# Patient Record
Sex: FEMALE | Race: WHITE | Employment: FULL TIME | URBAN - METROPOLITAN AREA
[De-identification: names, ages, dates, MRNs, and addresses within clinical notes are randomized per-mention and may not be internally consistent; named-entity substitution may affect disease eponyms.]

---

## 2021-09-20 RX ORDER — AMANTADINE HYDROCHLORIDE 100 MG/1
200 CAPSULE, GELATIN COATED ORAL 2 TIMES DAILY
COMMUNITY
Start: 2021-07-27 | End: 2021-11-24

## 2021-09-20 RX ORDER — ONDANSETRON 8 MG/1
8 TABLET, ORALLY DISINTEGRATING ORAL
COMMUNITY
Start: 2021-08-23

## 2021-09-20 RX ORDER — LEVETIRACETAM 250 MG/1
250 TABLET ORAL 2 TIMES DAILY
COMMUNITY
Start: 2021-08-26 | End: 2021-11-24

## 2021-09-20 RX ORDER — TRAZODONE HYDROCHLORIDE 50 MG/1
50 TABLET ORAL AT BEDTIME
COMMUNITY
Start: 2021-08-17 | End: 2021-09-30

## 2021-09-20 NOTE — PERIOP NOTES
PRE-SURGICAL INSTRUCTIONS        Patient's Name:  Cici Kamara      YXDIANA Date:  9/20/2021            Covid Testing Date and Time:    Surgery Date:  9/22/2021                1. Do NOT eat or drink anything, including candy, gum, or ice chips after midnight on 9/20/2021, unless you have specific instructions from your surgeon or anesthesia provider to do so.  2. You may brush your teeth before coming to the hospital.  3. No smoking 24 hours prior to the day of surgery. 4. No alcohol 24 hours prior to the day of surgery. 5. No recreational drugs for one week prior to the day of surgery. 6. Leave all valuables, including money/purse, at home. 7. Remove all jewelry, nail polish, acrylic nails, and makeup (including mascara); no lotions powders, deodorant, or perfume/cologne/after shave on the skin. 8. Follow instruction for Hibiclens washes and CHG wipes from surgeon's office. 9. Glasses/contact lenses and dentures may be worn to the hospital.  They will be removed prior to surgery. 10. Call your doctor if symptoms of a cold or illness develop within 24-48 hours prior to your surgery. 11.  If you are having an outpatient procedure, please make arrangements for a responsible ADULT TO [address] [place] [address] and stay with you for 24 hours after your surgery. 12. ONE VISITOR in the hospital at this time for outpatient procedures. Exceptions may be made for surgical admissions, per nursing unit guidelines      Special Instructions:      Bring list of CURRENT medications. Bring any pertinent legal medical records. Take these medications the morning of surgery with a sip of water:  As directed by MD  Follow physician instructions about stopping anticoagulants. On the day of surgery, come in the main entrance of [facility]. Let the  at the desk know you are there for surgery. A staff member will come escort you to the surgical area on the second floor.     If you have any questions or concerns, please do not hesitate to call:     (Prior to the day of surgery) [facility] department:  [phone]   (Day of surgery) Pre-Op department:  [phone]    These surgical instructions were reviewed with Zachariah Lim during the [facility] phone call.

## 2021-09-21 ENCOUNTER — ANESTHESIA EVENT (OUTPATIENT)
Dept: ENDOSCOPY | Age: 56
End: 2021-09-21
Payer: COMMERCIAL

## 2021-09-22 ENCOUNTER — HOSPITAL ENCOUNTER (OUTPATIENT)
Age: 56
Setting detail: OUTPATIENT SURGERY
Discharge: HOME | End: 2021-09-22
Attending: INTERNAL MEDICINE | Admitting: INTERNAL MEDICINE
Payer: COMMERCIAL

## 2021-09-22 ENCOUNTER — ANESTHESIA (OUTPATIENT)
Dept: ENDOSCOPY | Age: 56
End: 2021-09-22
Payer: COMMERCIAL

## 2021-09-22 VITALS
OXYGEN SATURATION: 99 % | TEMPERATURE: 97.4 F | HEART RATE: 80 BPM | WEIGHT: 148 LBS | HEIGHT: 62 IN | RESPIRATION RATE: 20 BRPM | SYSTOLIC BLOOD PRESSURE: 99 MMHG | DIASTOLIC BLOOD PRESSURE: 67 MMHG | BODY MASS INDEX: 27.23 KG/M2

## 2021-09-22 LAB
COVID-19 RAPID TEST, COVR: NOT DETECTED
HCG UR QL: NEGATIVE
POTASSIUM SERPL-SCNC: 2.6 MMOL/L (ref 3.5–5.5)
POTASSIUM SERPL-SCNC: 3 MMOL/L (ref 3.5–5.5)
SOURCE, COVRS: NORMAL

## 2021-09-22 PROCEDURE — 74011250637 HC RX REV CODE- 250/637: Performed by: ANESTHESIOLOGY

## 2021-09-22 PROCEDURE — 74011250636 HC RX REV CODE- 250/636: Performed by: NURSE ANESTHETIST, CERTIFIED REGISTERED

## 2021-09-22 PROCEDURE — 74011000250 HC RX REV CODE- 250: Performed by: ANESTHESIOLOGY

## 2021-09-22 PROCEDURE — 2709999900 HC NON-CHARGEABLE SUPPLY: Performed by: INTERNAL MEDICINE

## 2021-09-22 PROCEDURE — 74011250636 HC RX REV CODE- 250/636: Performed by: ANESTHESIOLOGY

## 2021-09-22 PROCEDURE — 77030019988 HC FCPS ENDOSC DISP BSC -B: Performed by: INTERNAL MEDICINE

## 2021-09-22 PROCEDURE — 87635 SARS-COV-2 COVID-19 AMP PRB: CPT

## 2021-09-22 PROCEDURE — 77030008565 HC TBNG SUC IRR ERBE -B: Performed by: INTERNAL MEDICINE

## 2021-09-22 PROCEDURE — 84132 ASSAY OF SERUM POTASSIUM: CPT

## 2021-09-22 PROCEDURE — 76060000031 HC ANESTHESIA FIRST 0.5 HR: Performed by: INTERNAL MEDICINE

## 2021-09-22 PROCEDURE — 00731 ANES UPR GI NDSC PX NOS: CPT | Performed by: NURSE ANESTHETIST, CERTIFIED REGISTERED

## 2021-09-22 PROCEDURE — 74011000250 HC RX REV CODE- 250: Performed by: NURSE ANESTHETIST, CERTIFIED REGISTERED

## 2021-09-22 PROCEDURE — 76040000019: Performed by: INTERNAL MEDICINE

## 2021-09-22 PROCEDURE — 00731 ANES UPR GI NDSC PX NOS: CPT | Performed by: ANESTHESIOLOGY

## 2021-09-22 PROCEDURE — 36415 COLL VENOUS BLD VENIPUNCTURE: CPT

## 2021-09-22 PROCEDURE — 88305 TISSUE EXAM BY PATHOLOGIST: CPT

## 2021-09-22 PROCEDURE — 81025 URINE PREGNANCY TEST: CPT

## 2021-09-22 RX ORDER — ONDANSETRON 2 MG/ML
4 INJECTION INTRAMUSCULAR; INTRAVENOUS ONCE
Status: COMPLETED | OUTPATIENT
Start: 2021-09-22 | End: 2021-09-22

## 2021-09-22 RX ORDER — ONDANSETRON 4 MG/1
4 TABLET, FILM COATED ORAL ONCE
Status: DISCONTINUED | OUTPATIENT
Start: 2021-09-22 | End: 2021-09-22

## 2021-09-22 RX ORDER — LIDOCAINE HYDROCHLORIDE 10 MG/ML
0.1 INJECTION, SOLUTION EPIDURAL; INFILTRATION; INTRACAUDAL; PERINEURAL AS NEEDED
Status: DISCONTINUED | OUTPATIENT
Start: 2021-09-22 | End: 2021-09-22 | Stop reason: HOSPADM

## 2021-09-22 RX ORDER — POTASSIUM CHLORIDE 7.45 MG/ML
10 INJECTION INTRAVENOUS ONCE
Status: COMPLETED | OUTPATIENT
Start: 2021-09-22 | End: 2021-09-22

## 2021-09-22 RX ORDER — POTASSIUM CHLORIDE 20 MEQ/1
20 TABLET, EXTENDED RELEASE ORAL
Status: DISCONTINUED | OUTPATIENT
Start: 2021-09-22 | End: 2021-09-22

## 2021-09-22 RX ORDER — PROPOFOL 10 MG/ML
INJECTION, EMULSION INTRAVENOUS AS NEEDED
Status: DISCONTINUED | OUTPATIENT
Start: 2021-09-22 | End: 2021-09-22 | Stop reason: HOSPADM

## 2021-09-22 RX ORDER — ONDANSETRON 2 MG/ML
4 INJECTION INTRAMUSCULAR; INTRAVENOUS ONCE
Status: CANCELLED | OUTPATIENT
Start: 2021-09-22 | End: 2021-09-22

## 2021-09-22 RX ORDER — POTASSIUM CHLORIDE 750 MG/1
10 TABLET, EXTENDED RELEASE ORAL
Status: DISCONTINUED | OUTPATIENT
Start: 2021-09-22 | End: 2021-09-22

## 2021-09-22 RX ORDER — SODIUM CHLORIDE 0.9 % (FLUSH) 0.9 %
5-40 SYRINGE (ML) INJECTION EVERY 8 HOURS
Status: CANCELLED | OUTPATIENT
Start: 2021-09-22

## 2021-09-22 RX ORDER — LIDOCAINE HYDROCHLORIDE 20 MG/ML
INJECTION, SOLUTION EPIDURAL; INFILTRATION; INTRACAUDAL; PERINEURAL AS NEEDED
Status: DISCONTINUED | OUTPATIENT
Start: 2021-09-22 | End: 2021-09-22 | Stop reason: HOSPADM

## 2021-09-22 RX ORDER — INSULIN LISPRO 100 [IU]/ML
INJECTION, SOLUTION INTRAVENOUS; SUBCUTANEOUS ONCE
Status: DISCONTINUED | OUTPATIENT
Start: 2021-09-22 | End: 2021-09-22 | Stop reason: HOSPADM

## 2021-09-22 RX ORDER — NALOXONE HYDROCHLORIDE 0.4 MG/ML
0.4 INJECTION, SOLUTION INTRAMUSCULAR; INTRAVENOUS; SUBCUTANEOUS AS NEEDED
Status: CANCELLED | OUTPATIENT
Start: 2021-09-22

## 2021-09-22 RX ORDER — SODIUM CHLORIDE, SODIUM LACTATE, POTASSIUM CHLORIDE, CALCIUM CHLORIDE 600; 310; 30; 20 MG/100ML; MG/100ML; MG/100ML; MG/100ML
25 INJECTION, SOLUTION INTRAVENOUS CONTINUOUS
Status: DISCONTINUED | OUTPATIENT
Start: 2021-09-22 | End: 2021-09-22 | Stop reason: HOSPADM

## 2021-09-22 RX ORDER — SODIUM CHLORIDE 0.9 % (FLUSH) 0.9 %
5-40 SYRINGE (ML) INJECTION AS NEEDED
Status: CANCELLED | OUTPATIENT
Start: 2021-09-22

## 2021-09-22 RX ORDER — POTASSIUM CHLORIDE 14.9 MG/ML
10 INJECTION INTRAVENOUS ONCE
Status: DISCONTINUED | OUTPATIENT
Start: 2021-09-22 | End: 2021-09-22

## 2021-09-22 RX ORDER — ONDANSETRON 2 MG/ML
INJECTION INTRAMUSCULAR; INTRAVENOUS AS NEEDED
Status: DISCONTINUED | OUTPATIENT
Start: 2021-09-22 | End: 2021-09-22 | Stop reason: HOSPADM

## 2021-09-22 RX ORDER — FENTANYL CITRATE 50 UG/ML
25 INJECTION, SOLUTION INTRAMUSCULAR; INTRAVENOUS AS NEEDED
Status: CANCELLED | OUTPATIENT
Start: 2021-09-22

## 2021-09-22 RX ADMIN — FAMOTIDINE: 10 INJECTION INTRAVENOUS at 09:09

## 2021-09-22 RX ADMIN — LIDOCAINE HYDROCHLORIDE 100 MG: 20 INJECTION, SOLUTION EPIDURAL; INFILTRATION; INTRACAUDAL; PERINEURAL at 12:46

## 2021-09-22 RX ADMIN — SODIUM CHLORIDE, SODIUM LACTATE, POTASSIUM CHLORIDE, AND CALCIUM CHLORIDE 25 ML/HR: 600; 310; 30; 20 INJECTION, SOLUTION INTRAVENOUS at 08:22

## 2021-09-22 RX ADMIN — ONDANSETRON 4 MG: 2 INJECTION INTRAMUSCULAR; INTRAVENOUS at 12:46

## 2021-09-22 RX ADMIN — POTASSIUM BICARBONATE 25 MEQ: 978 TABLET, EFFERVESCENT ORAL at 10:53

## 2021-09-22 RX ADMIN — POTASSIUM CHLORIDE 10 MEQ: 7.46 INJECTION, SOLUTION INTRAVENOUS at 10:53

## 2021-09-22 RX ADMIN — PROPOFOL 100 MG: 10 INJECTION, EMULSION INTRAVENOUS at 12:51

## 2021-09-22 RX ADMIN — PROPOFOL 100 MG: 10 INJECTION, EMULSION INTRAVENOUS at 12:46

## 2021-09-22 RX ADMIN — ONDANSETRON 4 MG: 2 INJECTION INTRAMUSCULAR; INTRAVENOUS at 10:55

## 2021-09-22 NOTE — DISCHARGE INSTRUCTIONS
Upper GI Endoscopy: What to Expect at [address] [place] [address]  After you have an endoscopy, you will stay at the hospital or clinic for 1 to 2 hours. This will allow the medicine to wear off. You will be able to go home after your doctor or nurse checks to make sure you are not having any problems. You may have to stay overnight if you had treatment during the test. You may have a sore throat for a day or two after the test.  This care sheet gives you a general idea about what to expect after the test.  How can you care for yourself at home? Activity  · Rest as much as you need to after you go home. · You should be able to go back to your usual activities the day after the test.  Diet  · Follow your doctor's directions for eating after the test.  · Drink plenty of fluids (unless your doctor has told you not to). Medications  · If you have a sore throat the day after the test, use an over-the-counter spray to numb your throat. Follow-up care is a key part of your treatment and safety. Be sure to make and go to all appointments, and call your doctor if you are having problems. It's also a good idea to know your test results and keep a list of the medicines you take. When should you call for help? Call 911 anytime you think you may need emergency care. For example, call if:  · You passed out (lost consciousness). · You cough up blood. · You vomit blood or what looks like coffee grounds. · You pass maroon or very bloody stools. Call your doctor now or seek immediate medical care if:  · You have trouble swallowing. · You have belly pain. · Your stools are black and tarlike or have streaks of blood. · You are sick to your stomach or cannot keep fluids down. Watch closely for changes in your health, and be sure to contact your doctor if:  · Your throat still hurts after a day or two. · You do not get better as expected. Where can you learn more?    Go to DealExplorer.be  Enter J454 in the search box to learn more about \"Upper GI Endoscopy: What to Expect at Home. \"   © 5830-5191 Healthwise, Incorporated. Care instructions adapted under license by [employer] (which disclaims liability or warranty for this information). This care instruction is for use with your licensed healthcare professional. If you have questions about a medical condition or this instruction, always ask your healthcare professional. [address] any warranty or liability for your use of this information. Content Version: 65.3.271923; Current as of: November 14, 2014    Patient Education        Esophageal Dilation: What to Expect at [address] had an esophageal dilation. This procedure can open up narrow areas of the esophagus. After the procedure, you will stay at the hospital or surgery center for 1 to 2 hours. This will allow the medicine to wear off. You will be able to go home after your doctor or nurse checks to make sure that you're not having any problems. This care sheet gives you a general idea about how long it will take for you to recover. But each person recovers at a different pace. Follow the steps below to get better as quickly as possible. How can you care for yourself at home? Activity    · Rest as much as you need to after you go home.     · You should be able to go back to your usual activities the day after the procedure. Diet    · Follow your doctor's directions for eating after the procedure.     · Drink plenty of fluids (unless your doctor has told you not to). Medicines    · Your doctor will tell you if and when you can restart your medicines. He or she will also give you instructions about taking any new medicines.     · If you take aspirin or some other blood thinner, ask your doctor if and when to start taking it again.  Make sure that you understand exactly what your doctor wants you to do.     · If you have a sore throat the day after the procedure, use an over-the-counter spray to numb your throat. Sucking on throat lozenges and gargling with warm salt water may also help relieve your symptoms. Follow-up care is a key part of your treatment and safety. Be sure to make and go to all appointments, and call your doctor if you are having problems. It's also a good idea to know your test results and keep a list of the medicines you take. When should you call for help? Call 911 anytime you think you may need emergency care. For example, call if:    · You passed out (lost consciousness).     · You have trouble breathing.     · Your stools are maroon or very bloody   Call your doctor now or seek immediate medical care if:    · You have new or worse belly pain.     · You have a fever.     · You have new or more blood in your stools.     · You are sick to your stomach and cannot drink fluids.     · You cannot pass stools or gas.     · You have pain that does not get better after you take pain medicine. Watch closely for changes in your health, and be sure to contact your doctor if:    · Your throat still hurts after a day or two.     · You do not get better as expected. Where can you learn more? Go to http://www.gray.com/  Enter J014 in the search box to learn more about \"Esophageal Dilation: What to Expect at Home. \"  Current as of: February 10, 2021               Content Version: 13.0  © 7913-9818 Healthwise, Incorporated. Care instructions adapted under license by [employer] (which disclaims liability or warranty for this information). If you have questions about a medical condition or this instruction, always ask your healthcare professional. [place] [address] any warranty or liability for your use of this information. Patient Education        Learning About Swallowing Problems  What are swallowing problems? Certain health problems that affect the nervous system can cause trouble swallowing. These conditions include stroke, ALS (also known as Margarita Gehrig's disease), Parkinson's disease, and multiple sclerosis. The muscles and nerves that help move food through the throat and esophagus may not work right. Growths, such as cancer, and other problems with your esophagus can also make it hard to swallow. The esophagus is the tube that leads from your throat to your stomach. How are swallowing problems diagnosed? A doctor or speech therapist will examine you to check for swallowing problems. You may get swallowing tests to check how well your throat muscles work. For these tests, you swallow a special liquid that helps the doctor see your throat and esophagus on an X-ray or video screen. Other tests use a thin, flexible tube called a scope to check for problems with your esophagus. The doctor puts the scope in your mouth and down your throat to look at your esophagus. What are the symptoms? Symptoms of swallowing problems may include:  · Trouble getting food or liquids to go down on the first try. · Gagging, choking, or coughing when you swallow. · Having food or liquids come back up through your throat, mouth, or nose after you swallow. · Feeling like foods or liquids are stuck in some part of your throat or chest.  · Pain when you swallow. How are swallowing problems treated? How swallowing problems are treated depends on the cause. The main goals of treatment will be to help you eat and swallow safely and get good nutrition. This is important for your health and quality of life. You may learn exercises to train your throat muscles to work together so you're able to swallow better. Learning certain ways to put food in your mouth or to position your head while eating may also help. Your doctor or a speech therapist may recommend changes to your diet to help make it easier to swallow. You may need to avoid certain foods or liquids.  You also may need to change the thickness of foods or liquids in your diet. To eat and swallow safely, follow any instructions you get from your doctor or therapist. These ideas may help:  · Sit upright when eating, drinking, and taking pills. · Take small bites of food. Chew completely and swallow before taking another bite. · Take small sips of liquids. · If eating makes you tired, eat smaller but more frequent meals. · Tip your chin down when there is food in your mouth. Where can you learn more? Go to http://www.gray.com/  Enter D018 in the search box to learn more about \"Learning About Swallowing Problems. \"  Current as of: July 1, 2021               Content Version: 13.0  © 3823-7402 [employer]. Care instructions adapted under license by [employer] (which disclaims liability or warranty for this information). If you have questions about a medical condition or this instruction, always ask your healthcare professional. [place] [address] any warranty or liability for your use of this information. DISCHARGE SUMMARY from Nurse     POST-PROCEDURE INSTRUCTIONS:    Call your Physician if you:  ? Observe any excess bleeding. ? Develop a temperature over 100.5o F.  ? Experience abdominal, shoulder or chest pain. ? Notice any signs of decreased circulation or nerve impairment to an extremity such as a change in color, persistent numbness, tingling, coldness or increase in pain. ? Vomit blood or you have nausea and vomiting lasting longer than 4 hours. ? Are unable to take medications. ? Are unable to urinate within 8 hours after discharge following general anesthesia or intravenous sedation. For the next 24 hours after receiving general anesthesia or intravenous sedation, or while taking prescription Narcotics, limit your activities:  ? Do NOT drive a motor vehicle, operate hazard machinery or power tools, or perform tasks that require coordination.   The medication you received during your procedure may have some effect on your mental awareness. ? Do NOT make important personal or business decisions. The medication you received during your procedure may have some effect on your mental awareness. ? Do NOT drink alcoholic beverages. These drinks do not mix well with the medications that have been given to you. ? Upon discharge from the hospital, you must be accompanied by a responsible adult. ? Resume your diet as directed by your physician. ? Resume medications as your physician has prescribed. ? Please give a list of your current medications to your Primary Care Provider. ? Please update this list whenever your medications are discontinued, doses are changed, or new medications (including over-the-counter products) are added. ? Please carry medication information at all times in case of emergency situations. These are general instructions for a healthy lifestyle:    No smoking/ No tobacco products/ Avoid exposure to second hand smoke.  Surgeon General's Warning:  Quitting smoking now greatly reduces serious risk to your health. Obesity, smoking, and a sedentary lifestyle greatly increase your risk for illness.  A healthy diet, regular physical exercise & weight monitoring are important for maintaining a healthy lifestyle   You may be retaining fluid if you have a history of heart failure or if you experience any of the following symptoms:  Weight gain of 3 pounds or more overnight or 5 pounds in a week, increased swelling in our hands or feet or shortness of breath while lying flat in bed. Please call your doctor as soon as you notice any of these symptoms; do not wait until your next office visit. Colorectal Screening   Colorectal cancer almost always develops from precancerous polyps (abnormal growths) in the colon or rectum. Screening tests can find precancerous polyps, so that they can be removed before they turn into cancer.  Screening tests can also find colorectal cancer early, when treatment works best.  [facility] Speak with your physician about when you should begin screening and how often you should be tested  [facility]   Additional Information    Educational references and/or instructions provided during this visit included:    See attached. APPOINTMENTS:    Per MD Instruction. Discharge information has been reviewed with the patient. The patient verbalized understanding.

## 2021-09-22 NOTE — H&P
[facility]    [email or web address]      Impression:   1.dyspahgia       Plan:     1. egd dil mac all risks benfits and alt discussed       Chief Complaint: dyspahgai       HPI:  Tolu Curry is a 54 y.o. female who is being seen on consult for dysphagia . PMH:   Past Medical History:   Diagnosis Date    Arthritis     Diarrhea     no diarrhea since 9/14/2021    Encephalitis 06/14/2021    intubated    GERD (gastroesophageal reflux disease)     H. pylori infection 2019    IBS (irritable bowel syndrome)     Memory difficulty     since encephalitis    Migraine     Ovarian cyst 2019    Seizures ([address] 06/16/2021    x 1    Vomiting        PSH:   Past Surgical History:   Procedure Laterality Date    HX BREAST AUGMENTATION  2005    HX CHOLECYSTECTOMY      HX COLONOSCOPY  2016    HX ENDOSCOPY  2019    HX TYMPANOPLASTY      left x 5 times       Social HX:   Social History     Socioeconomic History    Marital status:      Spouse name: Not on file    Number of children: Not on file    Years of education: Not on file    Highest education level: Not on file   Occupational History    Not on file   Tobacco Use    Smoking status: Never Smoker    Smokeless tobacco: Never Used   Substance and Sexual Activity    Alcohol use: Yes     Alcohol/week: 1.0 standard drinks     Types: 1 Glasses of wine per week     Comment: occasional    Drug use: Never    Sexual activity: Not on file   Other Topics Concern    Not on file   Social History Narrative    Not on file     Social Determinants of Health     Financial Resource Strain:     Difficulty of Paying Living Expenses:    Food Insecurity:     Worried About Running Out of Food in the Last Year:     [address] in the Last Year:    Transportation Needs:     Lack of Transportation (Medical):      Lack of Transportation (Non-Medical):    Physical Activity:     Days of Exercise per Week:     Minutes of Exercise per Session:    Stress:     Feeling of Stress :    Social Connections:     Frequency of Communication with Friends and Family:     Frequency of Social Gatherings with Friends and Family:     Attends Yaz[employer] Services:     Active Member of Clubs or Organizations:     Attends Club or Organization Meetings:     Marital Status:    Intimate Partner Violence:     Fear of Current or Ex-Partner:     Emotionally Abused:     Physically Abused:     Sexually Abused:        FHX:   History reviewed. No pertinent family history. Allergy:   Allergies   Allergen Reactions    Minocycline Other (comments)     dizzy       Home Medications:     Medications Prior to Admission   Medication Sig    levETIRAcetam (KEPPRA) 250 mg tablet Take 250 mg by mouth two (2) times a day.  amantadine HCL (SYMMETREL) 100 mg capsule Take 200 mg by mouth two (2) times a day.  traZODone (DESYREL) 50 mg tablet Take 50 mg by mouth At bedtime.  ondansetron (ZOFRAN ODT) 8 mg disintegrating tablet Take 8 mg by mouth every eight (8) hours as needed. Review of Systems:     Constitutional: No fevers, chills, weight loss, fatigue. Skin: No rashes, pruritis, jaundice, ulcerations, erythema. HENT: No headaches, nosebleeds, sinus pressure, rhinorrhea, sore throat. Eyes: No visual changes, blurred vision, eye pain, photophobia, jaundice. Cardiovascular: No chest pain, heart palpitations. Respiratory: No cough, SOB, wheezing, chest discomfort, orthopnea. Gastrointestinal: dyspahgia    Genitourinary: No dysuria, bleeding, discharge, pyuria. Musculoskeletal: No weakness, arthralgias, wasting. Endo: No sweats. Heme: No bruising, easy bleeding. Allergies: As noted. Neurological: Cranial nerves intact. Alert and oriented. Gait not assessed. Psychiatric:  No anxiety, depression, hallucinations.                  Visit Vitals  Ht 5' 2\" (1.575 m)   Wt 68 kg (150 lb)   LMP 04/01/2019 Comment: unknown   BMI 27.44 kg/m²       Physical Assessment:     constitutional: appearance: well developed, well nourished, normal habitus, no deformities, in no acute distress. skin: inspection: no rashes, ulcers, icterus or other lesions; no clubbing or telangiectasias. palpation: no induration or subcutaneos nodules. eyes: inspection: normal conjunctivae and lids; no jaundice pupils: symmetrical, normoreactive to light, normal accommodation and size. ENMT: mouth: normal oral mucosa,lips and gums; good dentition. oropharynx: normal tongue, hard and soft palate; posterior pharynx without erythema, exudate or lesions. neck: no masses organomegaly or tenderness. respiratory: effort: normal chest excursion; no intercostal retraction or accessory muscle use. cardiovascular: abdominal aorta: normal size and position; no bruits. palpation: PMI of normal size and position; normal rhythm; no thrill or murmurs. abdominal: abdomen: normal consistency; no tenderness or masses. hernias: no hernias appreciated. liver: normal size and consistency. spleen: not palpable. rectal: hemoccult/guaiac: not performed. musculoskeletal: no deformities or muscle wasting   lymphatic: axilae: not palpable. groin: not palpable. neck: within normal limits. other: not palpable. neurologic: cranial nerves: II-XII normal.   psychiatric: judgement/insight: within normal limits. memory: within normal limits for recent and remote events. mood and affect: no evidence of depression, anxiety or agitation. orientation: oriented to time, space and person. Basic Metabolic Profile   No results for input(s): NA, K, CL, CO2, BUN, GLU, CA, MG, PHOS in the last 72 hours. No lab exists for component: CREAT      CBC w/Diff    No results for input(s): WBC, RBC, HGB, HCT, MCV, MCH, MCHC, RDW, PLT, HGBEXT, HCTEXT, PLTEXT in the last 72 hours.     No lab exists for component: MPV No results for input(s): GRANS, LYMPH, EOS, PRO, MYELO, METAS, BLAST in the last 72 hours. No lab exists for component: MONO, BASO     Hepatic Function   No results for input(s): ALB, TP, TBILI, AP, AML, LPSE in the last 72 hours. No lab exists for component: DBILI, GPT, SGOT       Gamaliel Malhotra MD, M.D. [facility], [address] [place] [address]  [email or web address]

## 2021-09-22 NOTE — ANESTHESIA PREPROCEDURE EVALUATION
Anesthetic History     PONV          Review of Systems / Medical History  Patient summary reviewed, nursing notes reviewed and pertinent labs reviewed    Pulmonary  Within defined limits                 Neuro/Psych     seizures        Comments: 06/2021 Encephalitis, residual left weakness Cardiovascular  Within defined limits                     GI/Hepatic/Renal     GERD           Endo/Other  Within defined limits           Other Findings            Physical Exam    Airway  Mallampati: I  TM Distance: 4 - 6 cm  Neck ROM: normal range of motion   Mouth opening: Normal     Cardiovascular    Rhythm: regular           Dental    Dentition: Upper dentition intact and Lower dentition intact     Pulmonary  Breath sounds clear to auscultation               Abdominal  GI exam deferred       Other Findings            Anesthetic Plan    ASA: 3  Anesthesia type: MAC            Anesthetic plan and risks discussed with: Patient

## 2021-09-22 NOTE — PROGRESS NOTES
Apply ice 20 minutes at a time for comfort. May take Tylenol or Ibuprofen as needed for pain. Weight bearing as tolerated. Follow up with PCP in 7-10 days if foot is not improving. For rash, start giving Children's Claritin daily to help with itching. May apply anti-itch cream as needed. Monitor for signs of infection. Follow up with PCP if rash does not improve in 7 days, sooner if rash worsens.     Foot Sprain    A sprain is a stretching or tearing of the ligaments that hold a joint together. There are no broken bones. Sprains generally take from 3-6 weeks to heal. A sprain may be treated with a splint, walking cast, or special boot. Mild sprains may not need any additional support.  Home care  The following guidelines will help you care for your injury at home:  · Keep your leg elevated when sitting or lying down. This is very important during the first 48 hours to reduce swelling. Stay off the injured foot as much as possible until you can walk on it without pain. If needed, you may use crutches during the first week for this purpose. Crutches can be rented at many pharmacies or surgical/orthopedic supply stores.  · You may be given a cast shoe to wear to prevent movement in your foot. If not, you can use a sandal or any shoe that does not put pressure on the injured area until the swelling and pain go away. If using a sandal, be careful not to hit your foot against anything, since another injury could make the sprain worse.  · Apply an ice pack over the injured area for 15 to 20 minutes every 3 to 6 hours. You should do this for the first 24 to 48 hours. You can make an ice pack by filling a plastic bag that seals at the top with ice cubes and then wrapping it with a thin towel. Continue to use ice packs for relief of pain and swelling as needed. As the ice melts, avoid getting any wrap, splint, or cast wet. After 48 hours, apply heat from a warm shower or bath for 20 minutes several times  Potassium very low , anesthesia ordered oral and iv re[placement and we are cleared to proceed. daily. Alternating ice and heat may also be helpful.  · You may use over-the-counter pain medicine to control pain, unless another medicine was prescribed. If you have chronic liver or kidney disease or ever had a stomach ulcer or GI bleeding, talk with your healthcare provider before using these medicines.  · If you were given a splint or cast, keep it dry. Bathe with your splint or cast well out of the water, protected with 2 large plastic bags, rubber-banded at the top end. If a fiberglass splint or cast gets wet, you can dry it with a hair dryer.  · You may return to sports after healing, when you can run without pain.  Follow-up care  Follow up with your healthcare provider as directed. Sometimes fractures don’t show up on the first X-ray. Bruises and sprains can sometimes hurt as much as a fracture. These injuries can take time to heal completely. If your symptoms don’t improve or they get worse, talk with your healthcare provider. You may need a repeat X-ray.  When to seek medical advice  Call your healthcare provider right away if any of these occur:  · The plaster cast or splint gets wet or soft  · The fiberglass cast or splint gets wet and does not dry for 24 hours  · Pain or swelling increases, or redness appears  · A bad odor comes from within the cast  · Fever of 100.4°F (38°C) or above lasting for 24 to 48 hours  · Toes on the injured foot become cold, blue, numb, or tingly  Date Last Reviewed: 11/20/2015  © 5442-4571 [employer]. [address] [place] [address]. All rights reserved. This information is not intended as a substitute for professional medical care. Always follow your healthcare professional's instructions.             Poison Ivy Dermatitis (Child)  Poison ivy dermatitis is a skin rash. It is caused by the oil found in the poison ivy plant. The oil is called urushiol. Symptoms can start within a few hours to a few days after contact with the plant. They include an  itchy rash, redness, and swelling of the skin. Small blisters can form, which can then break and leak fluid. This fluid is not contagious to others. Only the plant oil can cause rash. The rash usually starts to go away after 1 to 2 weeks, but it may take 4 to 6 weeks to fully clear.  Home care  Your child’s healthcare provider may prescribe medicine to help relieve itching and swelling. These may include steroid cream, antihistamines, or calamine lotion. For more severe cases, oral medicine or medicine injected into a muscle  may be given. Follow all instructions for giving medicines to your child.  The diagnosis is usually made by the clinical appearance and the history.  General care  · Follow the healthcare provider’s instructions on how to care for your child’s rash.  · Wash your hands with soap and warm water before and after caring for your child.  · The rash can spread if traces of the plant oil remain on your child’s skin. Gently wash the affected areas of the skin with soap and warm water. If needed, over-the-counter products can be used to help remove the plant oil from the skin shortly after exposure.  · Bathe your child in cool water. Adding oatmeal powder or aluminum acetate powder to the water may help soothe itchy skin. These are available over the counter.  · Expose the affected skin to the air so that it dries completely. Do not use a hair dryer on the skin.  · Dress your child in loose cotton clothing.  · Make sure your child does not scratch the affected area. This can delay healing. It can also cause a bacterial infection. You may need to use soft “scratch mittens” that cover your child’s hands. Keep your child’s nails trimmed short. You may need to put gloves on small children to prevent scratching. Hydrocortisone cream (1%) is available over the counter and can reduce itching. Benadryl may be given by mouth if the itching is bothersome.  · Put cold compresses on your child’s sores to help  soothe symptoms. Do this for 30 minutes 3 to 4 times a day. You can make a cold compress by soaking a cloth in cold water. Squeeze out excess water. You can add colloidal oatmeal to the water to help reduce itching.  · You can also help relieve large areas of itching by giving your child a lukewarm bath with colloidal oatmeal added to the water.  · Make sure to wash the clothes your child was wearing when in contact with the plant. This washes away the plant oil that gave your child the rash and prevent more or worse symptoms.  · Check your child’s skin every day for the signs of a worsening rash or infection listed below.  Prevention  Follow these steps to help prevent poison ivy dermatitis:  · If your child is exposed to poison ivy, use a poison ivy wash to remove the plant oil from the skin. Poison ivy wash can be bought in a drugstore with no prescription. The sooner you remove the oil, the more it will help prevent rash. The oil can irritate the skin quickly, but a wash it can still help hours later.  · Wash anything that may have touched the plant oil. This includes clothing, shoes, and toys. Oil can stay on these items for weeks if not washed.  · The oil can also get on a pet’s fur. If your pet has been in an area where there is poison ivy, clean your pet’s fur. Otherwise the animal can rub the oil on you and your children.  · If your child has very sensitive skin, he or she should wear long shirts, pants, or gloves even when it is hot outside. Learn what the plant looks like to avoid touching it.  · If your child is very sensitive, consider using an ivy block skin product before they are potentially exposed. There is no guarantee that this will work, however.  Follow-up care  Follow up with your child’s healthcare provider, or as advised. Contact your child’s healthcare provider if the rash is not better in 2 weeks.  Special note to parents  Wash your hands well with soap and warm water before and after  caring for your child. This helps prevent infection.  When to seek medical advice  Call your child’s healthcare provider right away if any of these occur:  · Redness or swelling that gets worse  · Symptoms that don’t get better after 1 week of treatment  · Rash spreads to the face or groin, causing swelling  · Pain, redness, or swelling that gets worse  · Foul-smelling fluid leaking from the skin  · Fussiness or crying that cannot be soothed  · Fever as directed by your healthcare professional or:  ? Your child is younger than 12 weeks and has a fever of 100.4°F (38°C) or higher because your baby may need to be seen by his or her healthcare provider  ? Your child has repeated fevers above 104°F (40°C) at any age  ? Your child is younger than 2 years old and his or her fever continues for more than 24 hours or your child is 2 years old or older and his or her fever continues for more than 3 days  Date Last Reviewed: 12/24/2015  © 3123-3036 [employer], [employer]. [address] [place] [address], [place] [address]. All rights reserved. This information is not intended as a substitute for professional medical care. Always follow your healthcare professional's instructions.

## 2021-09-22 NOTE — ANESTHESIA POSTPROCEDURE EVALUATION
Procedure(s):  UPPER ENDOSCOPY / dilation w bx's. MAC    Anesthesia Post Evaluation      Multimodal analgesia: multimodal analgesia used between 6 hours prior to anesthesia start to PACU discharge  Patient location during evaluation: bedside  Patient participation: complete - patient participated  Level of consciousness: awake  Pain management: adequate  Airway patency: patent  Anesthetic complications: no  Cardiovascular status: stable  Respiratory status: acceptable  Hydration status: acceptable  Post anesthesia nausea and vomiting:  controlled  Final Post Anesthesia Temperature Assessment:  Normothermia (36.0-37.5 degrees C)      INITIAL Post-op Vital signs:   Vitals Value Taken Time   BP 93/49 09/22/21 1306   Temp     Pulse 78 09/22/21 1316   Resp 21 09/22/21 1316   SpO2 99 % 09/22/21 1316   Vitals shown include unvalidated device data.

## 2021-09-28 ENCOUNTER — HOME HEALTH ADMISSION (OUTPATIENT)
Dept: HOME HEALTH SERVICES | Facility: HOME HEALTH | Age: 56
End: 2021-09-28
Payer: COMMERCIAL

## 2021-09-30 ENCOUNTER — HOME CARE VISIT (OUTPATIENT)
Dept: SCHEDULING | Facility: HOME HEALTH | Age: 56
End: 2021-09-30
Payer: COMMERCIAL

## 2021-09-30 PROCEDURE — 400013 HH SOC

## 2021-09-30 PROCEDURE — 400018 HH-NO PAY CLAIM PROCEDURE

## 2021-09-30 PROCEDURE — G0153 HHCP-SVS OF S/L PATH,EA 15MN: HCPCS

## 2021-10-01 ENCOUNTER — HOME CARE VISIT (OUTPATIENT)
Dept: SCHEDULING | Facility: HOME HEALTH | Age: 56
End: 2021-10-01
Payer: COMMERCIAL

## 2021-10-01 ENCOUNTER — HOME CARE VISIT (OUTPATIENT)
Dept: HOME HEALTH SERVICES | Facility: HOME HEALTH | Age: 56
End: 2021-10-01
Payer: COMMERCIAL

## 2021-10-01 VITALS
HEART RATE: 85 BPM | RESPIRATION RATE: 18 BRPM | OXYGEN SATURATION: 95 % | DIASTOLIC BLOOD PRESSURE: 75 MMHG | TEMPERATURE: 98.1 F | SYSTOLIC BLOOD PRESSURE: 104 MMHG | RESPIRATION RATE: 18 BRPM | HEART RATE: 81 BPM | TEMPERATURE: 98.1 F | OXYGEN SATURATION: 97 %

## 2021-10-01 PROCEDURE — G0151 HHCP-SERV OF PT,EA 15 MIN: HCPCS

## 2021-10-01 PROCEDURE — G0153 HHCP-SVS OF S/L PATH,EA 15MN: HCPCS

## 2021-10-01 NOTE — HOME HEALTH
[address]  RECENT HX OF CURRENT ILLNESS/REASON FOR REFERRAL:  patient was referred to [facility] - INPATIENT following Face to Face encounter on 8/26/21 by Riky Urena NP under the supervision of Adeola May MD.  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 PLOF/DIET/COMMUNICATION:  Independent  . PAST MEDICAL HISTORY:    Diarrhea        no diarrhea since 9/14/2021    Encephalitis 06/14/2021      intubated    GERD (gastroesophageal reflux disease)      H. pylori infection 2019    IBS (irritable bowel syndrome)      Memory difficulty        since encephalitis    Migraine      Ovarian cyst 2019    Seizures ([facility] [address] 06/16/2021      x 1    Vomiting   . CURRENT RESIDENCE/LIVING SITUATION: Patient lives in 2 story home with  and 1 child. .  EDUCATIONAL LEVEL:        ___POST SECONDARY EDUCATION  . SUBJECTIVE (PT/FAMILY COMMENTS, THERAPIST OBSERVATIONS):  is present, patient is feeling nauseated, relating difficulties she is having completing ADLs due to  difficulties with thought processing and memory   . MEDICATIONS REVIEWED: PROBLEMS, NO PROBLEMS   . CAREGIVER INVOLVEMENT:caregivers provide assistance with medications, meals and ADLs  . ASSESSMENT OF INTERVENTION / PATIENT PROGRESS / Cris Bennett / PLAN:  Patient received the Swallow Ability and Function Examination scoring WFL for thin liquids. Solids were not assessed due to patient's gag response upon presentation of solid trials. Further assessment of swallow function is needed to complete examination. Cognitive linguistic assessment if remarkable for Mild Neurocognitive Disorder scoring  a 22 of 30 on the [address] Status Examination. Specifically, patient demonstrates deficits in short term recall working memory, money management, recall of paragraph [employer] and visual spatial tasks.   Portions of the BURNS Inventory of Language and Cognition were also remarkable for deficits in executive function skills and sequencing. Patient is currently unable to manage her finances, appointments and complete tasks of ADLs due to above deficits. Speech therapy is medically necessary to increase cognitive linguistic function and continue assessment of swallow function s/p encephalitis. SLP collaborated with PT on 10/2/2021 regardign functional status, mobility, gait and ADLs  . MD NOTIFIED OF POC AND FREQUENCY    . PATIENT GOALS:  get my memory back  . ASSESSMENT/RESPONSE TO TEACHING:patient is motivated to achieve her goals and return to work  . HOME EXERCISE PROGRAM: notebook to keep daily schedule  . EDUCATION PROVIDED:STPOC  .   DISCHARGE PLANNING - (ANTICIPATED DC DATE, DC PLAN):  7 more visit or when goals met/max benefits achieved

## 2021-10-02 VITALS
OXYGEN SATURATION: 98 % | HEART RATE: 70 BPM | SYSTOLIC BLOOD PRESSURE: 120 MMHG | TEMPERATURE: 97.5 F | DIASTOLIC BLOOD PRESSURE: 82 MMHG

## 2021-10-02 NOTE — HOME HEALTH
Teodoro Merrill PATIENT EDUCATION PROVIDED THIS VISIT:  Home safety to include stair safety and fall prevention, HEP, walking, postureal correction using stretch to anterior chest and strengthening to upper back:  scapular retractions, posterior shoulder rolls, pecs stretches. HEP consisting of:  1. Walking every hour during the day as tolerated to prevent being sedentary. 2. Seated sit to stand 1-5 times a day from dining room chair, no hands. 3. Deep breathing   Written HEP issued, patient/caregiver verbalized understanding; however, will need reinforcement and continued education for Progression of HEP to include balance and general strengthening exercises. .  ASSESSMENT AND CONTINUED NEED FOR THE FOLLOWING SKILLS: HHPT is needed to restore functional independence through skilled interventions in strength, HEP, transfer and gait training, balance training. Patient reports that she experiences vertigo symptoms when she moves her head as during some transfers or when looking up or around. This needs to be addressed through physical therapy in order to prevent a fall and to restore independent function. Patient is at risk for falls due to vertigo, and due to her scores on Tinetti, TUG and FTSST, and HHPT is medically necessary to improve safety in the home. POC:  Patient/caregiver instructed on POC and are agreeable to POC at this time. PLAN: PT 1w1, 2w4, 1w1    DISCHARGE PLANNING DISCUSSED: Discharge to self and family under MD supervision once all goals have been met or patient has reached max potential. Patient/caregiver verbalized understanding. PMHx:    List of Comorbitites and PMH:   Reduced hearing L ear, reconstructive surgery L ear.    Arthritis      Diarrhea  -no diarrhea since 9/14/2021    Encephalitis 06/14/2021- intubated    GERD (gastroesophageal reflux disease)      H. pylori infection 2019    IBS (irritable bowel syndrome)      Memory difficulty since encephalitis    Migraine      Ovarian cyst 2019    Seizures 06/16/2021 x 1    Vomiting   . SUBJECTIVE:  Patient is a 64 y.o. female s/p a prolonged hospitalization for Encephalitis due to HSV. The patient was hospitalized, then sent to rehab prior to d/c home. During the initial part of her hospital stay she was intubated. She had 1 seizure in June 2021. Today, she presents with nausea and vomiting and poor ability to eat, hold food and water down. She reports that once she can eat she believes she will begin to feel better. At this time the cause of the nv is unknown. The patient also reports vertigp symptoms when she turns her head in specific ways, such as looking up. She reports the vertigo can cause her to fall back or swoon, and usually occurs when she is sitting and looking up. LIVING SITUATION:  Patient lives in a 2 story home with her  and son. Her  is present and is available to assist, as he works from home at this time. There are about 6 steps to enter home, handrails. REQUIRES CAREGIVER ASSISTANCE FOR:  Meals, meds, supervision with ADLs and walking tdue to impaired balance. PLOF:  Indep, driving, working outside the home. [facility] MEDICATIONS:  No changes in meds since admission yesterday. NEXT MD APPT:  TBD. Patient/caregiver encouraged/instructed to keep appointment as lack of follow through with physician appointment could result in discontinuation of home care services for non-compliance. .  ROM:  BLEs WFL  STRENGTH:   B Hips and knees:  3+/5, see PT ROM for details. 3+/5 indicates weakness per MMT. FTSST:  20.1 seconds, indicates a fall risk if 12 seconds or higher, and a risk for repeated falls if 15 seconds or higher, indiicating functional weakness. WOUNDS:  n/a. BED MOBILITY:   Uses step stool to get in forward to knees then sidelying. Coming out uses bedrail assist bar. Indep/ supervision for safety due to weakness and impaired balance.   TRANSFERS: Uses hands for touch support in and out of chairs, toilet, couch. Able to stand from dining room chair without hands and completed FTSST:  20.1 seconds. GAIT: No AD, Supervision due to impaired balance. Gait characterized by veers off to the side, flexed posture, uneven step lengths. Cues/instruction provided for posture. STAIRS:  Step over step up and down, hands on rail, mild short of breath after each, supervision level for safety. BALANCE:  Pt scored 19/28 on Tinetti Balance Assessment placing patient at medium risk for falls.

## 2021-10-02 NOTE — CASE COMMUNICATION
Therapy Functional Score Assessment- OT  Question   Score   Grooming  1       Upper Dressing 1      Lower Dressing 1      Bathing  3      Toilet Transfer  1    Transfer  1      OE580l Mobility  Lying to sitting on side of bed  SOC 5  Goal    6          Ambulation  3   Dyspnea                     1       Pain Interfering with activity  0   Est number therapy visits      10    [id number]: Self Care   SOC/MIGUEL  DC Goal  A. Eating   5      6  B. Oral Hygeine   5      6  C. Toileting Hygeine  4      6  E. Shower/Bathe Self:  4      6  F. UB Dressing   4                            6   G. LB Dressing   4      6  H.  Red/doff footwear  4      6

## 2021-10-05 ENCOUNTER — HOME CARE VISIT (OUTPATIENT)
Dept: SCHEDULING | Facility: HOME HEALTH | Age: 56
End: 2021-10-05
Payer: COMMERCIAL

## 2021-10-05 VITALS
OXYGEN SATURATION: 93 % | RESPIRATION RATE: 18 BRPM | HEART RATE: 81 BPM | DIASTOLIC BLOOD PRESSURE: 80 MMHG | SYSTOLIC BLOOD PRESSURE: 103 MMHG | TEMPERATURE: 98.1 F

## 2021-10-05 VITALS
OXYGEN SATURATION: 98 % | RESPIRATION RATE: 16 BRPM | TEMPERATURE: 97 F | SYSTOLIC BLOOD PRESSURE: 110 MMHG | DIASTOLIC BLOOD PRESSURE: 80 MMHG | HEART RATE: 82 BPM

## 2021-10-05 PROCEDURE — G0153 HHCP-SVS OF S/L PATH,EA 15MN: HCPCS

## 2021-10-05 PROCEDURE — G0157 HHC PT ASSISTANT EA 15: HCPCS

## 2021-10-05 NOTE — HOME HEALTH
Subjective: Pt report continued diarrhea and vomiting and not eating well. She reports having a bad start to her day. Her primary c/o are not being able to keep her food down. .  Caregiver involvement/assistance needed for: meals, meds, transportation. .  Home health supplies by type and quantity ordered/delivered this visit include:  none  . Objective:  See interventions. .  Assessment and progress towards goals:   Pt demonstrated Ind with all bed mobility and transfer on/off the bed. Bed mobility goal achieved. Pt denies any pain in the past 24 hours. Pt amb without an AD. She amb at a fair daniel and stability. Issued written HEP. Educated on exercises and expectations. Pt with good return demonstration and perceived exertion at 3-4/10  . Home Health PT is medically necessary to address the following:  decreased strength,  decreased activity tolerance, decreased Ind with functional transfers, impaired gait, impaired stair negotiation, and impaired stability, to decrease sx, improve functional Ind, quality of life and reduce the fall risk. Plan for next visit: Fall risk intervention education, add seated exercises  .   The following discharge planning discussed with the patient/caregiver: Estimated DC  11/1

## 2021-10-05 NOTE — HOME HEALTH
SUBJECTIVE  Patient reports exhaustion and nausea. Resting upon SLP arrival.  .  Caregiver involvement: caregivers provide assistance with medications, meals and ADLs. .  Medications reconciled and all medications are available in the home this visit. The following education was provided regarding medications, medication interactions, and look a like medications: no new medications, take as scheduled. Medications  are effective at this time. .  Home health supplies by type and quantity ordered/delivered this visit include: n/a  . Patient education provided this visit:  discharge plan, insurance coverage  . OBJECTIVE - see interventions  . ASSESSMENT  Patient demonstrates steady progress in therapy this day as indicated by functional completion of all therapy tasks with decreased need for clarification from patient and function time for task completion. Patient continues to complain of decrease short term recall for unrelated information. Patient continues to benefit from skilled speech therapy to increase functional cognitive linguistic skills for safe completion of ADLs and return to work. Adelaida Wyman   PLAN  Home exercise program: read map and answer questions, complete inferences    Continued need for the following skills: Speech Language Pathology    The following discharge planning was discussed with the pt/caregiver: 7 more visits or when goals met/max benefits achieved

## 2021-10-07 ENCOUNTER — HOME CARE VISIT (OUTPATIENT)
Dept: SCHEDULING | Facility: HOME HEALTH | Age: 56
End: 2021-10-07
Payer: COMMERCIAL

## 2021-10-07 PROCEDURE — G0153 HHCP-SVS OF S/L PATH,EA 15MN: HCPCS

## 2021-10-08 ENCOUNTER — HOME CARE VISIT (OUTPATIENT)
Dept: SCHEDULING | Facility: HOME HEALTH | Age: 56
End: 2021-10-08
Payer: COMMERCIAL

## 2021-10-08 VITALS — HEART RATE: 80 BPM | OXYGEN SATURATION: 99 % | TEMPERATURE: 98.1 F | RESPIRATION RATE: 18 BRPM

## 2021-10-08 PROCEDURE — G0157 HHC PT ASSISTANT EA 15: HCPCS

## 2021-10-08 NOTE — HOME HEALTH
SUBJECTIVE  Patient reports assistance required with home work assignment. .  Caregiver involvement: caregivers provide assistance with medications, meals and ADLs. .  Medications reconciled and all medications are available in the home this visit. The following education was provided regarding medications, medication interactions, and look a like medications: no new medications, update on new medications following today's visit. Medications  are somewhat effective at this time. .  Home health supplies by type and quantity ordered/delivered this visit include: n/a  . Patient education provided this visit:  social support for nausea  . OBJECTIVE - SEE INTERVENTIONS  . ASSESSMENT  Patient demonstrates steady progress in therapy as indicated by increased complexity of therapy tasks completed with decreased assistance required. Delayed recall of 3 unrelated words is [facility]. Patient continues to benefit from skilled speech therapy to increase safe completion of higher executive function skills for return to PLOF and community/work s/p encephalitis. Isabel Moon   PLAN  Home exercise program: thought processing    Continued need for the following skills: Speech Language Pathology    The following discharge planning was discussed with the pt/caregiver: 4 mor visit or when goals met max /benefits achieved

## 2021-10-09 VITALS
HEART RATE: 86 BPM | TEMPERATURE: 96.1 F | SYSTOLIC BLOOD PRESSURE: 120 MMHG | OXYGEN SATURATION: 98 % | RESPIRATION RATE: 18 BRPM | DIASTOLIC BLOOD PRESSURE: 90 MMHG

## 2021-10-09 NOTE — HOME HEALTH
Subjective: The patient reports not sleeping well last night. Pt reports that she has done her HEP about 3x since her last PT session only. Her nausea has been really bad. .  Caregiver involvement/assistance needed for: meals, meds transportation  . Home health supplies by type and quantity ordered/delivered this visit include:  none  . Objective:  See interventions. .  Assessment and progress towards goals: The patient was only able to perform minimal amount of exercises due to nausea. She denies any pain except for her nausea. She is continuing to vomit. She demonstrates good stability when amb. Fair return demonstration of her exercises with poor tolerance due to nausea. Home Health PT is medically necessary to address the following:  Decreased activity tolerance, decreased strength,  decreased Ind with functional transfers, impaired gait, impaired stair negotiation and impaired stability to decrease sx, improve functional Ind, quality of life, reduce the fall risk. .  Plan for next visit: community amb if pt is able to tolerate  .   The following discharge planning discussed with the patient/caregiver: Estimated DC  11/1

## 2021-10-12 ENCOUNTER — TRANSCRIBE ORDER (OUTPATIENT)
Dept: SCHEDULING | Age: 56
End: 2021-10-12

## 2021-10-12 ENCOUNTER — HOME CARE VISIT (OUTPATIENT)
Dept: HOME HEALTH SERVICES | Facility: HOME HEALTH | Age: 56
End: 2021-10-12
Payer: COMMERCIAL

## 2021-10-12 DIAGNOSIS — R10.13 ABDOMINAL PAIN, EPIGASTRIC: Primary | ICD-10-CM

## 2021-10-12 DIAGNOSIS — R63.4 LOSS OF WEIGHT: ICD-10-CM

## 2021-10-12 NOTE — Clinical Note
Thank you,  We had to d/c PT because she cannot tolerate it at this time. ----- Message -----  From: MARIA DEL ROSARIO Davis  Sent: 10/13/2021   5:45 PM EDT  To: Gorge Wheatley, WALT      Patient received therapy to increase cognitive linguistic skills necessary to return to PLOF. Patient is managing her own appointments and completes executive function skills. Increased thought processing and strategies are required for short term recall tasks. Patient is discharged due to insurance denial of service coverage.

## 2021-10-12 NOTE — Clinical Note
vernon garza  ----- Message -----  From: Minnie Guido PT  Sent: 10/19/2021   8:24 AM EDT  To: MARIA DEL ROSARIO Lerner  Subject: RE:                                                Thank you,  We had to d/c PT because she cannot tolerate it at this time. ----- Message -----  From: MARIA DEL ROSARIO Fox  Sent: 10/13/2021   5:45 PM EDT  To: Mark Borjas PT      Patient received therapy to increase cognitive linguistic skills necessary to return to [facility]. Patient is managing her own appointments and completes executive function skills. Increased thought processing and strategies are required for short term recall tasks. Patient is discharged due to insurance denial of service coverage.

## 2021-10-13 NOTE — CASE COMMUNICATION
Patient received therapy to increase cognitive linguistic skills necessary to return to [facility]. Patient is managing her own appointments and completes executive function skills. Increased thought processing and strategies are required for short term recall tasks. Patient is discharged due to insurance denial of service coverage.

## 2021-10-14 ENCOUNTER — TRANSCRIBE ORDER (OUTPATIENT)
Dept: SCHEDULING | Age: 56
End: 2021-10-14

## 2021-10-14 DIAGNOSIS — R10.13 ABDOMINAL PAIN, EPIGASTRIC: Primary | ICD-10-CM

## 2021-10-15 ENCOUNTER — HOSPITAL ENCOUNTER (EMERGENCY)
Age: 56
Discharge: HOME | End: 2021-10-15
Attending: EMERGENCY MEDICINE
Payer: COMMERCIAL

## 2021-10-15 ENCOUNTER — HOSPITAL ENCOUNTER (OUTPATIENT)
Dept: CT IMAGING | Age: 56
Discharge: HOME | End: 2021-10-15
Attending: NURSE PRACTITIONER
Payer: COMMERCIAL

## 2021-10-15 ENCOUNTER — HOME CARE VISIT (OUTPATIENT)
Dept: HOME HEALTH SERVICES | Facility: HOME HEALTH | Age: 56
End: 2021-10-15
Payer: COMMERCIAL

## 2021-10-15 VITALS
TEMPERATURE: 98.8 F | BODY MASS INDEX: 29.06 KG/M2 | SYSTOLIC BLOOD PRESSURE: 106 MMHG | HEART RATE: 86 BPM | OXYGEN SATURATION: 100 % | HEIGHT: 60 IN | DIASTOLIC BLOOD PRESSURE: 64 MMHG | WEIGHT: 148 LBS | RESPIRATION RATE: 27 BRPM

## 2021-10-15 DIAGNOSIS — R10.13 ABDOMINAL PAIN, EPIGASTRIC: ICD-10-CM

## 2021-10-15 DIAGNOSIS — R11.2 NON-INTRACTABLE VOMITING WITH NAUSEA, UNSPECIFIED VOMITING TYPE: ICD-10-CM

## 2021-10-15 DIAGNOSIS — E87.6 HYPOKALEMIA: Primary | ICD-10-CM

## 2021-10-15 LAB
ALBUMIN SERPL-MCNC: 2.9 G/DL (ref 3.4–5)
ALBUMIN/GLOB SERPL: 1 {RATIO} (ref 0.8–1.7)
ALP SERPL-CCNC: 91 U/L (ref 45–117)
ALT SERPL-CCNC: 175 U/L (ref 13–56)
ANION GAP SERPL CALC-SCNC: 16 MMOL/L (ref 3–18)
AST SERPL-CCNC: 126 U/L (ref 10–38)
ATRIAL RATE: 88 BPM
BASOPHILS # BLD: 0 K/UL (ref 0–0.1)
BASOPHILS NFR BLD: 0 % (ref 0–2)
BILIRUB SERPL-MCNC: 3.8 MG/DL (ref 0.2–1)
BUN SERPL-MCNC: 17 MG/DL (ref 7–18)
BUN/CREAT SERPL: 31 (ref 12–20)
CALCIUM SERPL-MCNC: 8.1 MG/DL (ref 8.5–10.1)
CALCULATED P AXIS, ECG09: 54 DEGREES
CALCULATED R AXIS, ECG10: 102 DEGREES
CALCULATED T AXIS, ECG11: 38 DEGREES
CHLORIDE SERPL-SCNC: 87 MMOL/L (ref 100–111)
CK MB CFR SERPL CALC: 13.4 % (ref 0–4)
CK MB SERPL-MCNC: 7.9 NG/ML (ref 5–25)
CK SERPL-CCNC: 59 U/L (ref 26–192)
CO2 SERPL-SCNC: 27 MMOL/L (ref 21–32)
CREAT SERPL-MCNC: 0.55 MG/DL (ref 0.6–1.3)
DIAGNOSIS, 93000: NORMAL
DIFFERENTIAL METHOD BLD: ABNORMAL
EOSINOPHIL # BLD: 0 K/UL (ref 0–0.4)
EOSINOPHIL NFR BLD: 0 % (ref 0–5)
ERYTHROCYTE [DISTWIDTH] IN BLOOD BY AUTOMATED COUNT: 13.9 % (ref 11.6–14.5)
GLOBULIN SER CALC-MCNC: 3 G/DL (ref 2–4)
GLUCOSE SERPL-MCNC: 127 MG/DL (ref 74–99)
HCT VFR BLD AUTO: 35.4 % (ref 35–45)
HGB BLD-MCNC: 13 G/DL (ref 12–16)
LIPASE SERPL-CCNC: 164 U/L (ref 73–393)
LYMPHOCYTES # BLD: 1.3 K/UL (ref 0.9–3.6)
LYMPHOCYTES NFR BLD: 13 % (ref 21–52)
MAGNESIUM SERPL-MCNC: 1.7 MG/DL (ref 1.6–2.6)
MCH RBC QN AUTO: 30.8 PG (ref 24–34)
MCHC RBC AUTO-ENTMCNC: 36.7 G/DL (ref 31–37)
MCV RBC AUTO: 83.9 FL (ref 78–100)
MONOCYTES # BLD: 0.9 K/UL (ref 0.05–1.2)
MONOCYTES NFR BLD: 8 % (ref 3–10)
NEUTS SEG # BLD: 8.3 K/UL (ref 1.8–8)
NEUTS SEG NFR BLD: 78 % (ref 40–73)
P-R INTERVAL, ECG05: 162 MS
PLATELET # BLD AUTO: 432 K/UL (ref 135–420)
PMV BLD AUTO: 9.7 FL (ref 9.2–11.8)
POTASSIUM SERPL-SCNC: 2.1 MMOL/L (ref 3.5–5.5)
PROT SERPL-MCNC: 5.9 G/DL (ref 6.4–8.2)
Q-T INTERVAL, ECG07: 370 MS
QRS DURATION, ECG06: 62 MS
QTC CALCULATION (BEZET), ECG08: 447 MS
RBC # BLD AUTO: 4.22 M/UL (ref 4.2–5.3)
SODIUM SERPL-SCNC: 130 MMOL/L (ref 136–145)
TROPONIN I SERPL-MCNC: 0.09 NG/ML (ref 0–0.04)
VENTRICULAR RATE, ECG03: 88 BPM
WBC # BLD AUTO: 10.6 K/UL (ref 4.6–13.2)

## 2021-10-15 PROCEDURE — 96366 THER/PROPH/DIAG IV INF ADDON: CPT

## 2021-10-15 PROCEDURE — 83735 ASSAY OF MAGNESIUM: CPT

## 2021-10-15 PROCEDURE — 96361 HYDRATE IV INFUSION ADD-ON: CPT

## 2021-10-15 PROCEDURE — 83690 ASSAY OF LIPASE: CPT

## 2021-10-15 PROCEDURE — 74011250636 HC RX REV CODE- 250/636: Performed by: EMERGENCY MEDICINE

## 2021-10-15 PROCEDURE — 74177 CT ABD & PELVIS W/CONTRAST: CPT

## 2021-10-15 PROCEDURE — 74011000636 HC RX REV CODE- 636: Performed by: NURSE PRACTITIONER

## 2021-10-15 PROCEDURE — 93005 ELECTROCARDIOGRAM TRACING: CPT

## 2021-10-15 PROCEDURE — 85025 COMPLETE CBC W/AUTO DIFF WBC: CPT

## 2021-10-15 PROCEDURE — 80053 COMPREHEN METABOLIC PANEL: CPT

## 2021-10-15 PROCEDURE — 99284 EMERGENCY DEPT VISIT MOD MDM: CPT

## 2021-10-15 PROCEDURE — 82553 CREATINE MB FRACTION: CPT

## 2021-10-15 PROCEDURE — 96365 THER/PROPH/DIAG IV INF INIT: CPT

## 2021-10-15 PROCEDURE — 96376 TX/PRO/DX INJ SAME DRUG ADON: CPT

## 2021-10-15 RX ORDER — POTASSIUM CHLORIDE 7.45 MG/ML
10 INJECTION INTRAVENOUS
Status: COMPLETED | OUTPATIENT
Start: 2021-10-15 | End: 2021-10-15

## 2021-10-15 RX ORDER — LORAZEPAM 2 MG/ML
1 INJECTION INTRAMUSCULAR
Status: COMPLETED | OUTPATIENT
Start: 2021-10-15 | End: 2021-10-15

## 2021-10-15 RX ORDER — LORAZEPAM 1 MG/1
1 TABLET ORAL
Qty: 10 TABLET | Refills: 0 | Status: SHIPPED | OUTPATIENT
Start: 2021-10-15 | End: 2021-11-24

## 2021-10-15 RX ADMIN — POTASSIUM CHLORIDE 10 MEQ: 7.46 INJECTION, SOLUTION INTRAVENOUS at 10:48

## 2021-10-15 RX ADMIN — POTASSIUM CHLORIDE 10 MEQ: 7.46 INJECTION, SOLUTION INTRAVENOUS at 14:38

## 2021-10-15 RX ADMIN — LORAZEPAM 1 MG: 2 INJECTION INTRAMUSCULAR; INTRAVENOUS at 09:11

## 2021-10-15 RX ADMIN — POTASSIUM CHLORIDE 10 MEQ: 7.46 INJECTION, SOLUTION INTRAVENOUS at 11:51

## 2021-10-15 RX ADMIN — SODIUM CHLORIDE 1000 ML: 900 INJECTION, SOLUTION INTRAVENOUS at 09:11

## 2021-10-15 RX ADMIN — POTASSIUM CHLORIDE 10 MEQ: 7.46 INJECTION, SOLUTION INTRAVENOUS at 13:16

## 2021-10-15 RX ADMIN — POTASSIUM CHLORIDE 10 MEQ: 7.46 INJECTION, SOLUTION INTRAVENOUS at 15:41

## 2021-10-15 RX ADMIN — IOPAMIDOL 100 ML: 612 INJECTION, SOLUTION INTRAVENOUS at 08:28

## 2021-10-15 RX ADMIN — SODIUM CHLORIDE 1000 ML: 900 INJECTION, SOLUTION INTRAVENOUS at 11:10

## 2021-10-15 RX ADMIN — POTASSIUM CHLORIDE 10 MEQ: 7.46 INJECTION, SOLUTION INTRAVENOUS at 09:44

## 2021-10-15 NOTE — ED NOTES
Pt. Greeted/made comfortable in bed @ this time. Pt. Understands to contact RN with any concerns/requests/needs.

## 2021-10-15 NOTE — ED TRIAGE NOTES
Per  patient has been having poor food intake and patient not being able to hold down foods    Patient states she is just week.  states she has had multiple procedures to figure out why she is vomiting.     Per patient she was not able to hold down the CT contrast.

## 2021-10-15 NOTE — ED NOTES
Purewick placed at this time. Pt resting comfortably. Pt's  at the bedside. Pt repositioned at the bedside.  Pt brought a sierre mist & warm blanket, per request.

## 2021-10-15 NOTE — ED NOTES
Pt resting in the bed at this time, in no distress. Pt able to be aroused, then pt resumes sleeping. Pt's  informed pt will be here for an extended amount of time. Pt receiving first dose of potassium now. Pt has call bell within reach, no complaints. All questions answered at this time.

## 2021-10-15 NOTE — ED PROVIDER NOTES
EMERGENCY DEPARTMENT HISTORY AND PHYSICAL EXAM    8:54 AM she has brought from outpatient CT to the emergency department and seen at this time in room 5      Date: 10/15/2021  Patient Name: Maximiano Castleman    History of Presenting Illness     Chief Complaint   Patient presents with    Fatigue    Nausea         History Provided By: patient    Additional History (Context): Maximiano Castleman is a 64 y.o. female presents with complex recent medical history was having CT abdomen and pelvis and felt very weak she has been feeling weak chronically but worse today. Felt like she was going to pass out while supine on the table and worse with sitting up after the procedure. CT tech brought her over to the emergency department. 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 No bowel movements, sometimes feels just empty sometimes constipated. Occasional mild crampy abdominal pain minimal at the time of my exam.  She is vertiginous with sitting up. Bree Gray PCP: Gokul Tabares NP    Chief Complaint:   Duration:    Timing:    Location:   Quality:   Severity:   Modifying Factors:   Associated Symptoms:       Current Facility-Administered Medications   Medication Dose Route Frequency Provider Last Rate Last Admin    sodium chloride 0.9 % bolus infusion 1,000 mL  1,000 mL IntraVENous ONCE Maryann Engle MD        LORazepam (ATIVAN) injection 1 mg  1 mg IntraVENous NOW Chantal Hernandez MD         Current Outpatient Medications   Medication Sig Dispense Refill    meclizine (ANTIVERT) 12.5 mg tablet Take 12.5 mg by mouth three (3) times daily as needed for Dizziness. Dizziness      pantoprazole (PROTONIX) 20 mg tablet Take 20 mg by mouth daily.  Take on empty stomah      levETIRAcetam (KEPPRA) 250 mg tablet Take 250 mg by mouth two (2) times a day.  amantadine HCL (SYMMETREL) 100 mg capsule Take 200 mg by mouth two (2) times a day.  ondansetron (ZOFRAN ODT) 8 mg disintegrating tablet Take 8 mg by mouth every eight (8) hours as needed for Nausea or Vomiting. Past History     Past Medical History:  Past Medical History:   Diagnosis Date    Arthritis     Diarrhea     no diarrhea since 9/14/2021    Encephalitis 06/14/2021    intubated    GERD (gastroesophageal reflux disease)     H. pylori infection 2019    IBS (irritable bowel syndrome)     Memory difficulty     since encephalitis    Migraine     Ovarian cyst 2019    Seizures ([address] 06/16/2021    x 1    Vomiting        Past Surgical History:  Past Surgical History:   Procedure Laterality Date    HX BREAST AUGMENTATION  2005    HX CHOLECYSTECTOMY      HX COLONOSCOPY  2016    HX ENDOSCOPY  2019    HX TYMPANOPLASTY      left x 5 times       Family History:  History reviewed. No pertinent family history. Social History:  Social History     Tobacco Use    Smoking status: Never Smoker    Smokeless tobacco: Never Used   Substance Use Topics    Alcohol use: Yes     Alcohol/week: 1.0 standard drinks     Types: 1 Glasses of wine per week     Comment: occasional    Drug use: Never       Allergies: Allergies   Allergen Reactions    Minocycline Other (comments)     dizzy         Review of Systems     Review of Systems   Constitutional: Negative for diaphoresis and fever. HENT: Negative for congestion and sore throat. Eyes: Negative for pain and itching. Respiratory: Negative for cough and shortness of breath. Cardiovascular: Negative for chest pain and palpitations. Gastrointestinal: Positive for abdominal pain. Negative for diarrhea. Endocrine: Negative for polydipsia and polyuria. Genitourinary: Negative for dysuria and hematuria. Musculoskeletal: Negative for arthralgias and myalgias.    Skin: Negative for rash and wound. Neurological: Negative for seizures and syncope. Hematological: Does not bruise/bleed easily. Psychiatric/Behavioral: Negative for agitation and hallucinations. Physical Exam       No data found. IPVITALS  No data found. Physical Exam  Vitals and nursing note reviewed. Constitutional:       General: She is in acute distress (Appears very uncomfortable). Appearance: She is well-developed. Comments: Appears fatigued   HENT:      Head: Normocephalic and atraumatic. Eyes:      General: No scleral icterus. Conjunctiva/sclera: Conjunctivae normal.   Neck:      Vascular: No JVD. Cardiovascular:      Rate and Rhythm: Normal rate and regular rhythm. Heart sounds: Normal heart sounds. Comments: 4 intact extremity pulses  Pulmonary:      Effort: Pulmonary effort is normal.      Breath sounds: Normal breath sounds. Abdominal:      Palpations: Abdomen is soft. There is no mass. Tenderness: There is no abdominal tenderness. Musculoskeletal:         General: Normal range of motion. Cervical back: Normal range of motion and neck supple. Lymphadenopathy:      Cervical: No cervical adenopathy. Skin:     General: Skin is warm and dry. Neurological:      General: No focal deficit present. Mental Status: She is alert. Cranial Nerves: No cranial nerve deficit. Coordination: Coordination normal.      Comments: Bilateral horizontal nystagmus is present with lateral gaze  Normal test of skew. Head impact test deferred due to discomfort.   Speech articulation word choice comprehension all normal           Diagnostic Study Results   Labs -  Recent Results (from the past 12 hour(s))   EKG, 12 LEAD, INITIAL    Collection Time: 10/15/21  8:30 AM   Result Value Ref Range    Ventricular Rate 88 BPM    Atrial Rate 88 BPM    P-R Interval 162 ms    QRS Duration 62 ms    Q-T Interval 370 ms    QTC Calculation (Bezet) 447 ms    Calculated P Axis 54 degrees    Calculated R Axis 102 degrees    Calculated T Axis 38 degrees    Diagnosis       Normal sinus rhythm  Rightward axis  Cannot rule out Anterior infarct , age undetermined  Abnormal ECG  No previous ECGs available         Radiologic Studies -   No orders to display     CT ABD PELV W CONT    Result Date: 10/15/2021  CT ABDOMEN WITH CONTRAST COMPARISON: None. INDICATIONS: Vomiting and weakness and vertigo. TECHNIQUE:  Following the uneventful administration of oral and 100cc of Isovue 300 intravenous contrast , volumetric data acquisition was performed through the abdomen on a multislice scanner and reconstructed in axial coronal and sagittal planes CT ABDOMEN FINDINGS: Lung Bases: Clear. Liver/Gallbladder/Biliary: Gallbladder out. Otherwise unremarkable. Spleen: Normal. Adrenal Glands: Normal. Kidneys: Normal. Pancreas: Normal. Stomach, Small Bowel,  and Colon: Normal. Lymph Nodes: Normal in size and number. The abdominal aorta is unremarkable. The IVC is unremarkable. Peritoneal Spaces: No free fluid or free air is present. Abdominal wall: No hernia or mass is evident Bladder: Unremarkable. The uterus is unremarkable. Simple appearing right adnexal cyst measures 3 cm. Osseous Structures Of Abdomen And Pelvis: Unremarkable for age. No acute or active appearing pathology is evident All CT scans at this facility are performed using dose optimization technique as appropriate to the performed exam, to include automated exposure control, adjustment of the mA and/or kV according to patient's size (Including appropriate matching for site-specific examinations), or use of iterative reconstruction technique.       Medications ordered:   Medications   sodium chloride 0.9 % bolus infusion 1,000 mL (has no administration in time range)   LORazepam (ATIVAN) injection 1 mg (has no administration in time range)         Medical Decision Making   Initial Medical Decision Making and DDx:  CT scan resulted, normal    Thorough discussion with the patient and her , will check labs for effects of dehydration and vomiting, electrolyte abnormality kidney failure. Treat symptomatically will give Ativan. Consider that primary cause may be positional vertigo at this point not a primary gastrointestinal problem. She has had endoscopy recently which was unrevealing. Recent MRI of the brain showed improvement of encephalitis signs. I do not think she is suffering an acute stroke at this point do not think LP will be useful. ED Course: Progress Notes, Reevaluation, and Consults:  ED Course as of Nov 02 0655   Fri Oct 15, 2021   6381 Calcium 2.1, K runs ordered    [CB]   1053 Twelve-lead EKG sinus rhythm at 88 no acute process    [CB]   1140 Updated patient and family on plan to replete potassium. Nonactionable CT and other labs    [CB]      ED Course User Index  [CB] Jon Maya MD         I am the first provider for this patient. I reviewed the vital signs, available nursing notes, past medical history, past surgical history, family history and social history. No data found. Vital Signs-Reviewed the patient's vital signs. Pulse Oximetry Analysis, Cardiac Monitor, 12 lead ekg:      Interpreted by the EP. Records Reviewed: Nursing notes reviewed (Time of Review: 8:54 AM)    Procedures:   Critical Care Time:   Aspirin: (was aspirin given for stroke?)    Diagnosis     Clinical Impression: No diagnosis found. Disposition:       Follow-up Information    None          Patient's Medications   Start Taking    No medications on file   Continue Taking    AMANTADINE HCL (SYMMETREL) 100 MG CAPSULE    Take 200 mg by mouth two (2) times a day. LEVETIRACETAM (KEPPRA) 250 MG TABLET    Take 250 mg by mouth two (2) times a day. MECLIZINE (ANTIVERT) 12.5 MG TABLET    Take 12.5 mg by mouth three (3) times daily as needed for Dizziness.  Dizziness    ONDANSETRON (ZOFRAN ODT) 8 MG DISINTEGRATING TABLET    Take 8 mg by mouth every eight (8) hours as needed for Nausea or Vomiting. PANTOPRAZOLE (PROTONIX) 20 MG TABLET    Take 20 mg by mouth daily.  Take on empty stomah   These Medications have changed    No medications on file   Stop Taking    No medications on file     _______________________________    Notes:    Rodriguez Smith MD using Dragon dictation      _______________________________

## 2021-10-15 NOTE — ED NOTES
Kadie MORENO aware of pt's BP. Pt receiving second bag of potassium at this time. Pt resting comfortably. Pt's  stated he was going home to check on there son & he would be back shortly.

## 2021-10-18 ENCOUNTER — HOME CARE VISIT (OUTPATIENT)
Dept: SCHEDULING | Facility: HOME HEALTH | Age: 56
End: 2021-10-18
Payer: COMMERCIAL

## 2021-10-18 PROCEDURE — G0151 HHCP-SERV OF PT,EA 15 MIN: HCPCS

## 2021-10-19 VITALS
HEART RATE: 84 BPM | RESPIRATION RATE: 18 BRPM | TEMPERATURE: 97.7 F | DIASTOLIC BLOOD PRESSURE: 90 MMHG | OXYGEN SATURATION: 98 % | SYSTOLIC BLOOD PRESSURE: 120 MMHG

## 2021-10-20 ENCOUNTER — HOSPITAL ENCOUNTER (INPATIENT)
Age: 56
LOS: 35 days | Discharge: NURSING, REHAB OR LONG-TERM CARE | DRG: 056 | End: 2021-11-24
Attending: EMERGENCY MEDICINE | Admitting: INTERNAL MEDICINE
Payer: COMMERCIAL

## 2021-10-20 DIAGNOSIS — E43 SEVERE PROTEIN-CALORIE MALNUTRITION (HCC): ICD-10-CM

## 2021-10-20 DIAGNOSIS — R56.9 SEIZURE (HCC): Primary | ICD-10-CM

## 2021-10-20 DIAGNOSIS — E83.42 HYPOMAGNESEMIA: ICD-10-CM

## 2021-10-20 DIAGNOSIS — R11.2 INTRACTABLE VOMITING WITH NAUSEA, UNSPECIFIED VOMITING TYPE: ICD-10-CM

## 2021-10-20 DIAGNOSIS — E83.39 HYPOPHOSPHATEMIA: ICD-10-CM

## 2021-10-20 DIAGNOSIS — E87.6 HYPOKALEMIA: ICD-10-CM

## 2021-10-20 DIAGNOSIS — G93.40 ENCEPHALOPATHY: ICD-10-CM

## 2021-10-20 PROBLEM — R11.10 VOMITING: Status: ACTIVE | Noted: 2021-10-20

## 2021-10-20 LAB
ALBUMIN SERPL-MCNC: 3.2 G/DL (ref 3.4–5)
ALBUMIN/GLOB SERPL: 0.9 {RATIO} (ref 0.8–1.7)
ALP SERPL-CCNC: 150 U/L (ref 45–117)
ALT SERPL-CCNC: 167 U/L (ref 13–56)
ANION GAP SERPL CALC-SCNC: 8 MMOL/L (ref 3–18)
AST SERPL-CCNC: 109 U/L (ref 10–38)
BASOPHILS # BLD: 0 K/UL (ref 0–0.1)
BASOPHILS NFR BLD: 0 % (ref 0–2)
BILIRUB SERPL-MCNC: 2.9 MG/DL (ref 0.2–1)
BUN SERPL-MCNC: 8 MG/DL (ref 7–18)
BUN/CREAT SERPL: 20 (ref 12–20)
CALCIUM SERPL-MCNC: 9 MG/DL (ref 8.5–10.1)
CHLORIDE SERPL-SCNC: 91 MMOL/L (ref 100–111)
CK MB CFR SERPL CALC: 4 % (ref 0–4)
CK MB SERPL-MCNC: 3.5 NG/ML (ref 5–25)
CK SERPL-CCNC: 88 U/L (ref 26–192)
CO2 SERPL-SCNC: 30 MMOL/L (ref 21–32)
CREAT SERPL-MCNC: 0.41 MG/DL (ref 0.6–1.3)
DIFFERENTIAL METHOD BLD: NORMAL
EOSINOPHIL # BLD: 0 K/UL (ref 0–0.4)
EOSINOPHIL NFR BLD: 0 % (ref 0–5)
ERYTHROCYTE [DISTWIDTH] IN BLOOD BY AUTOMATED COUNT: 14.4 % (ref 11.6–14.5)
GLOBULIN SER CALC-MCNC: 3.6 G/DL (ref 2–4)
GLUCOSE SERPL-MCNC: 98 MG/DL (ref 74–99)
HCT VFR BLD AUTO: 38 % (ref 35–45)
HGB BLD-MCNC: 13.8 G/DL (ref 12–16)
LIPASE SERPL-CCNC: 266 U/L (ref 73–393)
LYMPHOCYTES # BLD: 2.1 K/UL (ref 0.9–3.6)
LYMPHOCYTES NFR BLD: 23 % (ref 21–52)
MAGNESIUM SERPL-MCNC: 1.4 MG/DL (ref 1.6–2.6)
MCH RBC QN AUTO: 30.9 PG (ref 24–34)
MCHC RBC AUTO-ENTMCNC: 36.3 G/DL (ref 31–37)
MCV RBC AUTO: 85.2 FL (ref 78–100)
MONOCYTES # BLD: 0.9 K/UL (ref 0.05–1.2)
MONOCYTES NFR BLD: 10 % (ref 3–10)
NEUTS SEG # BLD: 6 K/UL (ref 1.8–8)
NEUTS SEG NFR BLD: 66 % (ref 40–73)
PLATELET # BLD AUTO: 329 K/UL (ref 135–420)
PMV BLD AUTO: 9.3 FL (ref 9.2–11.8)
POTASSIUM SERPL-SCNC: 3.4 MMOL/L (ref 3.5–5.5)
PROT SERPL-MCNC: 6.8 G/DL (ref 6.4–8.2)
RBC # BLD AUTO: 4.46 M/UL (ref 4.2–5.3)
SODIUM SERPL-SCNC: 129 MMOL/L (ref 136–145)
TROPONIN I SERPL-MCNC: 0.02 NG/ML (ref 0–0.04)
WBC # BLD AUTO: 9 K/UL (ref 4.6–13.2)

## 2021-10-20 PROCEDURE — 99223 1ST HOSP IP/OBS HIGH 75: CPT | Performed by: INTERNAL MEDICINE

## 2021-10-20 PROCEDURE — 96374 THER/PROPH/DIAG INJ IV PUSH: CPT

## 2021-10-20 PROCEDURE — 99282 EMERGENCY DEPT VISIT SF MDM: CPT

## 2021-10-20 PROCEDURE — 74011250636 HC RX REV CODE- 250/636: Performed by: EMERGENCY MEDICINE

## 2021-10-20 PROCEDURE — 96360 HYDRATION IV INFUSION INIT: CPT

## 2021-10-20 PROCEDURE — 85025 COMPLETE CBC W/AUTO DIFF WBC: CPT

## 2021-10-20 PROCEDURE — 65270000029 HC RM PRIVATE

## 2021-10-20 PROCEDURE — 83690 ASSAY OF LIPASE: CPT

## 2021-10-20 PROCEDURE — 82553 CREATINE MB FRACTION: CPT

## 2021-10-20 PROCEDURE — 93005 ELECTROCARDIOGRAM TRACING: CPT

## 2021-10-20 PROCEDURE — 83735 ASSAY OF MAGNESIUM: CPT

## 2021-10-20 PROCEDURE — 80053 COMPREHEN METABOLIC PANEL: CPT

## 2021-10-20 PROCEDURE — 74011250636 HC RX REV CODE- 250/636: Performed by: PHYSICIAN ASSISTANT

## 2021-10-20 RX ORDER — MAGNESIUM SULFATE HEPTAHYDRATE 40 MG/ML
2 INJECTION, SOLUTION INTRAVENOUS ONCE
Status: DISPENSED | OUTPATIENT
Start: 2021-10-20 | End: 2021-10-21

## 2021-10-20 RX ORDER — ONDANSETRON 2 MG/ML
4 INJECTION INTRAMUSCULAR; INTRAVENOUS
Status: COMPLETED | OUTPATIENT
Start: 2021-10-20 | End: 2021-10-20

## 2021-10-20 RX ADMIN — SODIUM CHLORIDE 1000 ML: 900 INJECTION, SOLUTION INTRAVENOUS at 13:01

## 2021-10-20 RX ADMIN — ONDANSETRON 4 MG: 2 INJECTION INTRAMUSCULAR; INTRAVENOUS at 13:11

## 2021-10-20 NOTE — H&P
Date of Admission: 10/20/2021      Assessment:   Failure to thrive with unintentional weight loss of 50 pounds: Albumin 3.2  Hypokalemia  Hypomagnesemia  Hyponatremia  Mild transaminitis  Intractable nausea and vmiting  History of HSV encephalitis-admitted to [facility] from 7/28/2021 through 8/12/2021  GERD  Dysphagia: Followed by Dr. Carmine Farfan; status post EGD on 9/22; biopsies without abnormalities  Vitamin D deficiency  Hyperlipidemia  Plan:   GI consulted-will evaluate in a.m. Replace potassium  Replace magnesium  CBC, BMP, magnesium, phosphorus  Neurology consulted-discussed with Dr. Hany Andrade  PT, OT  Speech eval  Nutrition eval  Repeat MRI brain  Continue IV fluid hydration to replace sodium  Zofran for nausea  Toradol for pain    Rocael Hamilton D.O. Internal Medicine and Infectious Diseases      Subjective:    Patient is a 64 y. o.female who is being evaluated for weight loss and possible J-tube placement by GI. .    Ms. Babar Mayer is a 59-year-old female with a history of GERD and arthritis who is presenting to the emergency department for further evaluation of unintentional weight loss and poorly controlled nausea and vomiting. She has an extensive recent medical history which had started in June when she was found by her  to be lethargic and poorly responsive. She was made admitted to [facility] and was noted to have HSV encephalitis. She has had a long and difficult recovery from her encephalitis and had undergone acute rehab as well as home PT and OT. Per her  she is lost approximately 50 pounds since the end of June. According to her  she had been feeling significantly better until about this 4 to 6 weeks ago. At that time she started to have increased confusion, increased difficulty with ambulation, worsening nausea and vomiting. He states that she has not been able to eat more than 1 or 2 bites of food without vomiting.   She has had a recent MRI approximately 2 weeks ago as well as follow-up with her neurologist which showed some improvement compared to her initial MRI in June. However she complains of new onset of vertigo as well as double vision out of her right eye. Her  states that her right eyelid is closed but he is not sure whether she is doing it purposefully. He states that she has been having increased difficulty with ambulation and that he has to help her as she shuffles from one place to the other. He notes that she has had significant decline in physical condition. Of note he she did have an EGD performed for evaluation of her dysphagia and nausea on September 22. Biopsies were performed and were negative. Past Medical History:   Diagnosis Date    Arthritis     Diarrhea     no diarrhea since 9/14/2021    Encephalitis 06/14/2021    intubated    GERD (gastroesophageal reflux disease)     H. pylori infection 2019    IBS (irritable bowel syndrome)     Memory difficulty     since encephalitis    Migraine     Ovarian cyst 2019    Seizures ([facility] [address] 06/16/2021    x 1    Vomiting      Past Surgical History:   Procedure Laterality Date    HX BREAST AUGMENTATION  2005    HX CHOLECYSTECTOMY      HX COLONOSCOPY  2016    HX ENDOSCOPY  2019    HX TYMPANOPLASTY      left x 5 times     No family history on file. Medications reviewed as below:   Current Facility-Administered Medications   Medication Dose Route Frequency Provider Last Rate Last Admin    sodium chloride 0.9 % bolus infusion 1,000 mL  1,000 mL IntraVENous ONCE Aarti [place], [place]        magnesium sulfate 2 g/50 ml IVPB (premix or compounded)  2 g IntraVENous ONCE Jesse Engle MD         Current Outpatient Medications   Medication Sig Dispense Refill    potassium gluconate 595 mg (99 mg) tablet Take 99 mg by mouth daily. To begin taking when the packets run out      potassium chloride (KLOR-CON) 20 mEq pack Take 20 mEq by mouth daily.  Mix with water      LORazepam (Ativan) 1 mg tablet Take 1 Tablet by mouth every eight (8) hours as needed (Vomiting). Max Daily Amount: 3 mg. 10 Tablet 0    meclizine (ANTIVERT) 12.5 mg tablet Take 12.5 mg by mouth three (3) times daily as needed for Dizziness. Dizziness      pantoprazole (PROTONIX) 20 mg tablet Take 20 mg by mouth daily. Take on empty stomah      levETIRAcetam (KEPPRA) 250 mg tablet Take 250 mg by mouth two (2) times a day.  amantadine HCL (SYMMETREL) 100 mg capsule Take 200 mg by mouth two (2) times a day.  ondansetron (ZOFRAN ODT) 8 mg disintegrating tablet Take 8 mg by mouth every eight (8) hours as needed for Nausea or Vomiting. Allergies   Allergen Reactions    Minocycline Other (comments)     dizzy     Social History     Socioeconomic History    Marital status:      Spouse name: Not on file    Number of children: Not on file    Years of education: Not on file    Highest education level: Not on file   Occupational History    Not on file   Tobacco Use    Smoking status: Never Smoker    Smokeless tobacco: Never Used   Substance and Sexual Activity    Alcohol use: Yes     Alcohol/week: 1.0 standard drinks     Types: 1 Glasses of wine per week     Comment: occasional    Drug use: Never    Sexual activity: Not on file   Other Topics Concern    Not on file   Social History Narrative    Not on file     Social Determinants of Health     Financial Resource Strain:     Difficulty of Paying Living Expenses:    Food Insecurity:     Worried About Running Out of Food in the Last Year:     [address] in the Last Year:    Transportation Needs:     Lack of Transportation (Medical):      Lack of Transportation (Non-Medical):    Physical Activity:     Days of Exercise per Week:     Minutes of Exercise per Session:    Stress:     Feeling of Stress :    Social Connections:     Frequency of Communication with Friends and Family:     Frequency of Social Gatherings with Friends and Family:     Attends Mu-ism Services:     Active Member of Clubs or Organizations:     Attends Club or Organization Meetings:     Marital Status:    Intimate Partner Violence:     Fear of Current or Ex-Partner:     Emotionally Abused:     Physically Abused:     Sexually Abused:         Review of Systems    Negative Unless BOLDED    General: fevers, chills, myalgias, arthralgias, unexplained weight loss, malaise, fatigue. HEENT:  headaches,sinus pain or presure, recent URI, recent dental procedures;  tinnitus, hearing loss , visual changes, catarats, dizziness or blurred vision  PUlMONARY:  cough , shortness of breath, sputum production, hx of asthma or COPD. previous treatement for TB or PPD. Cardiovascular: chest pain, previous CAD/MI, vavlular heart disease,  murmurs  GI:   nausea, vomiting, diarrhea, abdominal pain, prior C.diff  :  urinary frequency, dysuria, hematuria, bladder incontinence. Neurologic:  seizures, syncope or prior CVA/TIA, confusion, memory impairment, neuropathy  Musculoskeletal:  myalgias arthralgias, joint pain/ swelling,  back pain  Skin:  Purities,  recurrent cellulitis,  chronic stasis ulcer, diabetic foot ulcers  Endocrine: polyuria, polydipsia, hair loss, weight gain  Psych: Denies depression or treatment by a psychiatrist/psycologist  Heme-Onc: prior DVT, easy bruising, fatigue, malignancy        Objective:        Visit Vitals  BP (!) 129/90 (BP 1 Location: Left arm, BP Patient Position: Sitting)   Pulse 99   Temp 98.6 °F (37 °C)   Resp 18   LMP 2019 Comment: unknown   SpO2 99%     Temp (24hrs), Av.6 °F (37 °C), Min:98.6 °F (37 °C), Max:98.6 °F (37 °C)        General:   awake alert and oriented   Skin:   no rashes or skin lesions noted on limited exam   HEENT:  Normocephalic, atraumatic, PERRL, EOMI, no scleral icterus or pallor; no conjunctival hemmohage;  nasal and oral mucous are moist and without evidence of lesions. No thrush. Dentition good. Neck supple, no bruits.    Lymph Nodes:   no cervical, axillary or inguinal adenopathy   Lungs:   non-labored, bilaterally clear to aspiration- no crackles wheezes rales or rhonchi   Heart:  RRR, s1 and s2; no murmurs rubs or gallops, no edema, + pedal pulses   Abdomen:  soft, non-distended, active bowel sounds, no hepatomegaly, no splenomegaly. Non-tender   Genitourinary:  deferred   Extremities:   no clubbing, cyanosis; no joint effusions or swelling; Full ROM of all large joints to the upper and lower extremities; muscle mass appropriate for age   Neurologic:  No gross focal sensory abnormalities; generalized weakness without any focal loss of muscle strength to upper and lower extremities. Speech appropirate. Right lid lag, pupils are equally round reactive, tongue midline   Psychiatric:   appropriate and interactive. Labs: Results:   Chemistry Recent Labs     10/20/21  1312   GLU 98   *   K 3.4*   CL 91*   CO2 30   BUN 8   CREA 0.41*   CA 9.0   AGAP 8   BUCR 20   *   TP 6.8   ALB 3.2*   GLOB 3.6   AGRAT 0.9      CBC w/Diff Recent Labs     10/20/21  1312   WBC 9.0   RBC 4.46   HGB 13.8   HCT 38.0      GRANS 66   LYMPH 23   EOS 0            No results found for: SDES No results found for: CULT       Imaging:      All imaging reviewed from Admission to present as per radiology interpretation in [address] [place] [address]

## 2021-10-20 NOTE — ED PROVIDER NOTES
EMERGENCY DEPARTMENT HISTORY AND PHYSICAL EXAM    1:17 PM patient seen at this time in triage room      Date: 10/20/2021  Patient Name: Ashok Hayward    History of Presenting Illness     Chief Complaint   Patient presents with    Nausea    Vomiting         History Provided By: patient    Additional History (Context): Ashok Hayward is a 64 y.o. female presents with as yet undiagnosed intractable vomiting illness losing weight becoming more dehydrated. I saw her several days ago repleted her potassium was able to discharge her. Now she comes in from the GI office, very weak confined to a wheelchair additional weight loss. She is referred here, will need admission work-up possibly a G-tube or J-tube. Possibly additional neurology assessment. No time no pain at the time of my exam.    PCP: Liberty Owusu NP    Chief Complaint:   Duration:    Timing:    Location:   Quality:   Severity:   Modifying Factors:   Associated Symptoms:       Current Facility-Administered Medications   Medication Dose Route Frequency Provider Last Rate Last Admin    sodium chloride 0.9 % bolus infusion 1,000 mL  1,000 mL IntraVENous ONCE Nicole Crawford, Manjinderma        magnesium sulfate 2 g/50 ml IVPB (premix or compounded)  2 g IntraVENous ONCE Christofer Engle MD         Current Outpatient Medications   Medication Sig Dispense Refill    potassium gluconate 595 mg (99 mg) tablet Take 99 mg by mouth daily. To begin taking when the packets run out      potassium chloride (KLOR-CON) 20 mEq pack Take 20 mEq by mouth daily. Mix with water      LORazepam (Ativan) 1 mg tablet Take 1 Tablet by mouth every eight (8) hours as needed (Vomiting). Max Daily Amount: 3 mg. 10 Tablet 0    meclizine (ANTIVERT) 12.5 mg tablet Take 12.5 mg by mouth three (3) times daily as needed for Dizziness. Dizziness      pantoprazole (PROTONIX) 20 mg tablet Take 20 mg by mouth daily.  Take on empty stomah      levETIRAcetam (KEPPRA) 250 mg tablet Take 250 mg by mouth two (2) times a day.  amantadine HCL (SYMMETREL) 100 mg capsule Take 200 mg by mouth two (2) times a day.  ondansetron (ZOFRAN ODT) 8 mg disintegrating tablet Take 8 mg by mouth every eight (8) hours as needed for Nausea or Vomiting. Past History     Past Medical History:  Past Medical History:   Diagnosis Date    Arthritis     Diarrhea     no diarrhea since 9/14/2021    Encephalitis 06/14/2021    intubated    GERD (gastroesophageal reflux disease)     H. pylori infection 2019    IBS (irritable bowel syndrome)     Memory difficulty     since encephalitis    Migraine     Ovarian cyst 2019    Seizures ([facility] [address] 06/16/2021    x 1    Vomiting        Past Surgical History:  Past Surgical History:   Procedure Laterality Date    HX BREAST AUGMENTATION  2005    HX CHOLECYSTECTOMY      HX COLONOSCOPY  2016    HX ENDOSCOPY  2019    HX TYMPANOPLASTY      left x 5 times       Family History:  No family history on file. Social History:  Social History     Tobacco Use    Smoking status: Never Smoker    Smokeless tobacco: Never Used   Substance Use Topics    Alcohol use: Yes     Alcohol/week: 1.0 standard drinks     Types: 1 Glasses of wine per week     Comment: occasional    Drug use: Never       Allergies: Allergies   Allergen Reactions    Minocycline Other (comments)     dizzy         Review of Systems     Review of Systems   Constitutional: Positive for fatigue. Negative for diaphoresis and fever. HENT: Negative for congestion and sore throat. Eyes: Negative for pain and itching. Respiratory: Negative for cough and shortness of breath. Cardiovascular: Negative for chest pain and palpitations. Gastrointestinal: Positive for vomiting. Negative for abdominal pain and diarrhea. Endocrine: Negative for polydipsia and polyuria. Genitourinary: Negative for dysuria and hematuria. Musculoskeletal: Negative for arthralgias and myalgias.    Skin: Negative for rash and wound. Neurological: Negative for seizures and syncope. Hematological: Does not bruise/bleed easily. Psychiatric/Behavioral: Negative for agitation and hallucinations. Physical Exam       Patient Vitals for the past 12 hrs:   Temp Pulse Resp BP SpO2   10/20/21 1302 98.6 °F (37 °C) 99 18 (!) 129/90 99 %       IPVITALS  Patient Vitals for the past 24 hrs:   BP Temp Pulse Resp SpO2   10/20/21 1302 (!) 129/90 98.6 °F (37 °C) 99 18 99 %       Physical Exam  Vitals and nursing note reviewed. Constitutional:       General: She is in acute distress. Appearance: She is well-developed. She is ill-appearing. Comments: Seated in wheelchair appears tired. Patch over her right eye   HENT:      Head: Normocephalic and atraumatic. Eyes:      General: No scleral icterus. Conjunctiva/sclera: Conjunctivae normal.   Neck:      Vascular: No JVD. Cardiovascular:      Rate and Rhythm: Normal rate and regular rhythm. Heart sounds: Normal heart sounds. Comments: 4 intact extremity pulses  Pulmonary:      Effort: Pulmonary effort is normal.      Breath sounds: Normal breath sounds. Abdominal:      Palpations: Abdomen is soft. There is no mass. Tenderness: There is no abdominal tenderness. Musculoskeletal:         General: Normal range of motion. Cervical back: Normal range of motion and neck supple. Lymphadenopathy:      Cervical: No cervical adenopathy. Skin:     General: Skin is warm and dry. Neurological:      Mental Status: She is alert.            Diagnostic Study Results   Labs -  Recent Results (from the past 12 hour(s))   CBC WITH AUTOMATED DIFF    Collection Time: 10/20/21  1:12 PM   Result Value Ref Range    WBC 9.0 4.6 - 13.2 K/uL    RBC 4.46 4.20 - 5.30 M/uL    HGB 13.8 12.0 - 16.0 g/dL    HCT 38.0 35.0 - 45.0 %    MCV 85.2 78.0 - 100.0 FL    MCH 30.9 24.0 - 34.0 PG    MCHC 36.3 31.0 - 37.0 g/dL    RDW 14.4 11.6 - 14.5 %    PLATELET 558 922 - 365 K/uL MPV 9.3 9.2 - 11.8 FL    NEUTROPHILS 66 40 - 73 %    LYMPHOCYTES 23 21 - 52 %    MONOCYTES 10 3 - 10 %    EOSINOPHILS 0 0 - 5 %    BASOPHILS 0 0 - 2 %    ABS. NEUTROPHILS 6.0 1.8 - 8.0 K/UL    ABS. LYMPHOCYTES 2.1 0.9 - 3.6 K/UL    ABS. MONOCYTES 0.9 0.05 - 1.2 K/UL    ABS. EOSINOPHILS 0.0 0.0 - 0.4 K/UL    ABS. BASOPHILS 0.0 0.0 - 0.1 K/UL    DF AUTOMATED     METABOLIC PANEL, COMPREHENSIVE    Collection Time: 10/20/21  1:12 PM   Result Value Ref Range    Sodium 129 (L) 136 - 145 mmol/L    Potassium 3.4 (L) 3.5 - 5.5 mmol/L    Chloride 91 (L) 100 - 111 mmol/L    CO2 30 21 - 32 mmol/L    Anion gap 8 3.0 - 18 mmol/L    Glucose 98 74 - 99 mg/dL    BUN 8 7.0 - 18 MG/DL    Creatinine 0.41 (L) 0.6 - 1.3 MG/DL    BUN/Creatinine ratio 20 12 - 20      GFR est AA >60 >60 ml/min/1.73m2    GFR est non-AA >60 >60 ml/min/1.73m2    Calcium 9.0 8.5 - 10.1 MG/DL    Bilirubin, total 2.9 (H) 0.2 - 1.0 MG/DL    ALT (SGPT) 167 (H) 13 - 56 U/L    AST (SGOT) 109 (H) 10 - 38 U/L    Alk.  phosphatase 150 (H) 45 - 117 U/L    Protein, total 6.8 6.4 - 8.2 g/dL    Albumin 3.2 (L) 3.4 - 5.0 g/dL    Globulin 3.6 2.0 - 4.0 g/dL    A-G Ratio 0.9 0.8 - 1.7     LIPASE    Collection Time: 10/20/21  1:12 PM   Result Value Ref Range    Lipase 266 73 - 393 U/L   CARDIAC PANEL,(CK, CKMB & TROPONIN)    Collection Time: 10/20/21  1:12 PM   Result Value Ref Range    CK - MB 3.5 <3.6 ng/ml    CK-MB Index 4.0 0.0 - 4.0 %    CK 88 26 - 192 U/L    Troponin-I, QT 0.02 0.0 - 0.045 NG/ML   MAGNESIUM    Collection Time: 10/20/21  1:12 PM   Result Value Ref Range    Magnesium 1.4 (L) 1.6 - 2.6 mg/dL   EKG, 12 LEAD, INITIAL    Collection Time: 10/20/21  1:20 PM   Result Value Ref Range    Ventricular Rate 102 BPM    Atrial Rate 102 BPM    P-R Interval 128 ms    QRS Duration 66 ms    Q-T Interval 330 ms    QTC Calculation (Bezet) 430 ms    Calculated P Axis 1 degrees    Calculated R Axis -19 degrees    Calculated T Axis 10 degrees    Diagnosis       Sinus tachycardia  Cannot rule out Anterior infarct (cited on or before 20-OCT-2021)  Abnormal ECG  When compared with ECG of 15-OCT-2021 08:30,  Questionable change in initial forces of Anterior leads  ST no longer elevated in Inferior leads  Inverted T waves have replaced nonspecific T wave abnormality in Inferior   leads         Radiologic Studies -   No orders to display     No results found. Medications ordered:   Medications   sodium chloride 0.9 % bolus infusion 1,000 mL (has no administration in time range)   magnesium sulfate 2 g/50 ml IVPB (premix or compounded) (has no administration in time range)   ondansetron (ZOFRAN) injection 4 mg (4 mg IntraVENous Given 10/20/21 1311)         Medical Decision Making   Initial Medical Decision Making and DDx: We will hydrate, treat with Zofran, evaluate for any reversible electrolyte abnormality, plan on admission, further discussion additional GI studies, J-tube feeding tube TPN etc.    Twelve-lead EKG sinus tachycardia 102 no acute process    ED Course: Progress Notes, Reevaluation, and Consults:     Alarming events while in the emergency department, some signs of dehydration, possibly too much free water intake. Repletion of magnesium ordered. 30 minutes critical care time management of alarming electrolyte abnormality requiring IV repletion. Marcella with Dr. Maria R Caldwell agrees to the admission. I am the first provider for this patient. I reviewed the vital signs, available nursing notes, past medical history, past surgical history, family history and social history. Patient Vitals for the past 12 hrs:   Temp Pulse Resp BP SpO2   10/20/21 1302 98.6 °F (37 °C) 99 18 (!) 129/90 99 %       Vital Signs-Reviewed the patient's vital signs. Pulse Oximetry Analysis, Cardiac Monitor, 12 lead ekg:      Interpreted by the EP.       Records Reviewed: Nursing notes reviewed (Time of Review: 1:17 PM)    Procedures:   Critical Care Time:   Aspirin: (was aspirin given for stroke?)    Diagnosis     Clinical Impression:   1. Intractable vomiting with nausea, unspecified vomiting type    2. Hypomagnesemia        Disposition:       Follow-up Information    None          Patient's Medications   Start Taking    No medications on file   Continue Taking    AMANTADINE HCL (SYMMETREL) 100 MG CAPSULE    Take 200 mg by mouth two (2) times a day. LEVETIRACETAM (KEPPRA) 250 MG TABLET    Take 250 mg by mouth two (2) times a day. LORAZEPAM (ATIVAN) 1 MG TABLET    Take 1 Tablet by mouth every eight (8) hours as needed (Vomiting). Max Daily Amount: 3 mg. MECLIZINE (ANTIVERT) 12.5 MG TABLET    Take 12.5 mg by mouth three (3) times daily as needed for Dizziness. Dizziness    ONDANSETRON (ZOFRAN ODT) 8 MG DISINTEGRATING TABLET    Take 8 mg by mouth every eight (8) hours as needed for Nausea or Vomiting. PANTOPRAZOLE (PROTONIX) 20 MG TABLET    Take 20 mg by mouth daily. Take on empty stomah    POTASSIUM CHLORIDE (KLOR-CON) 20 MEQ PACK    Take 20 mEq by mouth daily. Mix with water    POTASSIUM GLUCONATE 595 MG (99 MG) TABLET    Take 99 mg by mouth daily.  To begin taking when the packets run out   These Medications have changed    No medications on file   Stop Taking    No medications on file     _______________________________    Notes:    Eliot Haque MD using Dragon dictation      _______________________________

## 2021-10-21 ENCOUNTER — APPOINTMENT (OUTPATIENT)
Dept: MRI IMAGING | Age: 56
DRG: 056 | End: 2021-10-21
Attending: INTERNAL MEDICINE
Payer: COMMERCIAL

## 2021-10-21 PROBLEM — E43 SEVERE PROTEIN-CALORIE MALNUTRITION (HCC): Status: ACTIVE | Noted: 2021-10-21

## 2021-10-21 LAB
ANION GAP SERPL CALC-SCNC: 9 MMOL/L (ref 3–18)
APPEARANCE UR: ABNORMAL
ATRIAL RATE: 102 BPM
BACTERIA URNS QL MICRO: ABNORMAL /HPF
BILIRUB UR QL: ABNORMAL
BUN SERPL-MCNC: 8 MG/DL (ref 7–18)
BUN/CREAT SERPL: 33 (ref 12–20)
CALCIUM SERPL-MCNC: 7.7 MG/DL (ref 8.5–10.1)
CALCULATED P AXIS, ECG09: 1 DEGREES
CALCULATED R AXIS, ECG10: -19 DEGREES
CALCULATED T AXIS, ECG11: 10 DEGREES
CHLORIDE SERPL-SCNC: 95 MMOL/L (ref 100–111)
CO2 SERPL-SCNC: 29 MMOL/L (ref 21–32)
COLOR UR: ABNORMAL
CREAT SERPL-MCNC: 0.24 MG/DL (ref 0.6–1.3)
CRP SERPL-MCNC: 1.2 MG/DL (ref 0–0.3)
DIAGNOSIS, 93000: NORMAL
EPITH CASTS URNS QL MICRO: ABNORMAL /LPF (ref 0–5)
ERYTHROCYTE [DISTWIDTH] IN BLOOD BY AUTOMATED COUNT: 14.4 % (ref 11.6–14.5)
ERYTHROCYTE [SEDIMENTATION RATE] IN BLOOD: 15 MM/HR (ref 0–30)
GLUCOSE SERPL-MCNC: 81 MG/DL (ref 74–99)
GLUCOSE UR STRIP.AUTO-MCNC: NEGATIVE MG/DL
HCT VFR BLD AUTO: 32.7 % (ref 35–45)
HGB BLD-MCNC: 11.5 G/DL (ref 12–16)
HGB UR QL STRIP: NEGATIVE
KETONES UR QL STRIP.AUTO: 80 MG/DL
LEUKOCYTE ESTERASE UR QL STRIP.AUTO: ABNORMAL
MAGNESIUM SERPL-MCNC: 1.8 MG/DL (ref 1.6–2.6)
MCH RBC QN AUTO: 30.3 PG (ref 24–34)
MCHC RBC AUTO-ENTMCNC: 35.2 G/DL (ref 31–37)
MCV RBC AUTO: 86.3 FL (ref 78–100)
MUCOUS THREADS URNS QL MICRO: ABNORMAL /LPF
NITRITE UR QL STRIP.AUTO: NEGATIVE
P-R INTERVAL, ECG05: 128 MS
PH UR STRIP: 7 [PH] (ref 5–8)
PHOSPHATE SERPL-MCNC: 3.4 MG/DL (ref 2.5–4.9)
PLATELET # BLD AUTO: 263 K/UL (ref 135–420)
PMV BLD AUTO: 9.3 FL (ref 9.2–11.8)
POTASSIUM SERPL-SCNC: 3 MMOL/L (ref 3.5–5.5)
PROT UR STRIP-MCNC: ABNORMAL MG/DL
Q-T INTERVAL, ECG07: 330 MS
QRS DURATION, ECG06: 66 MS
QTC CALCULATION (BEZET), ECG08: 430 MS
RBC # BLD AUTO: 3.79 M/UL (ref 4.2–5.3)
RBC #/AREA URNS HPF: NEGATIVE /HPF (ref 0–5)
SODIUM SERPL-SCNC: 133 MMOL/L (ref 136–145)
SP GR UR REFRACTOMETRY: 1.01 (ref 1–1.03)
UROBILINOGEN UR QL STRIP.AUTO: 2 EU/DL (ref 0.2–1)
VENTRICULAR RATE, ECG03: 102 BPM
WBC # BLD AUTO: 5.7 K/UL (ref 4.6–13.2)
WBC URNS QL MICRO: ABNORMAL /HPF (ref 0–4)

## 2021-10-21 PROCEDURE — 74011000250 HC RX REV CODE- 250: Performed by: INTERNAL MEDICINE

## 2021-10-21 PROCEDURE — 99223 1ST HOSP IP/OBS HIGH 75: CPT | Performed by: PSYCHIATRY & NEUROLOGY

## 2021-10-21 PROCEDURE — 74011250636 HC RX REV CODE- 250/636: Performed by: FAMILY MEDICINE

## 2021-10-21 PROCEDURE — 36415 COLL VENOUS BLD VENIPUNCTURE: CPT

## 2021-10-21 PROCEDURE — 84425 ASSAY OF VITAMIN B-1: CPT

## 2021-10-21 PROCEDURE — 65270000029 HC RM PRIVATE

## 2021-10-21 PROCEDURE — 74011250636 HC RX REV CODE- 250/636: Performed by: PSYCHIATRY & NEUROLOGY

## 2021-10-21 PROCEDURE — 85027 COMPLETE CBC AUTOMATED: CPT

## 2021-10-21 PROCEDURE — 74011250636 HC RX REV CODE- 250/636: Performed by: INTERNAL MEDICINE

## 2021-10-21 PROCEDURE — 74011250637 HC RX REV CODE- 250/637: Performed by: INTERNAL MEDICINE

## 2021-10-21 PROCEDURE — 86140 C-REACTIVE PROTEIN: CPT

## 2021-10-21 PROCEDURE — 84100 ASSAY OF PHOSPHORUS: CPT

## 2021-10-21 PROCEDURE — 51798 US URINE CAPACITY MEASURE: CPT

## 2021-10-21 PROCEDURE — 97166 OT EVAL MOD COMPLEX 45 MIN: CPT

## 2021-10-21 PROCEDURE — 74011250637 HC RX REV CODE- 250/637: Performed by: PSYCHIATRY & NEUROLOGY

## 2021-10-21 PROCEDURE — 82104 ALPHA-1-ANTITRYPSIN PHENO: CPT

## 2021-10-21 PROCEDURE — 2709999900 HC NON-CHARGEABLE SUPPLY

## 2021-10-21 PROCEDURE — 97161 PT EVAL LOW COMPLEX 20 MIN: CPT

## 2021-10-21 PROCEDURE — 81001 URINALYSIS AUTO W/SCOPE: CPT

## 2021-10-21 PROCEDURE — 70553 MRI BRAIN STEM W/O & W/DYE: CPT

## 2021-10-21 PROCEDURE — 86038 ANTINUCLEAR ANTIBODIES: CPT

## 2021-10-21 PROCEDURE — 85652 RBC SED RATE AUTOMATED: CPT

## 2021-10-21 PROCEDURE — 86141 C-REACTIVE PROTEIN HS: CPT

## 2021-10-21 PROCEDURE — 99223 1ST HOSP IP/OBS HIGH 75: CPT | Performed by: FAMILY MEDICINE

## 2021-10-21 PROCEDURE — 81256 HFE GENE: CPT

## 2021-10-21 PROCEDURE — C9113 INJ PANTOPRAZOLE SODIUM, VIA: HCPCS | Performed by: INTERNAL MEDICINE

## 2021-10-21 PROCEDURE — 80048 BASIC METABOLIC PNL TOTAL CA: CPT

## 2021-10-21 PROCEDURE — 97535 SELF CARE MNGMENT TRAINING: CPT

## 2021-10-21 PROCEDURE — 83516 IMMUNOASSAY NONANTIBODY: CPT

## 2021-10-21 PROCEDURE — A9575 INJ GADOTERATE MEGLUMI 0.1ML: HCPCS | Performed by: FAMILY MEDICINE

## 2021-10-21 PROCEDURE — 83735 ASSAY OF MAGNESIUM: CPT

## 2021-10-21 PROCEDURE — 80074 ACUTE HEPATITIS PANEL: CPT

## 2021-10-21 PROCEDURE — 74011000258 HC RX REV CODE- 258: Performed by: PSYCHIATRY & NEUROLOGY

## 2021-10-21 PROCEDURE — 82390 ASSAY OF CERULOPLASMIN: CPT

## 2021-10-21 PROCEDURE — 97530 THERAPEUTIC ACTIVITIES: CPT

## 2021-10-21 RX ORDER — LANOLIN ALCOHOL/MO/W.PET/CERES
100 CREAM (GRAM) TOPICAL DAILY
Status: DISCONTINUED | OUTPATIENT
Start: 2021-10-29 | End: 2021-11-06

## 2021-10-21 RX ORDER — ONDANSETRON 2 MG/ML
8 INJECTION INTRAMUSCULAR; INTRAVENOUS
Status: DISCONTINUED | OUTPATIENT
Start: 2021-10-21 | End: 2021-11-24 | Stop reason: HOSPADM

## 2021-10-21 RX ORDER — AMANTADINE HYDROCHLORIDE 100 MG/1
200 CAPSULE, GELATIN COATED ORAL 2 TIMES DAILY
Status: DISCONTINUED | OUTPATIENT
Start: 2021-10-21 | End: 2021-10-23

## 2021-10-21 RX ORDER — POLYETHYLENE GLYCOL 3350 17 G/17G
17 POWDER, FOR SOLUTION ORAL DAILY PRN
Status: DISCONTINUED | OUTPATIENT
Start: 2021-10-21 | End: 2021-11-24 | Stop reason: HOSPADM

## 2021-10-21 RX ORDER — SODIUM CHLORIDE 9 MG/ML
100 INJECTION, SOLUTION INTRAVENOUS CONTINUOUS
Status: DISCONTINUED | OUTPATIENT
Start: 2021-10-21 | End: 2021-10-29

## 2021-10-21 RX ORDER — SODIUM CHLORIDE 0.9 % (FLUSH) 0.9 %
5-40 SYRINGE (ML) INJECTION EVERY 8 HOURS
Status: DISCONTINUED | OUTPATIENT
Start: 2021-10-21 | End: 2021-11-24 | Stop reason: HOSPADM

## 2021-10-21 RX ORDER — ACETAMINOPHEN 650 MG/1
650 SUPPOSITORY RECTAL
Status: DISCONTINUED | OUTPATIENT
Start: 2021-10-21 | End: 2021-11-24 | Stop reason: HOSPADM

## 2021-10-21 RX ORDER — ACETAMINOPHEN 325 MG/1
650 TABLET ORAL
Status: DISCONTINUED | OUTPATIENT
Start: 2021-10-21 | End: 2021-11-24 | Stop reason: HOSPADM

## 2021-10-21 RX ORDER — LEVETIRACETAM 500 MG/1
1000 TABLET ORAL 2 TIMES DAILY
Status: DISCONTINUED | OUTPATIENT
Start: 2021-10-21 | End: 2021-10-22

## 2021-10-21 RX ORDER — ENOXAPARIN SODIUM 100 MG/ML
40 INJECTION SUBCUTANEOUS DAILY
Status: DISCONTINUED | OUTPATIENT
Start: 2021-10-21 | End: 2021-11-24 | Stop reason: HOSPADM

## 2021-10-21 RX ORDER — ONDANSETRON 4 MG/1
4 TABLET, ORALLY DISINTEGRATING ORAL
Status: DISCONTINUED | OUTPATIENT
Start: 2021-10-21 | End: 2021-11-24 | Stop reason: HOSPADM

## 2021-10-21 RX ORDER — SODIUM CHLORIDE 0.9 % (FLUSH) 0.9 %
5-40 SYRINGE (ML) INJECTION AS NEEDED
Status: DISCONTINUED | OUTPATIENT
Start: 2021-10-21 | End: 2021-11-24 | Stop reason: HOSPADM

## 2021-10-21 RX ADMIN — LEVETIRACETAM 1000 MG: 500 TABLET, FILM COATED ORAL at 18:29

## 2021-10-21 RX ADMIN — AMANTADINE HYDROCHLORIDE 200 MG: 100 CAPSULE ORAL at 09:16

## 2021-10-21 RX ADMIN — AMANTADINE HYDROCHLORIDE 200 MG: 100 CAPSULE ORAL at 18:29

## 2021-10-21 RX ADMIN — ENOXAPARIN SODIUM 40 MG: 100 INJECTION SUBCUTANEOUS at 09:16

## 2021-10-21 RX ADMIN — SODIUM CHLORIDE 40 MG: 9 INJECTION, SOLUTION INTRAMUSCULAR; INTRAVENOUS; SUBCUTANEOUS at 03:50

## 2021-10-21 RX ADMIN — SODIUM CHLORIDE 100 ML/HR: 900 INJECTION, SOLUTION INTRAVENOUS at 15:46

## 2021-10-21 RX ADMIN — SODIUM CHLORIDE 100 ML/HR: 900 INJECTION, SOLUTION INTRAVENOUS at 03:50

## 2021-10-21 RX ADMIN — Medication 10 ML: at 23:31

## 2021-10-21 RX ADMIN — GADOTERATE MEGLUMINE 14 ML: 376.9 INJECTION INTRAVENOUS at 10:55

## 2021-10-21 RX ADMIN — THIAMINE HYDROCHLORIDE 500 MG: 100 INJECTION, SOLUTION INTRAMUSCULAR; INTRAVENOUS at 23:26

## 2021-10-21 NOTE — ROUTINE PROCESS
Bedside shift change report given to [facility] JONATHAN CHRISTIAN RN (oncoming nurse) by Seymour Dance RN (offgoing nurse). Report included the following information SBAR, Kardex, Procedure Summary, Intake/Output, MAR and Recent Results.

## 2021-10-21 NOTE — PROGRESS NOTES
Reason for Admission:  Vomiting [R11.10]                 RUR Score:    13%            Plan for utilizing home health:    Not at this time                      Likelihood of Readmission:   LOW                         Transition of Care Plan:              Initial assessment completed with patient. Cognitive status of patient: oriented to time, place, person and situation. Face sheet information confirmed:  yes. The patient designates , Lydia Yu to participate in her discharge plan and to receive any needed information. This patient lives in a single family home with son and . Patient is able to navigate steps as needed. Prior to hospitalization, patient was considered to be independent with ADLs/IADLS : yes . Patient has a current ACP document on file: no      Healthcare Decision Maker:     Click here to complete [address] including selection of the Healthcare Decision Maker Relationship (ie \"Primary\")    The  will be available to transport patient home upon discharge. The patient already has Shower chair available in the home. Patient is not currently active with home health. Patient has not stayed in a skilled nursing facility or rehab. This patient is on dialysis :no    Currently, the discharge plan is Home. The patient states that she can obtain her medications from the pharmacy, and take her medications as directed. Patient's current insurance is "[employer] and [employer]. Care Management Interventions  PCP Verified by CM:  Yes  Mode of Transport at Discharge: Self  Transition of Care Consult (CM Consult): Discharge Planning  Support Systems: Spouse/Significant Other  Confirm Follow Up Transport: Family  Discharge Location  Discharge Placement: Home        Roger Caban RN - Outcomes Manager  [phone]

## 2021-10-21 NOTE — PROGRESS NOTES
SLP Note:    New orders received and chart reviewed. Pt seen with  at bedside. Both denying difficulty with swallowing. Reporting pt with no appetite and nausea/vomiting post all intake. Skilled SLP services not indicated at this time. Discussed role of SLP, aspiration s/sx and to alert MD/RN if symptoms arise with understanding verbalized. Will sign off. Please re-consult as indicated.      Benjamin Lawrence M.S., [id number] [facility]  Speech-Language Pathologist

## 2021-10-21 NOTE — PROGRESS NOTES
MRI Screening form needs to be filled out and faxed to [address] MRI can be scheduled. If unable to obtain information from pt, [facility] needs to be contacted.  If pt is claustro or will need pain meds, please have ordered in advance in order to facilitate exam.

## 2021-10-21 NOTE — PROGRESS NOTES
Occupational Therapy Goals  Initiated 10/21/2021 within 7 day(s). 1.  Patient will perform bed mobility in preparation for selfcare with modified independence. 2.  Patient will perform grooming task/functional activity standing for 4-7 minutes with supervision/set-up, F+ balance. 3.  Patient will perform lower body dressing with supervision/set-up seated and standing using AE prn (Pt has reacher at home). 4.  Patient will perform toilet transfers with supervision/set-up. 5.  Patient will perform all aspects of toileting with supervision/set-up. 6.  Patient will participate in upper extremity therapeutic exercise/activities with modified independence for 8 minutes. 7.  Patient will utilize energy conservation techniques during functional activities with verbal cues. Prior Level of Function: Patient was independent with self-care and functional mobility PTA. Patient's spouse reports she would walk unsteady at home d/t vertigo and for the stairs she would bump up/down on her bottom      Problem: Self Care Deficits Care Plan (Adult)  Goal: *Acute Goals and Plan of Care (Insert Text)  Outcome: Progressing Towards Goal     OCCUPATIONAL THERAPY EVALUATION    Patient: Renetta Prince (12 y.o. female)  Date: 10/21/2021  Primary Diagnosis: Vomiting [R11.10]  Precautions: Fall, Skin      ASSESSMENT :  Patient cleared to participate in OT evaluation by RN. Upon entering the room, the patient was seated on bedside commode, alert, and agreeable to participate in OT evaluation with spouse present. Patient educated on the role of OT, evaluation process, and safety during this admission with patient verbalizing understanding and reporting familiarity as she was [facility]hab [facility]. Patient reports positional vertigo this session and feeling nauseated, (-) emesis. Patient with eye patch donned on R eye this session, (+) diplopia when doffed.  Patient minimal assistance for toileting tasks, lower body dressing and sit <> stand. Patient contact guard assist for functional transfer to recliner in preparation for selfcare. Based on the objective data described below, the patient presents with decreased strength, decreased independence, decreased activity tolerance, decreased functional balance, and decreased functional mobility, which impedes pt performance in basic self-care/ADL tasks. Patient would benefit from skilled OT to restore PLOF and maximize function. Patient will benefit from skilled intervention to address the above impairments. Patient's rehabilitation potential is considered to be Fair  Factors which may influence rehabilitation potential include:   []             None noted  [x]             Mental ability/status  [x]             Medical condition  [x]             Home/family situation and support systems  [x]             Safety awareness  []             Pain tolerance/management  []             Other:      PLAN :  Recommendations and Planned Interventions:   [x]               Self Care Training                  [x]      Therapeutic Activities  [x]               Functional Mobility Training   []      Cognitive Retraining  [x]               Therapeutic Exercises           [x]      Endurance Activities  [x]               Balance Training                    [x]      Neuromuscular Re-Education  [x]               Visual/Perceptual Training     [x]      Home Safety Training  [x]               Patient Education                   [x]      Family Training/Education  []               Other (comment):    Frequency/Duration: Patient will be followed by occupational therapy 3 - 6 times a week to address goals.   Discharge Recommendations: Inpatient Rehab vs. Home Health with 24/7 Assistance pending pt's progress  Further Equipment Recommendations for Discharge: rolling walker     SUBJECTIVE:   Patient stated I just have these positional spins going on    OBJECTIVE DATA SUMMARY:     Past Medical History:   Diagnosis Date    Arthritis     Diarrhea     no diarrhea since 9/14/2021    Encephalitis 06/14/2021    intubated    GERD (gastroesophageal reflux disease)     H. pylori infection 2019    IBS (irritable bowel syndrome)     Memory difficulty     since encephalitis    Migraine     Ovarian cyst 2019    Seizures ([facility] [address] 06/16/2021    x 1    Vomiting      Past Surgical History:   Procedure Laterality Date    HX BREAST AUGMENTATION  2005    HX CHOLECYSTECTOMY      HX COLONOSCOPY  2016    HX ENDOSCOPY  2019    HX TYMPANOPLASTY      left x 5 times     Barriers to Learning/Limitations: None  Compensate with: visual, verbal, tactile, kinesthetic cues/model    Home Situation:   Home Situation  Home Environment: Private residence  One/Two Story Residence: Two story, live on 1st floor  Living Alone: No  Support Systems: Spouse/Significant Other  Patient Expects to be Discharged to[de-identified] House  Current DME Used/Available at Home: Shower chair  Tub or Shower Type: Tub/Shower combination  [x]  Right hand dominant   []  Left hand dominant    Cognitive/Behavioral Status:  Neurologic State: Alert  Orientation Level: Oriented to person;Oriented to place; Disoriented to time;Disoriented to situation  Cognition: Follows commands;Decreased attention/concentration;Poor safety awareness  Safety/Judgement: Fall prevention    Skin: Intact  Edema: None noted    Vision/Perceptual:    Tracking:  (pt's L eye observed to deviate laterally)                 Diplopia: Yes (with eye patch doffed from R eye)    Acuity: Within Defined Limits;Able to read normal print without difficulty (with R eye patch donned)    Corrective Lenses: Reading glasses    Coordination: BUE  Coordination: Generally decreased, functional  Fine Motor Skills-Upper: Left Intact; Right Intact    Gross Motor Skills-Upper: Left Intact; Right Intact    Balance:  Sitting: Intact  Standing: Impaired; With support  Standing - Static: Fair  Standing - Dynamic : Fair    Strength: BUE    Strength: Generally decreased, functional                Tone & Sensation: BUE    Tone: Normal  Sensation: Intact                      Range of Motion: BUE    AROM: Within functional limits                         Functional Mobility and Transfers for ADLs:  Bed Mobility:  Rolling:  (unable to assess this date )           Transfers:  Sit to Stand: Minimum assistance  Stand to Sit: Minimum assistance                Toilet Transfer : Minimum assistance (BSC)                ADL Assessment:   Feeding: Setup    Oral Facial Hygiene/Grooming: Setup    Bathing: Minimum assistance    Upper Body Dressing: Stand-by assistance    Lower Body Dressing: Minimum assistance    Toileting: Minimum assistance                ADL Intervention:                           Lower Body Dressing Assistance  Dressing Assistance: Minimum assistance  Underpants: Minimum assistance  Position Performed:  (seated on BSC)    Toileting  Toileting Assistance: Minimum assistance  Bladder Hygiene: Minimum assistance (standing)    Cognitive Retraining  Safety/Judgement: Fall prevention      Pain:  Pain level pre-treatment: 0/10   Pain level post-treatment: 0/10   Pain Intervention(s): Medication (see MAR); Response to intervention: Nurse notified, See doc flow    Activity Tolerance:   Fair    Please refer to the flowsheet for vital signs taken during this treatment. After treatment:   [x] Patient left in no apparent distress sitting up in chair  [] Patient left in no apparent distress in bed  [x] Call bell left within reach  [x] Nursing notified  [x] Caregiver present  [x] alarm activated    COMMUNICATION/EDUCATION:   [x] Role of Occupational Therapy in the acute care setting  [x] Home safety education was provided and the patient/caregiver indicated understanding. [x] Patient/family have participated as able in goal setting and plan of care. [x] Patient/family agree to work toward stated goals and plan of care.   [] Patient understands intent and goals of therapy, but is neutral about his/her participation. [] Patient is unable to participate in goal setting and plan of care. Thank you for this referral.  Regine Sanderson, OTR/L  Time Calculation: 33 mins    Eval Complexity: History: MEDIUM Complexity : Expanded review of history including physical, cognitive and psychosocial  history ; Examination: MEDIUM Complexity : 3-5 performance deficits relating to physical, cognitive , or psychosocial skils that result in activity limitations and / or participation restrictions; Decision Making:MEDIUM Complexity : Patient may present with comorbidities that affect occupational performnce.  Miniml to moderate modification of tasks or assistance (eg, physical or verbal ) with assesment(s) is necessary to enable patient to complete evaluation

## 2021-10-21 NOTE — ED NOTES
TRANSFER - OUT REPORT:    Verbal report given to Aerial (name) on Marisa Abarca  being transferred to [address] [place] [address](unit) for routine progression of care       Report consisted of patients Situation, Background, Assessment and   Recommendations(SBAR). Information from the following report(s) SBAR, Kardex, ED Summary, Procedure Summary, Intake/Output, MAR, Recent Results, Cardiac Rhythm NSR, Alarm Parameters  and Quality Measures was reviewed with the receiving nurse. Lines:   Peripheral IV 10/20/21 Left Antecubital (Active)   Site Assessment Clean, dry, & intact 10/20/21 1311   Phlebitis Assessment 0 10/20/21 1311   Infiltration Assessment 0 10/20/21 1311   Dressing Status Clean, dry, & intact 10/20/21 1311   Hub Color/Line Status Pink 10/20/21 1311       Peripheral IV 10/20/21 Right Antecubital (Active)        Opportunity for questions and clarification was provided.       Patient transported with:   [employer]

## 2021-10-21 NOTE — CONSULTS
[email or web address]  [phone]    GASTROENTEROLOGY CONSULT      Impression:   1. Intractable nausea and vomiting - EGD 9/22/2021 neg, CT 10/14/2021 neg, MRI head as noted below  2. Weight loss due to #1  3. Failure to thrive  4. Electrolyte derangement  5. Mild transaminitis - normal liver on CT 10/14/2021  6. 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 Replete electrolytes as indicated  2. Appreciate input from neurology - ? Encephalopathy vs B1 deficiency  3. Will order liver work up given elevated LFTs  4. Antiemetics as indicated  5. Diet as tolerated, stressed importance of adequate intake to replete nutrients  6. Medical management per primary team      Chief Complaint: Nausea, vomiting      HPI:  Ashok Hayward is a 64 y.o. female who I am being asked to see in consultation for an opinion regarding the above. She presented from our GI office yesterday with continue nausea, vomiting and markedly decreased debility and weakness.  states she has been unable to do any of her daily functions and vomits every time she tries to eat. Symptoms have been occurring for several months with significant worsening. EGD 9/22/2021 was negative and recent CT was negative for anything contributing to her symptoms. During the encounter her  did most of the talking as she is fatigued and somnolent. She has been able to hold down small amounts of liquids and Boost at this point without vomiting.  Neurology was present at the end of the encounter. PMH:   Past Medical History:   Diagnosis Date    Arthritis     Diarrhea     no diarrhea since 9/14/2021    Encephalitis 06/14/2021    intubated    GERD (gastroesophageal reflux disease)     H. pylori infection 2019    IBS (irritable bowel syndrome)     Memory difficulty     since encephalitis    Migraine     Ovarian cyst 2019    Seizures ([address] 06/16/2021    x 1    Vomiting        PSH:   Past Surgical History:   Procedure Laterality Date    HX BREAST AUGMENTATION  2005    HX CHOLECYSTECTOMY      HX COLONOSCOPY  2016    HX ENDOSCOPY  2019    HX TYMPANOPLASTY      left x 5 times       Social HX:   Social History     Socioeconomic History    Marital status:      Spouse name: Not on file    Number of children: Not on file    Years of education: Not on file    Highest education level: Not on file   Occupational History    Not on file   Tobacco Use    Smoking status: Never Smoker    Smokeless tobacco: Never Used   Substance and Sexual Activity    Alcohol use: Yes     Alcohol/week: 1.0 standard drinks     Types: 1 Glasses of wine per week     Comment: occasional    Drug use: Never    Sexual activity: Not on file   Other Topics Concern    Not on file   Social History Narrative    Not on file     Social Determinants of Health     Financial Resource Strain:     Difficulty of Paying Living Expenses:    Food Insecurity:     Worried About Running Out of Food in the Last Year:     [address] in the Last Year:    Transportation Needs:     Lack of Transportation (Medical):      Lack of Transportation (Non-Medical):    Physical Activity:     Days of Exercise per Week:     Minutes of Exercise per Session:    Stress:     Feeling of Stress :    Social Connections:     Frequency of Communication with Friends and Family:     Frequency of Social Gatherings with Friends and Family:     Attends [employer] Services:     Active Member of Clubs or Organizations:     Attends Club or Organization Meetings:     Marital Status:    Intimate Partner Violence:     Fear of Current or Ex-Partner:     Emotionally Abused:     Physically Abused:     Sexually Abused:        FHX:   No family history on file. Allergy:   Allergies   Allergen Reactions    Minocycline Other (comments)     dizzy       Patient Active Problem List   Diagnosis Code    Vomiting R11.10       Home Medications:     Medications Prior to Admission   Medication Sig    potassium gluconate 595 mg (99 mg) tablet Take 99 mg by mouth daily. To begin taking when the packets run out    potassium chloride (KLOR-CON) 20 mEq pack Take 20 mEq by mouth daily. Mix with water    meclizine (ANTIVERT) 12.5 mg tablet Take 12.5 mg by mouth three (3) times daily as needed for Dizziness. Dizziness    ondansetron (ZOFRAN ODT) 8 mg disintegrating tablet Take 8 mg by mouth every eight (8) hours as needed for Nausea or Vomiting.  LORazepam (Ativan) 1 mg tablet Take 1 Tablet by mouth every eight (8) hours as needed (Vomiting). Max Daily Amount: 3 mg. (Patient not taking: Reported on 10/21/2021)    pantoprazole (PROTONIX) 20 mg tablet Take 20 mg by mouth daily. Take on empty stomah (Patient not taking: Reported on 10/21/2021)    levETIRAcetam (KEPPRA) 250 mg tablet Take 250 mg by mouth two (2) times a day. (Patient not taking: Reported on 10/21/2021)    amantadine HCL (SYMMETREL) 100 mg capsule Take 200 mg by mouth two (2) times a day. Review of Systems:     Constitutional: weight loss, fatigue. Skin: No rashes, pruritis, jaundice, ulcerations, erythema. HENT: No headaches, nosebleeds, sinus pressure, rhinorrhea, sore throat. Eyes: Right eye weakness, lid droop   Cardiovascular: No chest pain, heart palpitations. Respiratory: No cough, SOB, wheezing, chest discomfort, orthopnea. Gastrointestinal: Neg unless noted otherwise in H&P   Genitourinary: No dysuria, bleeding, discharge, pyuria.    Musculoskeletal: weakness, wasting. Endo: No sweats. Heme: No bruising, easy bleeding. Allergies: As noted. Neurological: Gait not assessed. Psychiatric:  No anxiety, depression, hallucinations. Visit Vitals  /70 (BP 1 Location: Right arm, BP Patient Position: At rest)   Pulse 84   Temp 98.1 °F (36.7 °C)   Resp 16   Ht 5' (1.524 m)   Wt 67.1 kg (148 lb)   LMP 04/01/2019 Comment: unknown   SpO2 99%   BMI 28.90 kg/m²       Physical Assessment:     constitutional: appearance: thin, ill appearing, in no acute distress. skin: inspection: no rashes, ulcers, icterus or other lesions; no clubbing or telangiectasias. palpation: no induration or subcutaneos nodules. eyes: inspection: R lid droop/weakness; no jaundice pupils: normal  ENMT: mouth: normal oral mucosa,lips and gums; good dentition. oropharynx: normal tongue, hard and soft palate; posterior pharynx without erithema, exudate or lesions. neck: thyroid: normal size, consistency and position; no masses or tenderness. respiratory: effort: normal chest excursion; no intercostal retraction or accessory muscle use. cardiovascular: abdominal aorta: normal size and position; no bruits. palpation: PMI of normal size and position; normal rhythm; no thrill or murmurs. abdominal: abdomen: normal consistency; mild diffuse tenderness no masses. hernias: no hernias appreciated. liver: normal size and consistency. spleen: not palpable. rectal: hemoccult/guaiac: not performed. musculoskeletal: grossly normal, gait not assessed, sitting up in chair. neurologic: cranial nerves: II-XII grossly normal. Pupils intact.    Psychiatric: Somnolent during encounter,  gave most of history        Basic Metabolic Profile   Recent Labs     10/21/21  0345   *   K 3.0*   CL 95*   CO2 29   BUN 8   GLU 81   CA 7.7*   MG 1.8   PHOS 3.4         CBC w/Diff    Recent Labs     10/21/21  0345   WBC 5.7   RBC 3.79*   HGB 11.5*   HCT 32.7*   MCV 86.3   MCH 30.3   MCHC 35.2   RDW 14.4       Recent Labs     10/20/21  1312   GRANS 66   LYMPH 23   EOS 0        Hepatic Function   Recent Labs     10/20/21  1312   ALB 3.2*   TP 6.8   TBILI 2.9*   *   LPSE 266        Coags   No results for input(s): PTP, INR, APTT, INREXT in the last 72 hours. LAUREN Aragon. [facility] [address]  Cell: [phone]  [email or web address]

## 2021-10-21 NOTE — CONSULTS
Comprehensive Nutrition Assessment    Type and Reason for Visit: Initial, Positive nutrition screen, Consult    Nutrition Recommendations/Plan:  - Monitor GI symptoms and readiness for diet advancement as tolerated once nausea & vomiting improved. - Add nutritional supplements: Ensure Clear, BID & Gelatein once daily.  - Agree with high dose IV thiamine supplementation.  - Continue IV fluids.  - May need to consider parenteral nutrition if no improvement in po intake or diet tolerance. Nutrition Assessment:  Clear liquid diet with poor po intake and appetite. Inadequate intake x months with significant wt loss. Nausea and vomiting post all po intake, started on high dose IV thiamine per Neurology. Gi consulted for evaluation and possible J-tube placement. Malnutrition Assessment:  Malnutrition Status:  Severe malnutrition    Context:  Chronic illness     Findings of the 6 clinical characteristics of malnutrition:   Energy Intake:  7 - 75% or less est energy requirements for 1 month or longer  Weight Loss:  7.00 - Greater than 10% over 6 months     Body Fat Loss:  Unable to assess,     Muscle Mass Loss:  Unable to assess,    Fluid Accumulation:  Unable to assess,     Strength:  Not performed     Nutrition History and Allergies: PMHx- GERD, arthritis, IBS & HSV encephalitis. Presented to ED for further evaluation of unintentional wt loss and poorly controlled nausea and vomiting. Pt reported a 50# (25%) wt loss since June 2021 with UBW of 200#, down to 148#. Inadequate intake x months, worsened over the past 4-6 weeks PTA with increased nausea and vomiting. NKFA. Estimated Daily Nutrient Needs:  Energy (kcal): 3632-5659; Weight Used for Energy Requirements: Current (67 kg)  Protein (g): ; Weight Used for Protein Requirements: Current (1-1.5)  Fluid (ml/day): 3393-9046; Method Used for Fluid Requirements: 1 ml/kcal    Nutrition Related Findings:  BM PTA. SLP signed off.  Meds: NS at 100 mL/hr & IV thiamine 500 mg TID. Hypokalemia. Wounds:    None       Current Nutrition Therapies:  ADULT DIET Clear Liquid    Anthropometric Measures:  · Height:  5' (152.4 cm)  · Current Body Wt:  67.1 kg (147 lb 14.9 oz)   · Admission Body Wt:  147 lb 14.9 oz    · Usual Body Wt:  90.7 kg (200 lb) (June 2021 per pt report)     · Ideal Body Wt:  100 lbs:  147.9 %   · BMI Category: Overweight (BMI 25.0-29. 9)       Nutrition Diagnosis:   · Severe malnutrition, In context of chronic illness related to altered GI function (poor appetite with nausea and vomiting) as evidenced by poor intake prior to admission, weight loss greater than or equal to 10% in 6 months    Nutrition Interventions:   Food and/or Nutrient Delivery: Continue current diet, Start oral nutrition supplement, Vitamin supplement, IV fluid delivery  Nutrition Education and Counseling: No recommendations at this time, Education not indicated  Coordination of Nutrition Care: Continue to monitor while inpatient    Goals:  Nutritional needs will be met through adequate oral intake or nutrition support within the next 7 days. Nutrition Monitoring and Evaluation:   Behavioral-Environmental Outcomes: None identified  Food/Nutrient Intake Outcomes: Diet advancement/tolerance, Food and nutrient intake, Supplement intake, Vitamin/mineral intake, IVF intake  Physical Signs/Symptoms Outcomes: Biochemical data, GI status, Nausea/vomiting, Nutrition focused physical findings, Meal time behavior    Discharge Planning:     Too soon to determine     Electronically signed by Lennox Sauce, RD on 10/21/2021 at 2:49 PM    Contact: [phone]

## 2021-10-21 NOTE — CONSULTS
NEUROLOGY CONSULT NOTE    Patient ID:  Jean Claude Couch  [id number]  2 y.o.  1965    Date of Consultation:  October 21, 2021    Referring Physician: Tiffany Pate MD    Reason for Consultation: History of HSV encephalitis        Subjective:       History of Present Illness:     Ms. 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 In June this year she became lethargic, poorly responsive, and was admitted at [facility] where she was found to have HSV encephalitis. She was intubated. She had a long and difficult recovery, had undergone acute rehab as well as PT and OT at home. Per her  the patient was doing better until about 4 to 6 weeks ago and after that she developed again confusion, difficulty ambulating, nausea and vomiting. She also reports diplopia. Today the patient reports feeling generally weak. Extremely dizzy and vertiginous, binocular diplopia, nausea and vomiting. Particularly over the past 2 weeks ideally she had an extremely poor intake. Now is unable to ambulate independently because is unsteady. No hx of seizures, but had abnormal EEG, was on Keppra and was not aware needed to be continued.      Patient Active Problem List    Diagnosis Date Noted    Vomiting 10/20/2021     Past Medical History:   Diagnosis Date    Arthritis     Diarrhea     no diarrhea since 9/14/2021    Encephalitis 06/14/2021    intubated    GERD (gastroesophageal reflux disease)     H. pylori infection 2019    IBS (irritable bowel syndrome)     Memory difficulty     since encephalitis    Migraine     Ovarian cyst 2019    Seizures ([facility] [address] 06/16/2021    x 1    Vomiting       Past Surgical History:   Procedure Laterality Date    HX BREAST AUGMENTATION  2005    HX CHOLECYSTECTOMY      HX COLONOSCOPY  2016    HX ENDOSCOPY  2019    HX TYMPANOPLASTY      left x 5 times Prior to Admission medications    Medication Sig Start Date End Date Taking? Authorizing Provider   potassium gluconate 595 mg (99 mg) tablet Take 99 mg by mouth daily. To begin taking when the packets run out   Yes Provider, Historical   potassium chloride (KLOR-CON) 20 mEq pack Take 20 mEq by mouth daily. Mix with water   Yes Provider, Historical   meclizine (ANTIVERT) 12.5 mg tablet Take 12.5 mg by mouth three (3) times daily as needed for Dizziness. Dizziness   Yes Provider, Historical   ondansetron (ZOFRAN ODT) 8 mg disintegrating tablet Take 8 mg by mouth every eight (8) hours as needed for Nausea or Vomiting. 8/23/21  Yes Provider, Historical   LORazepam (Ativan) 1 mg tablet Take 1 Tablet by mouth every eight (8) hours as needed (Vomiting). Max Daily Amount: 3 mg. Patient not taking: Reported on 10/21/2021 10/15/21   Shakila NEAL MD   pantoprazole (PROTONIX) 20 mg tablet Take 20 mg by mouth daily. Take on empty stomah  Patient not taking: Reported on 10/21/2021    Provider, Historical   levETIRAcetam (KEPPRA) 250 mg tablet Take 250 mg by mouth two (2) times a day. Patient not taking: Reported on 10/21/2021 8/26/21 8/26/22  Provider, Historical   amantadine HCL (SYMMETREL) 100 mg capsule Take 200 mg by mouth two (2) times a day. 7/27/21   Provider, Historical     Allergies   Allergen Reactions    Minocycline Other (comments)     dizzy      Social History     Tobacco Use    Smoking status: Never Smoker    Smokeless tobacco: Never Used   Substance Use Topics    Alcohol use: Yes     Alcohol/week: 1.0 standard drinks     Types: 1 Glasses of wine per week     Comment: occasional      No family history on file. Review of Systems    Pertinent items are noted in HPI. Negative Unless BOLDED     General: fevers, chills, myalgias, arthralgias, unexplained weight loss, malaise, fatigue.   HEENT:  headaches,sinus pain or presure, recent URI, recent dental procedures;  tinnitus, hearing loss , visual changes, catarats, dizziness or blurred vision  PUlMONARY:  cough , shortness of breath, sputum production, hx of asthma or COPD. previous treatement for TB or PPD. Cardiovascular: chest pain, previous CAD/MI, vavlular heart disease,  murmurs  GI:   nausea, vomiting, diarrhea, abdominal pain, prior C.diff  :  urinary frequency, dysuria, hematuria, bladder incontinence. Neurologic:  seizures, syncope or prior CVA/TIA, confusion, memory impairment, neuropathy  Musculoskeletal:  myalgias arthralgias, joint pain/ swelling,  back pain  Skin:  Purities,  recurrent cellulitis,  chronic stasis ulcer, diabetic foot ulcers  Endocrine: polyuria, polydipsia, hair loss, weight gain  Psych: Denies depression or treatment by a psychiatrist/psycologist  Heme-Onc: prior DVT, easy bruising, fatigue, malignancy    Objective:     Patient Vitals for the past 8 hrs:   BP Temp Pulse Resp SpO2   10/21/21 0824 125/83 97.1 °F (36.2 °C) 84 16 98 %   10/21/21 0357 116/74 97.9 °F (36.6 °C) 100 16        General Exam   No acute distress, normal body habitus    HEENT: Normocephalic, atraumatic, Sclera anicteric, normal conjunctiva  Mucous membranes: normal color and hydration     CV: No carotid bruits,   Heart: regular to rate and rhythm. No murmurs     RESP:  CTAB     Neurologic Exam:    MENTAL STATUS:     The patient is awake, alert, and oriented x 4. Fund of knowledge is adequate. Speech is fluent and memory appears to be intact, both long and short term. CRANIAL NERVES:   II  pupils are both reactive to light ,some disconjugate eye movements. III, IV, VI-extraocular movement impaired with a ophthalmoplegia, impaired horizontal gaze and nystagmus left beating, also some rotational nystagmus component. Definitely right eye with impaired abduction. V  Facial sensation is intact to pinprick and light touch. VII  Face is symmetrical.   VIII - Hearing is present. IX, X, [address] rises symmetrically.  Gag is present. Tongue is in the midline. XI - Shoulder shrugging and head turning intact    MOTOR:          Tone is normal.  Muscle bulk is normal.  The patient appears globally weak, about 4+ in all muscle groups. No involuntary movements    SENSATION:  Sensory examination is intact to pinprick, light touch and position sense testing. REFLEXES: 2+ and symmetrical. Plantars are down going. COORDINATION: Cerebellar examination reveals no gross ataxia or dysmetria. GAIT: Not able to stand up on my exam.      Data Review:    Recent Results (from the past 24 hour(s))   CBC WITH AUTOMATED DIFF    Collection Time: 10/20/21  1:12 PM   Result Value Ref Range    WBC 9.0 4.6 - 13.2 K/uL    RBC 4.46 4.20 - 5.30 M/uL    HGB 13.8 12.0 - 16.0 g/dL    HCT 38.0 35.0 - 45.0 %    MCV 85.2 78.0 - 100.0 FL    MCH 30.9 24.0 - 34.0 PG    MCHC 36.3 31.0 - 37.0 g/dL    RDW 14.4 11.6 - 14.5 %    PLATELET 710 095 - 584 K/uL    MPV 9.3 9.2 - 11.8 FL    NEUTROPHILS 66 40 - 73 %    LYMPHOCYTES 23 21 - 52 %    MONOCYTES 10 3 - 10 %    EOSINOPHILS 0 0 - 5 %    BASOPHILS 0 0 - 2 %    ABS. NEUTROPHILS 6.0 1.8 - 8.0 K/UL    ABS. LYMPHOCYTES 2.1 0.9 - 3.6 K/UL    ABS. MONOCYTES 0.9 0.05 - 1.2 K/UL    ABS. EOSINOPHILS 0.0 0.0 - 0.4 K/UL    ABS.  BASOPHILS 0.0 0.0 - 0.1 K/UL    DF AUTOMATED     METABOLIC PANEL, COMPREHENSIVE    Collection Time: 10/20/21  1:12 PM   Result Value Ref Range    Sodium 129 (L) 136 - 145 mmol/L    Potassium 3.4 (L) 3.5 - 5.5 mmol/L    Chloride 91 (L) 100 - 111 mmol/L    CO2 30 21 - 32 mmol/L    Anion gap 8 3.0 - 18 mmol/L    Glucose 98 74 - 99 mg/dL    BUN 8 7.0 - 18 MG/DL    Creatinine 0.41 (L) 0.6 - 1.3 MG/DL    BUN/Creatinine ratio 20 12 - 20      GFR est AA >60 >60 ml/min/1.73m2    GFR est non-AA >60 >60 ml/min/1.73m2    Calcium 9.0 8.5 - 10.1 MG/DL    Bilirubin, total 2.9 (H) 0.2 - 1.0 MG/DL    ALT (SGPT) 167 (H) 13 - 56 U/L    AST (SGOT) 109 (H) 10 - 38 U/L    Alk.  phosphatase 150 (H) 45 - 117 U/L    Protein, total 6.8 6.4 - 8.2 g/dL    Albumin 3.2 (L) 3.4 - 5.0 g/dL    Globulin 3.6 2.0 - 4.0 g/dL    A-G Ratio 0.9 0.8 - 1.7     LIPASE    Collection Time: 10/20/21  1:12 PM   Result Value Ref Range    Lipase 266 73 - 393 U/L   CARDIAC PANEL,(CK, CKMB & TROPONIN)    Collection Time: 10/20/21  1:12 PM   Result Value Ref Range    CK - MB 3.5 <3.6 ng/ml    CK-MB Index 4.0 0.0 - 4.0 %    CK 88 26 - 192 U/L    Troponin-I, QT 0.02 0.0 - 0.045 NG/ML   MAGNESIUM    Collection Time: 10/20/21  1:12 PM   Result Value Ref Range    Magnesium 1.4 (L) 1.6 - 2.6 mg/dL   EKG, 12 LEAD, INITIAL    Collection Time: 10/20/21  1:20 PM   Result Value Ref Range    Ventricular Rate 102 BPM    Atrial Rate 102 BPM    P-R Interval 128 ms    QRS Duration 66 ms    Q-T Interval 330 ms    QTC Calculation (Bezet) 430 ms    Calculated P Axis 1 degrees    Calculated R Axis -19 degrees    Calculated T Axis 10 degrees    Diagnosis       Sinus tachycardia  Cannot rule out Anterior infarct (cited on or before 20-OCT-2021)  Abnormal ECG  When compared with ECG of 15-OCT-2021 08:30,  Questionable change in initial forces of Anterior leads  ST no longer elevated in Inferior leads  Inverted T waves have replaced nonspecific T wave abnormality in Inferior   leads  Confirmed by Lenita Phoenix ([id number]) on 10/21/2021 5:05:01 AM     METABOLIC PANEL, BASIC    Collection Time: 10/21/21  3:45 AM   Result Value Ref Range    Sodium 133 (L) 136 - 145 mmol/L    Potassium 3.0 (L) 3.5 - 5.5 mmol/L    Chloride 95 (L) 100 - 111 mmol/L    CO2 29 21 - 32 mmol/L    Anion gap 9 3.0 - 18 mmol/L    Glucose 81 74 - 99 mg/dL    BUN 8 7.0 - 18 MG/DL    Creatinine 0.24 (L) 0.6 - 1.3 MG/DL    BUN/Creatinine ratio 33 (H) 12 - 20      GFR est AA >60 >60 ml/min/1.73m2    GFR est non-AA >60 >60 ml/min/1.73m2    Calcium 7.7 (L) 8.5 - 10.1 MG/DL   MAGNESIUM    Collection Time: 10/21/21  3:45 AM   Result Value Ref Range    Magnesium 1.8 1.6 - 2.6 mg/dL   CBC W/O DIFF    Collection Time: 10/21/21  3:45 AM   Result Value Ref Range    WBC 5.7 4.6 - 13.2 K/uL    RBC 3.79 (L) 4.20 - 5.30 M/uL    HGB 11.5 (L) 12.0 - 16.0 g/dL    HCT 32.7 (L) 35.0 - 45.0 %    MCV 86.3 78.0 - 100.0 FL    MCH 30.3 24.0 - 34.0 PG    MCHC 35.2 31.0 - 37.0 g/dL    RDW 14.4 11.6 - 14.5 %    PLATELET 400 130 - 560 K/uL    MPV 9.3 9.2 - 11.8 FL   PHOSPHORUS    Collection Time: 10/21/21  3:45 AM   Result Value Ref Range    Phosphorus 3.4 2.5 - 4.9 MG/DL   SED RATE (ESR)    Collection Time: 10/21/21  3:45 AM   Result Value Ref Range    Sed rate, automated 15 0 - 30 mm/hr   C REACTIVE PROTEIN, QT    Collection Time: 10/21/21  3:45 AM   Result Value Ref Range    C-Reactive protein 1.2 (H) 0 - 0.3 mg/dL   URINALYSIS W/ RFLX MICROSCOPIC    Collection Time: 10/21/21  4:48 AM   Result Value Ref Range    Color DARK YELLOW      Appearance CLOUDY      Specific gravity 1.014 1.005 - 1.030      pH (UA) 7.0 5.0 - 8.0      Protein TRACE (A) NEG mg/dL    Glucose Negative NEG mg/dL    Ketone 80 (A) NEG mg/dL    Bilirubin LARGE (A) NEG      Blood Negative NEG      Urobilinogen 2.0 (H) 0.2 - 1.0 EU/dL    Nitrites Negative NEG      Leukocyte Esterase MODERATE (A) NEG     URINE MICROSCOPIC ONLY    Collection Time: 10/21/21  4:48 AM   Result Value Ref Range    WBC TOO NUMEROUS TO COUNT 0 - 4 /hpf    RBC Negative 0 - 5 /hpf    Epithelial cells 1+ 0 - 5 /lpf    Bacteria 1+ (A) NEG /hpf    Mucus 3+ (A) NEG /lpf         Radiology studies:   Brain MRI 10/21/2021, reviewed. No enhancing lesions. FINDINGS:     Parenchyma:  There is a significant injury to the right insula anterior temporal   region as well as the medial right frontal lobe these suggest ischemic injuries now remote to the middle and anterior cerebral distribution on the right side     Today's diffusion images demonstrate no evidence of acute restricted diffusion     FLAIR and T2-weighted images demonstrate expected areas of gliosis surrounding the areas of involvement there is also evidence for small amount of left frontal infarction injury also remote old. This has the appearance of a atherosclerotic  injury as most likely etiology for the changes seen     Focal meningitis may have led to a ischemic injury as demonstrated now     On today's exam with contrast there is no evidence of any enhancement of the meninges or subarachnoid spaces to suggest any ongoing concern for an active meningitis, cerebritis-type change      No acute infarction. No acute hemorrhage. No mass lesion.  No pathologic enhancement.     CSF spaces: Reflect cystic encephalomalacia and volume loss in the right middle fossa     IAC regions: Unremarkable     Parasellar region: Unremarkable     Vasculature: Signal void is identified in the carotid siphons terminal carotid right and left middle and anterior cerebral circulation     Cervicomedullary junction: Patent     Orbits: Unremarkable     Paranasal sinuses: Clear         IMPRESSION     Evidence for what appears to be now remote vascular injury to the right MCA right and left TA distribution    more severe on the right with associated volume loss and localized expansion of the ventricular system     No acute infarction no evidence of acute intracranial enhancement seen     Pattern best fits with a atherosclerotic vascular event however focal area of meningitis or even severe encephalitis consent times stimulate vascular occlusion     110  minutes were spent on the patient of which more than 50% was spent in coordination of care and counseling (time spent with patient/family face to face, physical exam, reviewing laboratory and imaging investigations, speaking with physicians and nursing staff involved in this patient's care)    Assessment:     Ms. 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 On neurological exam the patient has ophthalmoplegia and impaired gait, somewhat slow cognitively. On brain MRI there is no brainstem lesion involving MLF to explain the exm findings. Therefore, thiamine deficiency now is in differential.   No concern for active seizures.      Plan:     - B1 level and start B1 Wernicke protocol  - Keppra 1000 mg bid po  - IR LP in am and sent routine labs, HSV 1,2 PCR, NMDA, TGG index, OB  - PT/OT   - CTA head and neck   - will see her in am           Geovany Bui MD  Adult Neurologist  10/21/2021

## 2021-10-21 NOTE — PROGRESS NOTES
Problem: Mobility Impaired (Adult and Pediatric)  Goal: *Acute Goals and Plan of Care (Insert Text)  Description: Physical Therapy Goals  Initiated 10/21/2021 and to be accomplished within 7 day(s)  1. Patient will move from supine to sit and sit to supine , scoot up and down, and roll side to side in bed with supervision/set-up. 2.  Patient will transfer from bed to chair and chair to bed with minimal assistance/contact guard assist using the least restrictive device. 3.  Patient will perform sit to stand with minimal assistance/contact guard assist.  4.  Patient will ambulate with minimal assistance/contact guard assist for 50 feet with the least restrictive device. 5.  Patient will ascend/descend stairs pending necessity for discharge and if safe to perform. 6.  Patient will perform BLE therex as tolerated     PLOF: Pt reports she lives in a [address] [place] [address] with her , states she was ambulatory with no AD however per chart review, pt has been using a wc recently as she has increased weakness and difficulty walking, pt only oriented to self and time this date. Outcome: Progressing Towards Goal     PHYSICAL THERAPY EVALUATION    Patient: Allen Boland (89 y.o. female)  Date: 10/21/2021  Primary Diagnosis: Vomiting [R11.10]        Precautions:      WBAT  PLOF: see above     ASSESSMENT :  Based on the objective data described below, the patient presents with generalized weakness and decreased functional mobility. Pt received in bed in NAD, agreeable. Pt endorses abdominal discomfort and nausea but declines any medication. Pt is only oriented to self and time at this time. BLE ROM at bed level is [facility] [place] however strength generally weak, sensation is in tact.  Pt asked if she wants to sit up and she says yes however does not initiate any movement to sit up, pt asked if she needs help and she says \"no\", again pt asked to sit up and she continues to just sit there, attempted to motivate pt to sit up approx 10 min but pt continues to not initiate then states she wants to eat her breakfast. Pt is a candidate for skilled PT per chart review with increased difficulty with mobility recently, will continue to follow and assess to recommend for discharge. Pt left in bed with alarm on and all needs met. Patient will benefit from skilled intervention to address the above impairments. Patient's rehabilitation potential is considered to be Good  Factors which may influence rehabilitation potential include:   []         None noted  [x]         Mental ability/status  [x]         Medical condition  [x]         Home/family situation and support systems  []         Safety awareness  []         Pain tolerance/management  []         Other:      PLAN :  Recommendations and Planned Interventions:   [x]           Bed Mobility Training             [x]    Neuromuscular Re-Education  [x]           Transfer Training                   []    Orthotic/Prosthetic Training  [x]           Gait Training                          []    Modalities  [x]           Therapeutic Exercises           []    Edema Management/Control  [x]           Therapeutic Activities            [x]    Family Training/Education  [x]           Patient Education  []           Other (comment):    Frequency/Duration: Patient will be followed by physical therapy 1-2 times per day/4-7 days per week to address goals. Discharge Recommendations: Rehab vs HH with increased support pending progress  Further Equipment Recommendations for Discharge: TBD     SUBJECTIVE:   Patient stated my stomach needs to settle.     OBJECTIVE DATA SUMMARY:     Past Medical History:   Diagnosis Date    Arthritis     Diarrhea     no diarrhea since 9/14/2021    Encephalitis 06/14/2021    intubated    GERD (gastroesophageal reflux disease)     H. pylori infection 2019    IBS (irritable bowel syndrome)     Memory difficulty     since encephalitis    Migraine     Ovarian cyst 2019    [facility] 06/16/2021 x 1    Vomiting      Past Surgical History:   Procedure Laterality Date    HX BREAST AUGMENTATION  2005    HX CHOLECYSTECTOMY      HX COLONOSCOPY  2016    HX ENDOSCOPY  2019    HX TYMPANOPLASTY      left x 5 times     Barriers to Learning/Limitations: yes;  physical and altered mental status (i.e.Sedation, Confusion)  Compensate with: Visual Cues, Verbal Cues, and Tactile Cues  Home Situation:  Home Situation  Home Environment: Private residence  One/Two Story Residence: Two story, live on 1st floor  Living Alone: No  Support Systems: Spouse/Significant Other  Patient Expects to be Discharged to[de-identified] House  Current DME Used/Available at Home: None  Critical Behavior:  Neurologic State: Confused  Orientation Level: Oriented to person;Disoriented to place; Disoriented to situation;Oriented to time  Cognition: Decreased attention/concentration;Decreased command following                       Strength:    Strength: Generally decreased, functional                    Tone & Sensation:   Tone: Normal              Sensation: Intact               Range Of Motion:  AROM: Within functional limits       Functional Mobility:  Bed Mobility:  Rolling:  (unable to assess this date )    Pain:  Pain level pre-treatment: pt does not rate, endorses abdominal discomfort /10   Pain level post-treatment: \"    \" /10   Pain Intervention(s) : Medication (see MAR); Rest, Ice, Repositioning  Response to intervention: Nurse notified,    Activity Tolerance:   Fair    Please refer to the flowsheet for vital signs taken during this treatment. After treatment:   []         Patient left in no apparent distress sitting up in chair  [x]         Patient left in no apparent distress in bed  [x]         Call bell left within reach  [x]         Nursing notified  []         Caregiver present  [x]         Bed alarm activated  []         SCDs applied    COMMUNICATION/EDUCATION:   [x]         Role of Physical Therapy in the acute care setting.   [x] Fall prevention education was provided and the patient/caregiver indicated understanding. [x]         Patient/family have participated as able in goal setting and plan of care. [x]         Patient/family agree to work toward stated goals and plan of care. []         Patient understands intent and goals of therapy, but is neutral about his/her participation. []         Patient is unable to participate in goal setting/plan of care: ongoing with therapy staff.  []         Other:     Thank you for this referral.  Evelyn Braden   Time Calculation: 21 mins      Eval Complexity: History: MEDIUM  Complexity : 1-2 comorbidities / personal factors will impact the outcome/ POC Exam:MEDIUM Complexity : 3 Standardized tests and measures addressing body structure, function, activity limitation and / or participation in recreation  Presentation: MEDIUM Complexity : Evolving with changing characteristics  Clinical Decision Making:Medium Complexity    Overall Complexity:MEDIUM

## 2021-10-21 NOTE — ROUTINE PROCESS
Bedside verbal report given to [facility], Oncoming Nurse. Report consisted of patients Situation, Background, Assessment and   Recommendations(SBAR). Information from the following report(s): Kardex, MAR and Recent Results was reviewed with the oncoming nurse. Opportunity for questions and clarification was provided.

## 2021-10-22 ENCOUNTER — APPOINTMENT (OUTPATIENT)
Dept: GENERAL RADIOLOGY | Age: 56
DRG: 056 | End: 2021-10-22
Attending: PSYCHIATRY & NEUROLOGY
Payer: COMMERCIAL

## 2021-10-22 LAB
ANA TITR SER IF: NEGATIVE {TITER}
ANION GAP SERPL CALC-SCNC: 12 MMOL/L (ref 3–18)
APPEARANCE CSF: CLEAR
BUN SERPL-MCNC: 8 MG/DL (ref 7–18)
BUN/CREAT SERPL: 33 (ref 12–20)
CALCIUM SERPL-MCNC: 7.8 MG/DL (ref 8.5–10.1)
CERULOPLASMIN SERPL-MCNC: 18.6 MG/DL (ref 19–39)
CHLORIDE SERPL-SCNC: 100 MMOL/L (ref 100–111)
CO2 SERPL-SCNC: 24 MMOL/L (ref 21–32)
COLOR CSF: COLORLESS
CREAT SERPL-MCNC: 0.24 MG/DL (ref 0.6–1.3)
CRP SERPL HS-MCNC: 9.4 MG/L
ERYTHROCYTE [DISTWIDTH] IN BLOOD BY AUTOMATED COUNT: 14.6 % (ref 11.6–14.5)
GLUCOSE CSF-MCNC: 42 MG/DL (ref 40–70)
GLUCOSE SERPL-MCNC: 67 MG/DL (ref 74–99)
HAV IGM SER QL: NEGATIVE
HBV CORE IGM SER QL: NEGATIVE
HBV SURFACE AG SER QL: <0.1 INDEX
HBV SURFACE AG SER QL: NEGATIVE
HCT VFR BLD AUTO: 31.4 % (ref 35–45)
HCV AB SER IA-ACNC: 0.06 INDEX
HCV AB SERPL QL IA: NEGATIVE
HCV COMMENT,HCGAC: NORMAL
HGB BLD-MCNC: 10.9 G/DL (ref 12–16)
INR PPP: 1 (ref 0.8–1.2)
MAGNESIUM SERPL-MCNC: 1.7 MG/DL (ref 1.6–2.6)
MCH RBC QN AUTO: 30.9 PG (ref 24–34)
MCHC RBC AUTO-ENTMCNC: 34.7 G/DL (ref 31–37)
MCV RBC AUTO: 89 FL (ref 78–100)
PHOSPHATE SERPL-MCNC: 2.9 MG/DL (ref 2.5–4.9)
PLATELET # BLD AUTO: 238 K/UL (ref 135–420)
PLEASE NOTE, 734348: NORMAL
PMV BLD AUTO: 9.2 FL (ref 9.2–11.8)
POTASSIUM SERPL-SCNC: 3.3 MMOL/L (ref 3.5–5.5)
PROT CSF-MCNC: 78 MG/DL (ref 15–45)
PROTHROMBIN TIME: 13.2 SEC (ref 11.5–15.2)
RBC # BLD AUTO: 3.53 M/UL (ref 4.2–5.3)
RBC # CSF: 4 /CU MM
SODIUM SERPL-SCNC: 136 MMOL/L (ref 136–145)
SP1: NORMAL
SP2: NORMAL
SP3: NORMAL
TUBE # CSF: 1
TUBE # CSF: 1
TUBE # CSF: 3
WBC # BLD AUTO: 7.1 K/UL (ref 4.6–13.2)
WBC # CSF: 0 /CU MM

## 2021-10-22 PROCEDURE — 36415 COLL VENOUS BLD VENIPUNCTURE: CPT

## 2021-10-22 PROCEDURE — 74011250636 HC RX REV CODE- 250/636

## 2021-10-22 PROCEDURE — C9113 INJ PANTOPRAZOLE SODIUM, VIA: HCPCS | Performed by: FAMILY MEDICINE

## 2021-10-22 PROCEDURE — 87529 HSV DNA AMP PROBE: CPT

## 2021-10-22 PROCEDURE — 86255 FLUORESCENT ANTIBODY SCREEN: CPT

## 2021-10-22 PROCEDURE — 77003 FLUOROGUIDE FOR SPINE INJECT: CPT

## 2021-10-22 PROCEDURE — 85027 COMPLETE CBC AUTOMATED: CPT

## 2021-10-22 PROCEDURE — 00JU3ZZ INSPECTION OF SPINAL CANAL, PERCUTANEOUS APPROACH: ICD-10-PCS | Performed by: RADIOLOGY

## 2021-10-22 PROCEDURE — 83735 ASSAY OF MAGNESIUM: CPT

## 2021-10-22 PROCEDURE — 74011000250 HC RX REV CODE- 250: Performed by: FAMILY MEDICINE

## 2021-10-22 PROCEDURE — 74011250636 HC RX REV CODE- 250/636: Performed by: INTERNAL MEDICINE

## 2021-10-22 PROCEDURE — 85610 PROTHROMBIN TIME: CPT

## 2021-10-22 PROCEDURE — 74011000258 HC RX REV CODE- 258

## 2021-10-22 PROCEDURE — 83916 OLIGOCLONAL BANDS: CPT

## 2021-10-22 PROCEDURE — 89050 BODY FLUID CELL COUNT: CPT

## 2021-10-22 PROCEDURE — 84157 ASSAY OF PROTEIN OTHER: CPT

## 2021-10-22 PROCEDURE — 65270000029 HC RM PRIVATE

## 2021-10-22 PROCEDURE — 84100 ASSAY OF PHOSPHORUS: CPT

## 2021-10-22 PROCEDURE — 87070 CULTURE OTHR SPECIMN AEROBIC: CPT

## 2021-10-22 PROCEDURE — 82945 GLUCOSE OTHER FLUID: CPT

## 2021-10-22 PROCEDURE — 74011250637 HC RX REV CODE- 250/637: Performed by: PSYCHIATRY & NEUROLOGY

## 2021-10-22 PROCEDURE — 74011250637 HC RX REV CODE- 250/637: Performed by: INTERNAL MEDICINE

## 2021-10-22 PROCEDURE — 74011250636 HC RX REV CODE- 250/636: Performed by: FAMILY MEDICINE

## 2021-10-22 PROCEDURE — 80048 BASIC METABOLIC PNL TOTAL CA: CPT

## 2021-10-22 PROCEDURE — 74011250636 HC RX REV CODE- 250/636: Performed by: PSYCHIATRY & NEUROLOGY

## 2021-10-22 PROCEDURE — 74011000258 HC RX REV CODE- 258: Performed by: PSYCHIATRY & NEUROLOGY

## 2021-10-22 PROCEDURE — 99232 SBSQ HOSP IP/OBS MODERATE 35: CPT | Performed by: PSYCHIATRY & NEUROLOGY

## 2021-10-22 PROCEDURE — 74011250637 HC RX REV CODE- 250/637: Performed by: FAMILY MEDICINE

## 2021-10-22 PROCEDURE — 82784 ASSAY IGA/IGD/IGG/IGM EACH: CPT

## 2021-10-22 RX ORDER — DEXTROSE, SODIUM CHLORIDE, AND POTASSIUM CHLORIDE 5; .9; .15 G/100ML; G/100ML; G/100ML
125 INJECTION INTRAVENOUS CONTINUOUS
Status: DISPENSED | OUTPATIENT
Start: 2021-10-22 | End: 2021-10-22

## 2021-10-22 RX ADMIN — Medication 10 ML: at 21:34

## 2021-10-22 RX ADMIN — ONDANSETRON 4 MG: 4 TABLET, ORALLY DISINTEGRATING ORAL at 09:04

## 2021-10-22 RX ADMIN — POTASSIUM CHLORIDE, DEXTROSE MONOHYDRATE AND SODIUM CHLORIDE 125 ML/HR: 150; 5; 900 INJECTION, SOLUTION INTRAVENOUS at 10:48

## 2021-10-22 RX ADMIN — AMANTADINE HYDROCHLORIDE 200 MG: 100 CAPSULE ORAL at 17:34

## 2021-10-22 RX ADMIN — THIAMINE HYDROCHLORIDE 500 MG: 100 INJECTION, SOLUTION INTRAMUSCULAR; INTRAVENOUS at 17:34

## 2021-10-22 RX ADMIN — AMANTADINE HYDROCHLORIDE 200 MG: 100 CAPSULE ORAL at 08:54

## 2021-10-22 RX ADMIN — ONDANSETRON 8 MG: 2 INJECTION INTRAMUSCULAR; INTRAVENOUS at 21:37

## 2021-10-22 RX ADMIN — SODIUM CHLORIDE 1000 MG: 9 INJECTION, SOLUTION INTRAVENOUS at 14:26

## 2021-10-22 RX ADMIN — LEVETIRACETAM 1000 MG: 500 SOLUTION ORAL at 17:34

## 2021-10-22 RX ADMIN — THIAMINE HYDROCHLORIDE 500 MG: 100 INJECTION, SOLUTION INTRAMUSCULAR; INTRAVENOUS at 09:04

## 2021-10-22 RX ADMIN — SODIUM CHLORIDE 40 MG: 9 INJECTION, SOLUTION INTRAMUSCULAR; INTRAVENOUS; SUBCUTANEOUS at 08:54

## 2021-10-22 RX ADMIN — LEVETIRACETAM 1000 MG: 500 SOLUTION ORAL at 14:26

## 2021-10-22 NOTE — PROGRESS NOTES
[facility] Hospitalists  Progress Note    Patient: Alfonso Newton Age: 64 y.o. : 1965 MR#: [id number] SSN: [id number]  Date: 10/21/2021     Subjective/24-hour events:     Sitting in chair at bedside. Sleepy, no new issues overnight.  present in room. Assessment:   Adult failure to thrive  Multiple electrolyte abnormalities: Hypokalemia, hypomagnesemia, hyponatremia  Transaminitis/elevated LFTs  Intractable nausea and vomiting  GERD  Vitamin D deficiency  Hyperlipidemia  Severe protein calorie malnutrition  Recent history of HSV encephalitis      Plan:   Monitor electrolytes and replace as necessary. Case discussed with neurology, abnormalities on MRI noted. Will await further recommendations. No obvious acute infection issues but will discuss case with ID given recent history of HSV encephalitis. Clinical nutrition evaluation, recommendations appreciated. If no significant improvement in oral intake will need to consider alternative means of nutrition. Will follow up again in AM.  PT/OT, mobilize as able/tolerated. Other care primarily supportive. Follow. Case discussed with:  [x]Patient  [x]Family  [x]Nursing  []Case Management  DVT Prophylaxis:  []Lovenox  []Hep SQ  []SCDs  []Coumadin   []On Heparin gtt    Objective:   VS:   Visit Vitals  /81   Pulse 89   Temp 97.8 °F (36.6 °C)   Resp 16   Ht 5' (1.524 m)   Wt 67.1 kg (148 lb)   LMP 2019 Comment: unknown   SpO2 100%   BMI 28.90 kg/m²      Tmax/24hrs: Temp (24hrs), Av.9 °F (36.6 °C), Min:97.1 °F (36.2 °C), Max:98.7 °F (37.1 °C)      Intake/Output Summary (Last 24 hours) at 10/21/2021 2056  Last data filed at 10/21/2021 0904  Gross per 24 hour   Intake 120 ml   Output 315 ml   Net -195 ml       General:  In NAD. Nontoxic-appearing. Weak appearing. Cardiovascular:  RRR. Pulmonary:  Lungs clear bilaterally, no wheezes. No accessory muscle use. GI:  Abdomen soft, NTTP.   Extremities: Warm, no edema or ischemia. Neuro: Sleepy/lethargic but moves extremities spontaneously.     Labs:    Recent Results (from the past 24 hour(s))   METABOLIC PANEL, BASIC    Collection Time: 10/21/21  3:45 AM   Result Value Ref Range    Sodium 133 (L) 136 - 145 mmol/L    Potassium 3.0 (L) 3.5 - 5.5 mmol/L    Chloride 95 (L) 100 - 111 mmol/L    CO2 29 21 - 32 mmol/L    Anion gap 9 3.0 - 18 mmol/L    Glucose 81 74 - 99 mg/dL    BUN 8 7.0 - 18 MG/DL    Creatinine 0.24 (L) 0.6 - 1.3 MG/DL    BUN/Creatinine ratio 33 (H) 12 - 20      GFR est AA >60 >60 ml/min/1.73m2    GFR est non-AA >60 >60 ml/min/1.73m2    Calcium 7.7 (L) 8.5 - 10.1 MG/DL   MAGNESIUM    Collection Time: 10/21/21  3:45 AM   Result Value Ref Range    Magnesium 1.8 1.6 - 2.6 mg/dL   CBC W/O DIFF    Collection Time: 10/21/21  3:45 AM   Result Value Ref Range    WBC 5.7 4.6 - 13.2 K/uL    RBC 3.79 (L) 4.20 - 5.30 M/uL    HGB 11.5 (L) 12.0 - 16.0 g/dL    HCT 32.7 (L) 35.0 - 45.0 %    MCV 86.3 78.0 - 100.0 FL    MCH 30.3 24.0 - 34.0 PG    MCHC 35.2 31.0 - 37.0 g/dL    RDW 14.4 11.6 - 14.5 %    PLATELET 699 203 - 791 K/uL    MPV 9.3 9.2 - 11.8 FL   PHOSPHORUS    Collection Time: 10/21/21  3:45 AM   Result Value Ref Range    Phosphorus 3.4 2.5 - 4.9 MG/DL   SED RATE (ESR)    Collection Time: 10/21/21  3:45 AM   Result Value Ref Range    Sed rate, automated 15 0 - 30 mm/hr   C REACTIVE PROTEIN, QT    Collection Time: 10/21/21  3:45 AM   Result Value Ref Range    C-Reactive protein 1.2 (H) 0 - 0.3 mg/dL   URINALYSIS W/ RFLX MICROSCOPIC    Collection Time: 10/21/21  4:48 AM   Result Value Ref Range    Color DARK YELLOW      Appearance CLOUDY      Specific gravity 1.014 1.005 - 1.030      pH (UA) 7.0 5.0 - 8.0      Protein TRACE (A) NEG mg/dL    Glucose Negative NEG mg/dL    Ketone 80 (A) NEG mg/dL    Bilirubin LARGE (A) NEG      Blood Negative NEG      Urobilinogen 2.0 (H) 0.2 - 1.0 EU/dL    Nitrites Negative NEG      Leukocyte Esterase MODERATE (A) NEG     URINE MICROSCOPIC ONLY    Collection Time: 10/21/21  4:48 AM   Result Value Ref Range    WBC TOO NUMEROUS TO COUNT 0 - 4 /hpf    RBC Negative 0 - 5 /hpf    Epithelial cells 1+ 0 - 5 /lpf    Bacteria 1+ (A) NEG /hpf    Mucus 3+ (A) NEG /lpf       Signed By: Gracie Cramer MD     October 21, 2021

## 2021-10-22 NOTE — PROGRESS NOTES
conducted an initial consultation and Spiritual Assessment for Fanny Malone, who is a 64 y.o.,female. Patient's Primary Language is: [place]. According to the patient's EMR Episcopal Affiliation is: No preference. The reason the Patient came to the hospital is:   Patient Active Problem List    Diagnosis Date Noted    Severe protein-calorie malnutrition ([facility] [address] 10/21/2021    Vomiting 10/20/2021        The  provided the following Interventions:  Initiated a relationship of care and support. Explored for spiritual needs while hospitalized. Listened empathically as he  shared about the difficulty staff has in starting an IV on his wife. As patient and spouse declined having any current spiritual needs, provided information about [address] [place] [address] in case they desire the support of a  later in her hospitalization. Chart reviewed. The following outcomes where achieved:  Patient and  are aware of the availability of spiritual care services. Patient and her  expressed gratitude for 's visit. Assessment:  Patient does not have any Church/cultural needs that will affect patient's preferences in health care. There are no spiritual or Church issues which require intervention at this time. Plan:  Chaplains will continue to follow and will provide pastoral care on an as needed/requested basis.  recommends bedside caregivers page  on duty if patient shows signs of acute spiritual or emotional distress.     [address] [place]   [phone]

## 2021-10-22 NOTE — PROGRESS NOTES
Chart reviewed. PT/OT recommending rehab. Pt down for lumbar puncture earlier today. Pt anticipated to be here for a few more days for full workup. Plan for discharge to SNF or ARU.   Gustavo Ponce RN - Outcomes Manager  [phone]

## 2021-10-22 NOTE — PROGRESS NOTES
PT treatment attempted, pt up in chair drowsy requesting PT return at a later time.     Will follow up  Thank you for this referral.  Jung Boyd PT DPT

## 2021-10-22 NOTE — PROGRESS NOTES
[email or web address]  [phone]    Gastroenterology follow up-Progress note    Impression:  1. Intractable nausea and vomiting - EGD 9/22/2021 neg, CT 10/14/2021 neg, No response to thiamine, only few sips of liquids today, unable to swallow large pills  2. Weight loss due to #1  3. Failure to thrive  4. Electrolyte derangement  5. Mild transaminitis - normal liver on CT 10/14/2021, acute hep panel neg, autoimmune labs pending. 6. Hx HSV encephalitis - neuro following     Plan:  1. Replete electrolytes as indicated  2. Appreciate input from neurology - ? Encephalopathy vs B1 deficiency  3. Await autoimmune labs and neuro results  4. Antiemetics as indicated  5. Diet as tolerated, stressed importance of adequate intake to replete nutrients  6. Medical management per primary team    Chief Complaint: Nausea, vomiting      Subjective:  More alert and answering questions today. ROS: Denies any fevers, chills, rash.      Eyes: Nystagmus, ophthalmoplegia   Neck: ROM normal, supple and trachea normal   Cardiovascular: heart normal, intact distal pulses, normal rate and regular rhythm   Pulmonary/Chest Wall: breath sounds normal and effort normal   Abdominal: appearance normal, bowel sounds normal and soft, non-acute, mild diffuse ttp     Patient Active Problem List   Diagnosis Code    Vomiting R11.10    Severe protein-calorie malnutrition (HCC) E43         Visit Vitals  /66   Pulse 74   Temp 98.1 °F (36.7 °C)   Resp 16   Ht 5' (1.524 m)   Wt 67.1 kg (148 lb)   LMP 04/01/2019 Comment: unknown   SpO2 100%   BMI 28.90 kg/m²           Intake/Output Summary (Last 24 hours) at 10/22/2021 1119  Last data filed at 10/21/2021 2348  Gross per 24 hour   Intake 360 ml   Output    Net 360 ml       CBC w/Diff    Lab Results   Component Value Date/Time    WBC 7.1 10/22/2021 02:54 AM    RBC 3.53 (L) 10/22/2021 02:54 AM    HGB 10.9 (L) 10/22/2021 02:54 AM    HCT 31.4 (L) 10/22/2021 02:54 AM    MCV 89.0 10/22/2021 02:54 AM MCH 30.9 10/22/2021 02:54 AM    MCHC 34.7 10/22/2021 02:54 AM    RDW 14.6 (H) 10/22/2021 02:54 AM     10/22/2021 02:54 AM    Lab Results   Component Value Date/Time    GRANS 66 10/20/2021 01:12 PM    LYMPH 23 10/20/2021 01:12 PM    EOS 0 10/20/2021 01:12 PM    BASOS 0 10/20/2021 01:12 PM      Basic Metabolic Profile   Recent Labs     10/22/21  0254      K 3.3*      CO2 24   BUN 8   CA 7.8*   MG 1.7   PHOS 2.9        Hepatic Function    Lab Results   Component Value Date/Time    ALB 3.2 (L) 10/20/2021 01:12 PM    TP 6.8 10/20/2021 01:12 PM     (H) 10/20/2021 01:12 PM    No results found for: TBIL       Coags   Recent Labs     10/22/21  0254   PTP 13.2   INR 1.0               LAUREN Schumacher    [facility]. [email or web address]  Phone: [phone]  Pager: [phone]

## 2021-10-22 NOTE — PROCEDURES
RADIOLOGY POST PROCEDURE NOTE     October 22, 2021       2:26 PM     Preoperative Diagnosis:   AMS    Postoperative Diagnosis:  Same. : Marc Hodgson PA-C    Assistant:  None. Type of Anesthesia: 1% plain lidocaine    Procedure/Description:  L2-L3 LP    Findings:   Successful L2-L3 LP  Opening pressures 9  12 cc clear CSF removed. Estimated blood Loss:  Minimal    Specimen Removed:   yes    Blood transfusions:  None. Implants:  None.     Complications: None    Condition: Stable    Discharge Plan:  continue present therapy    Marc Hodgson PA-C

## 2021-10-22 NOTE — PROGRESS NOTES
Neurology Progress Note    Patient ID:  Maximiano Castleman  [id number]  64 y.o.  1965    Subjective:      Patient was seen today as follow-up for ophthalmoplegia, nausea vomiting generalized fatigue, impaired gait. No changes clinically over the past 24 hours continue to feel severely nauseated, also had episodes of incontinence. Cognitively she maintains awareness. No episodes of seizures. Received 2 doses of thiamine 500 mg IV.  at the bedside. Current Facility-Administered Medications   Medication Dose Route Frequency    dextrose 5% - 0.9% NaCl with KCl 20 mEq/L infusion  125 mL/hr IntraVENous CONTINUOUS    amantadine HCL (SYMMETREL) capsule 200 mg  200 mg Oral BID    sodium chloride (NS) flush 5-40 mL  5-40 mL IntraVENous Q8H    sodium chloride (NS) flush 5-40 mL  5-40 mL IntraVENous PRN    acetaminophen (TYLENOL) tablet 650 mg  650 mg Oral Q6H PRN    Or    acetaminophen (TYLENOL) suppository 650 mg  650 mg Rectal Q6H PRN    polyethylene glycol (MIRALAX) packet 17 g  17 g Oral DAILY PRN    ondansetron (ZOFRAN ODT) tablet 4 mg  4 mg Oral Q8H PRN    Or    ondansetron (ZOFRAN) injection 8 mg  8 mg IntraVENous Q6H PRN    [Held by provider] enoxaparin (LOVENOX) injection 40 mg  40 mg SubCUTAneous DAILY    0.9% sodium chloride infusion  100 mL/hr IntraVENous CONTINUOUS    pantoprazole (PROTONIX) 40 mg in 0.9% sodium chloride 10 mL injection  40 mg IntraVENous DAILY    levETIRAcetam (KEPPRA) tablet 1,000 mg  1,000 mg Oral BID    thiamine (B-1) 500 mg in 0.9% sodium chloride 50 mL IVPB  500 mg IntraVENous Q8H    [START ON 10/24/2021] thiamine (B-1) 250 mg in 0.9% sodium chloride 50 mL IVPB  250 mg IntraVENous DAILY    [START ON 10/29/2021] thiamine HCL (B-1) tablet 100 mg  100 mg Oral DAILY          Objective: Active hospital medications were reviewed    Lab results and neuroradiology studies from the last 24 hours were reviewed.       Prior to Admission medications    Medication Sig Start Date End Date Taking? Authorizing Provider   potassium gluconate 595 mg (99 mg) tablet Take 99 mg by mouth daily. To begin taking when the packets run out   Yes Provider, Historical   potassium chloride (KLOR-CON) 20 mEq pack Take 20 mEq by mouth daily. Mix with water   Yes Provider, Historical   meclizine (ANTIVERT) 12.5 mg tablet Take 12.5 mg by mouth three (3) times daily as needed for Dizziness. Dizziness   Yes Provider, Historical   ondansetron (ZOFRAN ODT) 8 mg disintegrating tablet Take 8 mg by mouth every eight (8) hours as needed for Nausea or Vomiting. 8/23/21  Yes Provider, Historical   LORazepam (Ativan) 1 mg tablet Take 1 Tablet by mouth every eight (8) hours as needed (Vomiting). Max Daily Amount: 3 mg. Patient not taking: Reported on 10/21/2021 10/15/21   Faustina NEAL MD   pantoprazole (PROTONIX) 20 mg tablet Take 20 mg by mouth daily. Take on empty stomah  Patient not taking: Reported on 10/21/2021    Provider, Historical   levETIRAcetam (KEPPRA) 250 mg tablet Take 250 mg by mouth two (2) times a day. Patient not taking: Reported on 10/21/2021 8/26/21 8/26/22  Provider, Historical   amantadine HCL (SYMMETREL) 100 mg capsule Take 200 mg by mouth two (2) times a day. 7/27/21   Provider, Historical     Patient Vitals for the past 8 hrs:   BP Temp Pulse Resp SpO2   10/22/21 0804 110/66 98.1 °F (36.7 °C) 74 16 100 %   10/22/21 0409 118/69 97.2 °F (36.2 °C) 92 16 99 %   TCEDBBIFYPXM88/20 1901 - 10/22 0700  In: 530 [P.O.:480; I.V.:50]  Out: 315 [Urine:315]  10/20 1901 - 10/22 0700  In: 530 [P.O.:480; I.V.:50]  Out: 315 [Urine:315]RESULTRCNT(24h)Active Problems:    Vomiting (10/20/2021)      Severe protein-calorie malnutrition ([address] (10/21/2021)        Additional comments:I reviewed the patient's new clinical lab test results. and I reviewed the patients new imaging test results.      General Exam  No acute distress, mucous membranes normal color and hydration status; patient appears ill.    Neurologic Exam    Mental status: Awake, alert and oriented to person, situation, place, date. Follows commands. Language is intact. Mild dysarthria. Cranial nerves: Pupils are midline, small about 3 mm, reactive to light. Vertical nystagmus with primary gaze. Multidirectional nystagmus with horizontal gaze. Impaired right gaze. Face is with mild asymmetry. Tongue midline with normal strength, palat symmetric    Motor: Globally weak. Moves all 4 extremities symmetric and strength is> 4+    Gait: not tested today however was able to transfer from the bed to the chair with assistance and using the walker. Assessment:     Adonis Kolb is a 64 y.o. 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 Neurological exam seems to be worse than yesterday with vertical nystagmus with primary gaze and gaze evoked nystagmus sustained, nausea or vomiting. The patient clinical presentation is highly concerning for autoimmune post HSV-1 encephalitis. Also thiamine deficiency is in differential however she received 2 doses of 500 mg IV thiamine with no improvement. Plan:     -IR LP today   -CSF analysis with routine plus cultures plus NMDA receptor antibodies  -Start IV Solu-Medrol at 1 g daily x3 to 5 days based on the response.   If no improvement will start IVIG at 2 g/kg  -Continue Keppra 1000 mg twice daily by mouth  -Above plan discussed with the  and the patient and they agree with the plan      Signed:  Mary Neil MD  Adult Neurologist  10/22/2021  10:32 AM

## 2021-10-22 NOTE — PROGRESS NOTES
Nutrition Note    Remains on clear liquid diet with poor po intake, severely nauseated & receiving Zofran, no episodes of emesis noted today. Taking a few sips of clears & nutritional supplements, noted premier protein supplement at bedside not consistent with clear liquid diet order & telephone order from  [facility] for advancement to full liquid diet as desired. Dextrose and potassium added to IVF, hypokalemic today. D5 NS with 20 mEq/L KCL at 125 mL/hr providing 510 kcal per day, 60 mEq KCL. S/p IR lumbar puncture today. No progress towards nutritional goals. Nutrition Recommendations/Plan:  - Advance to full liquid diet as tolerated per discussion with MD.  - Change nutritional supplements to Ensure Enlive, BID & Magic Cup once daily.  - Agree with high dose IV thiamine supplementation & continuation of IVF. - If no improvement noted in po intake, may need to consider enteral nutrition support vs parenteral nutrition support (if pt unable to tolerate po).     Electronically signed by Amanda Ramirez RD on 10/22/2021 at 3:22 PM    Contact: [phone]

## 2021-10-23 ENCOUNTER — APPOINTMENT (OUTPATIENT)
Dept: CT IMAGING | Age: 56
DRG: 056 | End: 2021-10-23
Attending: PSYCHIATRY & NEUROLOGY
Payer: COMMERCIAL

## 2021-10-23 LAB
ACTIN IGG SERPL-ACNC: 7 UNITS (ref 0–19)
ANION GAP SERPL CALC-SCNC: 7 MMOL/L (ref 3–18)
BUN SERPL-MCNC: 4 MG/DL (ref 7–18)
BUN/CREAT SERPL: 13 (ref 12–20)
CALCIUM SERPL-MCNC: 8.3 MG/DL (ref 8.5–10.1)
CHLORIDE SERPL-SCNC: 103 MMOL/L (ref 100–111)
CO2 SERPL-SCNC: 27 MMOL/L (ref 21–32)
CREAT SERPL-MCNC: 0.3 MG/DL (ref 0.6–1.3)
ERYTHROCYTE [DISTWIDTH] IN BLOOD BY AUTOMATED COUNT: 14.5 % (ref 11.6–14.5)
GLUCOSE SERPL-MCNC: 108 MG/DL (ref 74–99)
HCT VFR BLD AUTO: 28.6 % (ref 35–45)
HGB BLD-MCNC: 9.9 G/DL (ref 12–16)
MAGNESIUM SERPL-MCNC: 1.7 MG/DL (ref 1.6–2.6)
MCH RBC QN AUTO: 30 PG (ref 24–34)
MCHC RBC AUTO-ENTMCNC: 34.6 G/DL (ref 31–37)
MCV RBC AUTO: 86.7 FL (ref 78–100)
MITOCHONDRIA M2 IGG SER-ACNC: <20 UNITS (ref 0–20)
PHOSPHATE SERPL-MCNC: 2.9 MG/DL (ref 2.5–4.9)
PLATELET # BLD AUTO: 224 K/UL (ref 135–420)
PMV BLD AUTO: 9.1 FL (ref 9.2–11.8)
POTASSIUM SERPL-SCNC: 3.4 MMOL/L (ref 3.5–5.5)
RBC # BLD AUTO: 3.3 M/UL (ref 4.2–5.3)
SODIUM SERPL-SCNC: 137 MMOL/L (ref 136–145)
WBC # BLD AUTO: 6.7 K/UL (ref 4.6–13.2)

## 2021-10-23 PROCEDURE — 84100 ASSAY OF PHOSPHORUS: CPT

## 2021-10-23 PROCEDURE — 85027 COMPLETE CBC AUTOMATED: CPT

## 2021-10-23 PROCEDURE — 2709999900 HC NON-CHARGEABLE SUPPLY

## 2021-10-23 PROCEDURE — 80048 BASIC METABOLIC PNL TOTAL CA: CPT

## 2021-10-23 PROCEDURE — 74011250636 HC RX REV CODE- 250/636

## 2021-10-23 PROCEDURE — 65270000029 HC RM PRIVATE

## 2021-10-23 PROCEDURE — 74011250636 HC RX REV CODE- 250/636: Performed by: FAMILY MEDICINE

## 2021-10-23 PROCEDURE — 74011250636 HC RX REV CODE- 250/636: Performed by: PSYCHIATRY & NEUROLOGY

## 2021-10-23 PROCEDURE — 83735 ASSAY OF MAGNESIUM: CPT

## 2021-10-23 PROCEDURE — 71260 CT THORAX DX C+: CPT

## 2021-10-23 PROCEDURE — 74011000250 HC RX REV CODE- 250: Performed by: PSYCHIATRY & NEUROLOGY

## 2021-10-23 PROCEDURE — 74011000250 HC RX REV CODE- 250: Performed by: FAMILY MEDICINE

## 2021-10-23 PROCEDURE — 74011000258 HC RX REV CODE- 258: Performed by: PSYCHIATRY & NEUROLOGY

## 2021-10-23 PROCEDURE — 74011250637 HC RX REV CODE- 250/637: Performed by: PSYCHIATRY & NEUROLOGY

## 2021-10-23 PROCEDURE — 74011250637 HC RX REV CODE- 250/637: Performed by: INTERNAL MEDICINE

## 2021-10-23 PROCEDURE — 74011000258 HC RX REV CODE- 258

## 2021-10-23 PROCEDURE — 74011250637 HC RX REV CODE- 250/637: Performed by: FAMILY MEDICINE

## 2021-10-23 PROCEDURE — C9113 INJ PANTOPRAZOLE SODIUM, VIA: HCPCS | Performed by: FAMILY MEDICINE

## 2021-10-23 PROCEDURE — 99233 SBSQ HOSP IP/OBS HIGH 50: CPT | Performed by: FAMILY MEDICINE

## 2021-10-23 PROCEDURE — 99231 SBSQ HOSP IP/OBS SF/LOW 25: CPT | Performed by: PSYCHIATRY & NEUROLOGY

## 2021-10-23 PROCEDURE — 74011000636 HC RX REV CODE- 636: Performed by: FAMILY MEDICINE

## 2021-10-23 PROCEDURE — 36415 COLL VENOUS BLD VENIPUNCTURE: CPT

## 2021-10-23 RX ORDER — AMANTADINE HYDROCHLORIDE 100 MG/1
100 CAPSULE, GELATIN COATED ORAL 2 TIMES DAILY
Status: DISCONTINUED | OUTPATIENT
Start: 2021-10-23 | End: 2021-10-23

## 2021-10-23 RX ORDER — AMANTADINE HYDROCHLORIDE 50 MG/5ML
100 SOLUTION ORAL 2 TIMES DAILY
Status: DISCONTINUED | OUTPATIENT
Start: 2021-10-23 | End: 2021-10-24

## 2021-10-23 RX ORDER — POTASSIUM CHLORIDE 7.45 MG/ML
10 INJECTION INTRAVENOUS
Status: COMPLETED | OUTPATIENT
Start: 2021-10-23 | End: 2021-10-23

## 2021-10-23 RX ADMIN — THIAMINE HYDROCHLORIDE 500 MG: 100 INJECTION, SOLUTION INTRAMUSCULAR; INTRAVENOUS at 01:09

## 2021-10-23 RX ADMIN — POTASSIUM CHLORIDE 10 MEQ: 7.45 INJECTION INTRAVENOUS at 11:10

## 2021-10-23 RX ADMIN — AMANTADINE HYDROCHLORIDE 100 MG: 50 SOLUTION ORAL at 17:53

## 2021-10-23 RX ADMIN — LEVETIRACETAM 1000 MG: 500 SOLUTION ORAL at 09:36

## 2021-10-23 RX ADMIN — SODIUM CHLORIDE 1000 MG: 9 INJECTION, SOLUTION INTRAVENOUS at 09:36

## 2021-10-23 RX ADMIN — Medication 10 ML: at 22:22

## 2021-10-23 RX ADMIN — SODIUM CHLORIDE 40 MG: 9 INJECTION, SOLUTION INTRAMUSCULAR; INTRAVENOUS; SUBCUTANEOUS at 09:36

## 2021-10-23 RX ADMIN — Medication 10 ML: at 05:15

## 2021-10-23 RX ADMIN — POTASSIUM CHLORIDE 10 MEQ: 7.45 INJECTION INTRAVENOUS at 12:57

## 2021-10-23 RX ADMIN — AMANTADINE HYDROCHLORIDE 200 MG: 100 CAPSULE ORAL at 09:36

## 2021-10-23 RX ADMIN — LEVETIRACETAM 750 MG: 100 SOLUTION ORAL at 17:53

## 2021-10-23 RX ADMIN — THIAMINE HYDROCHLORIDE 500 MG: 100 INJECTION, SOLUTION INTRAMUSCULAR; INTRAVENOUS at 09:36

## 2021-10-23 RX ADMIN — POTASSIUM CHLORIDE 10 MEQ: 7.45 INJECTION INTRAVENOUS at 13:00

## 2021-10-23 RX ADMIN — IOPAMIDOL 75 ML: 612 INJECTION, SOLUTION INTRAVENOUS at 09:00

## 2021-10-23 NOTE — PROGRESS NOTES
[facility] Hospitalists  Progress Note    Patient: Crystal Soliman Age: 64 y.o. : 1965 MR#: [id number] SSN: [id number]  Date: 10/23/2021     Subjective/24-hour events:     Continues complain of some nausea, reports an episode of vomiting this morning. Denies abdominal pain appetite remains poor. Assessment:   Adult failure to thrive  Multiple electrolyte abnormalities: Hypokalemia, hypomagnesemia, hyponatremia  Transaminitis/elevated LFTs  Intractable nausea and vomiting  GERD  Vitamin D deficiency  Hyperlipidemia  Severe protein calorie malnutrition  Recent history of HSV encephalitis      Plan:   Replace potassium today as it remains low. Monitor. Follow CSF fluid studies. No obvious evidence for acute infection. High-dose solumedrol per neurology. Encourage oral intake and monitor. If no significant improvement in appetite by tomorrow patient is agreeable to having an NG tube placed for administration of nutrition. This was discussed on visit today. PT/OT. Encourage participation with therapy as much as able. Supportive care otherwise. Follow. Case discussed with:  [x]Patient  [x]Family  [x]Nursing  []Case Management  DVT Prophylaxis:  []Lovenox  []Hep SQ  []SCDs  []Coumadin   []On Heparin gtt    Objective:   VS:   Visit Vitals  BP 94/62 (BP 1 Location: Left upper arm, BP Patient Position: At rest)   Pulse 67   Temp 97.8 °F (36.6 °C)   Resp 18   Ht 5' (1.524 m)   Wt 67.1 kg (148 lb)   LMP 2019 Comment: unknown   SpO2 99%   BMI 28.90 kg/m²      Tmax/24hrs: Temp (24hrs), Av.7 °F (36.5 °C), Min:97.4 °F (36.3 °C), Max:97.9 °F (36.6 °C)      Intake/Output Summary (Last 24 hours) at 10/23/2021 0951  Last data filed at 10/23/2021 0357  Gross per 24 hour   Intake 240 ml   Output    Net 240 ml       General:  In NAD. Nontoxic-appearing. Weak appearing. Cardiovascular:  RRR. Pulmonary:  Lungs clear bilaterally, no wheezes. No accessory muscle use.   GI: Abdomen soft, NTTP. Extremities:  Warm, no edema or ischemia. Neuro: Sleepy/lethargic but moves extremities spontaneously. Labs:    Recent Results (from the past 24 hour(s))   PROTEIN, CSF    Collection Time: 10/22/21 12:15 PM   Result Value Ref Range    Tube No. 1      Protein,CSF 78 (H) 15 - 45 MG/DL   GLUCOSE, CSF    Collection Time: 10/22/21 12:15 PM   Result Value Ref Range    Tube No. 1      Glucose,CSF 42 40 - 70 MG/DL   CULTURE, CSF W GRAM STAIN    Collection Time: 10/22/21 12:15 PM    Specimen: Cerebrospinal Fluid   Result Value Ref Range    Special Requests: TUBE 2, CULTURE AND SENSITIVTY AND GRAM STAIN.      GRAM STAIN NO WBC'S SEEN      GRAM STAIN NO ORGANISMS SEEN      Culture result: PENDING    CELL COUNT, CSF    Collection Time: 10/22/21 12:15 PM   Result Value Ref Range    CSF TUBE NO. 3      CSF COLOR COLORLESS      CSF APPEARANCE CLEAR      CSF RBCs 4 (H) 0 /cu mm    CSF WBCs 0 <5 /cu mm   IGG, CSF INDEX    Collection Time: 10/22/21 12:15 PM   Result Value Ref Range    IgG, Quant, CSF PENDING mg/dL    Albumin, CSF PENDING mg/dL    Immunoglobulin G, Qt. 635 586 - 1,602 mg/dL    Albumin, serum 3.1 (L) 3.8 - 4.9 g/dL    IgG/Alb ratio, CSF PENDING     CSF IgG Index PENDING    METABOLIC PANEL, BASIC    Collection Time: 10/23/21  4:08 AM   Result Value Ref Range    Sodium 137 136 - 145 mmol/L    Potassium 3.4 (L) 3.5 - 5.5 mmol/L    Chloride 103 100 - 111 mmol/L    CO2 27 21 - 32 mmol/L    Anion gap 7 3.0 - 18 mmol/L    Glucose 108 (H) 74 - 99 mg/dL    BUN 4 (L) 7.0 - 18 MG/DL    Creatinine 0.30 (L) 0.6 - 1.3 MG/DL    BUN/Creatinine ratio 13 12 - 20      GFR est AA >60 >60 ml/min/1.73m2    GFR est non-AA >60 >60 ml/min/1.73m2    Calcium 8.3 (L) 8.5 - 10.1 MG/DL   MAGNESIUM    Collection Time: 10/23/21  4:08 AM   Result Value Ref Range    Magnesium 1.7 1.6 - 2.6 mg/dL   CBC W/O DIFF    Collection Time: 10/23/21  4:08 AM   Result Value Ref Range    WBC 6.7 4.6 - 13.2 K/uL    RBC 3.30 (L) 4.20 - 5.30 M/uL    HGB 9.9 (L) 12.0 - 16.0 g/dL    HCT 28.6 (L) 35.0 - 45.0 %    MCV 86.7 78.0 - 100.0 FL    MCH 30.0 24.0 - 34.0 PG    MCHC 34.6 31.0 - 37.0 g/dL    RDW 14.5 11.6 - 14.5 %    PLATELET 159 943 - 871 K/uL    MPV 9.1 (L) 9.2 - 11.8 FL   PHOSPHORUS    Collection Time: 10/23/21  4:08 AM   Result Value Ref Range    Phosphorus 2.9 2.5 - 4.9 MG/DL       Signed By: Grant Christie MD     October 23, 2021

## 2021-10-23 NOTE — PROGRESS NOTES
Neurology Progress Note    Patient ID:  Irma Trivedi  [id number]  64 y.o.  1965    Subjective:      Patient was seen today as follow-up for ophthalmoplegia, nausea, vomiting, fatigue and impaired gait. Ms. 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 On exam she was found to have ophthalmoplegia and B1 was started concerning for possible Wernicke type of presentation. Yesterday exam showed nystagmus with primary gaze as well as gaze evoked, along with generalized fatigue gait impairment cognitive slowing. 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 The patient had also repeat CSF analyzes and profile much improved comparing with June 2021, with 0 WBCs, RBC 4, protein 78 and glucose 42. The patient continues to have dizziness/nausea and vomiting this morning and impaired vision/diplopia. No seizures since admission. I reviewed medications and she is on amantadine 200 mg twice a day which was continued during this admission. Looks like this was started back in June.     Current Facility-Administered Medications   Medication Dose Route Frequency    potassium chloride 10 mEq in 100 ml IVPB  10 mEq IntraVENous Q1H    levETIRAcetam (KEPPRA) oral solution 750 mg  750 mg Oral BID    amantadine (SYMMETREL) 50 mg/5 mL oral solution 100 mg  100 mg Oral BID    methylPREDNISolone (PF) (Solu-MEDROL) 1,000 mg in 0.9% sodium chloride 100 mL IVPB  1,000 mg IntraVENous DAILY    sodium chloride (NS) flush 5-40 mL  5-40 mL IntraVENous Q8H    sodium chloride (NS) flush 5-40 mL  5-40 mL IntraVENous PRN    acetaminophen (TYLENOL) tablet 650 mg  650 mg Oral Q6H PRN    Or    acetaminophen (TYLENOL) suppository 650 mg  650 mg Rectal Q6H PRN    polyethylene glycol (MIRALAX) packet 17 g  17 g Oral DAILY PRN    ondansetron (ZOFRAN ODT) tablet 4 mg  4 mg Oral Q8H PRN    Or    ondansetron (ZOFRAN) injection 8 mg  8 mg IntraVENous Q6H PRN    enoxaparin (LOVENOX) injection 40 mg  40 mg SubCUTAneous DAILY    0.9% sodium chloride infusion  100 mL/hr IntraVENous CONTINUOUS    pantoprazole (PROTONIX) 40 mg in 0.9% sodium chloride 10 mL injection  40 mg IntraVENous DAILY    thiamine (B-1) 500 mg in 0.9% sodium chloride 50 mL IVPB  500 mg IntraVENous Q8H    [START ON 10/24/2021] thiamine (B-1) 250 mg in 0.9% sodium chloride 50 mL IVPB  250 mg IntraVENous DAILY    [START ON 10/29/2021] thiamine HCL (B-1) tablet 100 mg  100 mg Oral DAILY          Objective: Active hospital medications were reviewed    Lab results and neuroradiology studies from the last 24 hours were reviewed. Prior to Admission medications    Medication Sig Start Date End Date Taking? Authorizing Provider   potassium gluconate 595 mg (99 mg) tablet Take 99 mg by mouth daily. To begin taking when the packets run out   Yes Provider, Historical   potassium chloride (KLOR-CON) 20 mEq pack Take 20 mEq by mouth daily. Mix with water   Yes Provider, Historical   meclizine (ANTIVERT) 12.5 mg tablet Take 12.5 mg by mouth three (3) times daily as needed for Dizziness. Dizziness   Yes Provider, Historical   ondansetron (ZOFRAN ODT) 8 mg disintegrating tablet Take 8 mg by mouth every eight (8) hours as needed for Nausea or Vomiting. 8/23/21  Yes Provider, Historical   LORazepam (Ativan) 1 mg tablet Take 1 Tablet by mouth every eight (8) hours as needed (Vomiting). Max Daily Amount: 3 mg. Patient not taking: Reported on 10/21/2021 10/15/21   Ligia NEAL MD   pantoprazole (PROTONIX) 20 mg tablet Take 20 mg by mouth daily.  Take on empty stomah  Patient not taking: Reported on 10/21/2021    Provider, Historical   levETIRAcetam (KEPPRA) 250 mg tablet Take 250 mg by mouth two (2) times a day. Patient not taking: Reported on 10/21/2021 8/26/21 8/26/22  Provider, Historical   amantadine HCL (SYMMETREL) 100 mg capsule Take 200 mg by mouth two (2) times a day. 7/27/21   Provider, Historical     Patient Vitals for the past 8 hrs:   BP Temp Pulse Resp SpO2   10/23/21 1103 99/65 97.5 °F (36.4 °C) 69 18 98 %   10/23/21 0805 94/62 97.8 °F (36.6 °C) 67 18 99 %   10/23/21 0357 (!) 101/57 97.7 °F (36.5 °C) 71 18 97 %   [id number]/21 1901 - 10/23 0700  In: 600 [P.O.:600]  Out: -   10/21 1901 - 10/23 0700  In: 600 [P.O.:600]  Out: - RESULTRCNT(24h)Active Problems:    Vomiting (10/20/2021)      Severe protein-calorie malnutrition ([facility] [address] (10/21/2021)        Additional comments:I reviewed the patient's new clinical lab test results. and I reviewed the patients new imaging test results. General Exam  No acute distress, mucous membranes normal color and hydration status. Patient appears uncomfortable. Neurologic Exam    Mental status: Awake, oriented to person, place, circumstances. Cranial nerves: Pupillary midline, with spontaneous and gaze evoked nystagmus. Facies with mild asymmetry to movements on the left. Tongue midline with normal strength, palat symmetric    Motor: Globally weak, however moves all 4 extremities symmetric and strength is more than 4+/5 throughout  throughout    Gait: Uses a walker to transfer to bedside commode otherwise very unsteady and reports feeling dizzy. Chest CT with contrast was negative. This was done was done  to evaluate for malignancy in case clinical presentation is secondary to a paraneoplastic process     Assessment:     Tiffani Horn is a 64 y.o.  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     Neurological exam not much improved despite the fact that she received thiamine replacement, yesterday started Solu-Medrol and today received the second dose of 1000 mg IV. Neurological exam continues to be significant for spontaneous and gaze evoked nystagmus which no concerns me that this is an oculogyric crisis which actually is one of the serious adverse reaction seen with amantadine. Other differential diagnosis remains autoimmune post HSV-1 encephalitis and thiamine deficiency.      Plan:     -Safely taper off the amantadine, today we will switch to 100 mg twice a day and tomorrow 50 mg twice a day for 2 days and then stop concerning for oculogyric crisis, nausea, dizziness, anorexia, vomiting, confusion  -Continue Solu-Medrol IV 1 g every day for 3 to 5 days today is day 2/5   -Continue thiamine replacement as ordered  -We will follow-up on pending CSF results including IgG index, OB, CSF NMDA receptor  - will follow      Signed:  Ellena Riedel, MD  Adult Neurologist  10/23/2021  11:08 AM

## 2021-10-23 NOTE — PROGRESS NOTES
[email or web address]  [phone]    Gastroenterology follow up-Progress note    Impression:  1. Intractable nausea and vomiting - EGD 9/22/2021 neg, CT 10/14/2021 neg, No response to thiamine, only few sips of liquids today, unable to swallow large pills-ok to try short course of NG feeds. Would avoid PEG/TPN if possible   2. Weight loss due to #1  3. Failure to thrive  4. Electrolyte derangement  5. Mild transaminitis - normal liver on CT 10/14/2021, acute hep panel neg, autoimmune labs pending. 6. Hx HSV encephalitis - neuro following     Plan:  1. Replete electrolytes as indicated  2. Appreciate input from neurology - ? Encephalopathy vs B1 deficiency  3. Await autoimmune labs and neuro results  4. Antiemetics as indicated  5. Diet as tolerated, stressed importance of adequate intake to replete nutrients  6. Medical management per primary team    Discussed with Dr. Norah Ortiz. Also discussed with  at bedside. Chief Complaint: Nausea, vomiting      Subjective:  More alert and answering questions today. Still with nausea and emesis. ROS: Denies any fevers, chills, rash.      Eyes: Nystagmus, ophthalmoplegia   Neck: ROM normal, supple and trachea normal   Cardiovascular: heart normal, intact distal pulses, normal rate and regular rhythm   Pulmonary/Chest Wall: breath sounds normal and effort normal   Abdominal: appearance normal, bowel sounds normal and soft, non-acute, mild diffuse ttp     Patient Active Problem List   Diagnosis Code    Vomiting R11.10    Severe protein-calorie malnutrition (HCC) E43         Visit Vitals  BP 93/64 (BP 1 Location: Left upper arm, BP Patient Position: At rest)   Pulse 85   Temp 98.1 °F (36.7 °C)   Resp 16   Ht 5' (1.524 m)   Wt 67.1 kg (148 lb)   LMP 04/01/2019 Comment: unknown   SpO2 99%   BMI 28.90 kg/m²           Intake/Output Summary (Last 24 hours) at 10/23/2021 7716  Last data filed at 10/23/2021 0357  Gross per 24 hour   Intake 240 ml   Output    Net 240 ml       CBC w/Diff    Lab Results   Component Value Date/Time    WBC 6.7 10/23/2021 04:08 AM    RBC 3.30 (L) 10/23/2021 04:08 AM    HGB 9.9 (L) 10/23/2021 04:08 AM    HCT 28.6 (L) 10/23/2021 04:08 AM    MCV 86.7 10/23/2021 04:08 AM    MCH 30.0 10/23/2021 04:08 AM    MCHC 34.6 10/23/2021 04:08 AM    RDW 14.5 10/23/2021 04:08 AM     10/23/2021 04:08 AM    Lab Results   Component Value Date/Time    GRANS 66 10/20/2021 01:12 PM    LYMPH 23 10/20/2021 01:12 PM    EOS 0 10/20/2021 01:12 PM    BASOS 0 10/20/2021 01:12 PM      Basic Metabolic Profile   Recent Labs     10/23/21  0408      K 3.4*      CO2 27   BUN 4*   CA 8.3*   MG 1.7   PHOS 2.9        Hepatic Function    Lab Results   Component Value Date/Time    ALB 3.2 (L) 10/20/2021 01:12 PM    TP 6.8 10/20/2021 01:12 PM     (H) 10/20/2021 01:12 PM    No results found for: TBIL       Coags   Recent Labs     10/22/21  0254   PTP 13.2   INR 1.0               Tremaine Sandoval MD    [facility]. [email or web address]  Phone: [phone]  Pager: [phone]

## 2021-10-23 NOTE — PROGRESS NOTES
Problem: Falls - Risk of  Goal: *Absence of Falls  Description: Document Lopez Eng Fall Risk and appropriate interventions in the flowsheet.   Outcome: Progressing Towards Goal  Note: Fall Risk Interventions:  Mobility Interventions: Bed/chair exit alarm, Patient to call before getting OOB    Mentation Interventions: Adequate sleep, hydration, pain control, Bed/chair exit alarm, Door open when patient unattended    Medication Interventions: Bed/chair exit alarm, Patient to call before getting OOB    Elimination Interventions: Bed/chair exit alarm, Call light in reach, Patient to call for help with toileting needs    History of Falls Interventions: Bed/chair exit alarm, Door open when patient unattended         Problem: Patient Education: Go to Patient Education Activity  Goal: Patient/Family Education  Outcome: Progressing Towards Goal     Problem: Patient Education: Go to Patient Education Activity  Goal: Patient/Family Education  Outcome: Progressing Towards Goal

## 2021-10-24 LAB
ANION GAP SERPL CALC-SCNC: 8 MMOL/L (ref 3–18)
BUN SERPL-MCNC: 7 MG/DL (ref 7–18)
BUN/CREAT SERPL: 21 (ref 12–20)
CALCIUM SERPL-MCNC: 8.5 MG/DL (ref 8.5–10.1)
CHLORIDE SERPL-SCNC: 104 MMOL/L (ref 100–111)
CO2 SERPL-SCNC: 27 MMOL/L (ref 21–32)
CREAT SERPL-MCNC: 0.34 MG/DL (ref 0.6–1.3)
GLUCOSE SERPL-MCNC: 99 MG/DL (ref 74–99)
MAGNESIUM SERPL-MCNC: 1.7 MG/DL (ref 1.6–2.6)
PHOSPHATE SERPL-MCNC: 3.6 MG/DL (ref 2.5–4.9)
POTASSIUM SERPL-SCNC: 3.7 MMOL/L (ref 3.5–5.5)
SODIUM SERPL-SCNC: 139 MMOL/L (ref 136–145)

## 2021-10-24 PROCEDURE — 74011250636 HC RX REV CODE- 250/636: Performed by: FAMILY MEDICINE

## 2021-10-24 PROCEDURE — 84100 ASSAY OF PHOSPHORUS: CPT

## 2021-10-24 PROCEDURE — 99231 SBSQ HOSP IP/OBS SF/LOW 25: CPT | Performed by: PSYCHIATRY & NEUROLOGY

## 2021-10-24 PROCEDURE — 74011250636 HC RX REV CODE- 250/636: Performed by: INTERNAL MEDICINE

## 2021-10-24 PROCEDURE — 74011000258 HC RX REV CODE- 258

## 2021-10-24 PROCEDURE — 36415 COLL VENOUS BLD VENIPUNCTURE: CPT

## 2021-10-24 PROCEDURE — 74011000250 HC RX REV CODE- 250: Performed by: FAMILY MEDICINE

## 2021-10-24 PROCEDURE — 2709999900 HC NON-CHARGEABLE SUPPLY

## 2021-10-24 PROCEDURE — C9113 INJ PANTOPRAZOLE SODIUM, VIA: HCPCS | Performed by: FAMILY MEDICINE

## 2021-10-24 PROCEDURE — 83735 ASSAY OF MAGNESIUM: CPT

## 2021-10-24 PROCEDURE — 74011250637 HC RX REV CODE- 250/637: Performed by: PSYCHIATRY & NEUROLOGY

## 2021-10-24 PROCEDURE — 74011250636 HC RX REV CODE- 250/636

## 2021-10-24 PROCEDURE — 80048 BASIC METABOLIC PNL TOTAL CA: CPT

## 2021-10-24 PROCEDURE — 65270000029 HC RM PRIVATE

## 2021-10-24 PROCEDURE — 74011000258 HC RX REV CODE- 258: Performed by: PSYCHIATRY & NEUROLOGY

## 2021-10-24 PROCEDURE — 74011250636 HC RX REV CODE- 250/636: Performed by: PSYCHIATRY & NEUROLOGY

## 2021-10-24 PROCEDURE — 99233 SBSQ HOSP IP/OBS HIGH 50: CPT | Performed by: FAMILY MEDICINE

## 2021-10-24 RX ORDER — AMANTADINE HYDROCHLORIDE 50 MG/5ML
50 SOLUTION ORAL 2 TIMES DAILY
Status: COMPLETED | OUTPATIENT
Start: 2021-10-24 | End: 2021-10-25

## 2021-10-24 RX ADMIN — Medication 10 ML: at 05:33

## 2021-10-24 RX ADMIN — THIAMINE HYDROCHLORIDE 250 MG: 100 INJECTION, SOLUTION INTRAMUSCULAR; INTRAVENOUS at 12:39

## 2021-10-24 RX ADMIN — ENOXAPARIN SODIUM 40 MG: 100 INJECTION SUBCUTANEOUS at 09:50

## 2021-10-24 RX ADMIN — LEVETIRACETAM 750 MG: 100 SOLUTION ORAL at 17:22

## 2021-10-24 RX ADMIN — Medication 10 ML: at 17:23

## 2021-10-24 RX ADMIN — AMANTADINE HYDROCHLORIDE 100 MG: 50 SOLUTION ORAL at 10:04

## 2021-10-24 RX ADMIN — Medication 10 ML: at 22:16

## 2021-10-24 RX ADMIN — AMANTADINE HYDROCHLORIDE 50 MG: 50 SOLUTION ORAL at 21:00

## 2021-10-24 RX ADMIN — LEVETIRACETAM 750 MG: 100 SOLUTION ORAL at 09:46

## 2021-10-24 RX ADMIN — SODIUM CHLORIDE 40 MG: 9 INJECTION, SOLUTION INTRAMUSCULAR; INTRAVENOUS; SUBCUTANEOUS at 09:48

## 2021-10-24 RX ADMIN — SODIUM CHLORIDE 1000 MG: 9 INJECTION, SOLUTION INTRAVENOUS at 10:06

## 2021-10-24 NOTE — PROGRESS NOTES
[facility]  Progress Note    Patient: Clarence Burgos Age: 64 y.o. : 1965 MR#: [id number] SSN: [id number]  Date: 10/24/2021     Subjective/24-hour events:     Reports eating a fair amount of breakfast this morning. Continues to have some nausea but no vomiting today. Assessment:   Adult failure to thrive  Multiple electrolyte abnormalities: Hypokalemia, hypomagnesemia, hyponatremia  Transaminitis/elevated LFTs  Intractable nausea and vomiting  GERD  Vitamin D deficiency  Hyperlipidemia  Severe protein calorie malnutrition  Recent history of HSV encephalitis      Plan:   Potassium, magnesium, phosphorus all in normal range today. Continue to monitor. Patient wishes to hold off on NGT placement today but will reevaluate tomorrow pending intake over the course of the day today. Continue to follow CSF fluid cultures. Continue high-dose Solu-Medrol per neurology. Currently day 3 of 5. PT/OT, mobilize as tolerated. Current recommendation is for rehab versus home health with assistance. Other care primarily supportive. Follow. Case discussed with:  [x]Patient  [x]Family  [x]Nursing  []Case Management  DVT Prophylaxis:  [x]Lovenox  []Hep SQ  []SCDs  []Coumadin   []On Heparin gtt    Objective:   VS:   Visit Vitals  /60   Pulse 76   Temp 98.4 °F (36.9 °C)   Resp 17   Ht 5' (1.524 m)   Wt 67.1 kg (148 lb)   LMP 2019 Comment: unknown   SpO2 98%   BMI 28.90 kg/m²      Tmax/24hrs: Temp (24hrs), Av °F (36.7 °C), Min:97.5 °F (36.4 °C), Max:98.4 °F (36.9 °C)      Intake/Output Summary (Last 24 hours) at 10/24/2021 0914  Last data filed at 10/24/2021 0433  Gross per 24 hour   Intake 3370 ml   Output 1 ml   Net 3369 ml       General:  In NAD. Nontoxic-appearing. Weak appearing. Cardiovascular:  RRR. Pulmonary:  Lungs clear bilaterally, no wheezes. No accessory muscle use. GI:  Abdomen soft, NTTP. Extremities:  Warm, no edema or ischemia.   Neuro: Sleepy/lethargic but moves extremities spontaneously.     Labs:    Recent Results (from the past 24 hour(s))   PHOSPHORUS    Collection Time: 10/24/21  3:46 AM   Result Value Ref Range    Phosphorus 3.6 2.5 - 4.9 MG/DL   MAGNESIUM    Collection Time: 10/24/21  3:46 AM   Result Value Ref Range    Magnesium 1.7 1.6 - 2.6 mg/dL   METABOLIC PANEL, BASIC    Collection Time: 10/24/21  3:46 AM   Result Value Ref Range    Sodium 139 136 - 145 mmol/L    Potassium 3.7 3.5 - 5.5 mmol/L    Chloride 104 100 - 111 mmol/L    CO2 27 21 - 32 mmol/L    Anion gap 8 3.0 - 18 mmol/L    Glucose 99 74 - 99 mg/dL    BUN 7 7.0 - 18 MG/DL    Creatinine 0.34 (L) 0.6 - 1.3 MG/DL    BUN/Creatinine ratio 21 (H) 12 - 20      GFR est AA >60 >60 ml/min/1.73m2    GFR est non-AA >60 >60 ml/min/1.73m2    Calcium 8.5 8.5 - 10.1 MG/DL       Signed By: Alan Flores MD     October 24, 2021

## 2021-10-24 NOTE — PROGRESS NOTES
Problem: Pressure Injury - Risk of  Goal: *Prevention of pressure injury  Description: Document Christofer Scale and appropriate interventions in the flowsheet. Outcome: Progressing Towards Goal  Note: Pressure Injury Interventions:  Sensory Interventions: Assess changes in LOC, Avoid rigorous massage over bony prominences, Check visual cues for pain, Discuss PT/OT consult with provider, Keep linens dry and wrinkle-free    Moisture Interventions: Absorbent underpads, Assess need for specialty bed, Check for incontinence Q2 hours and as needed, Internal/External urinary devices    Activity Interventions: Assess need for specialty bed, Increase time out of bed, Pressure redistribution bed/mattress(bed type), PT/OT evaluation    Mobility Interventions: Assess need for specialty bed, HOB 30 degrees or less, Pressure redistribution bed/mattress (bed type), PT/OT evaluation    Nutrition Interventions: Document food/fluid/supplement intake, Offer support with meals,snacks and hydration    Friction and Shear Interventions: Apply protective barrier, creams and emollients, Foam dressings/transparent film/skin sealants, HOB 30 degrees or less                Problem: Patient Education: Go to Patient Education Activity  Goal: Patient/Family Education  Outcome: Progressing Towards Goal     Problem: Falls - Risk of  Goal: *Absence of Falls  Description: Document Theresa Fall Risk and appropriate interventions in the flowsheet.   Outcome: Progressing Towards Goal  Note: Fall Risk Interventions:  Mobility Interventions: Assess mobility with egress test, Communicate number of staff needed for ambulation/transfer, OT consult for ADLs, Patient to call before getting OOB, PT Consult for assist device competence    Mentation Interventions: Adequate sleep, hydration, pain control, Door open when patient unattended, Evaluate medications/consider consulting pharmacy, Familiar objects from home    Medication Interventions: Assess postural VS orthostatic hypotension, Evaluate medications/consider consulting pharmacy, Patient to call before getting OOB, Teach patient to arise slowly    Elimination Interventions: Call light in reach, Elevated toilet seat, Patient to call for help with toileting needs, Stay With Me (per policy), Toileting schedule/hourly rounds    History of Falls Interventions: Consult care management for discharge planning, Evaluate medications/consider consulting pharmacy, Room close to nurse's station, Utilize gait belt for transfer/ambulation         Problem: Patient Education: Go to Patient Education Activity  Goal: Patient/Family Education  Outcome: Progressing Towards Goal     Problem: Patient Education: Go to Patient Education Activity  Goal: Patient/Family Education  Outcome: Progressing Towards Goal     Problem: Patient Education: Go to Patient Education Activity  Goal: Patient/Family Education  Outcome: Progressing Towards Goal

## 2021-10-24 NOTE — ROUTINE PROCESS
Bedside shift change report given to esther finn (oncoming nurse) by Johnny Haile (offgoing nurse). Report included the following information SBAR and Kardex.

## 2021-10-24 NOTE — PROGRESS NOTES
Problem: Pressure Injury - Risk of  Goal: *Prevention of pressure injury  Description: Document Christofer Scale and appropriate interventions in the flowsheet. Outcome: Progressing Towards Goal  Note: Pressure Injury Interventions:  Sensory Interventions: Assess changes in LOC, Minimize linen layers    Moisture Interventions: Absorbent underpads, Minimize layers    Activity Interventions: Increase time out of bed, Pressure redistribution bed/mattress(bed type)    Mobility Interventions: Pressure redistribution bed/mattress (bed type), Assess need for specialty bed    Nutrition Interventions: Document food/fluid/supplement intake    Friction and Shear Interventions: Minimize layers                Problem: Patient Education: Go to Patient Education Activity  Goal: Patient/Family Education  Outcome: Progressing Towards Goal     Problem: Falls - Risk of  Goal: *Absence of Falls  Description: Document Theresa Fall Risk and appropriate interventions in the flowsheet.   Outcome: Progressing Towards Goal  Note: Fall Risk Interventions:  Mobility Interventions: Assess mobility with egress test, Patient to call before getting OOB, PT Consult for mobility concerns, PT Consult for assist device competence, Strengthening exercises (ROM-active/passive)    Mentation Interventions: Adequate sleep, hydration, pain control, Reorient patient    Medication Interventions: Assess postural VS orthostatic hypotension, Patient to call before getting OOB, Teach patient to arise slowly    Elimination Interventions: Call light in reach, Elevated toilet seat, Patient to call for help with toileting needs, Stay With Me (per policy), Toileting schedule/hourly rounds    History of Falls Interventions: Consult care management for discharge planning         Problem: Patient Education: Go to Patient Education Activity  Goal: Patient/Family Education  Outcome: Progressing Towards Goal     Problem: Patient Education: Go to Patient Education Activity  Goal: Patient/Family Education  Outcome: Progressing Towards Goal     Problem: Patient Education: Go to Patient Education Activity  Goal: Patient/Family Education  Outcome: Progressing Towards Goal

## 2021-10-24 NOTE — PROGRESS NOTES
Tolerated  1/2 breakfast today. Discussed at length with Dr. Zara Austin. Does not appear to have a primary GI pathology here. Will attempt oral feeds-ok to try NG-if it so happens that she is unable to tolerate diet, then we may to consider PEG. Please reconsult as needed. Doron Teran MD  [facility].  [email or web address]  Phone: [phone]  Pager: [phone]  Cell: [phone]. [email or web address]

## 2021-10-24 NOTE — PROGRESS NOTES
Neurology Progress Note    Patient ID:  Elizabeth Robles  [id number]  64 y.o.  1965    Subjective:      Patient was seen today as follow-up for ophthalmoplegia, nausea, vomiting, fatigue and impaired gait. Since admission she has been having rotational and gaze evoked nystagmus primary gaze and gaze evoked, associated with nausea, vomiting gait abnormality. She received thiamine supplement per Wernicke protocol with the thought that her symptoms could be secondary to thiamine deficiency due to poor intake over the past 2.5 weeks, also she started IV Solu-Medrol 1 g/day for 5 days day 1 was on diet 10/22/2021 for other differential diagnosis which included autoimmune post HSV-1 encephalitis, and yesterday I started weaning of the amantadine which she was taking 200 mg twice a day due to concern of adverse events causing oculogyric crisis. She is doing better this morning, more awake and less primary gaze nystagmus. Also improved horizontal gaze. Able to ambulate. History from admission:  Ms. 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 On exam she was found to have ophthalmoplegia and B1 was started concerning for possible Wernicke type of presentation. Yesterday exam showed nystagmus with primary gaze as well as gaze evoked, along with generalized fatigue gait impairment cognitive slowing. 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 The patient had also repeat CSF analyzes and profile much improved comparing with June 2021, with 0 WBCs, RBC 4, protein 78 and glucose 42.     Current Facility-Administered Medications   Medication Dose Route Frequency    amantadine (SYMMETREL) 50 mg/5 mL oral solution 50 mg  50 mg Oral BID    levETIRAcetam (KEPPRA) oral solution 750 mg  750 mg Oral BID    methylPREDNISolone (PF) (Solu-MEDROL) 1,000 mg in 0.9% sodium chloride 100 mL IVPB  1,000 mg IntraVENous DAILY    sodium chloride (NS) flush 5-40 mL  5-40 mL IntraVENous Q8H    sodium chloride (NS) flush 5-40 mL  5-40 mL IntraVENous PRN    acetaminophen (TYLENOL) tablet 650 mg  650 mg Oral Q6H PRN    Or    acetaminophen (TYLENOL) suppository 650 mg  650 mg Rectal Q6H PRN    polyethylene glycol (MIRALAX) packet 17 g  17 g Oral DAILY PRN    ondansetron (ZOFRAN ODT) tablet 4 mg  4 mg Oral Q8H PRN    Or    ondansetron (ZOFRAN) injection 8 mg  8 mg IntraVENous Q6H PRN    enoxaparin (LOVENOX) injection 40 mg  40 mg SubCUTAneous DAILY    0.9% sodium chloride infusion  100 mL/hr IntraVENous CONTINUOUS    pantoprazole (PROTONIX) 40 mg in 0.9% sodium chloride 10 mL injection  40 mg IntraVENous DAILY    thiamine (B-1) 250 mg in 0.9% sodium chloride 50 mL IVPB  250 mg IntraVENous DAILY    [START ON 10/29/2021] thiamine HCL (B-1) tablet 100 mg  100 mg Oral DAILY          Objective: Active hospital medications were reviewed    Lab results and neuroradiology studies from the last 24 hours were reviewed. Prior to Admission medications    Medication Sig Start Date End Date Taking? Authorizing Provider   potassium gluconate 595 mg (99 mg) tablet Take 99 mg by mouth daily. To begin taking when the packets run out   Yes Provider, Historical   potassium chloride (KLOR-CON) 20 mEq pack Take 20 mEq by mouth daily. Mix with water   Yes Provider, Historical   meclizine (ANTIVERT) 12.5 mg tablet Take 12.5 mg by mouth three (3) times daily as needed for Dizziness. Dizziness   Yes Provider, Historical   ondansetron (ZOFRAN ODT) 8 mg disintegrating tablet Take 8 mg by mouth every eight (8) hours as needed for Nausea or Vomiting. 8/23/21  Yes Provider, Historical   LORazepam (Ativan) 1 mg tablet Take 1 Tablet by mouth every eight (8) hours as needed (Vomiting). Max Daily Amount: 3 mg. Patient not taking: Reported on 10/21/2021 10/15/21   Joni NEAL MD   pantoprazole (PROTONIX) 20 mg tablet Take 20 mg by mouth daily. Take on empty stomah  Patient not taking: Reported on 10/21/2021    Provider, Historical   levETIRAcetam (KEPPRA) 250 mg tablet Take 250 mg by mouth two (2) times a day. Patient not taking: Reported on 10/21/2021 8/26/21 8/26/22  Provider, Historical   amantadine HCL (SYMMETREL) 100 mg capsule Take 200 mg by mouth two (2) times a day. 7/27/21   Provider, Historical     Patient Vitals for the past 8 hrs:   BP Temp Pulse Resp SpO2   10/24/21 0742 132/60 98.4 °F (36.9 °C) 76 17 98 %   10/24/21 0418 100/66 98 °F (36.7 °C) 76 16 97 %   AZYFSGOKIAXN50/22 1901 - 10/24 0700  In: 2226 [P.O.:1340; I.V.:2410]  Out: 1 [Urine:1]  10/22 1901 - 10/24 0700  In: 3750 [P.O.:1340; I.V.:2410]  Out: 1 [Urine:1]RESULTRCNT(24h)Active Problems:    Vomiting (10/20/2021)      Severe protein-calorie malnutrition ([address] (10/21/2021)        Additional comments:I reviewed the patient's new clinical lab test results. General Exam  No acute distress, mucous membranes normal color and hydration status    Neurologic Exam    Mental status:  Alert, oriented to person, place, time and circumstance  Language: normal fluency and comprehension  No visual spatial neglect or overt apraxia    Cranial nerves: Pupils are 4 mm, both reactive to light, improved extraocular movement, now able to perform almost complete horizontal gaze, still primarily and gaze evoked nystagmus with a rotational component which now is improved comparing with yesterday. Face to mild asymmetry, Tongue midline with normal strength, palat symmetric    Motor: Globally weak however improved, strength 5-/5 throughout    Gait not tested today. Assessment:     Jean Claude Couch is a 64 y.o.   woman, right-hand-dominant, with history of HSV1 encephalitis in June 2021, evaluated today as follow-up for ophthalmoplegia, impaired gait and cognitively delay/somnolence, improving. Patient is receiving thiamine replacement, Solu-Medrol 1000 mg IV x5 dose, day 1 was 10/22/2021 and tapering for famotidine.     Neurological exam today is improved, still with primary gaze suggestive for nystagmus but less significant comparing with yesterday when I was concerned that she could have oculogyric crisis due to amantadine adverse events. The other differential diagnosis such as autoimmune post HSV-1 encephalitis and thiamine deficiency remains.       Plan:     -Amantadine 50 mg tonight and 50 mg tomorrow morning and then stop  -Continue Keppra 750 mg twice a day  -Continue thiamine replacement as ordered  -Continue IV Solu-Medrol 1 g every day x5 days, today is day 3/5  -CSF results pending -IgG index, LP, CSF NMDA receptor antibodies  -My colleague, Dr. Winifred Parnell  will continue following      Signed:  Afshin Diaz MD  Adult Neurologist  10/24/2021  10:52 AM

## 2021-10-24 NOTE — ROUTINE PROCESS
Bedside and verbal shift change report given BRIONNA do by Flaquita Jimenez RN.  Report included the following information:  -procedure summary  -MAR  -Recent Results  -Med Rec Status  -SBAR

## 2021-10-25 LAB
A1AT PHENOTYP SERPL IFE: NORMAL
A1AT SERPL-MCNC: 136 MG/DL (ref 101–187)
ALBUMIN CSF-MCNC: 47 MG/DL (ref 8–37)
ALBUMIN SERPL-MCNC: 3.1 G/DL (ref 3.8–4.9)
ANION GAP SERPL CALC-SCNC: 5 MMOL/L (ref 3–18)
BUN SERPL-MCNC: 11 MG/DL (ref 7–18)
BUN/CREAT SERPL: 28 (ref 12–20)
CALCIUM SERPL-MCNC: 8.1 MG/DL (ref 8.5–10.1)
CHLORIDE SERPL-SCNC: 106 MMOL/L (ref 100–111)
CO2 SERPL-SCNC: 26 MMOL/L (ref 21–32)
CREAT SERPL-MCNC: 0.39 MG/DL (ref 0.6–1.3)
GLUCOSE SERPL-MCNC: 119 MG/DL (ref 74–99)
HSV1 DNA SPEC QL NAA+PROBE: NEGATIVE
HSV2 DNA SPEC QL NAA+PROBE: NEGATIVE
IGG CSF-MCNC: 8.3 MG/DL (ref 0–6.7)
IGG SERPL-MCNC: 635 MG/DL (ref 586–1602)
IGG/ALB CLEAR SER+CSF-RTO: 0.9 (ref 0–0.7)
IGG/ALB CSF: 0.18 {RATIO} (ref 0–0.25)
MAGNESIUM SERPL-MCNC: 1.5 MG/DL (ref 1.6–2.6)
PHOSPHATE SERPL-MCNC: 3.7 MG/DL (ref 2.5–4.9)
POTASSIUM SERPL-SCNC: 3.1 MMOL/L (ref 3.5–5.5)
SODIUM SERPL-SCNC: 137 MMOL/L (ref 136–145)
SPECIMEN SOURCE: NORMAL

## 2021-10-25 PROCEDURE — 74011250636 HC RX REV CODE- 250/636: Performed by: INTERNAL MEDICINE

## 2021-10-25 PROCEDURE — 74011250636 HC RX REV CODE- 250/636: Performed by: FAMILY MEDICINE

## 2021-10-25 PROCEDURE — 74011250636 HC RX REV CODE- 250/636: Performed by: PSYCHIATRY & NEUROLOGY

## 2021-10-25 PROCEDURE — 83735 ASSAY OF MAGNESIUM: CPT

## 2021-10-25 PROCEDURE — 97530 THERAPEUTIC ACTIVITIES: CPT

## 2021-10-25 PROCEDURE — 36415 COLL VENOUS BLD VENIPUNCTURE: CPT

## 2021-10-25 PROCEDURE — 84100 ASSAY OF PHOSPHORUS: CPT

## 2021-10-25 PROCEDURE — 2709999900 HC NON-CHARGEABLE SUPPLY

## 2021-10-25 PROCEDURE — 99233 SBSQ HOSP IP/OBS HIGH 50: CPT | Performed by: STUDENT IN AN ORGANIZED HEALTH CARE EDUCATION/TRAINING PROGRAM

## 2021-10-25 PROCEDURE — C9113 INJ PANTOPRAZOLE SODIUM, VIA: HCPCS | Performed by: FAMILY MEDICINE

## 2021-10-25 PROCEDURE — 74011000258 HC RX REV CODE- 258

## 2021-10-25 PROCEDURE — 74011000250 HC RX REV CODE- 250: Performed by: FAMILY MEDICINE

## 2021-10-25 PROCEDURE — 74011000258 HC RX REV CODE- 258: Performed by: PSYCHIATRY & NEUROLOGY

## 2021-10-25 PROCEDURE — 80048 BASIC METABOLIC PNL TOTAL CA: CPT

## 2021-10-25 PROCEDURE — 99232 SBSQ HOSP IP/OBS MODERATE 35: CPT | Performed by: FAMILY MEDICINE

## 2021-10-25 PROCEDURE — 74011250636 HC RX REV CODE- 250/636

## 2021-10-25 PROCEDURE — 74011250637 HC RX REV CODE- 250/637: Performed by: PSYCHIATRY & NEUROLOGY

## 2021-10-25 PROCEDURE — 65270000029 HC RM PRIVATE

## 2021-10-25 RX ORDER — THERA TABS 400 MCG
1 TAB ORAL DAILY
Status: DISCONTINUED | OUTPATIENT
Start: 2021-10-26 | End: 2021-11-24 | Stop reason: HOSPADM

## 2021-10-25 RX ADMIN — ENOXAPARIN SODIUM 40 MG: 100 INJECTION SUBCUTANEOUS at 08:28

## 2021-10-25 RX ADMIN — THIAMINE HYDROCHLORIDE 250 MG: 100 INJECTION, SOLUTION INTRAMUSCULAR; INTRAVENOUS at 08:25

## 2021-10-25 RX ADMIN — SODIUM CHLORIDE 1000 MG: 9 INJECTION, SOLUTION INTRAVENOUS at 08:25

## 2021-10-25 RX ADMIN — LEVETIRACETAM 750 MG: 100 SOLUTION ORAL at 17:34

## 2021-10-25 RX ADMIN — LEVETIRACETAM 750 MG: 100 SOLUTION ORAL at 08:25

## 2021-10-25 RX ADMIN — Medication 10 ML: at 17:36

## 2021-10-25 RX ADMIN — Medication 10 ML: at 09:23

## 2021-10-25 RX ADMIN — Medication 10 ML: at 21:59

## 2021-10-25 RX ADMIN — SODIUM CHLORIDE 40 MG: 9 INJECTION, SOLUTION INTRAMUSCULAR; INTRAVENOUS; SUBCUTANEOUS at 08:25

## 2021-10-25 RX ADMIN — AMANTADINE HYDROCHLORIDE 50 MG: 50 SOLUTION ORAL at 09:22

## 2021-10-25 NOTE — PROGRESS NOTES
[facility] Hospital[facility]  Progress Note    Patient: Verona Dey Age: 64 y.o. : 1965 MR#: [id number] SSN: [id number]  Date: 10/25/2021     Subjective/24-hour events:     Reports feeling a little better today but continues to have some nausea. Able to eat some breakfast this morning per report. Assessment:   Adult failure to thrive  Multiple electrolyte abnormalities: Hypokalemia, hypomagnesemia, hyponatremia  Transaminitis/elevated LFTs  Intractable nausea and vomiting  GERD  Vitamin D deficiency  Hyperlipidemia  Severe protein calorie malnutrition  Recent history of HSV encephalitis      Plan:   Monitor electrolytes, replace as necessary. Continue to encourage p.o. as tolerated. Discussed NG tube placement again today but patient still would like to hold off on this. Will follow up again in AM.  Discussion with ELMER Solu-Medrol daily for 5 per neurology recommendations. PT/OT, mobilize as tolerated. Disposition to be determined. Supportive care otherwise. Follow. Case discussed with:  [x]Patient  []Family  [x]Nursing  []Case Management  DVT Prophylaxis:  [x]Lovenox  []Hep SQ  []SCDs  []Coumadin   []On Heparin gtt    Objective:   VS:   Visit Vitals  /74   Pulse 83   Temp 97.6 °F (36.4 °C)   Resp 20   Ht 5' (1.524 m)   Wt 67.1 kg (148 lb)   LMP 2019 Comment: unknown   SpO2 96%   BMI 28.90 kg/m²      Tmax/24hrs: Temp (24hrs), Av °F (36.7 °C), Min:97.6 °F (36.4 °C), Max:98.2 °F (36.8 °C)      Intake/Output Summary (Last 24 hours) at 10/25/2021 1509  Last data filed at 10/24/2021 2138  Gross per 24 hour   Intake 300 ml   Output    Net 300 ml       General:  In NAD. Nontoxic-appearing. Cardiovascular:  RRR. Pulmonary:  Lungs clear bilaterally, no wheezes. No accessory muscle use. GI:  Abdomen soft, NTTP. Extremities:  Warm, no edema or ischemia. Neuro: Awake and alert, moves extremities spontaneously.     Labs:    Recent Results (from the past 24 hour(s))   PHOSPHORUS    Collection Time: 10/25/21  4:30 AM   Result Value Ref Range    Phosphorus 3.7 2.5 - 4.9 MG/DL       Signed By: Gracie Cramer MD     October 25, 2021

## 2021-10-25 NOTE — PROGRESS NOTES
Chart reviewed. Left message for Cozetta Schilder in ARU to please review patient for possible admission. Sent out referrals via [facility] and [facility] for SNF.   Verner Berry RN - Outcomes Manager  [phone]

## 2021-10-25 NOTE — PROGRESS NOTES
Nutrition Note    Pt reported 1 episode of emesis this morning but attempted to eat some oatmeal and drink some ensure. Some improvement noted in po intake but intake remains inadequate. Pt agreeable to try some toast and eggs, verbal order from Dr. Dunia Bui for diet advancement to allow solid foods as desired. MD discussed feeding tube placement with pt to provide supplemental enteral nutrition support, pt does not wish for feeding tube placement at this time and MD to reassess tomorrow. Remains on IV thiamine, loose BM 10/24. Slow progress towards nutritional goals.     Nutrition Recommendations/Plan:  - Advance to GI bland diet as tolerated per discussion with MD.  - Continue oral nutritional supplements: Ensure Enlive, BID & Magic Cup once daily.  - Continue IV thiamine supplementation & add daily multivitamin.  - If no improvement noted in po intake, may need to consider enteral nutrition support- MD continues to assess    Electronically signed by Sonali Ramon RD on 10/25/2021 at 4:01 PM    Contact: [phone]

## 2021-10-25 NOTE — PROGRESS NOTES
Occupational Therapy Goals  Initiated 10/21/2021 within 7 day(s). 1.  Patient will perform bed mobility in preparation for selfcare with modified independence. 2.  Patient will perform grooming task/functional activity standing for 4-7 minutes with supervision/set-up, F+ balance. 3.  Patient will perform lower body dressing with supervision/set-up seated and standing using AE prn (Pt has reacher at home). 4.  Patient will perform toilet transfers with supervision/set-up. 5.  Patient will perform all aspects of toileting with supervision/set-up. 6.  Patient will participate in upper extremity therapeutic exercise/activities with modified independence for 8 minutes. 7.  Patient will utilize energy conservation techniques during functional activities with verbal cues. Prior Level of Function: Patient was independent with self-care and functional mobility PTA. Patient's spouse reports she would walk unsteady at home d/t vertigo and for the stairs she would bump up/down on her bottom        Problem: Self Care Deficits Care Plan (Adult)  Goal: *Acute Goals and Plan of Care (Insert Text)  Outcome: Progressing Towards Goal  OCCUPATIONAL THERAPY TREATMENT    Patient: Cresencio Griffith (24 y.o. female)  Date: 10/25/2021  Diagnosis: Vomiting [R11.10] <principal problem not specified>       Precautions: Fall, Skin  PLOF: Patient was independent with self-care and functional mobility PTA. Patient's spouse reports she would walk unsteady at home d/t vertigo and for the stairs she would bump up/down on her bottom    Chart, occupational therapy assessment, plan of care, and goals were reviewed. ASSESSMENT:  Pt presented supine in bed upon therapist arrival with eyes closed. She was easily aroused by verbal stimuli however declines OOB/EOB this date 2/2 fatigue and nausea despite encouragement and education. RN presented in room for IV placement. She required MAX A x 2 to scoot to [address] [facility] [address] for optimal bed positioning.  Pt left with HOB elevated, RN present, and all needs left within reach. Progression toward goals:  []          Improving appropriately and progressing toward goals  [x]          Improving slowly and progressing toward goals  []          Not making progress toward goals and plan of care will be adjusted     PLAN:  Patient continues to benefit from skilled intervention to address the above impairments. Continue treatment per established plan of care. Discharge Recommendations:  Rehab vs. Home Health with 24/7 Assistance pending pt's progress  Further Equipment Recommendations for Discharge: TBA     SUBJECTIVE:   Patient stated  I haven't gotten any sleep. \"     OBJECTIVE DATA SUMMARY:   Cognitive/Behavioral Status:  Neurologic State: Alert  Orientation Level: Oriented to person, Oriented to situation, Disoriented to place, Disoriented to time  Cognition: Follows commands  Safety/Judgement: Fall prevention    Functional Mobility and Transfers for ADLs:   Bed Mobility:           Scooting: Maximum assistance;Assist x2 (scooting to [address] )     Pain:  Pain level pre-treatment: 0/10   Pain level post-treatment: 0/10      Activity Tolerance:    Poor  Please refer to the flowsheet for vital signs taken during this treatment. After treatment:   []  Patient left in no apparent distress sitting up in chair  [x]  Patient left in no apparent distress in bed  [x]  Call bell left within reach  [x]  Nursing notified  [x] RN present  [x]  Bed alarm activated    COMMUNICATION/EDUCATION:   [x] Role of Occupational Therapy in the acute care setting  [] Home safety education was provided and the patient/caregiver indicated understanding. [] Patient/family have participated as able in working towards goals and plan of care. [x] Patient/family agree to work toward stated goals and plan of care. [] Patient understands intent and goals of therapy, but is neutral about his/her participation.   [] Patient is unable to participate in goal setting and plan of care.       Thank you for this referral.  JUNIOR Celis  Time Calculation: 10 mins

## 2021-10-26 LAB
MAGNESIUM SERPL-MCNC: 1.6 MG/DL (ref 1.6–2.6)
OLIGOCLONAL BANDS.IT SER+CSF QL: NORMAL
PHOSPHATE SERPL-MCNC: 2.8 MG/DL (ref 2.5–4.9)
POTASSIUM SERPL-SCNC: 3 MMOL/L (ref 3.5–5.5)

## 2021-10-26 PROCEDURE — 74011000258 HC RX REV CODE- 258

## 2021-10-26 PROCEDURE — 84100 ASSAY OF PHOSPHORUS: CPT

## 2021-10-26 PROCEDURE — 74011250637 HC RX REV CODE- 250/637: Performed by: FAMILY MEDICINE

## 2021-10-26 PROCEDURE — 65270000029 HC RM PRIVATE

## 2021-10-26 PROCEDURE — 99233 SBSQ HOSP IP/OBS HIGH 50: CPT | Performed by: STUDENT IN AN ORGANIZED HEALTH CARE EDUCATION/TRAINING PROGRAM

## 2021-10-26 PROCEDURE — 83735 ASSAY OF MAGNESIUM: CPT

## 2021-10-26 PROCEDURE — 74011250636 HC RX REV CODE- 250/636: Performed by: PSYCHIATRY & NEUROLOGY

## 2021-10-26 PROCEDURE — 84132 ASSAY OF SERUM POTASSIUM: CPT

## 2021-10-26 PROCEDURE — 74011250637 HC RX REV CODE- 250/637: Performed by: PSYCHIATRY & NEUROLOGY

## 2021-10-26 PROCEDURE — 74011250636 HC RX REV CODE- 250/636: Performed by: INTERNAL MEDICINE

## 2021-10-26 PROCEDURE — 74011250636 HC RX REV CODE- 250/636

## 2021-10-26 PROCEDURE — 97116 GAIT TRAINING THERAPY: CPT

## 2021-10-26 PROCEDURE — 2709999900 HC NON-CHARGEABLE SUPPLY

## 2021-10-26 PROCEDURE — C9113 INJ PANTOPRAZOLE SODIUM, VIA: HCPCS | Performed by: FAMILY MEDICINE

## 2021-10-26 PROCEDURE — 74011000258 HC RX REV CODE- 258: Performed by: PSYCHIATRY & NEUROLOGY

## 2021-10-26 PROCEDURE — 74011000250 HC RX REV CODE- 250: Performed by: FAMILY MEDICINE

## 2021-10-26 PROCEDURE — 36415 COLL VENOUS BLD VENIPUNCTURE: CPT

## 2021-10-26 PROCEDURE — 74011250636 HC RX REV CODE- 250/636: Performed by: FAMILY MEDICINE

## 2021-10-26 PROCEDURE — 99232 SBSQ HOSP IP/OBS MODERATE 35: CPT | Performed by: FAMILY MEDICINE

## 2021-10-26 RX ORDER — POTASSIUM CHLORIDE 20 MEQ/1
40 TABLET, EXTENDED RELEASE ORAL 2 TIMES DAILY
Status: DISCONTINUED | OUTPATIENT
Start: 2021-10-27 | End: 2021-10-27

## 2021-10-26 RX ADMIN — ENOXAPARIN SODIUM 40 MG: 100 INJECTION SUBCUTANEOUS at 08:40

## 2021-10-26 RX ADMIN — POTASSIUM BICARBONATE 50 MEQ: 978 TABLET, EFFERVESCENT ORAL at 23:37

## 2021-10-26 RX ADMIN — LEVETIRACETAM 750 MG: 100 SOLUTION ORAL at 08:40

## 2021-10-26 RX ADMIN — SODIUM CHLORIDE 40 MG: 9 INJECTION, SOLUTION INTRAMUSCULAR; INTRAVENOUS; SUBCUTANEOUS at 08:40

## 2021-10-26 RX ADMIN — THIAMINE HYDROCHLORIDE 250 MG: 100 INJECTION, SOLUTION INTRAMUSCULAR; INTRAVENOUS at 11:05

## 2021-10-26 RX ADMIN — Medication 10 ML: at 21:15

## 2021-10-26 RX ADMIN — SODIUM CHLORIDE 1000 MG: 9 INJECTION, SOLUTION INTRAVENOUS at 11:08

## 2021-10-26 RX ADMIN — THERA TABS 1 TABLET: TAB at 08:40

## 2021-10-26 RX ADMIN — Medication 10 ML: at 05:50

## 2021-10-26 RX ADMIN — LEVETIRACETAM 750 MG: 100 SOLUTION ORAL at 18:00

## 2021-10-26 NOTE — PROGRESS NOTES
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  On exam she was found to have ophthalmoplegia and B1 was started concerning for possible Wernicke type of presentation. Patient was initially started on high-dose thiamine for possible Wernicke's. For worsening symptoms, the possibility of autoimmune encephalitis following previous HSV infection was considered. Started on IV Solu-Medrol 1 g IV per day. Her amiodarone dose was decreased for possible oculogyric crisis. MRI of the brain during this admission showed encephalomalacia in the distribution of right MCA right and left TA. She had a lumbar puncture was unremarkable profile. Cultures negative. Autoimmune panels pending. Her thiamine level also is pending. PCR for HSV pending. Interval history:  Patient was seen today in follow-up. He continues to have the diplopia and blurring of vision. She is fully conscious; some disorientation. Complains of headache. Has nausea but no vomiting currently. Feels off balance when trying to stand up. Has generalized weakness but moves her arms and legs while in bed.   No fever or leukocytosis.           Medications:    Current Facility-Administered Medications   Medication Dose Route Frequency Provider Last Rate Last Admin    [START ON 10/26/2021] therapeutic multivitamin (THERAGRAN) tablet 1 Tablet  1 Tablet Oral DAILY Rajesh Sanchez MD        levETIRAcetam (KEPPRA) oral solution 750 mg  750 mg Oral BID Jennifer Viera MD   750 mg at 10/25/21 4043    methylPREDNISolone (PF) (Solu-MEDROL) 1,000 mg in 0.9% sodium chloride 100 mL IVPB  1,000 mg IntraVENous DAILY Jennifer Viera  mL/hr at 10/25/21 0825 1,000 mg at 10/25/21 0825    sodium chloride (NS) flush 5-40 mL  5-40 mL IntraVENous Q8H Edita Vera MD   10 mL at 10/25/21 1736    sodium chloride (NS) flush 5-40 mL  5-40 mL IntraVENous PRN Edita Vera MD        acetaminophen (TYLENOL) tablet 650 mg  650 mg Oral Q6H PRN Edita Vera MD        Or   [facility] acetaminophen (TYLENOL) suppository 650 mg  650 mg Rectal Q6H PRN Edita Vera MD        polyethylene glycol (MIRALAX) packet 17 g  17 g Oral DAILY PRN Edita Vera MD        ondansetron (ZOFRAN ODT) tablet 4 mg  4 mg Oral Q8H PRN Edita Vera MD   4 mg at 10/22/21 0292    Or    [facility]ron [facility] injection 8 mg  8 mg IntraVENous Q6H PRN Edita Vera MD   8 mg at 10/22/21 2137    enoxaparin (LOVENOX) injection 40 mg  40 mg SubCUTAneous DAILY Edita Vera MD   40 mg at 10/25/21 1052    0.9% sodium chloride infusion  100 mL/hr IntraVENous CONTINUOUS Edita Vera  mL/hr at 10/21/21 1546 100 mL/hr at 10/21/21 1546    pantoprazole (PROTONIX) 40 mg in 0.9% sodium chloride 10 mL injection  40 mg IntraVENous DAILY Leyd Craven MD   40 mg at 10/25/21 0825    thiamine (B-1) 250 mg in 0.9% sodium chloride 50 mL IVPB  250 mg IntraVENous DAILY Hiro Teran  mL/hr at 10/25/21 0825 250 mg at 10/25/21 0825    [START ON 10/29/2021] thiamine HCL (B-1) tablet 100 mg  100 mg Oral DAILY Hiro Teran MD           Vital signs:    Visit Vitals  /74 (BP 1 Location: Right upper arm, BP Patient Position: At rest)   Pulse 80   Temp 97.8 °F (36.6 °C)   Resp 16   Ht 5' (1.524 m)   Wt 67.1 kg (148 lb)   LMP 04/01/2019 Comment: unknown   SpO2 98%   BMI 28.90 kg/m²       Review of Systems:   Constitutional no fever or chills  Skin denies rash or itching  HEENT  No hearing lose  Eyes: has blurring of vision and  diplopia  Respiratory denies sortness of breath  Cardiovascular denies chest pain, dyspnea on exertion  Gastrointestinal: hx of nausea and vomiting. Genitourinary denies incontinence  Musculoskeletal denies joint pain or swelling  Hematology denies  bleeding   Neurological as above in HPI      Patient Active Problem List   Diagnosis Code    Vomiting R11.10    Severe protein-calorie malnutrition ([facility] [address] E43         Objective:   General: awake and alert  Chest: no labored breathing. Mental status: Awake, alert, oriented to self, place, month and year; didn't  Know the president; follows commands; negative meningeal signs. No gaze deviation. No subtle seizure like activity. Speech and languge: fluent, coherent,   and comprehension intact  CN: VFF, horizontal nystagmus on lateral gaze bilaterally;  PERRLA, face sensation intact , no facial asymmetry noted, palate elevation symmetric bilat, SS+SCM 5/5 bilat, tongue midline  Motor: Normal tone; moving her arms or legs symmetrically. Sensory: intact to light touch throughout  Coordination: FNF  accurate w/o dysmetria  DTR: 2+ throughout  Gait: not tested     Study Result    Narrative & Impression   EXAM: MRI BRAIN W WO CONT     CLINICAL INDICATION/HISTORY: Encephalitis June 2021 memory difficulties  migraines and seizures     TECHNIQUE: Multisequence multiplanar MR imaging acquired through the brain.      Contrast used: 20 cc Gadavist     COMPARISON: None available here     FINDINGS:     Parenchyma: There is a significant injury to the right insula anterior temporal  region as well as the medial right frontal lobe these suggest ischemic injuries  now remote to the middle and anterior cerebral distribution on the right side     Today's diffusion images demonstrate no evidence of acute restricted diffusion     FLAIR and T2-weighted images demonstrate expected areas of gliosis surrounding  the areas of involvement there is also evidence for small amount of left frontal  infarction injury also remote old.  This has the appearance of a atherosclerotic  injury as most likely etiology for the changes seen     Focal meningitis may have led to a ischemic injury as demonstrated now     On today's exam with contrast there is no evidence of any enhancement of the  meninges or subarachnoid spaces to suggest any ongoing concern for an active  meningitis, cerebritis-type change      No acute infarction. No acute hemorrhage. No mass lesion.  No pathologic  enhancement.     CSF spaces: Reflect cystic encephalomalacia and volume loss in the right middle  fossa     IAC regions: Unremarkable     Parasellar region: Unremarkable     Vasculature: Signal void is identified in the carotid siphons terminal carotid  right and left middle and anterior cerebral circulation     Cervicomedullary junction: Patent     Orbits: Unremarkable     Paranasal sinuses: Clear            IMPRESSION     Evidence for what appears to be now remote vascular injury to the right MCA  right and left TA distribution     more severe on the right with associated volume loss and localized expansion of  the ventricular system     No acute infarction no evidence of acute intracranial enhancement seen     Pattern best fits with a atherosclerotic vascular event however focal area of  meningitis or even severe encephalitis consent times stimulate vascular  occlusion         CBC:   Lab Results   Component Value Date/Time    WBC 6.7 10/23/2021 04:08 AM    RBC 3.30 (L) 10/23/2021 04:08 AM    HGB 9.9 (L) 10/23/2021 04:08 AM    HCT 28.6 (L) 10/23/2021 04:08 AM    PLATELET 103 31/68/0025 04:08 AM     BMP:   Lab Results   Component Value Date/Time    Glucose 119 (H) 10/25/2021 03:58 PM    Sodium 137 10/25/2021 03:58 PM    Potassium 3.1 (L) 10/25/2021 03:58 PM    Chloride 106 10/25/2021 03:58 PM    CO2 26 10/25/2021 03:58 PM    BUN 11 10/25/2021 03:58 PM    Creatinine 0.39 (L) 10/25/2021 03:58 PM    Calcium 8.1 (L) 10/25/2021 03:58 PM     CMP:   Lab Results   Component Value Date/Time    Glucose 119 (H) 10/25/2021 03:58 PM    Sodium 137 10/25/2021 03:58 PM    Potassium 3.1 (L) 10/25/2021 03:58 PM    Chloride 106 10/25/2021 03:58 PM    CO2 26 10/25/2021 03:58 PM    BUN 11 10/25/2021 03:58 PM    Creatinine 0.39 (L) 10/25/2021 03:58 PM    Calcium 8.1 (L) 10/25/2021 03:58 PM    Anion gap 5 10/25/2021 03:58 PM    BUN/Creatinine ratio 28 (H) 10/25/2021 03:58 PM    Alk. phosphatase 150 (H) 10/20/2021 01:12 PM    Protein, total 6.8 10/20/2021 01:12 PM    Albumin 3.2 (L) 10/20/2021 01:12 PM    Globulin 3.6 10/20/2021 01:12 PM    A-G Ratio 0.9 10/20/2021 01:12 PM     Coagulation:   Lab Results   Component Value Date/Time    Prothrombin time 13.2 10/22/2021 02:54 AM    INR 1.0 10/22/2021 02:54 AM       Assessment:    A 64 y.o.  female patient with medical history of HSV encephalitis in June 2021 [chronic admission at [address] [place] [address], seizure from HSV on [facility] came to the hospital for nausea, vomiting, generalized weakness, blurring of vision, diplopia, and altered mental status. MRI of the brain showed old lesions vascular former HSV encephalitis; no acute stroke or new lesions. Had a lumbar puncture: CSF profile unremarkable. NMDA ab is pending. Initial impression was possible Warnicke encephalopathy [patient has prolonged good nausea, vomiting, poor intake of food, unsteadiness, ophthalmoplegia]. Started on thiamine for Wernicke's protocol. For possible impression possible autoimmune encephalitis following HSV, started on Solu-Medrol. Today, she is on day 4/5. She has improvement in her mental status. Continues to have the gaze evoked nystagmus. Still unsteady on her feet. Plan:  -Continue Solu-Medrol  -Continue with time supplementation  -Follow results of an NMDA receptor antibody and HSV PCR  -We will follow thiamine level  -Follow metabolic panels and correct any changes  -We will follow patient  -Call for questions. Sincerely,      Vasu Sanchez M.D. PLEASE NOTE:   This document has been produced using voice recognition software.  Unrecognized errors in transcription may be present.

## 2021-10-26 NOTE — ROUTINE PROCESS
Bedside shift change report given to Torin Dickerson rn (oncoming nurse) by Kelly Sanchez (offgoing nurse). Report included the following information SBAR and Kardex.

## 2021-10-26 NOTE — PROGRESS NOTES
Bedside shift change report given to [address] [place] [address] [address] (oncoming nurse) by Naya Eric RN (offgoing nurse). Report included the following information SBAR, Kardex, Intake/Output, Recent Results and Med Rec Status.

## 2021-10-26 NOTE — ROUTINE PROCESS
Bedside and verbal shift change report given to  Rajan Armando by Scar Mclean RN.  Report included the following information:    -MAR  -Recent Results  -Med Rec Statu  -SBARs  -Kardex

## 2021-10-26 NOTE — PROGRESS NOTES
[facility] Hospitalists  Progress Note    Patient: Fede Meredith Age: 64 y.o. : 1965 MR#: [id number] SSN: [id number]  Date: 10/26/2021     Subjective/24-hour events:     Patient states that she feels her appetite is better but intake remains poor per nursing staff. Assessment:   Adult failure to thrive  Multiple electrolyte abnormalities: Hypokalemia, hypomagnesemia, hyponatremia  Transaminitis/elevated LFTs  Intractable nausea and vomiting  GERD  Vitamin D deficiency  Hyperlipidemia  Severe protein calorie malnutrition  Recent history of HSV encephalitis    Plan:   Repeat potassium level today and replace further if necessary. Still feel that patient would probably benefit from a few days of tube feeds via NGT but she has been unwilling to have tube placed at this point. Will continue to monitor intake today and discuss NGT placement again tomorrow if intake remains poor. PT/OT, mobilize as tolerated. Supportive care otherwise. Follow. Case discussed with:  [x]Patient  []Family  [x]Nursing  []Case Management  DVT Prophylaxis:  [x]Lovenox  []Hep SQ  []SCDs  []Coumadin   []On Heparin gtt    Objective:   VS:   Visit Vitals  /74 (BP 1 Location: Left upper arm, BP Patient Position: At rest)   Pulse 76   Temp 97.7 °F (36.5 °C)   Resp 16   Ht 5' (1.524 m)   Wt 67.1 kg (148 lb)   LMP 2019 Comment: unknown   SpO2 97%   BMI 28.90 kg/m²      Tmax/24hrs: Temp (24hrs), Av.5 °F (36.4 °C), Min:97 °F (36.1 °C), Max:97.9 °F (36.6 °C)      Intake/Output Summary (Last 24 hours) at 10/26/2021 1450  Last data filed at 10/26/2021 0414  Gross per 24 hour   Intake 1800 ml   Output 2 ml   Net 1798 ml       General:  In NAD. Nontoxic-appearing. Cardiovascular:  RRR. Pulmonary:  Lungs clear bilaterally, no wheezes. No accessory muscle use. GI:  Abdomen soft, NTTP. Extremities:  Warm, no edema or ischemia.   Neuro: Awake and alert, moves extremities spontaneously.     Labs:    Recent Results (from the past 24 hour(s))   METABOLIC PANEL, BASIC    Collection Time: 10/25/21  3:58 PM   Result Value Ref Range    Sodium 137 136 - 145 mmol/L    Potassium 3.1 (L) 3.5 - 5.5 mmol/L    Chloride 106 100 - 111 mmol/L    CO2 26 21 - 32 mmol/L    Anion gap 5 3.0 - 18 mmol/L    Glucose 119 (H) 74 - 99 mg/dL    BUN 11 7.0 - 18 MG/DL    Creatinine 0.39 (L) 0.6 - 1.3 MG/DL    BUN/Creatinine ratio 28 (H) 12 - 20      GFR est AA >60 >60 ml/min/1.73m2    GFR est non-AA >60 >60 ml/min/1.73m2    Calcium 8.1 (L) 8.5 - 10.1 MG/DL   MAGNESIUM    Collection Time: 10/25/21  3:58 PM   Result Value Ref Range    Magnesium 1.5 (L) 1.6 - 2.6 mg/dL   PHOSPHORUS    Collection Time: 10/26/21  4:42 AM   Result Value Ref Range    Phosphorus 2.8 2.5 - 4.9 MG/DL   MAGNESIUM    Collection Time: 10/26/21  4:42 AM   Result Value Ref Range    Magnesium 1.6 1.6 - 2.6 mg/dL       Signed By: Grant Christie MD     October 26, 2021

## 2021-10-26 NOTE — PROGRESS NOTES
Problem: Mobility Impaired (Adult and Pediatric)  Goal: *Acute Goals and Plan of Care (Insert Text)  Description: Physical Therapy Goals  Initiated 10/21/2021 and to be accomplished within 7 day(s)  1. Patient will move from supine to sit and sit to supine , scoot up and down, and roll side to side in bed with supervision/set-up. 2.  Patient will transfer from bed to chair and chair to bed with minimal assistance/contact guard assist using the least restrictive device. 3.  Patient will perform sit to stand with minimal assistance/contact guard assist.  4.  Patient will ambulate with minimal assistance/contact guard assist for 50 feet with the least restrictive device. 5.  Patient will ascend/descend stairs pending necessity for discharge and if safe to perform. 6.  Patient will perform BLE therex as tolerated     PLOF: Pt reports she lives in a [address] [place] [address] with her , states she was ambulatory with no AD however per chart review, pt has been using a wc recently as she has increased weakness and difficulty walking, pt only oriented to self and time this date. Outcome: Progressing Towards Goal     PHYSICAL THERAPY TREATMENT    Patient: Logan Dc (77 y.o. female)  Date: 10/26/2021  Diagnosis: Vomiting [R11.10] <principal problem not specified>       Precautions: Fall, Skin    ASSESSMENT:  Rn cleared for mobility. Pt found semi-reclined in bed, in NAD, willing to work with PT,  at bedside. She reports pain at IV site, RN aware. Sup-sit with CGA. Pt stood with CGA and RW, amb 10 ft of side steps along EOB prior to amb 20 ft around the room with Simon for minor LOB. Dizziness reported prior to sitting. Pt sitting for ~1 min with persistent dizziness, sit-supine to relieve symptoms. Pt was scooted up towards [facility] with Simon and she was left semi-reclined with call bell and all needs met.    Progression toward goals:   []      Improving appropriately and progressing toward goals  [x]      Improving slowly and progressing toward goals  []      Not making progress toward goals and plan of care will be adjusted     PLAN:  Patient continues to benefit from skilled intervention to address the above impairments. Continue treatment per established plan of care. Discharge Recommendations:  Rehab vs Home Health with 24/7 assist  Further Equipment Recommendations for Discharge:  rolling walker     SUBJECTIVE:   Patient stated i've had vertigo.     OBJECTIVE DATA SUMMARY:   Critical Behavior:  Neurologic State: Alert  Orientation Level: Oriented to person, Oriented to place, Oriented to situation  Cognition: Follows commands  Safety/Judgement: Fall prevention  Functional Mobility Training:    Transfers:  Sit to Stand: Contact guard assistance  Stand to Sit: Contact guard assistance   Balance:  Sitting: Intact  Standing: Impaired; With support  Standing - Static: Good  Standing - Dynamic : Fair       Ambulation/Gait Training:  Distance (ft): 20 Feet (ft)  Assistive Device: Walker, rolling  Ambulation - Level of Assistance: Minimal assistance        Gait Abnormalities: Decreased step clearance;Trunk sway increased        Base of Support: Center of gravity altered     Speed/Helen: Slow  Step Length: Right shortened;Left shortened      Pain:  Pain level pre-treatment: 4/10- IV site  Pain level post-treatment: 4/10   Pain Intervention(s): Medication (see MAR); Rest, Ice, Repositioning   Response to intervention: Nurse notified, See doc flow    Activity Tolerance:   Pt tolerated mobility fair, vertigo towards end of session  Please refer to the flowsheet for vital signs taken during this treatment.   After treatment:   [] Patient left in no apparent distress sitting up in chair  [x] Patient left in no apparent distress in bed  [x] Call bell left within reach  [x] Nursing notified  [] Caregiver present  [] Bed alarm activated  [] SCDs applied      COMMUNICATION/EDUCATION:   [x]         Role of Physical Therapy in the acute care setting. [x]         Fall prevention education was provided and the patient/caregiver indicated understanding. [x]         Patient/family have participated as able in working toward goals and plan of care. []         Patient/family agree to work toward stated goals and plan of care. []         Patient understands intent and goals of therapy, but is neutral about his/her participation.   []         Patient is unable to participate in stated goals/plan of care: ongoing with therapy staff.  []         Other:        Rafita Granger   Time Calculation: 20 mins

## 2021-10-26 NOTE — PROGRESS NOTES
Problem: Pressure Injury - Risk of  Goal: *Prevention of pressure injury  Description: Document Christofer Scale and appropriate interventions in the flowsheet. Outcome: Progressing Towards Goal  Note: Pressure Injury Interventions:  Sensory Interventions: Assess changes in LOC, Avoid rigorous massage over bony prominences, Check visual cues for pain, Discuss PT/OT consult with provider, Keep linens dry and wrinkle-free, Minimize linen layers    Moisture Interventions: Absorbent underpads, Assess need for specialty bed, Check for incontinence Q2 hours and as needed, Internal/External urinary devices    Activity Interventions: Assess need for specialty bed, Increase time out of bed, Pressure redistribution bed/mattress(bed type), PT/OT evaluation    Mobility Interventions: Assess need for specialty bed, HOB 30 degrees or less, Pressure redistribution bed/mattress (bed type), PT/OT evaluation    Nutrition Interventions: Document food/fluid/supplement intake, Offer support with meals,snacks and hydration    Friction and Shear Interventions: Apply protective barrier, creams and emollients, Foam dressings/transparent film/skin sealants, HOB 30 degrees or less, Lift team/patient mobility team                Problem: Patient Education: Go to Patient Education Activity  Goal: Patient/Family Education  Outcome: Progressing Towards Goal     Problem: Falls - Risk of  Goal: *Absence of Falls  Description: Document Theresa Fall Risk and appropriate interventions in the flowsheet.   Outcome: Progressing Towards Goal  Note: Fall Risk Interventions:  Mobility Interventions: Assess mobility with egress test, Communicate number of staff needed for ambulation/transfer, Patient to call before getting OOB, PT Consult for mobility concerns    Mentation Interventions: Adequate sleep, hydration, pain control, Door open when patient unattended, Evaluate medications/consider consulting pharmacy, Eyeglasses and hearing aids, Familiar objects from home, Family/sitter at bedside    Medication Interventions: Assess postural VS orthostatic hypotension, Evaluate medications/consider consulting pharmacy, Patient to call before getting OOB, Teach patient to arise slowly    Elimination Interventions: Call light in reach, Elevated toilet seat, Patient to call for help with toileting needs, Stay With Me (per policy), Toilet paper/wipes in reach, Toileting schedule/hourly rounds    History of Falls Interventions: Consult care management for discharge planning, Door open when patient unattended, Investigate reason for fall, Room close to nurse's station, Utilize gait belt for transfer/ambulation         Problem: Patient Education: Go to Patient Education Activity  Goal: Patient/Family Education  Outcome: Progressing Towards Goal     Problem: Patient Education: Go to Patient Education Activity  Goal: Patient/Family Education  Outcome: Progressing Towards Goal     Problem: Patient Education: Go to Patient Education Activity  Goal: Patient/Family Education  Outcome: Progressing Towards Goal

## 2021-10-26 NOTE — PROGRESS NOTES
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  On exam she was found to have ophthalmoplegia and B1 was started concerning for possible Wernicke type of presentation. Patient was initially started on high-dose thiamine for possible Wernicke's. For worsening symptoms, the possibility of autoimmune encephalitis following previous HSV infection was considered. Started on IV Solu-Medrol 1 g IV per day. Her amiodarone dose was decreased for possible oculogyric crisis. MRI of the brain during this admission showed encephalomalacia in the distribution of right MCA right and left TA. She had a lumbar puncture was unremarkable profile. Cultures negative. Autoimmune panels pending. Her thiamine level also is pending. PCR for HSV pending. Interval history:  Patient was seen today in follow-up. She claims of the diplopia is getting better. Mild confusion. Has been up and walking. Has global headache. She continues to have the nausea and intermittent vomiting. Has no hallucinations. No agitations. No seizure-like activity. Lastly on methylprednisolone. She is on IV thiamine. Her HSV PCR is negative.   NMDA receptor antibody and thiamine levels pending.           Medications:    Current Facility-Administered Medications   Medication Dose Route Frequency Provider Last Rate Last Admin    therapeutic multivitamin (THERAGRAN) tablet 1 Tablet  1 Tablet Oral DAILY Ledy Craven MD   1 Tablet at 10/26/21 0840    levETIRAcetam (KEPPRA) oral solution 750 mg  750 mg Oral BID Hiro Teran MD   750 mg at 10/26/21 0840    sodium chloride (NS) flush 5-40 mL  5-40 mL IntraVENous Q8H Cari King MD   10 mL at 10/26/21 0550    sodium chloride (NS) flush 5-40 mL  5-40 mL IntraVENous PRN Cari King MD        acetaminophen (TYLENOL) tablet 650 mg  650 mg Oral Q6H PRN Cari King MD        Or   [place] acetaminophen (TYLENOL) suppository 650 mg  650 mg Rectal Q6H PRN Cari King MD        polyethylene glycol [facility]) packet 17 g  17 g Oral DAILY PRN Cari King MD        ondansetron [facility] ODT) tablet 4 mg  4 mg Oral Q8H PRN Cari King MD   4 mg at 10/22/21 1934    Or    ondansetron [facility]) injection 8 mg  8 mg IntraVENous Q6H PRN Cari King MD   8 mg at 10/22/21 2137    enoxaparin (LOVENOX) injection 40 mg  40 mg SubCUTAneous DAILY Cari King MD   40 mg at 10/26/21 0840    0.9% sodium chloride infusion  100 mL/hr IntraVENous CONTINUOUS Cari King  mL/hr at 10/21/21 1546 100 mL/hr at 10/21/21 1546    pantoprazole (PROTONIX) 40 mg in 0.9% sodium chloride 10 mL injection  40 mg IntraVENous DAILY Cachorro Rivas MD   40 mg at 10/26/21 0840    thiamine (B-1) 250 mg in 0.9% sodium chloride 50 mL IVPB  250 mg IntraVENous DAILY Giovanny Foley  mL/hr at 10/26/21 1105 250 mg at 10/26/21 1105    [START ON 10/29/2021] thiamine HCL (B-1) tablet 100 mg  100 mg Oral DAILY Giovanny Foley MD           Vital signs:    Visit Vitals  /75 (BP 1 Location: Left upper arm, BP Patient Position: At rest)   Pulse 76   Temp 97 °F (36.1 °C)   Resp 16   Ht 5' (1.524 m)   Wt 67.1 kg (148 lb)   LMP 04/01/2019 Comment: unknown   SpO2 97%   BMI 28.90 kg/m²       Review of Systems:   Constitutional no fever or chills  Skin denies rash or itching  HEENT  No hearing lose  Eyes: has blurring of vision and  diplopia  Respiratory denies sortness of breath  Cardiovascular denies chest pain, dyspnea on exertion  Gastrointestinal: hx of nausea and vomiting.     Genitourinary denies incontinence  Musculoskeletal denies joint pain or swelling  Hematology denies  bleeding Neurological as above in HPI      Patient Active Problem List   Diagnosis Code    Vomiting R11.10    Severe protein-calorie malnutrition ([facility] [address] E43         Objective:   General: awake and alert  Chest: no labored breathing. Mental status: Awake, alert, oriented to self, place, month and year; Knows the president; follows commands; negative meningeal signs. No gaze deviation. No subtle seizure like activity. Speech and languge: fluent, coherent,   and comprehension intact  CN: VFF, horizontal nystagmus on lateral gaze bilaterally;  PERRLA, face sensation intact , no facial asymmetry noted, palate elevation symmetric bilat, SS+SCM 5/5 bilat, tongue midline  Motor: Normal tone; moving her arms or legs symmetrically. Sensory: intact to light touch throughout  Coordination: FNF  accurate w/o dysmetria  DTR: 2+ throughout  Gait: not tested     Study Result    Narrative & Impression   EXAM: MRI BRAIN W WO CONT     CLINICAL INDICATION/HISTORY: Encephalitis June 2021 memory difficulties  migraines and seizures     TECHNIQUE: Multisequence multiplanar MR imaging acquired through the brain.      Contrast used: 20 cc Gadavist     COMPARISON: None available here     FINDINGS:     Parenchyma: There is a significant injury to the right insula anterior temporal  region as well as the medial right frontal lobe these suggest ischemic injuries  now remote to the middle and anterior cerebral distribution on the right side     Today's diffusion images demonstrate no evidence of acute restricted diffusion     FLAIR and T2-weighted images demonstrate expected areas of gliosis surrounding  the areas of involvement there is also evidence for small amount of left frontal  infarction injury also remote old.  This has the appearance of a atherosclerotic  injury as most likely etiology for the changes seen     Focal meningitis may have led to a ischemic injury as demonstrated now     On today's exam with contrast there is no evidence of any enhancement of the  meninges or subarachnoid spaces to suggest any ongoing concern for an active  meningitis, cerebritis-type change      No acute infarction. No acute hemorrhage. No mass lesion.  No pathologic  enhancement.     CSF spaces: Reflect cystic encephalomalacia and volume loss in the right middle  fossa     IAC regions: Unremarkable     Parasellar region: Unremarkable     Vasculature: Signal void is identified in the carotid siphons terminal carotid  right and left middle and anterior cerebral circulation     Cervicomedullary junction: Patent     Orbits: Unremarkable     Paranasal sinuses: Clear            IMPRESSION     Evidence for what appears to be now remote vascular injury to the right MCA  right and left TA distribution     more severe on the right with associated volume loss and localized expansion of  the ventricular system     No acute infarction no evidence of acute intracranial enhancement seen     Pattern best fits with a atherosclerotic vascular event however focal area of  meningitis or even severe encephalitis consent times stimulate vascular  occlusion         CBC:   Lab Results   Component Value Date/Time    WBC 6.7 10/23/2021 04:08 AM    RBC 3.30 (L) 10/23/2021 04:08 AM    HGB 9.9 (L) 10/23/2021 04:08 AM    HCT 28.6 (L) 10/23/2021 04:08 AM    PLATELET 442 77/00/3727 04:08 AM     BMP:   Lab Results   Component Value Date/Time    Glucose 119 (H) 10/25/2021 03:58 PM    Sodium 137 10/25/2021 03:58 PM    Potassium 3.0 (L) 10/26/2021 02:55 PM    Chloride 106 10/25/2021 03:58 PM    CO2 26 10/25/2021 03:58 PM    BUN 11 10/25/2021 03:58 PM    Creatinine 0.39 (L) 10/25/2021 03:58 PM    Calcium 8.1 (L) 10/25/2021 03:58 PM     CMP:   Lab Results   Component Value Date/Time    Glucose 119 (H) 10/25/2021 03:58 PM    Sodium 137 10/25/2021 03:58 PM    Potassium 3.0 (L) 10/26/2021 02:55 PM    Chloride 106 10/25/2021 03:58 PM    CO2 26 10/25/2021 03:58 PM    BUN 11 10/25/2021 03:58 PM Creatinine 0.39 (L) 10/25/2021 03:58 PM    Calcium 8.1 (L) 10/25/2021 03:58 PM    Anion gap 5 10/25/2021 03:58 PM    BUN/Creatinine ratio 28 (H) 10/25/2021 03:58 PM    Alk. phosphatase 150 (H) 10/20/2021 01:12 PM    Protein, total 6.8 10/20/2021 01:12 PM    Albumin 3.2 (L) 10/20/2021 01:12 PM    Globulin 3.6 10/20/2021 01:12 PM    A-G Ratio 0.9 10/20/2021 01:12 PM     Coagulation:   Lab Results   Component Value Date/Time    Prothrombin time 13.2 10/22/2021 02:54 AM    INR 1.0 10/22/2021 02:54 AM       Assessment:    A 64 y.o.  female patient with medical history of HSV encephalitis in June 2021 [chronic admission at [address] [place] [address], seizure from HSV on [facility] came to the hospital for nausea, vomiting, generalized weakness, blurring of vision, diplopia, and altered mental status. MRI of the brain showed old lesions vascular former HSV encephalitis; no acute stroke or new lesions. Had a lumbar puncture: CSF profile unremarkable. HSV PCR negative. NMDA ab is pending. Initial impression was possible Warnicke encephalopathy [patient has prolonged good nausea, vomiting, poor intake of food, unsteadiness, ophthalmoplegia]. Started on thiamine for Wernicke's protocol. For possible impression possible autoimmune encephalitis following HSV, started on Solu-Medrol. She has improvement in her mental status. Continues to have the gaze evoked nystagmus. Plan:  -Continue with thiamine  -Follow results of an NMDA receptor antibody  -We will follow thiamine level  -Follow metabolic panels and correct any changes  -We will follow patient  -Call for questions. Sincerely,      Chidi Curran M.D. PLEASE NOTE:   This document has been produced using voice recognition software. Unrecognized errors in transcription may be present.

## 2021-10-27 LAB
ANION GAP SERPL CALC-SCNC: 5 MMOL/L (ref 3–18)
BUN SERPL-MCNC: 6 MG/DL (ref 7–18)
BUN/CREAT SERPL: 14 (ref 12–20)
CALCIUM SERPL-MCNC: 8.4 MG/DL (ref 8.5–10.1)
CHLORIDE SERPL-SCNC: 102 MMOL/L (ref 100–111)
CO2 SERPL-SCNC: 29 MMOL/L (ref 21–32)
CREAT SERPL-MCNC: 0.43 MG/DL (ref 0.6–1.3)
GLUCOSE SERPL-MCNC: 116 MG/DL (ref 74–99)
HFE GENE MUT ANL BLD/T: NORMAL
MAGNESIUM SERPL-MCNC: 1.7 MG/DL (ref 1.6–2.6)
PHOSPHATE SERPL-MCNC: 1.9 MG/DL (ref 2.5–4.9)
POTASSIUM SERPL-SCNC: 3.8 MMOL/L (ref 3.5–5.5)
SODIUM SERPL-SCNC: 136 MMOL/L (ref 136–145)

## 2021-10-27 PROCEDURE — 99233 SBSQ HOSP IP/OBS HIGH 50: CPT | Performed by: STUDENT IN AN ORGANIZED HEALTH CARE EDUCATION/TRAINING PROGRAM

## 2021-10-27 PROCEDURE — 74011250636 HC RX REV CODE- 250/636: Performed by: FAMILY MEDICINE

## 2021-10-27 PROCEDURE — 99232 SBSQ HOSP IP/OBS MODERATE 35: CPT | Performed by: FAMILY MEDICINE

## 2021-10-27 PROCEDURE — 83735 ASSAY OF MAGNESIUM: CPT

## 2021-10-27 PROCEDURE — 84100 ASSAY OF PHOSPHORUS: CPT

## 2021-10-27 PROCEDURE — 65270000029 HC RM PRIVATE

## 2021-10-27 PROCEDURE — 2709999900 HC NON-CHARGEABLE SUPPLY

## 2021-10-27 PROCEDURE — 74011000258 HC RX REV CODE- 258: Performed by: PSYCHIATRY & NEUROLOGY

## 2021-10-27 PROCEDURE — 80048 BASIC METABOLIC PNL TOTAL CA: CPT

## 2021-10-27 PROCEDURE — 74011250637 HC RX REV CODE- 250/637: Performed by: FAMILY MEDICINE

## 2021-10-27 PROCEDURE — 36415 COLL VENOUS BLD VENIPUNCTURE: CPT

## 2021-10-27 PROCEDURE — 74011000250 HC RX REV CODE- 250: Performed by: FAMILY MEDICINE

## 2021-10-27 PROCEDURE — 74011250637 HC RX REV CODE- 250/637: Performed by: PSYCHIATRY & NEUROLOGY

## 2021-10-27 PROCEDURE — C9113 INJ PANTOPRAZOLE SODIUM, VIA: HCPCS | Performed by: FAMILY MEDICINE

## 2021-10-27 PROCEDURE — 74011250636 HC RX REV CODE- 250/636: Performed by: INTERNAL MEDICINE

## 2021-10-27 PROCEDURE — 74011250636 HC RX REV CODE- 250/636: Performed by: PSYCHIATRY & NEUROLOGY

## 2021-10-27 PROCEDURE — 97535 SELF CARE MNGMENT TRAINING: CPT

## 2021-10-27 PROCEDURE — 97530 THERAPEUTIC ACTIVITIES: CPT

## 2021-10-27 RX ORDER — SODIUM,POTASSIUM PHOSPHATES 280-250MG
2 POWDER IN PACKET (EA) ORAL 2 TIMES DAILY
Status: COMPLETED | OUTPATIENT
Start: 2021-10-27 | End: 2021-10-29

## 2021-10-27 RX ADMIN — POTASSIUM BICARBONATE 50 MEQ: 978 TABLET, EFFERVESCENT ORAL at 04:35

## 2021-10-27 RX ADMIN — ENOXAPARIN SODIUM 40 MG: 100 INJECTION SUBCUTANEOUS at 09:12

## 2021-10-27 RX ADMIN — POTASSIUM & SODIUM PHOSPHATES POWDER PACK 280-160-250 MG 2 PACKET: 280-160-250 PACK at 17:52

## 2021-10-27 RX ADMIN — THIAMINE HYDROCHLORIDE 250 MG: 100 INJECTION, SOLUTION INTRAMUSCULAR; INTRAVENOUS at 09:13

## 2021-10-27 RX ADMIN — POTASSIUM BICARBONATE 40 MEQ: 391 TABLET, EFFERVESCENT ORAL at 17:52

## 2021-10-27 RX ADMIN — LEVETIRACETAM 750 MG: 100 SOLUTION ORAL at 09:12

## 2021-10-27 RX ADMIN — THERA TABS 1 TABLET: TAB at 09:11

## 2021-10-27 RX ADMIN — SODIUM CHLORIDE 40 MG: 9 INJECTION, SOLUTION INTRAMUSCULAR; INTRAVENOUS; SUBCUTANEOUS at 09:12

## 2021-10-27 RX ADMIN — LEVETIRACETAM 750 MG: 100 SOLUTION ORAL at 17:52

## 2021-10-27 RX ADMIN — Medication 10 ML: at 05:34

## 2021-10-27 NOTE — ADT AUTH CERT NOTES
Vomiting - Care Day 7 (10/26/2021) by Adrianne Uribe       Review Status Review Entered   Completed 10/27/2021 07:42      Criteria Review      Care Day: 7 Care Date: 10/26/2021 Level of Care: Inpatient Floor    Guideline Day 2    Clinical Status    (X) * Hemodynamic stability    10/27/2021 07:38:34 EDT by Melvi Grider; 97.3; 72; 16; 97% RA    (X) * Vomiting absent or controlled    10/27/2021 07:38:34 EDT by Ridge Romero absent    (X) * Electrolyte abnormalities absent or acceptable for next level of care    10/27/2021 07:38:34 EDT by Adrianne Uribe      acceptable for the next loc    (X) * Life-threatening causes of vomiting excluded    10/27/2021 07:38:34 EDT by Ridge Romero none noted    (X) * Pain absent or managed    10/27/2021 07:42:14 EDT by Ridge Romero no issue noted    ( ) * Discharge plans and education understood    Activity    (X) * Ambulatory or acceptable for next level of care    10/27/2021 07:42:14 EDT by Adrianne Uribe      ad sofie    Routes    (X) * Oral hydration    10/27/2021 07:42:14 EDT by Adrianne Uribe      Regular; GI [place] (GERD/Peptic Ulcer)    (X) * Oral medications or regimen acceptable for next level of care    10/27/2021 07:42:14 EDT by Ridge Romero as tolerated    (X) * Oral diet or acceptable for next level of care    10/27/2021 07:42:14 EDT by Adrianne Puentes; GI [place] (GERD/Peptic Ulcer)    * Milestone   Additional Notes   10/26/2021 Continued Stay Review      MEDS:   Lovenox 40mg qd SC   Keppra 750mg bid PO   Protonix 40mg qd IV   Effer-k 50meq q4hrs PO   Theragran 1 qd PO   Thiamine 250mg qd IV   Symmetrel 50mg bid PO      FAMILY MEDICINE MD Note:          Patient states that she feels her appetite is better but intake remains poor per nursing staff.       Assessment:   Adult failure to thrive   Multiple electrolyte abnormalities: Hypokalemia, hypomagnesemia, hyponatremia   Transaminitis/elevated LFTs Intractable nausea and vomiting   GERD   Vitamin D deficiency   Hyperlipidemia   Severe protein calorie malnutrition   Recent history of HSV encephalitis       Plan:   Repeat potassium level today and replace further if necessary. Still feel that patient would probably benefit from a few days of tube feeds via NGT but she has been unwilling to have tube placed at this point.  Will continue to monitor intake today and discuss NGT placement again tomorrow if intake remains poor. PT/OT, mobilize as tolerated. LABS:      10/26/2021 04:42   Phosphorus: 2.8   Magnesium: 1.6      10/26/2021 14:55   Potassium: 3.0 (L)               Vomiting - Care Day 6 (10/25/2021) by Danny Harmon       Review Status Review Entered   Completed 10/26/2021 09:36      Criteria Review      Care Day: 6 Care Date: 10/25/2021 Level of Care: Inpatient Floor    Guideline Day 2    Clinical Status    (X) * Hemodynamic stability    (X) * Vomiting absent or controlled    (X) * Electrolyte abnormalities absent or acceptable for next level of care    (X) * Life-threatening causes of vomiting excluded    (X) * Pain absent or managed    ( ) * Discharge plans and education understood    Activity    (X) * Ambulatory or acceptable for next level of care    Routes    (X) * Oral hydration    (X) * Oral medications or regimen acceptable for next level of care    (X) * Oral diet or acceptable for next level of care    Interventions    (X) Electrolytes    * Milestone   Additional Notes   10/25/2021 Continued Stay Review      119/74; 97.8; 80; 16; 98% RA      Lovenox 40mg qd SC   Keppra 750mg bid PO   Solu-medrol 1000mg qd IV   Protonix 40mg qd IV   Thiamine 250mg qd IV   Symmetrel 50mg bid PO      FAMILY MEDICINE MD Note:      Reports feeling a little better today but continues to have some nausea.    Able to eat some breakfast this morning per report.       Assessment:   Adult failure to thrive   Multiple electrolyte abnormalities: Hypokalemia, hypomagnesemia, hyponatremia   Transaminitis/elevated LFTs   Intractable nausea and vomiting   GERD   Vitamin D deficiency   Hyperlipidemia   Severe protein calorie malnutrition   Recent history of HSV encephalitis           Plan:   Monitor electrolytes, replace as necessary.  Continue to encourage p.o. as tolerated. Discussed NG tube placement again today but patient still would like to hold off on this.  Will follow up again in AM.  Discussion with DHIRAJ. Solu-Medrol daily for 5 per neurology recommendations. PT/OT, mobilize as tolerated.  Disposition to be determined.    Supportive care otherwise.  Follow.       LABS:      10/25/2021 04:30   Phosphorus: 3.7      10/25/2021 15:58   Sodium: 137   Potassium: 3.1 (L)   Chloride: 106   CO2: 26   Anion gap: 5   Glucose: 119 (H)   BUN: 11   Creatinine: 0.39 (L)   BUN/Creatinine ratio: 28 (H)   Calcium: 8.1 (L)   Magnesium: 1.5 (L)   GFR est non-AA: >60   GFR est AA: >60               Vomiting - Care Day 5 (10/24/2021) by Jana Yates       Review Status Review Entered   Completed 10/26/2021 09:31      Criteria Review      Care Day: 5 Care Date: 10/24/2021 Level of Care: Inpatient Floor    Guideline Day 2    Clinical Status    (X) * Hemodynamic stability    10/26/2021 09:28:24 EDT by Anju Rivers [place] Mar[place] 05/72; 97.7; 78; 14; 96% RA    (X) * Vomiting absent or controlled    10/26/2021 36:07:43 EDT by Carolyn Miller no vomiting today    (X) * Electrolyte abnormalities absent or acceptable for next level of care    10/26/2021 09:28:24 EDT by Jana Yates      acceptable for next loc    (X) * Life-threatening causes of vomiting excluded    (X) * Pain absent or managed    10/26/2021 09:31:39 EDT by Carolyn Miller no issue noted    ( ) * Discharge plans and education understood    Activity    (X) * Ambulatory or acceptable for next level of care    10/26/2021 09:31:39 EDT by Carolyn Miller as tolerated    Routes    (X) * Oral hydration 10/26/2021 09:31:39 EDT by Fariba Grace Will attempt oral feeds-ok to try NG-if it so happens that she is unable to tolerate diet    (X) * Oral medications or regimen acceptable for next level of care    10/26/2021 09:31:39 EDT by Cece Rowland      acceptable    (X) * Oral diet or acceptable for next level of care    10/26/2021 09:31:39 EDT by Fariba Casillasa Will attempt oral feeds-ok to try NG-if it so happens that she is unable to tolerate diet    Interventions    (X) Electrolytes    10/26/2021 09:31:39 EDT by Fariba Grace see summary note    * Milestone   Additional Notes   10/24/2021 Continued Stay Review      Lovenox 40mg qd SC   Keppra 750mg bid PO   Solu-medrol 1000mg qd IV   Protonix 40mg qd IV   Thiamine 250mg qd IV   Symmetrel 50mg bid PO      FAMILY MEDICINE MD Note:      Reports eating a fair amount of breakfast this morning. Continues to have some nausea but no vomiting today.       Assessment:   Adult failure to thrive   Multiple electrolyte abnormalities: Hypokalemia, hypomagnesemia, hyponatremia   Transaminitis/elevated LFTs   Intractable nausea and vomiting   GERD   Vitamin D deficiency   Hyperlipidemia   Severe protein calorie malnutrition   Recent history of HSV encephalitis           Plan:   Potassium, magnesium, phosphorus all in normal range today.  Continue to monitor. Patient wishes to hold off on NGT placement today but will reevaluate tomorrow pending intake over the course of the day today. Continue to follow CSF fluid cultures. Continue high-dose Solu-Medrol per neurology.  Currently day 3 of 5. PT/OT, mobilize as tolerated.  Current recommendation is for rehab versus home health with assistance. Gastroenterology MD Note:      Tolerated  1/2 breakfast today. Discussed at length with Dr. Sylvestre Man. Does not appear to have a primary GI pathology here.  Will attempt oral feeds-ok to try NG-if it so happens that she is unable to tolerate diet, then we may to consider PEG. LABS:      10/24/2021 03:46   Sodium: 139   Potassium: 3.7   Chloride: 104   CO2: 27   Anion gap: 8   Glucose: 99   BUN: 7   Creatinine: 0.34 (L)   BUN/Creatinine ratio: 21 (H)   Calcium: 8.5   Phosphorus: 3.6   Magnesium: 1.7   GFR est non-AA: >60   GFR est AA: >60            Vomiting - Care Day 4 (10/23/2021) by Bulmaro Guerrero RN       Review Status Review Entered   Completed 10/25/2021 15:29      Criteria Review      Care Day: 4 Care Date: 10/23/2021 Level of Care: Inpatient Floor    Guideline Day 2    Clinical Status    ( ) * Hemodynamic stability    10/25/2021 15:29:23 EDT by Parmjit Ellison      T 98.3  P 71, 85  BP 94/62, 122/56  R 18  97% RA    ( ) * Vomiting absent or controlled    10/25/2021 15:29:23 EDT by Meri Garcia complain of some nausea, reports an episode of vomiting this morning    ( ) * Electrolyte abnormalities absent or acceptable for next level of care    10/25/2021 15:29:23 EDT by Parmjit Ellison      Multiple electrolyte abnormalities: Hypokalemia, hypomagnesemia, hyponatremia    ( ) * Life-threatening causes of vomiting excluded    10/25/2021 15:29:23 EDT by Parmjit Ellison      High-dose solumedrol per neurology.     ( ) * Pain absent or managed    ( ) * Discharge plans and education understood    Activity    ( ) * Ambulatory or acceptable for next level of care    10/25/2021 15:29:23 EDT by Stacie Donato activity as odell w/assist  C&DB  PT/OT    Routes    (X) * Oral hydration    ( ) * Oral medications or regimen acceptable for next level of care    10/25/2021 15:29:23 EDT by Parmjit Ellison      Protonix 40 mg IV daily  Solumedrol 1000 mg IV daily  Keppra 750 mg po bid  Symmetrel 100 mg po bid    lovenox 40 mg sq daily    ( ) * Oral diet or acceptable for next level of care    10/25/2021 15:29:23 EDT by Parmjit Ellison      full liquid diet    Interventions    (X) Electrolytes    10/25/2021 15:29:23 EDT by Parmjit Ellison      K 3.4  GLU 108  BUN 4  Crea 0.30  KCl 10 mEQ IV q1h x 3    10/22 Lumbar puncture performed under fluoroscopy. Laboratory results pending. CT Chest: Two  small nodular opacities are evident in the right lung. * Milestone   Additional Notes   Gastroenterology follow up-Progress note       Impression:   1. Intractable nausea and vomiting - EGD 9/22/2021 neg, CT 10/14/2021 neg, No response to thiamine, only few sips of liquids today, unable to swallow large pills-ok to try short course of NG feeds. Would avoid PEG/TPN if possible    2. Weight loss due to #1   3. Failure to thrive   4. Electrolyte derangement   5. Mild transaminitis - normal liver on CT 10/14/2021, acute hep panel neg, autoimmune labs pending. 6. Hx HSV encephalitis - neuro following        Plan:   1. Replete electrolytes as indicated   2. Appreciate input from neurology - ? Encephalopathy vs B1 deficiency   3. Await autoimmune labs and neuro results   4. Antiemetics as indicated   5. Diet as tolerated, stressed importance of adequate intake to replete nutrients   6. Medical management per primary team       Discussed with Dr. Jaime Osman.        Also discussed with  at bedside.        Chief Complaint: Nausea, vomiting           Subjective:  More alert and answering questions today. Still with nausea and emesis.       Neurology Progress Note       The patient continues to have dizziness/nausea and vomiting this morning and impaired vision/diplopia.  No seizures since admission.       I reviewed medications and she is on amantadine 200 mg twice a day which was continued during this admission.  Looks like this was started back in June.       Assessment:       Padilla Packer a 64 y.o.  woman, right-hand-dominant, with history of HSV1 encephalitis in June 2021, evaluated today as follow-up for ophthalmoplegia, impaired gait and cognitively delay/somnolence.       Neurological exam not much improved despite the fact that she received thiamine replacement, yesterday started Solu-Medrol and today received the second dose of 1000 mg IV.         Neurological exam continues to be significant for spontaneous and gaze evoked nystagmus which no concerns me that this is an oculogyric crisis which actually is one of the serious adverse reaction seen with amantadine.        Other differential diagnosis remains autoimmune post HSV-1 encephalitis and thiamine deficiency.        Plan:       -Safely taper off the amantadine, today we will switch to 100 mg twice a day and tomorrow 50 mg twice a day for 2 days and then stop concerning for oculogyric crisis, nausea, dizziness, anorexia, vomiting, confusion   -Continue Solu-Medrol IV 1 g every day for 3 to 5 days today is day 2/5    -Continue thiamine replacement as ordered   -We will follow-up on pending CSF results including IgG index, OB, CSF NMDA receptor   - will follow            Hospitalists Progress Note   Subjective/24-hour events:       Continues complain of some nausea, reports an episode of vomiting this morning. Denies abdominal pain appetite remains poor.       Assessment:   Adult failure to thrive   Multiple electrolyte abnormalities: Hypokalemia, hypomagnesemia, hyponatremia   Transaminitis/elevated LFTs   Intractable nausea and vomiting   GERD   Vitamin D deficiency   Hyperlipidemia   Severe protein calorie malnutrition   Recent history of HSV encephalitis           Plan:   Replace potassium today as it remains low.  Monitor. Follow CSF fluid studies.  No obvious evidence for acute infection. High-dose solumedrol per neurology. Encourage oral intake and monitor.  If no significant improvement in appetite by tomorrow patient is agreeable to having an NG tube placed for administration of nutrition.  This was discussed on visit today. PT/OT.  Encourage participation with therapy as much as able. Supportive care otherwise.  Follow. Neuro: Sleepy/lethargic but moves extremities spontaneously. 10/21/2021 MRI BRAIN W WO CONT    Evidence for what appears to be now remote vascular injury to the right MCA   right and left TA distribution       more severe on the right with associated volume loss and localized expansion of   the ventricular system       No acute infarction no evidence of acute intracranial enhancement seen       Pattern best fits with a atherosclerotic vascular event however focal area of   meningitis or even severe encephalitis consent times stimulate vascular   occlusion

## 2021-10-27 NOTE — PROGRESS NOTES
Nutrition Assessment     Type and Reason for Visit: Reassess, Positive nutrition screen, Consult    Nutrition Recommendations/Plan:   - Continue oral diet as tolerated with Ensure Enlive, BID & Magic Cup once daily.  - Continue IV thiamine supplementation & daily multivitamin.  - Electrolyte replacement per MD.    Nutrition Assessment:  Pt notes slight improvement in appetite and po intake but consumed a few bites of breakfast meal this morning with 100% intake of Ensure supplement. Denies emesis and pt unwilling to have NGT placed per MD note. Pt unable to tolerate K replacement this morning & discussed low phos with MD. MD to assess and modify orders as appropriate. Malnutrition Assessment:  Malnutrition Status: Severe malnutrition     Estimated Daily Nutrient Needs:  Energy (kcal):  7513-8486  Protein (g):         Fluid (ml/day):  4403-2985    Nutrition Related Findings:  BM 10/24. SLP signed off.  Meds: daily MVI & IV thiamine      Current Nutrition Therapies:  ADULT ORAL NUTRITION SUPPLEMENT Breakfast, Dinner; Standard High Calorie/High Protein  ADULT ORAL NUTRITION SUPPLEMENT Lunch; Frozen Supplement  ADULT DIET Regular; GI [place] (GERD/Peptic Ulcer)    Anthropometric Measures:  · Height:  5' (152.4 cm)  · Current Body Wt:  67.1 kg (147 lb 14.9 oz)  · BMI: 28.9    Nutrition Diagnosis:   · Severe malnutrition, In context of chronic illness related to altered GI function (poor appetite with nausea and vomiting) as evidenced by poor intake prior to admission, weight loss greater than or equal to 10% in 6 months    Nutrition Intervention:  Food and/or Nutrient Delivery: Continue current diet, Continue oral nutrition supplement, Vitamin supplement  Nutrition Education and Counseling: No recommendations at this time, Education not indicated  Coordination of Nutrition Care: Continue to monitor while inpatient (pt discussed with RN and low phos discussed with MD- MD to assess)    Goals:  Nutritional needs will be met through adequate oral intake or nutrition support within the next 7 days.        Nutrition Monitoring and Evaluation:   Behavioral-Environmental Outcomes: None identified  Food/Nutrient Intake Outcomes: Diet advancement/tolerance, Food and nutrient intake, Supplement intake, Vitamin/mineral intake  Physical Signs/Symptoms Outcomes: Biochemical data, GI status, Nausea/vomiting, Meal time behavior, Nutrition focused physical findings    Discharge Planning:    Continue oral nutrition supplement, Continue current diet     Electronically signed by Estella Pineda RD on 10/27/2021 at 1:29 PM    Contact Number: [phone]

## 2021-10-27 NOTE — PROGRESS NOTES
Occupational Therapy Goals  Initiated 10/21/2021 within 7 day(s). 1.  Patient will perform bed mobility in preparation for selfcare with modified independence. 2.  Patient will perform grooming task/functional activity standing for 4-7 minutes with supervision/set-up, F+ balance. 3.  Patient will perform lower body dressing with supervision/set-up seated and standing using AE prn (Pt has reacher at home). 4.  Patient will perform toilet transfers with supervision/set-up. 5.  Patient will perform all aspects of toileting with supervision/set-up. 6.  Patient will participate in upper extremity therapeutic exercise/activities with modified independence for 8 minutes. 7.  Patient will utilize energy conservation techniques during functional activities with verbal cues. Prior Level of Function: Patient was independent with self-care and functional mobility PTA. Patient's spouse reports she would walk unsteady at home d/t vertigo and for the stairs she would bump up/down on her bottom  Problem: Self Care Deficits Care Plan (Adult)  Goal: *Acute Goals and Plan of Care (Insert Text)  Outcome: Progressing Towards Goal  OCCUPATIONAL THERAPY TREATMENT    Patient: Lise Gomez (55 y.o. female)  Date: 10/27/2021  Diagnosis: Vomiting [R11.10] <principal problem not specified>       Precautions: Fall, Skin  PLOF: Patient was independent with self-care and functional mobility PTA. Patient's spouse reports she would walk unsteady at home d/t vertigo and for the stairs she would bump up/down on her bottom    Chart, occupational therapy assessment, plan of care, and goals were reviewed. ASSESSMENT:  Upon arrival pt lying supine in bed agreeable to engage in OT. Pt reported 8/10 pain in R foot however reported wanting to get out of bed. Pt performed toilet transfer to 3:1 commode at bedside with FWW and Simon for increased safety and stability. Pt required modA for clothing management and hygiene.  Pt performed oral care at [facility] with supervision and setup. Pt required frequent RB's for fatigue and VC's for safety for sequencing secondary to confusion. Progression toward goals:  []          Improving appropriately and progressing toward goals  [x]          Improving slowly and progressing toward goals  []          Not making progress toward goals and plan of care will be adjusted     PLAN:  Patient continues to benefit from skilled intervention to address the above impairments. Continue treatment per established plan of care. Discharge Recommendations:  [address] with 24/7 Assistance pending pt's progress  Further Equipment Recommendations for Discharge: TBA     SUBJECTIVE:   Patient stated i've been going to the bathroom all day.     OBJECTIVE DATA SUMMARY:   Cognitive/Behavioral Status:  Neurologic State: Alert, Eyes open spontaneously  Orientation Level: Oriented to person, Oriented to place, Oriented to time  Cognition: Follows commands  Safety/Judgement: Fall prevention    Functional Mobility and Transfers for ADLs:   Bed Mobility: Pt performed bed mobility scooting to center of bed and EOB with min-modA. Pt performed bed mobility to [facility] with maxA     Transfers: Pt performed toilet transfer to 3:1 [facility] at bedside with FWW and Simon. Balance: F- dynamic standing balance     ADL Intervention:     Toileting  Cues: Verbal cues provided  Adaptive Equipment: Elevated seat;Walker    Pain:  Pain level pre-treatment: 8/10   Pain level post-treatment: -/10  Pain Intervention(s): Reported to RN, pt requesting pain medication  Response to intervention: RN aware    Activity Tolerance:    Pt required frequent RB's secondary to fatigue  Please refer to the flowsheet for vital signs taken during this treatment.   After treatment:   []  Patient left in no apparent distress sitting up in chair  [x]  Patient left in no apparent distress in bed  [x]  Call bell left within reach  []  Nursing notified  []  Caregiver present  []  Bed alarm activated    COMMUNICATION/EDUCATION:   [] Role of Occupational Therapy in the acute care setting  [] Home safety education was provided and the patient/caregiver indicated understanding. [x] Patient/family have participated as able in working towards goals and plan of care. [] Patient/family agree to work toward stated goals and plan of care. [] Patient understands intent and goals of therapy, but is neutral about his/her participation. [] Patient is unable to participate in goal setting and plan of care.       Thank you for this referral.  Nira Denver, OT  Time Calculation: 44 mins

## 2021-10-27 NOTE — ROUTINE PROCESS
Bedside shift change report given to jose finn (oncoming nurse) by Merline Chill (offgoing nurse). Report included the following information SBAR and Kardex.

## 2021-10-27 NOTE — PROGRESS NOTES
Problem: Pressure Injury - Risk of  Goal: *Prevention of pressure injury  Description: Document Christofer Scale and appropriate interventions in the flowsheet. Outcome: Progressing Towards Goal  Note: Pressure Injury Interventions:  Sensory Interventions: Assess changes in LOC, Avoid rigorous massage over bony prominences, Keep linens dry and wrinkle-free    Moisture Interventions: Absorbent underpads, Assess need for specialty bed, Check for incontinence Q2 hours and as needed, Internal/External urinary devices    Activity Interventions: Increase time out of bed, Pressure redistribution bed/mattress(bed type), PT/OT evaluation    Mobility Interventions: Assess need for specialty bed, HOB 30 degrees or less, Pressure redistribution bed/mattress (bed type), Suspension boots    Nutrition Interventions: Document food/fluid/supplement intake, Offer support with meals,snacks and hydration    Friction and Shear Interventions: Apply protective barrier, creams and emollients, Foam dressings/transparent film/skin sealants, HOB 30 degrees or less, Lift team/patient mobility team                Problem: Patient Education: Go to Patient Education Activity  Goal: Patient/Family Education  Outcome: Progressing Towards Goal     Problem: Falls - Risk of  Goal: *Absence of Falls  Description: Document Theresa Fall Risk and appropriate interventions in the flowsheet.   Outcome: Progressing Towards Goal  Note: Fall Risk Interventions:  Mobility Interventions: Assess mobility with egress test, Communicate number of staff needed for ambulation/transfer, OT consult for ADLs, Patient to call before getting OOB, PT Consult for mobility concerns    Mentation Interventions: Adequate sleep, hydration, pain control, Door open when patient unattended, Evaluate medications/consider consulting pharmacy, Familiar objects from home    Medication Interventions: Assess postural VS orthostatic hypotension, Evaluate medications/consider consulting pharmacy, Patient to call before getting OOB, Teach patient to arise slowly    Elimination Interventions: Call light in reach, Elevated toilet seat, Patient to call for help with toileting needs, Stay With Me (per policy), Toilet paper/wipes in reach, Toileting schedule/hourly rounds    History of Falls Interventions: Consult care management for discharge planning, Door open when patient unattended, Room close to nurse's station         Problem: Patient Education: Go to Patient Education Activity  Goal: Patient/Family Education  Outcome: Progressing Towards Goal     Problem: Patient Education: Go to Patient Education Activity  Goal: Patient/Family Education  Outcome: Progressing Towards Goal     Problem: Patient Education: Go to Patient Education Activity  Goal: Patient/Family Education  Outcome: Progressing Towards Goal

## 2021-10-27 NOTE — PROGRESS NOTES
[facility] Hospitalists  Progress Note    Patient: Eddie November Age: 64 y.o. : 1965 MR#: [id number] SSN: [id number]  Date: 10/27/2021     Subjective/24-hour events:     Reports having some improved intake with breakfast today but has not had any of her large as of yet. Continues to complain of nausea but no significant vomiting. Assessment:   Adult failure to thrive  Multiple electrolyte abnormalities: Hypokalemia, hypomagnesemia, hyponatremia, hypophosphatemia  Transaminitis/elevated LFTs  Intractable nausea and vomiting  GERD  Vitamin D deficiency  Hyperlipidemia  Severe protein calorie malnutrition  Recent history of HSV encephalitis    Plan:   Potassium level normal today status post replacement yesterday. Continue to monitor. Have changed [facility] to [facility] due to difficulty swallowing tablets. Replace phos and monitor. Continue antiemetic as needed. Therapy as tolerated. Current recommendation for disposition is for rehab versus home health with  assist.  Discussed with case management, will await final decision on disposition. Supportive care otherwise. Follow. Case discussed with:  [x]Patient  []Family  [x]Nursing  [x]Case Management  DVT Prophylaxis:  [x]Lovenox  []Hep SQ  []SCDs  []Coumadin   []On Heparin gtt    Objective:   VS:   Visit Vitals  /75   Pulse 86   Temp 97.8 °F (36.6 °C)   Resp 17   Ht 5' (1.524 m)   Wt 67.1 kg (148 lb)   LMP 2019 Comment: unknown   SpO2 97%   BMI 28.90 kg/m²      Tmax/24hrs: Temp (24hrs), Av.6 °F (36.4 °C), Min:97 °F (36.1 °C), Max:98.1 °F (36.7 °C)      Intake/Output Summary (Last 24 hours) at 10/27/2021 1126  Last data filed at 10/27/2021 0932  Gross per 24 hour   Intake 3395 ml   Output    Net 3395 ml       General:  In NAD. Nontoxic-appearing. Cardiovascular:  RRR. Pulmonary:  Lungs clear bilaterally, no wheezes. No accessory muscle use. GI:  Abdomen soft, NTTP.   Extremities:  Warm, no edema or ischemia. Neuro: Awake and alert, moves extremities spontaneously.     Labs:    Recent Results (from the past 24 hour(s))   POTASSIUM    Collection Time: 10/26/21  2:55 PM   Result Value Ref Range    Potassium 3.0 (L) 3.5 - 5.5 mmol/L   PHOSPHORUS    Collection Time: 10/27/21  5:04 AM   Result Value Ref Range    Phosphorus 1.9 (L) 2.5 - 4.9 MG/DL   MAGNESIUM    Collection Time: 10/27/21  5:04 AM   Result Value Ref Range    Magnesium 1.7 1.6 - 2.6 mg/dL   METABOLIC PANEL, BASIC    Collection Time: 10/27/21  5:04 AM   Result Value Ref Range    Sodium 136 136 - 145 mmol/L    Potassium 3.8 3.5 - 5.5 mmol/L    Chloride 102 100 - 111 mmol/L    CO2 29 21 - 32 mmol/L    Anion gap 5 3.0 - 18 mmol/L    Glucose 116 (H) 74 - 99 mg/dL    BUN 6 (L) 7.0 - 18 MG/DL    Creatinine 0.43 (L) 0.6 - 1.3 MG/DL    BUN/Creatinine ratio 14 12 - 20      GFR est AA >60 >60 ml/min/1.73m2    GFR est non-AA >60 >60 ml/min/1.73m2    Calcium 8.4 (L) 8.5 - 10.1 MG/DL       Signed By: Anjel Ritter MD     October 27, 2021

## 2021-10-27 NOTE — PROGRESS NOTES
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  On exam she was found to have ophthalmoplegia and B1 was started concerning for possible Wernicke type of presentation. Patient was initially started on high-dose thiamine for possible Wernicke's. For worsening symptoms, the possibility of autoimmune encephalitis following previous HSV infection was considered. Started on IV Solu-Medrol 1 g IV per day. Her amiodarone dose was decreased for possible oculogyric crisis. MRI of the brain during this admission showed encephalomalacia in the distribution of right MCA right and left TA. She had a lumbar puncture was unremarkable profile. Cultures negative. Autoimmune panels pending. Her thiamine level also is pending. PCR for HSV pending. Interval history:  Patient was seen today in follow-up. Feeling better overall. Continues to have the diplopia mostly when looking to the sides; especially to the right side. Endorses forgetfulness. Has generalized weakness. Able to walk with support but feels off balance. Has mild headache. Continues to have nausea; no vomiting today. Has completed a course of steroid yesterday. Still on thiamine.   NMDA receptor antibody and thiamine levels pending.         Medications:    Current Facility-Administered Medications   Medication Dose Route Frequency Provider Last Rate Last Admin    potassium, sodium phosphates (NEUTRA-PHOS) packet 2 Packet  2 Packet Oral BID Will Cazares MD        potassium bicarb-citric acid (EFFER-K) tablet 40 mEq  40 mEq Oral BID Will Cazares MD        therapeutic multivitamin [facility]) tablet 1 Tablet  1 Tablet Oral DAILY Rakesh Douglas MD   1 Tablet at 10/27/21 0911    levETIRAcetam (KEPPRA) oral solution 750 mg  750 mg Oral BID Justice Ramesh MD   750 mg at 10/27/21 0912    sodium chloride (NS) flush 5-40 mL  5-40 mL IntraVENous Q8H Ivy Rhodes MD   10 mL at 10/27/21 0534    sodium chloride (NS) flush 5-40 mL  5-40 mL IntraVENous PRN Ivy Rhodes MD        acetaminophen (TYLENOL) tablet 650 mg  650 mg Oral Q6H PRN Ivy Rhodes MD        Or   Veda Eric acetaminophen (TYLENOL) suppository 650 mg  650 mg Rectal Q6H PRN Ivy Rhodes MD        polyethylene glycol (MIRALAX) packet 17 g  17 g Oral DAILY PRN Ivy Rhodes MD        ondansetron (ZOFRAN ODT) tablet 4 mg  4 mg Oral Q8H PRN Ivy Rhodes MD   4 mg at 10/22/21 9767    Or    ondansetron [facility]) injection 8 mg  8 mg IntraVENous Q6H PRN Ivy Rhodes MD   8 mg at 10/22/21 2137    enoxaparin (LOVENOX) injection 40 mg  40 mg SubCUTAneous DAILY Ivy Rhodes MD   40 mg at 10/27/21 0912    0.9% sodium chloride infusion  100 mL/hr IntraVENous CONTINUOUS Ivy Rhodes  mL/hr at 10/21/21 1546 100 mL/hr at 10/21/21 1546    pantoprazole (PROTONIX) 40 mg in 0.9% sodium chloride 10 mL injection  40 mg IntraVENous DAILY Rakesh Douglas MD   40 mg at 10/27/21 0912    thiamine (B-1) 250 mg in 0.9% sodium chloride 50 mL IVPB  250 mg IntraVENous DAILY Justice Ramesh  mL/hr at 10/27/21 0913 250 mg at 10/27/21 0913    [START ON 10/29/2021] thiamine HCL (B-1) tablet 100 mg  100 mg Oral DAILY Justice Ramesh MD           Vital signs:    Visit Vitals  /64   Pulse 81   Temp 98 °F (36.7 °C)   Resp 20   Ht 5' (1.524 m)   Wt 67.1 kg (148 lb)   LMP 04/01/2019 Comment: unknown   SpO2 97%   BMI 28.90 kg/m²       Review of Systems:   Constitutional no fever or chills  Skin denies rash or itching  HEENT  No hearing lose  Eyes: has blurring of vision and  diplopia  Respiratory denies sortness of breath  Cardiovascular denies chest pain, dyspnea on exertion  Gastrointestinal: hx of nausea and vomiting. Genitourinary denies incontinence  Musculoskeletal denies joint pain or swelling  Hematology denies  bleeding   Neurological as above in HPI      Patient Active Problem List   Diagnosis Code    Vomiting R11.10    Severe protein-calorie malnutrition ([facility] [address] E43         Objective:   General: awake and alert  Chest: no labored breathing. Mental status: Awake, alert, oriented to self, place, month and year; Knows the president; follows commands; negative meningeal signs. No gaze deviation. No subtle seizure like activity. Speech and languge: fluent, coherent,   and comprehension intact  CN: VFF, horizontal nystagmus on lateral gaze bilaterally;  PERRLA, face sensation intact , no facial asymmetry noted, palate elevation symmetric bilat, SS+SCM 5/5 bilat, tongue midline  Motor: Normal tone; moving her arms or legs symmetrically. Sensory: intact to light touch throughout  Coordination: FNF  accurate w/o dysmetria  DTR: 2+ throughout  Gait: not tested     Study Result    Narrative & Impression   EXAM: MRI BRAIN W WO CONT     CLINICAL INDICATION/HISTORY: Encephalitis June 2021 memory difficulties  migraines and seizures     TECHNIQUE: Multisequence multiplanar MR imaging acquired through the brain.      Contrast used: 20 cc Gadavist     COMPARISON: None available here     FINDINGS:     Parenchyma: There is a significant injury to the right insula anterior temporal  region as well as the medial right frontal lobe these suggest ischemic injuries  now remote to the middle and anterior cerebral distribution on the right side     Today's diffusion images demonstrate no evidence of acute restricted diffusion     FLAIR and T2-weighted images demonstrate expected areas of gliosis surrounding  the areas of involvement there is also evidence for small amount of left frontal  infarction injury also remote old.  This has the appearance of a atherosclerotic  injury as most likely etiology for the changes seen     Focal meningitis may have led to a ischemic injury as demonstrated now     On today's exam with contrast there is no evidence of any enhancement of the  meninges or subarachnoid spaces to suggest any ongoing concern for an active  meningitis, cerebritis-type change      No acute infarction. No acute hemorrhage. No mass lesion.  No pathologic  enhancement.     CSF spaces: Reflect cystic encephalomalacia and volume loss in the right middle  fossa     IAC regions: Unremarkable     Parasellar region: Unremarkable     Vasculature: Signal void is identified in the carotid siphons terminal carotid  right and left middle and anterior cerebral circulation     Cervicomedullary junction: Patent     Orbits: Unremarkable     Paranasal sinuses: Clear            IMPRESSION     Evidence for what appears to be now remote vascular injury to the right MCA  right and left TA distribution     more severe on the right with associated volume loss and localized expansion of  the ventricular system     No acute infarction no evidence of acute intracranial enhancement seen     Pattern best fits with a atherosclerotic vascular event however focal area of  meningitis or even severe encephalitis consent times stimulate vascular  occlusion         CBC:   Lab Results   Component Value Date/Time    WBC 6.7 10/23/2021 04:08 AM    RBC 3.30 (L) 10/23/2021 04:08 AM    HGB 9.9 (L) 10/23/2021 04:08 AM    HCT 28.6 (L) 10/23/2021 04:08 AM    PLATELET 107 87/19/2061 04:08 AM     BMP:   Lab Results   Component Value Date/Time    Glucose 116 (H) 10/27/2021 05:04 AM    Sodium 136 10/27/2021 05:04 AM    Potassium 3.8 10/27/2021 05:04 AM    Chloride 102 10/27/2021 05:04 AM    CO2 29 10/27/2021 05:04 AM    BUN 6 (L) 10/27/2021 05:04 AM    Creatinine 0.43 (L) 10/27/2021 05:04 AM    Calcium 8.4 (L) 10/27/2021 05:04 AM     CMP:   Lab Results   Component Value Date/Time    Glucose 116 (H) 10/27/2021 05:04 AM    Sodium 136 10/27/2021 05:04 AM    Potassium 3.8 10/27/2021 05:04 AM    Chloride 102 10/27/2021 05:04 AM    CO2 29 10/27/2021 05:04 AM    BUN 6 (L) 10/27/2021 05:04 AM    Creatinine 0.43 (L) 10/27/2021 05:04 AM    Calcium 8.4 (L) 10/27/2021 05:04 AM    Anion gap 5 10/27/2021 05:04 AM    BUN/Creatinine ratio 14 10/27/2021 05:04 AM    Alk. phosphatase 150 (H) 10/20/2021 01:12 PM    Protein, total 6.8 10/20/2021 01:12 PM    Albumin 3.2 (L) 10/20/2021 01:12 PM    Globulin 3.6 10/20/2021 01:12 PM    A-G Ratio 0.9 10/20/2021 01:12 PM     Coagulation:   Lab Results   Component Value Date/Time    Prothrombin time 13.2 10/22/2021 02:54 AM    INR 1.0 10/22/2021 02:54 AM       Assessment:    A 64 y.o.  female patient with medical history of HSV encephalitis in June 2021 [chronic admission at [address] [place] [address], seizure from HSV on [facility] came to the hospital for nausea, vomiting, generalized weakness, blurring of vision, diplopia, and altered mental status. MRI of the brain showed old lesions vascular former HSV encephalitis; no acute stroke or new lesions. Had a lumbar puncture: CSF profile unremarkable. HSV PCR negative. NMDA ab is pending. Initial impression was possible Warnicke encephalopathy [patient has prolonged nausea, vomiting, poor intake of food, unsteadiness, ophthalmoplegia]. Started on thiamine per Wernicke's protocol. For possible impression of autoimmune encephalitis following HSV [where patient with previous HSV developed some worsening symptoms after initial recovery], was treated with methylprednisolone . She has improvement in her mental status. Plan:  -Continue with thiamine  -Follow results of an NMDA receptor antibody  -We will follow thiamine level  -Follow metabolic panels and correct any changes  -PT? OT eval and follow recommendations.   -We will follow patient  -Call for questions. Sincerely,      Ahsan Bourgeois M.D.       PLEASE NOTE:   This document has been produced using voice recognition software. Unrecognized errors in transcription may be present.

## 2021-10-28 LAB
ANION GAP SERPL CALC-SCNC: 5 MMOL/L (ref 3–18)
APPEARANCE UR: ABNORMAL
BACTERIA URNS QL MICRO: ABNORMAL /HPF
BILIRUB UR QL: NEGATIVE
BUN SERPL-MCNC: 10 MG/DL (ref 7–18)
BUN/CREAT SERPL: 27 (ref 12–20)
CALCIUM SERPL-MCNC: 8.6 MG/DL (ref 8.5–10.1)
CHLORIDE SERPL-SCNC: 100 MMOL/L (ref 100–111)
CO2 SERPL-SCNC: 29 MMOL/L (ref 21–32)
COLOR UR: YELLOW
CREAT SERPL-MCNC: 0.37 MG/DL (ref 0.6–1.3)
EPITH CASTS URNS QL MICRO: ABNORMAL /LPF (ref 0–5)
ERYTHROCYTE [DISTWIDTH] IN BLOOD BY AUTOMATED COUNT: 16.6 % (ref 11.6–14.5)
GLUCOSE SERPL-MCNC: 67 MG/DL (ref 74–99)
GLUCOSE UR STRIP.AUTO-MCNC: NEGATIVE MG/DL
HCT VFR BLD AUTO: 33.9 % (ref 35–45)
HGB BLD-MCNC: 11.4 G/DL (ref 12–16)
HGB UR QL STRIP: ABNORMAL
KETONES UR QL STRIP.AUTO: NEGATIVE MG/DL
LEUKOCYTE ESTERASE UR QL STRIP.AUTO: ABNORMAL
MCH RBC QN AUTO: 31.2 PG (ref 24–34)
MCHC RBC AUTO-ENTMCNC: 33.6 G/DL (ref 31–37)
MCV RBC AUTO: 92.9 FL (ref 78–100)
NITRITE UR QL STRIP.AUTO: NEGATIVE
NMDA RECEPTOR, CSF: NORMAL
PH UR STRIP: 8.5 [PH] (ref 5–8)
PHOSPHATE SERPL-MCNC: 2.1 MG/DL (ref 2.5–4.9)
PLATELET # BLD AUTO: 203 K/UL (ref 135–420)
PMV BLD AUTO: 11.2 FL (ref 9.2–11.8)
POTASSIUM SERPL-SCNC: 4.2 MMOL/L (ref 3.5–5.5)
PROT UR STRIP-MCNC: NEGATIVE MG/DL
RBC # BLD AUTO: 3.65 M/UL (ref 4.2–5.3)
RBC #/AREA URNS HPF: NEGATIVE /HPF (ref 0–5)
SODIUM SERPL-SCNC: 134 MMOL/L (ref 136–145)
SP GR UR REFRACTOMETRY: 1.01 (ref 1–1.03)
UROBILINOGEN UR QL STRIP.AUTO: 1 EU/DL (ref 0.2–1)
VIT B1 BLD-SCNC: 29.5 NMOL/L (ref 66.5–200)
WBC # BLD AUTO: 9.4 K/UL (ref 4.6–13.2)
WBC URNS QL MICRO: ABNORMAL /HPF (ref 0–4)

## 2021-10-28 PROCEDURE — 97164 PT RE-EVAL EST PLAN CARE: CPT

## 2021-10-28 PROCEDURE — 84100 ASSAY OF PHOSPHORUS: CPT

## 2021-10-28 PROCEDURE — 74011250636 HC RX REV CODE- 250/636: Performed by: PSYCHIATRY & NEUROLOGY

## 2021-10-28 PROCEDURE — 74011250637 HC RX REV CODE- 250/637: Performed by: FAMILY MEDICINE

## 2021-10-28 PROCEDURE — 81001 URINALYSIS AUTO W/SCOPE: CPT

## 2021-10-28 PROCEDURE — 74011250637 HC RX REV CODE- 250/637: Performed by: PSYCHIATRY & NEUROLOGY

## 2021-10-28 PROCEDURE — 85027 COMPLETE CBC AUTOMATED: CPT

## 2021-10-28 PROCEDURE — 74011000258 HC RX REV CODE- 258: Performed by: PSYCHIATRY & NEUROLOGY

## 2021-10-28 PROCEDURE — 74011250636 HC RX REV CODE- 250/636: Performed by: FAMILY MEDICINE

## 2021-10-28 PROCEDURE — 2709999900 HC NON-CHARGEABLE SUPPLY

## 2021-10-28 PROCEDURE — 80048 BASIC METABOLIC PNL TOTAL CA: CPT

## 2021-10-28 PROCEDURE — 74011250636 HC RX REV CODE- 250/636: Performed by: INTERNAL MEDICINE

## 2021-10-28 PROCEDURE — 65270000029 HC RM PRIVATE

## 2021-10-28 PROCEDURE — 74011000250 HC RX REV CODE- 250: Performed by: FAMILY MEDICINE

## 2021-10-28 PROCEDURE — 77030038269 HC DRN EXT URIN PURWCK BARD -A

## 2021-10-28 PROCEDURE — 97530 THERAPEUTIC ACTIVITIES: CPT

## 2021-10-28 PROCEDURE — 36415 COLL VENOUS BLD VENIPUNCTURE: CPT

## 2021-10-28 PROCEDURE — C9113 INJ PANTOPRAZOLE SODIUM, VIA: HCPCS | Performed by: FAMILY MEDICINE

## 2021-10-28 PROCEDURE — 99233 SBSQ HOSP IP/OBS HIGH 50: CPT | Performed by: STUDENT IN AN ORGANIZED HEALTH CARE EDUCATION/TRAINING PROGRAM

## 2021-10-28 PROCEDURE — 99232 SBSQ HOSP IP/OBS MODERATE 35: CPT | Performed by: FAMILY MEDICINE

## 2021-10-28 RX ADMIN — Medication 10 ML: at 06:16

## 2021-10-28 RX ADMIN — Medication 10 ML: at 21:34

## 2021-10-28 RX ADMIN — POTASSIUM BICARBONATE 40 MEQ: 391 TABLET, EFFERVESCENT ORAL at 18:44

## 2021-10-28 RX ADMIN — POTASSIUM BICARBONATE 40 MEQ: 391 TABLET, EFFERVESCENT ORAL at 09:41

## 2021-10-28 RX ADMIN — SODIUM CHLORIDE 40 MG: 9 INJECTION, SOLUTION INTRAMUSCULAR; INTRAVENOUS; SUBCUTANEOUS at 09:41

## 2021-10-28 RX ADMIN — POTASSIUM & SODIUM PHOSPHATES POWDER PACK 280-160-250 MG 2 PACKET: 280-160-250 PACK at 18:44

## 2021-10-28 RX ADMIN — LEVETIRACETAM 750 MG: 100 SOLUTION ORAL at 09:43

## 2021-10-28 RX ADMIN — Medication 10 ML: at 06:15

## 2021-10-28 RX ADMIN — Medication 10 ML: at 20:06

## 2021-10-28 RX ADMIN — THIAMINE HYDROCHLORIDE 250 MG: 100 INJECTION, SOLUTION INTRAMUSCULAR; INTRAVENOUS at 10:30

## 2021-10-28 RX ADMIN — POTASSIUM & SODIUM PHOSPHATES POWDER PACK 280-160-250 MG 2 PACKET: 280-160-250 PACK at 09:42

## 2021-10-28 RX ADMIN — THERA TABS 1 TABLET: TAB at 09:45

## 2021-10-28 RX ADMIN — LEVETIRACETAM 750 MG: 100 SOLUTION ORAL at 18:44

## 2021-10-28 RX ADMIN — ENOXAPARIN SODIUM 40 MG: 100 INJECTION SUBCUTANEOUS at 09:43

## 2021-10-28 RX ADMIN — SODIUM CHLORIDE 100 ML/HR: 900 INJECTION, SOLUTION INTRAVENOUS at 09:38

## 2021-10-28 NOTE — PROGRESS NOTES
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    Will follow up later as pt's schedule allows.  Thank you for this referral.    Rose Winters MS, OTR/L

## 2021-10-28 NOTE — PROGRESS NOTES
Pt's  Shane Granado called at 2112 and would like to speak to  and physician about pt's progress. He can be reached at [phone].

## 2021-10-28 NOTE — PROGRESS NOTES
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  On exam she was found to have ophthalmoplegia and B1 was started concerning for possible Wernicke type of presentation. Patient was initially started on high-dose thiamine for possible Wernicke's. For worsening symptoms, the possibility of autoimmune encephalitis following previous HSV infection was considered. Started on IV Solu-Medrol 1 g IV per day. Her amiodarone dose was decreased for possible oculogyric crisis. MRI of the brain during this admission showed encephalomalacia in the distribution of right MCA right and left TA. She had a lumbar puncture was unremarkable profile. Cultures negative. Autoimmune panels pending. Her thiamine level also is pending. PCR for HSV pending. Interval history:  Patient was seen today in follow-up. No acute changes overnight. Continues to have the diplopia and blurring when looking to the right side; nystagmus more when looking to the left side. Sometimes become confused [telling me that she is in [address] Had some fluctuations. Moving her arms and leg symmetrically. NMDA receptor antibodies are negative. Thiamine level pending.        Medications:    Current Facility-Administered Medications   Medication Dose Route Frequency Provider Last Rate Last Admin    potassium, sodium phosphates (NEUTRA-PHOS) packet 2 Packet  2 Packet Oral BID Elio King MD   2 Packet at 10/28/21 9706    potassium bicarb-citric acid (EFFER-K) tablet 40 mEq  40 mEq Oral BID Elio King MD   40 mEq at 10/28/21 0941    therapeutic multivitamin (THERAGRAN) tablet 1 Tablet  1 Tablet Oral DAILY Libby Salazar Bela Davies MD   1 Tablet at 10/28/21 0945    levETIRAcetam (KEPPRA) oral solution 750 mg  750 mg Oral BID Bird Martino MD   750 mg at 10/28/21 0943    sodium chloride (NS) flush 5-40 mL  5-40 mL IntraVENous Q8H Gian Bruce MD   10 mL at 10/28/21 0616    sodium chloride (NS) flush 5-40 mL  5-40 mL IntraVENous PRN Gian Bruce MD        acetaminophen (TYLENOL) tablet 650 mg  650 mg Oral Q6H PRN Gian Bruce MD        Or   [facility] acetaminophen (TYLENOL) suppository 650 mg  650 mg Rectal Q6H PRN Gian Bruce MD        polyethylene glycol (MIRALAX) packet 17 g  17 g Oral DAILY PRN Gian Bruce MD        ondansetron (ZOFRAN ODT) tablet 4 mg  4 mg Oral Q8H PRN Gian Bruce MD   4 mg at 10/22/21 0611    Or    ondansetron [facility]) injection 8 mg  8 mg IntraVENous Q6H PRN Gian Bruce MD   8 mg at 10/22/21 2137    enoxaparin (LOVENOX) injection 40 mg  40 mg SubCUTAneous DAILY Gian Bruce MD   40 mg at 10/28/21 0143    0.9% sodium chloride infusion  100 mL/hr IntraVENous CONTINUOUS Gian Bruce  mL/hr at 10/28/21 0938 100 mL/hr at 10/28/21 0938    pantoprazole (PROTONIX) 40 mg in 0.9% sodium chloride 10 mL injection  40 mg IntraVENous DAILY Francisca Trejo MD   40 mg at 10/28/21 0941    [START ON 10/29/2021] thiamine HCL (B-1) tablet 100 mg  100 mg Oral DAILY Bird Martino MD           Vital signs:    Visit Vitals  BP 99/64 (BP 1 Location: Left upper arm, BP Patient Position: At rest)   Pulse 95   Temp 96.9 °F (36.1 °C)   Resp 16   Ht 5' (1.524 m)   Wt 67.1 kg (148 lb)   LMP 04/01/2019 Comment: unknown   SpO2 98%   BMI 28.90 kg/m²       Review of Systems:   Constitutional no fever or chills  Skin denies rash or itching  HEENT  No hearing lose  Eyes: has blurring of vision and  diplopia  Respiratory denies sortness of breath  Cardiovascular denies chest pain, dyspnea on exertion  Gastrointestinal: hx of nausea and vomiting.     Genitourinary denies incontinence  Musculoskeletal denies joint pain or swelling  Hematology denies  bleeding   Neurological as above in HPI      Patient Active Problem List   Diagnosis Code    Vomiting R11.10    Severe protein-calorie malnutrition ([facility] [address] E43         Objective:   General: awake and alert  Chest: no labored breathing. Mental status: Awake, alert, oriented to self, her age, the year; not to the day or months; did not know the place; knows the president; follows simple and complex commands; no subtle seizure-like activity. Speech and languge: fluent, coherent,   and comprehension intact  CN: VFF, horizontal nystagmus on lateral gaze bilaterally [more when looking to the left side]; PERRLA, face sensation intact , no facial asymmetry noted, palate elevation symmetric bilat, SS+SCM 5/5 bilat, tongue midline  Motor: Normal tone; moving her arms or legs symmetrically. Sensory: intact to light touch throughout  Coordination: FNF  accurate w/o dysmetria  DTR: 2+ throughout  Gait: not tested     Study Result    Narrative & Impression   EXAM: MRI BRAIN W WO CONT     CLINICAL INDICATION/HISTORY: Encephalitis June 2021 memory difficulties  migraines and seizures     TECHNIQUE: Multisequence multiplanar MR imaging acquired through the brain.      Contrast used: 20 cc Gadavist     COMPARISON: None available here     FINDINGS:     Parenchyma: There is a significant injury to the right insula anterior temporal  region as well as the medial right frontal lobe these suggest ischemic injuries  now remote to the middle and anterior cerebral distribution on the right side     Today's diffusion images demonstrate no evidence of acute restricted diffusion     FLAIR and T2-weighted images demonstrate expected areas of gliosis surrounding  the areas of involvement there is also evidence for small amount of left frontal  infarction injury also remote old.  This has the appearance of a atherosclerotic  injury as most likely etiology for the changes seen     Focal meningitis may have led to a ischemic injury as demonstrated now     On today's exam with contrast there is no evidence of any enhancement of the  meninges or subarachnoid spaces to suggest any ongoing concern for an active  meningitis, cerebritis-type change      No acute infarction. No acute hemorrhage. No mass lesion.  No pathologic  enhancement.     CSF spaces: Reflect cystic encephalomalacia and volume loss in the right middle  fossa     IAC regions: Unremarkable     Parasellar region: Unremarkable     Vasculature: Signal void is identified in the carotid siphons terminal carotid  right and left middle and anterior cerebral circulation     Cervicomedullary junction: Patent     Orbits: Unremarkable     Paranasal sinuses: Clear            IMPRESSION     Evidence for what appears to be now remote vascular injury to the right MCA  right and left TA distribution     more severe on the right with associated volume loss and localized expansion of  the ventricular system     No acute infarction no evidence of acute intracranial enhancement seen     Pattern best fits with a atherosclerotic vascular event however focal area of  meningitis or even severe encephalitis consent times stimulate vascular  occlusion         CBC:   Lab Results   Component Value Date/Time    WBC 9.4 10/28/2021 04:12 AM    RBC 3.65 (L) 10/28/2021 04:12 AM    HGB 11.4 (L) 10/28/2021 04:12 AM    HCT 33.9 (L) 10/28/2021 04:12 AM    PLATELET 140 53/98/1736 04:12 AM     BMP:   Lab Results   Component Value Date/Time    Glucose 67 (L) 10/28/2021 04:12 AM    Sodium 134 (L) 10/28/2021 04:12 AM    Potassium 4.2 10/28/2021 04:12 AM    Chloride 100 10/28/2021 04:12 AM    CO2 29 10/28/2021 04:12 AM    BUN 10 10/28/2021 04:12 AM    Creatinine 0.37 (L) 10/28/2021 04:12 AM    Calcium 8.6 10/28/2021 04:12 AM     CMP:   Lab Results   Component Value Date/Time    Glucose 67 (L) 10/28/2021 04:12 AM    Sodium 134 (L) 10/28/2021 04:12 AM    Potassium 4.2 10/28/2021 04:12 AM Chloride 100 10/28/2021 04:12 AM    CO2 29 10/28/2021 04:12 AM    BUN 10 10/28/2021 04:12 AM    Creatinine 0.37 (L) 10/28/2021 04:12 AM    Calcium 8.6 10/28/2021 04:12 AM    Anion gap 5 10/28/2021 04:12 AM    BUN/Creatinine ratio 27 (H) 10/28/2021 04:12 AM    Alk. phosphatase 150 (H) 10/20/2021 01:12 PM    Protein, total 6.8 10/20/2021 01:12 PM    Albumin 3.2 (L) 10/20/2021 01:12 PM    Globulin 3.6 10/20/2021 01:12 PM    A-G Ratio 0.9 10/20/2021 01:12 PM     Coagulation:   Lab Results   Component Value Date/Time    Prothrombin time 13.2 10/22/2021 02:54 AM    INR 1.0 10/22/2021 02:54 AM       Assessment:    A 64 y.o.  female patient with medical history of HSV encephalitis in June 2021 [prolonged admission at [address] [place] [address], seizure from HSV on [facility] came to the hospital for nausea, vomiting, generalized weakness, blurring of vision, diplopia, and altered mental status. MRI of the brain showed old lesions possibly from her previous HSV encephalitis; no acute stroke or new lesions. Had a lumbar puncture: CSF profile unremarkable. HSV PCR negative. NMDA ab is negative. Initial impression was possible Warnicke encephalopathy [patient has prolonged nausea, vomiting, poor intake of food, unsteadiness, ophthalmoplegia]. Started on thiamine per Wernicke's protocol. For possible impression of autoimmune encephalitis following HSV [where patient with previous HSV developed some worsening symptoms after initial recovery], was treated with methylprednisolone . She has some improvement in her mental status. Wi     Plan:  -Continue with thiamine  -We will follow thiamine level  -Follow metabolic panels and correct any changes  -PT/OT eval and follow recommendations.   -We will follow patient as needed  -Call for questions. Sincerely,      Nita Lauren M.D. PLEASE NOTE:   This document has been produced using voice recognition software. Unrecognized errors in transcription may be present.

## 2021-10-28 NOTE — PROGRESS NOTES
Problem: Mobility Impaired (Adult and Pediatric)  Goal: *Acute Goals and Plan of Care (Insert Text)  Description: Physical Therapy Goals  Initiated 10/21/2021 and to be accomplished within 7 day(s)  1. Patient will move from supine to sit and sit to supine , scoot up and down, and roll side to side in bed with supervision/set-up. 2.  Patient will transfer from bed to chair and chair to bed with minimal assistance/contact guard assist using the least restrictive device. 3.  Patient will perform sit to stand with minimal assistance/contact guard assist.  4.  Patient will ambulate with minimal assistance/contact guard assist for 50 feet with the least restrictive device. 5.  Patient will ascend/descend stairs pending necessity for discharge and if safe to perform. 6.  Patient will perform BLE therex as tolerated     PLOF: Pt reports she lives in a [address] [place] [address] with her , states she was ambulatory with no AD however per chart review, pt has been using a wc recently as she has increased weakness and difficulty walking, pt only oriented to self and time this date. 10/28/2021 1424 by Graham Everett PT  Outcome: Progressing Towards Goal  PHYSICAL THERAPY RE-EVALUATION    Patient: Tiffani Horn (16 y.o. female)  Date: 10/28/2021  Primary Diagnosis: Vomiting [R11.10]        Precautions:  Fall, Skin      ASSESSMENT :  Based on the objective data described below, the patient presents with decreased strength, confusion, decreased participation, and decreased independence with mobility. Patient is confused, oriented to person, place, and year. She is found soiled with urine. Patient initially does not follow commands to sit up at EOB, repeats \" I will\" or \"I'm going\", but does not initiate task. She requires minimal assistance to initiate rolling r/l at first and appreciate assistance from nursing student for full bed linen change. Then progresses to rolling SBA.  Supine to sit transfer with CGA and reports having to urinate. Patient does not follow commands to transfer to [facility] when attempting to assist. Sit ot supine with mod A. Informed nursing assistance of need for Alex BILL applied. Recommend rehab vs HH with 24/7 assistance. Patient will benefit from skilled intervention to address the above impairments. Patient's rehabilitation potential is considered to be Fair  Factors which may influence rehabilitation potential include:  []         None noted  []         Mental ability/status  [x]         Medical condition  [x]         Home/family situation and support systems  [x]         Safety awareness  []         Pain tolerance/management  []         Other:      PLAN :  Recommendations and Planned Interventions:   [x]           Bed Mobility Training             [x]    Neuromuscular Re-Education  [x]           Transfer Training                   []    Orthotic/Prosthetic Training  [x]           Gait Training                          []    Modalities  [x]           Therapeutic Exercises           []    Edema Management/Control  [x]           Therapeutic Activities            [x]    Family Training/Education  [x]           Patient Education  []           Other (comment):    Frequency/Duration: Patient will be followed by physical therapy 1-2 times per day/4-7 days per week to address goals. Discharge Recommendations: SNF VS [facility] with 24/7 supervision   Further Equipment Recommendations for Discharge: rolling walker and BSC     SUBJECTIVE:   Patient stated I'm going.     OBJECTIVE DATA SUMMARY:   Hospital course since last seen and reason for re-evaluation: Patient due for PT re-evaluation. Limited by confusion and poor command following.   Past Medical History:   Diagnosis Date    Arthritis     Diarrhea     no diarrhea since 9/14/2021    Encephalitis 06/14/2021    intubated    GERD (gastroesophageal reflux disease)     H. pylori infection 2019    IBS (irritable bowel syndrome)     Memory difficulty     since encephalitis    Migraine     Ovarian cyst 2019    Seizures ([facility] [address] 06/16/2021    x 1    Vomiting      Past Surgical History:   Procedure Laterality Date    HX BREAST AUGMENTATION  2005    HX CHOLECYSTECTOMY      HX COLONOSCOPY  2016    HX ENDOSCOPY  2019    HX TYMPANOPLASTY      left x 5 times     Barriers to Learning/Limitations: yes;  confusion   Compensate with: Visual Cues, Verbal Cues, and Tactile Cues  Home Situation:   Home Situation  Home Environment: Private residence  One/Two Story Residence: Two story, live on 1st floor  Living Alone: No  Support Systems: Spouse/Significant Other  Patient Expects to be Discharged to[de-identified] House  Current DME Used/Available at Home: Shower chair  Tub or Shower Type: Tub/Shower combination  Critical Behavior:  Neurologic State: Alert  Orientation Level: Oriented to person;Oriented to situation;Oriented to time  Cognition: Follows commands     Psychosocial  Patient Behaviors: Calm;Confused  Family  Behaviors: Calm; Cooperative  Purposeful Interaction: Yes  Pt Identified Daily Priority: Clinical issues (comment)  Caritas Process: Nurture loving kindness;Establish trust  Caring Interventions: Reassure  Reassure: Therapeutic listening; Informing     Family  Behaviors: Calm; Cooperative           Strength:    Strength: Generally decreased, functional                    Tone & Sensation:   Tone: Normal              Sensation: Intact               Range Of Motion:  AROM: Within functional limits             Functional Mobility:  Bed Mobility:  Rolling: Contact guard assistance;Stand-by assistance  Supine to Sit: Contact guard assistance  Sit to Supine: Moderate assistance  Scooting: Maximum assistance;Assist x2 (supine to [facility])       Balance:   Sitting: Intact    Pain:  Pain level pre-treatment: -/10 pt reports left hand pain at IV site and abdominal pain   Pain level post-treatment: -/10   Pain Intervention(s) : Medication (see MAR);  Rest, Ice, Repositioning   Response to intervention: Nurse notified, See doc flow    Activity Tolerance:   Fair  Please refer to the flowsheet for vital signs taken during this treatment. After treatment:   []         Patient left in no apparent distress sitting up in chair  [x]         Patient left in no apparent distress in bed  [x]         Call bell left within reach  [x]         Nursing notified  []         Caregiver present  [x]         Bed alarm activated  []         SCDs applied    COMMUNICATION/EDUCATION:   [x]         Role of Physical Therapy in the acute care setting. [x]         Fall prevention education was provided and the patient/caregiver indicated understanding. [x]         Patient/family have participated as able in goal setting and plan of care. [x]         Patient/family agree to work toward stated goals and plan of care. []         Patient understands intent and goals of therapy, but is neutral about his/her participation. []         Patient is unable to participate in goal setting/plan of care: ongoing with therapy staff.  []         Other:     Thank you for this referral.  Kayden Maurer, PT   Time Calculation: 40 mins

## 2021-10-28 NOTE — ROUTINE PROCESS
Bedside and verbal shift change report given to  Veronica RN by Ronen Albert RN.  Report included the following information:  -procedure summary  -MAR  -Recent Results  -Med Rec Status  -SBAR

## 2021-10-28 NOTE — PROGRESS NOTES
Problem: Pressure Injury - Risk of  Goal: *Prevention of pressure injury  Description: Document Christofer Scale and appropriate interventions in the flowsheet. Outcome: Progressing Towards Goal  Note: Pressure Injury Interventions:  Sensory Interventions: Assess changes in LOC, Minimize linen layers    Moisture Interventions: Absorbent underpads, Minimize layers    Activity Interventions: PT/OT evaluation, Pressure redistribution bed/mattress(bed type)    Mobility Interventions: HOB 30 degrees or less    Nutrition Interventions: Document food/fluid/supplement intake    Friction and Shear Interventions: Minimize layers                Problem: Patient Education: Go to Patient Education Activity  Goal: Patient/Family Education  Outcome: Progressing Towards Goal     Problem: Falls - Risk of  Goal: *Absence of Falls  Description: Document Theresa Fall Risk and appropriate interventions in the flowsheet.   Outcome: Progressing Towards Goal  Note: Fall Risk Interventions:  Mobility Interventions: PT Consult for mobility concerns, PT Consult for assist device competence, OT consult for ADLs, Patient to call before getting OOB, Assess mobility with egress test    Mentation Interventions: Adequate sleep, hydration, pain control, Door open when patient unattended, Increase mobility    Medication Interventions: Teach patient to arise slowly, Patient to call before getting OOB, Assess postural VS orthostatic hypotension    Elimination Interventions: Call light in reach, Patient to call for help with toileting needs    History of Falls Interventions: Bed/chair exit alarm, Investigate reason for fall         Problem: Patient Education: Go to Patient Education Activity  Goal: Patient/Family Education  Outcome: Progressing Towards Goal     Problem: Patient Education: Go to Patient Education Activity  Goal: Patient/Family Education  Outcome: Progressing Towards Goal     Problem: Patient Education: Go to Patient Education Activity  Goal: Patient/Family Education  Outcome: Progressing Towards Goal

## 2021-10-29 ENCOUNTER — APPOINTMENT (OUTPATIENT)
Dept: GENERAL RADIOLOGY | Age: 56
DRG: 056 | End: 2021-10-29
Attending: FAMILY MEDICINE
Payer: COMMERCIAL

## 2021-10-29 LAB
ANION GAP SERPL CALC-SCNC: 4 MMOL/L (ref 3–18)
BACTERIA SPEC CULT: NORMAL
BACTERIA SPEC CULT: NORMAL
BUN SERPL-MCNC: 5 MG/DL (ref 7–18)
BUN/CREAT SERPL: 17 (ref 12–20)
CALCIUM SERPL-MCNC: 8.5 MG/DL (ref 8.5–10.1)
CHLORIDE SERPL-SCNC: 103 MMOL/L (ref 100–111)
CO2 SERPL-SCNC: 31 MMOL/L (ref 21–32)
CREAT SERPL-MCNC: 0.3 MG/DL (ref 0.6–1.3)
GLUCOSE SERPL-MCNC: 72 MG/DL (ref 74–99)
GRAM STN SPEC: NORMAL
GRAM STN SPEC: NORMAL
PHOSPHATE SERPL-MCNC: 5 MG/DL (ref 2.5–4.9)
POTASSIUM SERPL-SCNC: 4.6 MMOL/L (ref 3.5–5.5)
SERVICE CMNT-IMP: NORMAL
SODIUM SERPL-SCNC: 138 MMOL/L (ref 136–145)

## 2021-10-29 PROCEDURE — 97535 SELF CARE MNGMENT TRAINING: CPT

## 2021-10-29 PROCEDURE — 36415 COLL VENOUS BLD VENIPUNCTURE: CPT

## 2021-10-29 PROCEDURE — 97116 GAIT TRAINING THERAPY: CPT

## 2021-10-29 PROCEDURE — 2709999900 HC NON-CHARGEABLE SUPPLY

## 2021-10-29 PROCEDURE — 99232 SBSQ HOSP IP/OBS MODERATE 35: CPT | Performed by: FAMILY MEDICINE

## 2021-10-29 PROCEDURE — 80048 BASIC METABOLIC PNL TOTAL CA: CPT

## 2021-10-29 PROCEDURE — 74011250637 HC RX REV CODE- 250/637: Performed by: PSYCHIATRY & NEUROLOGY

## 2021-10-29 PROCEDURE — 65270000029 HC RM PRIVATE

## 2021-10-29 PROCEDURE — 74011250636 HC RX REV CODE- 250/636: Performed by: INTERNAL MEDICINE

## 2021-10-29 PROCEDURE — 97530 THERAPEUTIC ACTIVITIES: CPT

## 2021-10-29 PROCEDURE — 74011250637 HC RX REV CODE- 250/637: Performed by: FAMILY MEDICINE

## 2021-10-29 PROCEDURE — 84100 ASSAY OF PHOSPHORUS: CPT

## 2021-10-29 PROCEDURE — 97168 OT RE-EVAL EST PLAN CARE: CPT

## 2021-10-29 PROCEDURE — 74018 RADEX ABDOMEN 1 VIEW: CPT

## 2021-10-29 RX ADMIN — ENOXAPARIN SODIUM 40 MG: 100 INJECTION SUBCUTANEOUS at 09:38

## 2021-10-29 RX ADMIN — THERA TABS 1 TABLET: TAB at 09:42

## 2021-10-29 RX ADMIN — POTASSIUM BICARBONATE 40 MEQ: 391 TABLET, EFFERVESCENT ORAL at 09:42

## 2021-10-29 RX ADMIN — LEVETIRACETAM 750 MG: 100 SOLUTION ORAL at 09:38

## 2021-10-29 RX ADMIN — LEVETIRACETAM 750 MG: 100 SOLUTION ORAL at 18:37

## 2021-10-29 RX ADMIN — Medication 100 MG: at 09:42

## 2021-10-29 RX ADMIN — POTASSIUM & SODIUM PHOSPHATES POWDER PACK 280-160-250 MG 2 PACKET: 280-160-250 PACK at 09:42

## 2021-10-29 NOTE — PROGRESS NOTES
[facility] Hospitalists  Progress Note    Patient: Renetta Prince Age: 64 y.o. : 1965 MR#: [id number] SSN: [id number]  Date: 10/28/2021     Subjective/24-hour events:     Essentially unchanged clinically. Able to tolerate intake without any significant N/V but remains marginal.    Assessment:   Adult failure to thrive  Multiple electrolyte abnormalities: Hypokalemia, hypomagnesemia, hyponatremia, hypophosphatemia  Transaminitis/elevated LFTs  Intractable nausea and vomiting  GERD  Vitamin D deficiency  Hyperlipidemia  Severe protein calorie malnutrition  Recent history of HSV encephalitis    Plan:   K+ in normal range today, continue to replace as needed and monitor. Continue phos replacement. Patient not wanting NGT placement. Will need to focus on improving functional status as best as able. Not much else to be done in hospital at this point and will likely look at discharge in the next day or so. Current recommendation is home with [place] [id number] vs SNF. Will f/u with CM in AM   Will need to make decision on whether she wishes to consider SNF placement - if not, disposition will be home with [place] [address] services. Case discussed with:  [x]Patient  []Family  [x]Nursing  [x]Case Management  DVT Prophylaxis:  [x]Lovenox  []Hep SQ  []SCDs  []Coumadin   []On Heparin gtt    Objective:   VS:   Visit Vitals  /81 (BP Patient Position: At rest)   Pulse (!) 109   Temp 98.6 °F (37 °C)   Resp 20   Ht 5' (1.524 m)   Wt 67.1 kg (148 lb)   LMP 2019 Comment: unknown   SpO2 100%   BMI 28.90 kg/m²      Tmax/24hrs: Temp (24hrs), Av.7 °F (36.5 °C), Min:96.9 °F (36.1 °C), Max:98.6 °F (37 °C)      Intake/Output Summary (Last 24 hours) at 10/28/2021 2030  Last data filed at 10/28/2021 1900  Gross per 24 hour   Intake 240 ml   Output    Net 240 ml       General:  In NAD. Nontoxic-appearing. Cardiovascular:  RRR. Pulmonary:  Lungs clear bilaterally, no wheezes.   No accessory muscle use.  GI:  Abdomen soft, NTTP. Extremities:  Warm, no edema or ischemia. Neuro: Awake and alert, moves extremities spontaneously.     Labs:    Recent Results (from the past 24 hour(s))   URINALYSIS W/ RFLX MICROSCOPIC    Collection Time: 10/28/21 12:11 AM   Result Value Ref Range    Color YELLOW      Appearance TURBID      Specific gravity 1.007 1.005 - 1.030      pH (UA) 8.5 (H) 5.0 - 8.0      Protein Negative NEG mg/dL    Glucose Negative NEG mg/dL    Ketone Negative NEG mg/dL    Bilirubin Negative NEG      Blood MODERATE (A) NEG      Urobilinogen 1.0 0.2 - 1.0 EU/dL    Nitrites Negative NEG      Leukocyte Esterase LARGE (A) NEG     URINE MICROSCOPIC ONLY    Collection Time: 10/28/21 12:11 AM   Result Value Ref Range    WBC 35 to 40 0 - 4 /hpf    RBC Negative 0 - 5 /hpf    Epithelial cells 3+ 0 - 5 /lpf    Bacteria 4+ (A) NEG /hpf   PHOSPHORUS    Collection Time: 10/28/21  4:12 AM   Result Value Ref Range    Phosphorus 2.1 (L) 2.5 - 4.9 MG/DL   CBC W/O DIFF    Collection Time: 10/28/21  4:12 AM   Result Value Ref Range    WBC 9.4 4.6 - 13.2 K/uL    RBC 3.65 (L) 4.20 - 5.30 M/uL    HGB 11.4 (L) 12.0 - 16.0 g/dL    HCT 33.9 (L) 35.0 - 45.0 %    MCV 92.9 78.0 - 100.0 FL    MCH 31.2 24.0 - 34.0 PG    MCHC 33.6 31.0 - 37.0 g/dL    RDW 16.6 (H) 11.6 - 14.5 %    PLATELET 312 770 - 928 K/uL    MPV 11.2 9.2 - 06.8 FL   METABOLIC PANEL, BASIC    Collection Time: 10/28/21  4:12 AM   Result Value Ref Range    Sodium 134 (L) 136 - 145 mmol/L    Potassium 4.2 3.5 - 5.5 mmol/L    Chloride 100 100 - 111 mmol/L    CO2 29 21 - 32 mmol/L    Anion gap 5 3.0 - 18 mmol/L    Glucose 67 (L) 74 - 99 mg/dL    BUN 10 7.0 - 18 MG/DL    Creatinine 0.37 (L) 0.6 - 1.3 MG/DL    BUN/Creatinine ratio 27 (H) 12 - 20      GFR est AA >60 >60 ml/min/1.73m2    GFR est non-AA >60 >60 ml/min/1.73m2    Calcium 8.6 8.5 - 10.1 MG/DL       Signed By: Mauricio Harding MD     October 28, 2021

## 2021-10-29 NOTE — PROGRESS NOTES
[facility]U/IPR REFERRAL CONTACT NOTE  [id number] [place] [address] for Physical Rehabilitation    RE: Faith Libia     Thank you for the opportunity to review this patient's case for admission to [address] [place] [address] for Physical Rehabilitation. Based on our pre-admission screening:     [x ] This patient does not meet criteria for admission to [facility] for Physical Rehabilitation due to:    [x ] Too functional, per documentation, patient does not require both Physical and Occupational Therapy Services at an acute rehabilitation level of intensity. Again, Thank you for this referral. Should you have any questions please do not hesitate to call. Sincerely,  Queenie Lemus. Lucian Bagley, [address]  Lucian Bagley, RN  Admissions [facility] for Physical Rehabilitation  [phone]

## 2021-10-29 NOTE — ROUTINE PROCESS
End of Shift Note     Bedside and verbal shift change report given to Moses Galindo RN (On coming nurse) by Fer Cárdenas RN (Off going nurse).   Report included the following information:      --Procedure Summary     --MAR,     --Recent Results     --Med Rec Status    SBAR Recommendations: weakness    Issues for Provider to address confusion          Activity This Shift     [] Bed Rest Order   [] Refused   [] Dangled    [] TDWB         Ambulating:     [] Bathroom     [] BSC     [] Room/Hallway      Up in Chair for meals    []Yes [x] No   Voiding       [x] Yes  [] No  Leo          [] Yes  [] No  Incontinent [x] Yes  [] No    DUE TO VOID POUR        [] Yes [] No  Purewick    [x] Yes [] No  New Onset [] Yes [] No Straight Cath   []Yes  [] No  Condom Cath  [] Yes [] No  MD Called      [] Yes  [] No   Blood Sugars Managed []Yes [x] No    Bowels Moved [x] Yes [] No    Incontinent     [x] Yes [] No Passed Gas [x]Yes [] No    New Onset  []Yes [x] No        MD Called []Yes  [] No     CHG Bath Done     Before Surgery     After Surgery      [] Yes  [x] No  [] Yes  [x] No       Drain Removed [] Yes  [] No [x] N/A    Dressing Changed [] Yes   [] No [x] N/A      Nausea/Vomiting [] Yes   [x] No     Ice Packs Changed [] Yes   [] No  [x] N/A    Incentive Spirometer  [] Yes  [x] No      SCD Pumps On     Ankle Pumping  [] Yes   [x] No      [] Yes   [x] No        Telemetry Monitoring [] Yes   [x] No   Rhythm

## 2021-10-29 NOTE — PROGRESS NOTES
[facility] Hospitalists  Progress Note    Patient: Rowan Sandoval Age: 64 y.o. : 1965 MR#: [id number] SSN: [id number]  Date: 10/29/2021     Subjective/24-hour events:     Sitting in chair at bedside, no new complaints. Able to eat some breakfast per her report but lunch tray is at bedside untouched.  present at bedside. Assessment:   Adult failure to thrive  Multiple electrolyte abnormalities: Hypokalemia, hypomagnesemia, hyponatremia, hypophosphatemia  Transaminitis/elevated LFTs  Intractable nausea and vomiting  GERD  Vitamin D deficiency  Hyperlipidemia  Severe protein calorie malnutrition  Recent history of HSV encephalitis    Plan:   Potassium and phosphorus both in normal range today, continue to monitor and replace as necessary. NGT placement discussed again today in presence of . Patient patient is agreeable to having tube placed with tube feeds to be initiated. Orders entered for tube to be inserted and will initiate feeds once placement of tube confirmed by x-ray. Formal nutrition consult for tube feed recommendations. Order placed. Continue PT/OT as able/tolerated. Recommendation is for inpatient rehab and I have placed order for rehab screen. As she is not a candidate, will likely need to consider skilled nursing placement for subacute rehab as patient states that he is unable to manage her at home given current functional status. Will make further recommendations pending clinical response. Case discussed with:  [x]Patient  []Family  [x]Nursing  [x]Case Management  DVT Prophylaxis:  [x]Lovenox  []Hep SQ  []SCDs  []Coumadin   []On Heparin gtt    Objective:   VS:   Visit Vitals  /84   Pulse 94   Temp 97.2 °F (36.2 °C)   Resp 20   Ht 5' (1.524 m)   Wt 67.1 kg (148 lb)   LMP 2019 Comment: unknown   SpO2 100%   BMI 28.90 kg/m²       General:  In NAD. Nontoxic but still appears weak. Cardiovascular:  RRR.     Pulmonary:  Lungs clear bilaterally, no wheezes. No accessory muscle use. GI:  Abdomen soft, NTTP. Extremities:  Warm, no edema or ischemia. Neuro: Awake and alert, moves extremities spontaneously.     Labs:    Recent Results (from the past 24 hour(s))   PHOSPHORUS    Collection Time: 10/29/21  3:12 AM   Result Value Ref Range    Phosphorus 5.0 (H) 2.5 - 4.9 MG/DL   METABOLIC PANEL, BASIC    Collection Time: 10/29/21  3:12 AM   Result Value Ref Range    Sodium 138 136 - 145 mmol/L    Potassium 4.6 3.5 - 5.5 mmol/L    Chloride 103 100 - 111 mmol/L    CO2 31 21 - 32 mmol/L    Anion gap 4 3.0 - 18 mmol/L    Glucose 72 (L) 74 - 99 mg/dL    BUN 5 (L) 7.0 - 18 MG/DL    Creatinine 0.30 (L) 0.6 - 1.3 MG/DL    BUN/Creatinine ratio 17 12 - 20      GFR est AA >60 >60 ml/min/1.73m2    GFR est non-AA >60 >60 ml/min/1.73m2    Calcium 8.5 8.5 - 10.1 MG/DL       Signed By: Oxana Sherwood MD     October 29, 2021

## 2021-10-29 NOTE — PROGRESS NOTES
Problem: Self Care Deficits Care Plan (Adult)  Goal: *Acute Goals and Plan of Care (Insert Text)  Description: Occupational Therapy Goals  Initiated 10/21/2021 and updated 10/29/21 within 7 day(s). 1.  Patient will perform bed mobility in preparation for selfcare with modified independence. 2.  Patient will perform grooming task/functional activity standing for 4-7 minutes with supervision/set-up, F+ balance. 3.  Patient will perform lower body dressing with supervision/set-up seated and standing using AE prn (Pt has reacher at home). 4.  Patient will perform toilet transfers with supervision/set-up. 5.  Patient will perform all aspects of toileting with supervision/set-up. 6.  Patient will participate in upper extremity therapeutic exercise/activities with modified independence for 8 minutes. 7.  Patient will utilize energy conservation techniques during functional activities with verbal cues. Prior Level of Function: Patient was independent with self-care and functional mobility PTA. Patient's spouse reports she would walk unsteady at home d/t vertigo and for the stairs she would bump up/down on her bottom  Outcome: Progressing Towards Goal   OCCUPATIONAL THERAPY RE-EVALUATION    Patient: Rush Blanco (23 y.o. female)  Date: 10/29/2021  Primary Diagnosis: Vomiting [R11.10]        Precautions:   Fall, Skin  PLOF: see above    ASSESSMENT :  Based on the objective data described below, the patient presents with continued confusion, memory loss, decreased strength, impaired balance, decreased endurance, complaints of vertigo and impaired functional mobioity impairing independence in self care. Pt cleared for participation by RN. Pt partially cotreated with PT to increase pt participation and safety. Pt sidelying in bed and agreeable to OT session, however, states she is not feeling well. Pt unable to report if she had been OOB this morning or if she had performed her ADLs.  Pt performed supine>sit with extra time and max v/c for initiation requiring min A to cue pt to sit up. Once seated edge o fbed pt demonstrating G balance and BUE AROM WFL with generally decreased UB strength with pt reporting soreness in BUE. Pt demonstrates ability to use cross over method to adjust socks SUP. Pt noted to have slight edema in BLE. Pt scooted to EOB with extra time and SUP. Pt performed bathroom mobility with CGA and RW with ed on proper distance between RW and ROBERT. Pt performed toilet transfer with CGA and required extended time on commode due to frequent urination and small bowel movement as well as pt's anxiety about getting off the commode. Pt performed hand hygiene x 2 sitting at commode and valentín care set up/supervision. Pt requires max v/c and min A for initiation of sit>stand from commode and educated on importance of not sitting on commode for long period of time for safety. Pt returned to arm chair with Cga and RW and sitting in chair at end of session with call bell and lunch set up. RN informed of pt's complaints of burning sensation during urination and pt's position in arm chair. Pt may benefit from a familiar environment for continued therapy (in the home). During today's session requiring min A and v/c for initiation of tasks and extra time. Patient will benefit from skilled intervention to address the above impairments.   Patient's rehabilitation potential is considered to be Good  Factors which may influence rehabilitation potential include:   []             None noted  [x]             Mental ability/status  []             Medical condition  []             Home/family situation and support systems  []             Safety awareness  []             Pain tolerance/management  []             Other:      PLAN :  Recommendations and Planned Interventions:   [x]               Self Care Training                  [x]      Therapeutic Activities  [x]               Functional Mobility Training   [x] Cognitive Retraining  [x]               Therapeutic Exercises           [x]      Endurance Activities  [x]               Balance Training                    [x]      Neuromuscular Re-Education  [x]               Visual/Perceptual Training     [x]      Home Safety Training  [x]               Patient Education                   [x]      Family Training/Education  []               Other (comment):    Frequency/Duration: Patient will be followed by occupational therapy 1-2 times per day/3-6 days per week to address goals. Discharge Recommendations: Home Health with 24 hour supervision or SNF if family unable to provide supervision  Further Equipment Recommendations for Discharge: bedside commode and shower chair if does not have at home      SUBJECTIVE:   Patient stated I don't know if I brushed my teeth or not.     OBJECTIVE DATA SUMMARY:   Hospital course since last seen and reason for reevaluation: Pt demonstrates varying levels of activity endurance during OT POC requiring moderate to max encouragement for ADL routine participation and OOB activity. Pt continues to demonstrate memory loss and impaired cognition requiring SUP for OOB activity and may benefit from familiar environment upon dc. Pt would continue to benefit from skilled services to increase independence in self care as well as safety awareness.    Past Medical History:   Diagnosis Date    Arthritis     Diarrhea     no diarrhea since 9/14/2021    Encephalitis 06/14/2021    intubated    GERD (gastroesophageal reflux disease)     H. pylori infection 2019    IBS (irritable bowel syndrome)     Memory difficulty     since encephalitis    Migraine     Ovarian cyst 2019    Seizures ([facility] [address] 06/16/2021    x 1    Vomiting      Past Surgical History:   Procedure Laterality Date    HX BREAST AUGMENTATION  2005    HX CHOLECYSTECTOMY      HX COLONOSCOPY  2016    HX ENDOSCOPY  2019    HX TYMPANOPLASTY      left x 5 times     Barriers to Learning/Limitations: yes; altered mental status (i.e.Sedation, Confusion)  Compensate with: visual, verbal, tactile, kinesthetic cues/model    Home Situation:   Home Situation  Home Environment: Private residence  One/Two Story Residence: Two story, live on 1st floor  Living Alone: No  Support Systems: Spouse/Significant Other  Patient Expects to be Discharged to[de-identified] House  Current DME Used/Available at Home: Shower chair  Tub or Shower Type: Tub/Shower combination  [x]  Right hand dominant   []  Left hand dominant    Cognitive/Behavioral Status:  Neurologic State: Alert  Orientation Level: Oriented to person;Oriented to place  Cognition: Decreased attention/concentration  Safety/Judgement: Fall prevention    Skin: intact BUE, slight bruising  Edema: none noted BUE, BLE slight edema     Vision/Perceptual:          Diplopia: Yes (self report R eye)  wears eye patch        Corrective Lenses: Glasses  Coordination: BUE  Coordination: Generally decreased, functional  Fine Motor Skills-Upper: Left Intact; Right Intact    Gross Motor Skills-Upper: Left Intact; Right Intact  Balance:  Sitting: Intact  Standing: With support; Intact  Standing - Static: Good  Strength: BUE  Strength: Generally decreased, functional   Tone & Sensation: BUE  Tone: Normal  Sensation: Intact   Range of Motion: BUE  AROM: Within functional limits   Functional Mobility and Transfers for ADLs:  Bed Mobility:  Supine to Sit: Minimum assistance   Transfers: Toilet Transfer : Minimum assistance     Bathroom Mobility: Stand-by assistance  ADL Assesment:   Feeding: Modified independent  Oral Facial Hygiene/Grooming: Supervision  Bathing: Minimum assistance  Upper Body Dressing: Setup  Lower Body Dressing: Setup;Supervision (socks )  Toileting: Setup;Supervision   ADL Intervention:   Cognitive Retraining  Safety/Judgement: Fall prevention  Pain:  Pain level pre-treatment: 0/10   Pain level post-treatment: 0/10  Pain Intervention(s): Medication (see MAR);  Rest,  Repositioning Response to intervention: Nurse notified, See doc flow    Activity Tolerance:   fair  Please refer to the flowsheet for vital signs taken during this treatment. After treatment:   [x] Patient left in no apparent distress sitting up in chair  [] Patient left in no apparent distress in bed  [x] Call bell left within reach  [x] Nursing notified  [] Caregiver present  [] Bed alarm activated    COMMUNICATION/EDUCATION:   [x] Role of Occupational Therapy in the acute care setting  [x] Home safety education was provided and the patient/caregiver indicated understanding. [x] Patient/family have participated as able in goal setting and plan of care. [x] Patient/family agree to work toward stated goals and plan of care. [] Patient understands intent and goals of therapy, but is neutral about his/her participation. [] Patient is unable to participate in goal setting and plan of care.     Thank you for this referral.  Miguel Foley, OT  Time Calculation: 41 mins

## 2021-10-29 NOTE — PROGRESS NOTES
Problem: Mobility Impaired (Adult and Pediatric)  Goal: *Acute Goals and Plan of Care (Insert Text)  Description: Physical Therapy Goals  Initiated 10/21/2021 and to be accomplished within 7 day(s)  1. Patient will move from supine to sit and sit to supine , scoot up and down, and roll side to side in bed with supervision/set-up. 2.  Patient will transfer from bed to chair and chair to bed with minimal assistance/contact guard assist using the least restrictive device. 3.  Patient will perform sit to stand with minimal assistance/contact guard assist.  4.  Patient will ambulate with minimal assistance/contact guard assist for 50 feet with the least restrictive device. 5.  Patient will ascend/descend stairs pending necessity for discharge and if safe to perform. 6.  Patient will perform BLE therex as tolerated     PLOF: Pt reports she lives in a [address] [place] [address] with her , states she was ambulatory with no AD however per chart review, pt has been using a wc recently as she has increased weakness and difficulty walking, pt only oriented to self and time this date. Outcome: Progressing Towards Goal   PHYSICAL THERAPY TREATMENT    Patient: Estefanía Oliveros (54 y.o. female)  Date: 10/29/2021  Diagnosis: Vomiting [R11.10] <principal problem not specified>       Precautions: Fall, Skin    ASSESSMENT:  Pt cleared to participate in PT session, pt received semi-reclined in bed and agreeable to therapy session. Completing with OT to maximize safety and mobility. Pt needing tactile cuing for all mobility this session, increased time for transitions reporting needing to \"catch her breath\" with no visible signs. Pt standing with CGA-Simon to RW, ambulating to toilet. Increased time sitting on toilet, Simon to stand. Pt then ambulating back to recliner. Pt positioned for comfort and educated to call for assist before getting up, pt verbalized understanding. Pt left with all needs met and call bell in reach.  RN notified of position and participation. Progression toward goals:   []      Improving appropriately and progressing toward goals  [x]      Improving slowly and progressing toward goals  []      Not making progress toward goals and plan of care will be adjusted     PLAN:  Patient continues to benefit from skilled intervention to address the above impairments. Continue treatment per established plan of care. Discharge Recommendations:  Rehab  Further Equipment Recommendations for Discharge:  rolling walker     SUBJECTIVE:   Patient stated My son is in high school.     OBJECTIVE DATA SUMMARY:   Critical Behavior:  Neurologic State: Alert  Orientation Level: Oriented to person, Oriented to place  Cognition: Decreased attention/concentration  Safety/Judgement: Fall prevention  Functional Mobility Training:  Bed Mobility:     Supine to Sit: Minimum assistance    Transfers:  Sit to Stand: Contact guard assistance  Stand to Sit: Contact guard assistance    Balance:  Sitting: Intact  Standing: Impaired; With support  Standing - Static: Good  Standing - Dynamic : Fair   Range Of Motion:   AROM: Within functional limits      Posture:   Posture (WDL): Within defined limits     Ambulation/Gait Training:  Distance (ft): 10 Feet (ft) (then x20 )  Assistive Device: Walker, rolling  Ambulation - Level of Assistance: Contact guard assistance    Gait Abnormalities: Decreased step clearance    Base of Support: Center of gravity altered;Narrowed     Speed/Helen: Slow;Shuffled  Step Length: Right shortened;Left shortened      Pain:  Pain level pre-treatment: 0/10  Pain level post-treatment: 0/10       Activity Tolerance:   Fair tolerance     Please refer to the flowsheet for vital signs taken during this treatment.   After treatment:   [x] Patient left in no apparent distress sitting up in chair  [] Patient left in no apparent distress in bed  [x] Call bell left within reach  [x] Nursing notified  [] Caregiver present  [] Bed alarm activated  [] SCDs applied      COMMUNICATION/EDUCATION:   [x]         Role of Physical Therapy in the acute care setting. [x]         Fall prevention education was provided and the patient/caregiver indicated understanding. [x]         Patient/family have participated as able in working toward goals and plan of care. [x]         Patient/family agree to work toward stated goals and plan of care. []         Patient understands intent and goals of therapy, but is neutral about his/her participation.   []         Patient is unable to participate in stated goals/plan of care: ongoing with therapy staff.  []         Other:        Mikel Mahajan, PT   Time Calculation: 38 mins

## 2021-10-29 NOTE — PROGRESS NOTES
[facility] Hospitalists  Progress Note    Patient: Faustina Grajeda Age: 64 y.o. : 1965 MR#: [id number] SSN: [id number]  Date: 10/30/2021     Subjective/24-hour events: Tolerating TF currently. No abdominal pain or worsening N/V reported. Assessment:   Adult failure to thrive  Multiple electrolyte abnormalities: Hypokalemia, hypomagnesemia, hyponatremia, hypophosphatemia  Transaminitis/elevated LFTs  Intractable nausea and vomiting  GERD  Vitamin D deficiency  Hyperlipidemia  Severe protein calorie malnutrition  Recent history of HSV encephalitis    Plan:   Continue tube feeds as ordered. Clinical nutrition evaluation has been ordered for formal tube feed recommendations. Assistance appreciated in advance. Monitor electrolytes, replace as necessary. PT/OT mobilize as tolerated. Disposition to be determined pending response to tube feeds and clinical course over the next 2 to 3 days. Case discussed with:  [x]Patient  []Family  []Nursing  []Case Management  DVT Prophylaxis:  [x]Lovenox  []Hep SQ  []SCDs  []Coumadin   []On Heparin gtt    Objective:   VS:   Visit Vitals  /78   Pulse (!) 101   Temp 98.2 °F (36.8 °C)   Resp 18   Ht 5' (1.524 m)   Wt 67.1 kg (148 lb)   LMP 2019 Comment: unknown   SpO2 98%   BMI 28.90 kg/m²      Tmax/24hrs: Temp (24hrs), Av.1 °F (36.7 °C), Min:97.8 °F (36.6 °C), Max:98.2 °F (36.8 °C)      Intake/Output Summary (Last 24 hours) at 10/30/2021 1631  Last data filed at 10/30/2021 1328  Gross per 24 hour   Intake 540 ml   Output    Net 540 ml       General:  In NAD. Nontoxic-appearing. Cardiovascular:  RRR. Pulmonary:  Lungs clear bilaterally, no wheezes. No accessory muscle use. GI:  Abdomen soft, NTTP. Extremities:  Warm, no edema or ischemia. Neuro: Awake and alert, moves extremities spontaneously.     Labs:    Recent Results (from the past 24 hour(s))   PHOSPHORUS    Collection Time: 10/30/21 12:50 AM   Result Value Ref Range    Phosphorus 5.3 (H) 2.5 - 4.9 MG/DL   METABOLIC PANEL, BASIC    Collection Time: 10/30/21 12:50 AM   Result Value Ref Range    Sodium 136 136 - 145 mmol/L    Potassium 4.2 3.5 - 5.5 mmol/L    Chloride 99 (L) 100 - 111 mmol/L    CO2 32 21 - 32 mmol/L    Anion gap 5 3.0 - 18 mmol/L    Glucose 93 74 - 99 mg/dL    BUN 5 (L) 7.0 - 18 MG/DL    Creatinine 0.38 (L) 0.6 - 1.3 MG/DL    BUN/Creatinine ratio 13 12 - 20      GFR est AA >60 >60 ml/min/1.73m2    GFR est non-AA >60 >60 ml/min/1.73m2    Calcium 9.1 8.5 - 10.1 MG/DL   MAGNESIUM    Collection Time: 10/30/21 12:50 AM   Result Value Ref Range    Magnesium 2.0 1.6 - 2.6 mg/dL       Signed By: Radha Pendleton MD     October 30, 2021

## 2021-10-29 NOTE — PROGRESS NOTES
Problem: Pressure Injury - Risk of  Goal: *Prevention of pressure injury  Description: Document Christofer Scale and appropriate interventions in the flowsheet. Outcome: Progressing Towards Goal  Note: Pressure Injury Interventions:  Sensory Interventions: Assess changes in LOC, Minimize linen layers    Moisture Interventions: Absorbent underpads    Activity Interventions: Pressure redistribution bed/mattress(bed type)    Mobility Interventions: Pressure redistribution bed/mattress (bed type)    Nutrition Interventions: Document food/fluid/supplement intake    Friction and Shear Interventions: Lift sheet                Problem: Patient Education: Go to Patient Education Activity  Goal: Patient/Family Education  Outcome: Progressing Towards Goal     Problem: Falls - Risk of  Goal: *Absence of Falls  Description: Document Theresa Fall Risk and appropriate interventions in the flowsheet.   Outcome: Progressing Towards Goal  Note: Fall Risk Interventions:  Mobility Interventions: Bed/chair exit alarm    Mentation Interventions: Bed/chair exit alarm    Medication Interventions: Teach patient to arise slowly    Elimination Interventions: Bed/chair exit alarm    History of Falls Interventions: Bed/chair exit alarm         Problem: Patient Education: Go to Patient Education Activity  Goal: Patient/Family Education  Outcome: Progressing Towards Goal

## 2021-10-30 LAB
ANION GAP SERPL CALC-SCNC: 5 MMOL/L (ref 3–18)
BUN SERPL-MCNC: 5 MG/DL (ref 7–18)
BUN/CREAT SERPL: 13 (ref 12–20)
CALCIUM SERPL-MCNC: 9.1 MG/DL (ref 8.5–10.1)
CHLORIDE SERPL-SCNC: 99 MMOL/L (ref 100–111)
CO2 SERPL-SCNC: 32 MMOL/L (ref 21–32)
CREAT SERPL-MCNC: 0.38 MG/DL (ref 0.6–1.3)
GLUCOSE SERPL-MCNC: 93 MG/DL (ref 74–99)
MAGNESIUM SERPL-MCNC: 2 MG/DL (ref 1.6–2.6)
PHOSPHATE SERPL-MCNC: 5.3 MG/DL (ref 2.5–4.9)
POTASSIUM SERPL-SCNC: 4.2 MMOL/L (ref 3.5–5.5)
SODIUM SERPL-SCNC: 136 MMOL/L (ref 136–145)

## 2021-10-30 PROCEDURE — 84100 ASSAY OF PHOSPHORUS: CPT

## 2021-10-30 PROCEDURE — 83735 ASSAY OF MAGNESIUM: CPT

## 2021-10-30 PROCEDURE — 74011250637 HC RX REV CODE- 250/637: Performed by: PSYCHIATRY & NEUROLOGY

## 2021-10-30 PROCEDURE — 36415 COLL VENOUS BLD VENIPUNCTURE: CPT

## 2021-10-30 PROCEDURE — 74011250636 HC RX REV CODE- 250/636: Performed by: INTERNAL MEDICINE

## 2021-10-30 PROCEDURE — 74011250637 HC RX REV CODE- 250/637: Performed by: FAMILY MEDICINE

## 2021-10-30 PROCEDURE — 74011250637 HC RX REV CODE- 250/637: Performed by: INTERNAL MEDICINE

## 2021-10-30 PROCEDURE — 65270000029 HC RM PRIVATE

## 2021-10-30 PROCEDURE — 80048 BASIC METABOLIC PNL TOTAL CA: CPT

## 2021-10-30 RX ADMIN — LEVETIRACETAM 750 MG: 100 SOLUTION ORAL at 17:20

## 2021-10-30 RX ADMIN — LEVETIRACETAM 750 MG: 100 SOLUTION ORAL at 09:19

## 2021-10-30 RX ADMIN — THERA TABS 1 TABLET: TAB at 09:19

## 2021-10-30 RX ADMIN — ONDANSETRON 4 MG: 4 TABLET, ORALLY DISINTEGRATING ORAL at 08:00

## 2021-10-30 RX ADMIN — Medication 100 MG: at 09:19

## 2021-10-30 RX ADMIN — ENOXAPARIN SODIUM 40 MG: 100 INJECTION SUBCUTANEOUS at 09:19

## 2021-10-30 NOTE — CONSULTS
Nutrition Assessment     Type and Reason for Visit: Initial, Consult    Nutrition Recommendations/Plan:   - Continue tube feeding of Jevity 1.5 at 40 mL/hr with 30 mL q 6 hour water flushes via NGT. (goal regimen to provide 1440 kcal, 61 gm protein, 730 mL free water, 96% RDIs). - Monitor meal intake. Plan to adjust tube feeding regimen if consistent improvement in po intake. - Discontinue oral nutrition supplements. Nutrition Assessment:  Pt now agreeable to NGT placement. Tube placed, and tube feedings initiated. Currently tolerating feeds of Jevity 1.5 at rate of 40 mL/hr. Phos elevetaed, but pt has been receiving replacement. Pt dislikes Ensure supplements, plan to discontinue oral nutrition supplements now that tube feeding has been initiated. Tube feeding meets low end of estimated nutritional needs, plan to modify if po intake improves. Malnutrition Assessment:  Malnutrition Status: Severe malnutrition     Estimated Daily Nutrient Needs:  Energy (kcal):  6236-5112  Protein (g):         Fluid (ml/day):  9401-4957    Nutrition Related Findings:  BM 10/29, loose. Phos 5.3       Current Nutrition Therapies:  ADULT ORAL NUTRITION SUPPLEMENT Breakfast, Dinner; Standard High Calorie/High Protein  ADULT ORAL NUTRITION SUPPLEMENT Lunch; Frozen Supplement  ADULT DIET Regular; GI [place] (GERD/Peptic Ulcer)  ADULT TUBE FEEDING Nasogastric; Standard with Fiber; Delivery Method: Continuous; Continuous Initial Rate (mL/hr): 20; Continuous Advance Tube Feeding: Yes; Advancement Volume (mL/hr): 10; Advancement Frequency: Q 6 hours; Continuous Goal Rate (mL. ..      Anthropometric Measures:  · Height:  5' (152.4 cm)  · Current Body Wt:  67.1 kg (147 lb 14.9 oz)  · BMI: 28.9    Nutrition Diagnosis:   · Severe malnutrition, In context of chronic illness related to altered GI function (poor appetite with nausea and vomiting) as evidenced by poor intake prior to admission, weight loss greater than or equal to 10% in 6 months      Nutrition Intervention:  Food and/or Nutrient Delivery: Continue current diet, Discontinue oral nutrition supplement, Continue tube feeding  Nutrition Education and Counseling: No recommendations at this time, Education not indicated  Coordination of Nutrition Care: Continue to monitor while inpatient    Goals:  Nutritional needs will be met through adequate oral intake and/or nutrition support within the next 7 days. Nutrition Monitoring and Evaluation:   Behavioral-Environmental Outcomes: None identified  Food/Nutrient Intake Outcomes: Food and nutrient intake, Enteral nutrition intake/tolerance  Physical Signs/Symptoms Outcomes: Biochemical data, Meal time behavior, Nutrition focused physical findings, Nausea/vomiting    Discharge Planning:     Too soon to determine     Electronically signed by Gary Potts RD on 10/30/2021 at 3:39 PM    Contact Number: [phone]

## 2021-10-30 NOTE — PROGRESS NOTES
Problem: Pressure Injury - Risk of  Goal: *Prevention of pressure injury  Description: Document Christofer Scale and appropriate interventions in the flowsheet. Outcome: Progressing Towards Goal  Note: Pressure Injury Interventions:  Sensory Interventions: Assess changes in LOC, Keep linens dry and wrinkle-free    Moisture Interventions: Absorbent underpads, Internal/External urinary devices    Activity Interventions: Pressure redistribution bed/mattress(bed type), PT/OT evaluation    Mobility Interventions: Pressure redistribution bed/mattress (bed type)    Nutrition Interventions: Document food/fluid/supplement intake    Friction and Shear Interventions: Apply protective barrier, creams and emollients, Minimize layers                Problem: Patient Education: Go to Patient Education Activity  Goal: Patient/Family Education  Outcome: Progressing Towards Goal     Problem: Falls - Risk of  Goal: *Absence of Falls  Description: Document Theresa Fall Risk and appropriate interventions in the flowsheet.   Outcome: Progressing Towards Goal  Note: Fall Risk Interventions:  Mobility Interventions: Bed/chair exit alarm, Patient to call before getting OOB    Mentation Interventions: Adequate sleep, hydration, pain control, Bed/chair exit alarm, Door open when patient unattended    Medication Interventions: Bed/chair exit alarm, Patient to call before getting OOB    Elimination Interventions: Bed/chair exit alarm, Call light in reach, Patient to call for help with toileting needs    History of Falls Interventions: Bed/chair exit alarm, Door open when patient unattended         Problem: Patient Education: Go to Patient Education Activity  Goal: Patient/Family Education  Outcome: Progressing Towards Goal

## 2021-10-31 LAB — PHOSPHATE SERPL-MCNC: 3.4 MG/DL (ref 2.5–4.9)

## 2021-10-31 PROCEDURE — 74011250637 HC RX REV CODE- 250/637: Performed by: FAMILY MEDICINE

## 2021-10-31 PROCEDURE — 99232 SBSQ HOSP IP/OBS MODERATE 35: CPT | Performed by: FAMILY MEDICINE

## 2021-10-31 PROCEDURE — 84100 ASSAY OF PHOSPHORUS: CPT

## 2021-10-31 PROCEDURE — 74011250637 HC RX REV CODE- 250/637: Performed by: INTERNAL MEDICINE

## 2021-10-31 PROCEDURE — 65270000029 HC RM PRIVATE

## 2021-10-31 PROCEDURE — 74011250637 HC RX REV CODE- 250/637: Performed by: PSYCHIATRY & NEUROLOGY

## 2021-10-31 PROCEDURE — 36415 COLL VENOUS BLD VENIPUNCTURE: CPT

## 2021-10-31 PROCEDURE — 74011250636 HC RX REV CODE- 250/636: Performed by: INTERNAL MEDICINE

## 2021-10-31 RX ADMIN — Medication 100 MG: at 10:03

## 2021-10-31 RX ADMIN — Medication 10 ML: at 02:55

## 2021-10-31 RX ADMIN — ONDANSETRON 4 MG: 4 TABLET, ORALLY DISINTEGRATING ORAL at 10:13

## 2021-10-31 RX ADMIN — THERA TABS 1 TABLET: TAB at 10:03

## 2021-10-31 RX ADMIN — PHENOL 1 SPRAY: 1.5 LIQUID ORAL at 17:42

## 2021-10-31 RX ADMIN — LEVETIRACETAM 750 MG: 100 SOLUTION ORAL at 10:03

## 2021-10-31 RX ADMIN — LEVETIRACETAM 750 MG: 100 SOLUTION ORAL at 17:42

## 2021-10-31 RX ADMIN — ENOXAPARIN SODIUM 40 MG: 100 INJECTION SUBCUTANEOUS at 10:02

## 2021-10-31 NOTE — PROGRESS NOTES
[facility]  Progress Note    Patient: Usman Bahena Age: 64 y.o. : 1965 MR#: [id number] SSN: [id number]  Date: 10/31/2021     Subjective/24-hour events: Tolerating TF though did have some mild residuals this AM per nursing staff. No abdominal pain or vomiting reported. Assessment:   Adult failure to thrive  Multiple electrolyte abnormalities: Hypokalemia, hypomagnesemia, hyponatremia, hypophosphatemia  Transaminitis/elevated LFTs  Intractable nausea and vomiting  GERD  Vitamin D deficiency  Hyperlipidemia  Severe protein calorie malnutrition  Recent history of HSV encephalitis    Plan:   Continue tube feeds as ordered, management per clinical nutrition. Monitor electrolytes, replace as necessary. Continue PT/OT. Current recommendation is for acute rehab at discharge. Order for rehab screening placed on Friday. Will await decision. Supportive care otherwise. Further plan pending response to tube feeds and overall course. Case discussed with:  [x]Patient  []Family  []Nursing  []Case Management  DVT Prophylaxis:  [x]Lovenox  []Hep SQ  []SCDs  []Coumadin   []On Heparin gtt    Objective:   VS:   Visit Vitals  BP 95/78   Pulse (!) 104   Temp 98.4 °F (36.9 °C)   Resp 16   Ht 5' (1.524 m)   Wt 67.1 kg (148 lb)   LMP 2019 Comment: unknown   SpO2 99%   BMI 28.90 kg/m²      Tmax/24hrs: Temp (24hrs), Av.9 °F (36.6 °C), Min:97 °F (36.1 °C), Max:98.8 °F (37.1 °C)      Intake/Output Summary (Last 24 hours) at 10/31/2021 0918  Last data filed at 10/30/2021 1328  Gross per 24 hour   Intake 300 ml   Output    Net 300 ml       General:  In NAD. Nontoxic-appearing. Cardiovascular:  RRR. Pulmonary:  Lungs clear bilaterally, no wheezes. No accessory muscle use. GI:  Abdomen soft, NTTP. Extremities:  Warm, no edema or ischemia. Neuro: Awake and alert, moves extremities spontaneously.     Labs:    Recent Results (from the past 24 hour(s))   PHOSPHORUS Collection Time: 10/31/21 12:32 AM   Result Value Ref Range    Phosphorus 3.4 2.5 - 4.9 MG/DL       Signed By: Mauricio Harding MD     October 31, 2021

## 2021-10-31 NOTE — PROGRESS NOTES
Problem: Pressure Injury - Risk of  Goal: *Prevention of pressure injury  Description: Document Christofer Scale and appropriate interventions in the flowsheet. Outcome: Progressing Towards Goal  Note: Pressure Injury Interventions:  Sensory Interventions: Assess changes in LOC    Moisture Interventions: Absorbent underpads    Activity Interventions: Pressure redistribution bed/mattress(bed type)    Mobility Interventions: Pressure redistribution bed/mattress (bed type)    Nutrition Interventions: Document food/fluid/supplement intake    Friction and Shear Interventions: Apply protective barrier, creams and emollients                Problem: Patient Education: Go to Patient Education Activity  Goal: Patient/Family Education  Outcome: Progressing Towards Goal     Problem: Falls - Risk of  Goal: *Absence of Falls  Description: Document Theresa Fall Risk and appropriate interventions in the flowsheet.   Outcome: Progressing Towards Goal  Note: Fall Risk Interventions:  Mobility Interventions: Bed/chair exit alarm    Mentation Interventions: Adequate sleep, hydration, pain control    Medication Interventions: Bed/chair exit alarm    Elimination Interventions: Bed/chair exit alarm    History of Falls Interventions: Bed/chair exit alarm         Problem: Patient Education: Go to Patient Education Activity  Goal: Patient/Family Education  Outcome: Progressing Towards Goal

## 2021-11-01 LAB — PHOSPHATE SERPL-MCNC: 3.1 MG/DL (ref 2.5–4.9)

## 2021-11-01 PROCEDURE — 84100 ASSAY OF PHOSPHORUS: CPT

## 2021-11-01 PROCEDURE — 36415 COLL VENOUS BLD VENIPUNCTURE: CPT

## 2021-11-01 PROCEDURE — 65270000029 HC RM PRIVATE

## 2021-11-01 PROCEDURE — 97535 SELF CARE MNGMENT TRAINING: CPT

## 2021-11-01 PROCEDURE — 74011250637 HC RX REV CODE- 250/637: Performed by: FAMILY MEDICINE

## 2021-11-01 PROCEDURE — 74011250637 HC RX REV CODE- 250/637: Performed by: INTERNAL MEDICINE

## 2021-11-01 PROCEDURE — 99233 SBSQ HOSP IP/OBS HIGH 50: CPT | Performed by: FAMILY MEDICINE

## 2021-11-01 PROCEDURE — 74011250636 HC RX REV CODE- 250/636: Performed by: INTERNAL MEDICINE

## 2021-11-01 PROCEDURE — 74011250637 HC RX REV CODE- 250/637: Performed by: PSYCHIATRY & NEUROLOGY

## 2021-11-01 PROCEDURE — 2709999900 HC NON-CHARGEABLE SUPPLY

## 2021-11-01 RX ADMIN — ONDANSETRON 4 MG: 4 TABLET, ORALLY DISINTEGRATING ORAL at 17:54

## 2021-11-01 RX ADMIN — Medication 100 MG: at 08:47

## 2021-11-01 RX ADMIN — LEVETIRACETAM 750 MG: 100 SOLUTION ORAL at 17:54

## 2021-11-01 RX ADMIN — Medication 10 ML: at 22:49

## 2021-11-01 RX ADMIN — ENOXAPARIN SODIUM 40 MG: 100 INJECTION SUBCUTANEOUS at 09:51

## 2021-11-01 RX ADMIN — LEVETIRACETAM 750 MG: 100 SOLUTION ORAL at 08:47

## 2021-11-01 RX ADMIN — THERA TABS 1 TABLET: TAB at 08:47

## 2021-11-01 RX ADMIN — ONDANSETRON 4 MG: 4 TABLET, ORALLY DISINTEGRATING ORAL at 08:06

## 2021-11-01 NOTE — PROGRESS NOTES
Problem: Pressure Injury - Risk of  Goal: *Prevention of pressure injury  Description: Document Christofer Scale and appropriate interventions in the flowsheet. Outcome: Progressing Towards Goal  Note: Pressure Injury Interventions:  Sensory Interventions: Assess changes in LOC, Keep linens dry and wrinkle-free    Moisture Interventions: Absorbent underpads, Check for incontinence Q2 hours and as needed, Internal/External urinary devices    Activity Interventions: Pressure redistribution bed/mattress(bed type)    Mobility Interventions: Pressure redistribution bed/mattress (bed type), PT/OT evaluation    Nutrition Interventions: Document food/fluid/supplement intake    Friction and Shear Interventions: Minimize layers                Problem: Patient Education: Go to Patient Education Activity  Goal: Patient/Family Education  Outcome: Progressing Towards Goal     Problem: Falls - Risk of  Goal: *Absence of Falls  Description: Document Theresa Fall Risk and appropriate interventions in the flowsheet.   Outcome: Progressing Towards Goal  Note: Fall Risk Interventions:  Mobility Interventions: Patient to call before getting OOB    Mentation Interventions: Adequate sleep, hydration, pain control, Bed/chair exit alarm, Door open when patient unattended    Medication Interventions: Bed/chair exit alarm, Patient to call before getting OOB    Elimination Interventions: Bed/chair exit alarm, Call light in reach, Patient to call for help with toileting needs, Toileting schedule/hourly rounds    History of Falls Interventions: Bed/chair exit alarm         Problem: Patient Education: Go to Patient Education Activity  Goal: Patient/Family Education  Outcome: Progressing Towards Goal

## 2021-11-01 NOTE — PROGRESS NOTES
Problem: Self Care Deficits Care Plan (Adult)  Goal: *Acute Goals and Plan of Care (Insert Text)  Description: Occupational Therapy Goals  Initiated 10/21/2021 and updated 10/29/21 within 7 day(s). 1.  Patient will perform bed mobility in preparation for selfcare with modified independence. 2.  Patient will perform grooming task/functional activity standing for 4-7 minutes with supervision/set-up, F+ balance. 3.  Patient will perform lower body dressing with supervision/set-up seated and standing using AE prn (Pt has reacher at home). 4.  Patient will perform toilet transfers with supervision/set-up. 5.  Patient will perform all aspects of toileting with supervision/set-up. 6.  Patient will participate in upper extremity therapeutic exercise/activities with modified independence for 8 minutes. 7.  Patient will utilize energy conservation techniques during functional activities with verbal cues. Prior Level of Function: Patient was independent with self-care and functional mobility PTA. Patient's spouse reports she would walk unsteady at home d/t vertigo and for the stairs she would bump up/down on her bottom  Outcome: Progressing Towards Goal   OCCUPATIONAL THERAPY TREATMENT    Patient: Lizbeth Hughes (87 y.o. female)  Date: 11/1/2021  Diagnosis: Vomiting [R11.10] <principal problem not specified>       Precautions: Fall, Skin    Chart, occupational therapy assessment, plan of care, and goals were reviewed. ASSESSMENT:  Pt sleeping upon entry. Arouses to voice and tactile stimuli. Supportive spouse at bedside. Pt requires increase time and encouragement w/bed  mobility to maneuver to EOB. Educated on energy conservation techniques w/ADLs and importance of increasing activity tolerance for carryover w/ADLs. Pt following commands. Responds yes/no only.  Generalized weakness requires Min Assist w/functional transfer to standing and hand over hand assist reaching for armrest w/functional transfer to BSC. Decrease dynamic standing balance requires assist w/clothing mgt and toileting ADL. Pt returned to supine and left w/all items within reach. Educated on importance of UE TherEx and encouraged to perform throughout the day. Progression toward goals:  []          Improving appropriately and progressing toward goals  [x]          Improving slowly and progressing toward goals  []          Not making progress toward goals and plan of care will be adjusted     PLAN:  Patient continues to benefit from skilled intervention to address the above impairments. Continue treatment per established plan of care. Discharge Recommendations:  Rehab  Further Equipment Recommendations for Discharge:  shower chair, rolling walker, and possibly wheelchair for community re-entry     SUBJECTIVE:   Patient stated yes.     OBJECTIVE DATA SUMMARY:   Cognitive/Behavioral Status:  Neurologic State: Alert  Orientation Level: Oriented to person  Cognition: Follows commands  Safety/Judgement: Fall prevention    Functional Mobility and Transfers for ADLs:   Bed Mobility:  Supine to Sit: Maximum assistance; Additional time;Bed Modified  Sit to Supine: Additional time;Contact guard assistance   Transfers:  Sit to Stand: Minimum assistance   Toilet Transfer : Moderate assistance (EOB <--> BSC xfer w/SPT)   Balance:  Sitting: Impaired  Standing: Impaired; With support    ADL Intervention:  Grooming  Position Performed: Seated edge of bed  Washing Face: Stand-by assistance  Washing Hands: Stand-by assistance  Brushing Teeth: Minimum assistance (assist w/bsin mgt 2/2 NGT)    Toileting  Toileting Assistance: Maximum assistance  Bladder Hygiene: Stand-by assistance (seated)  Clothing Management: Maximum assistance    Pain:  Pain level pre-treatment: 0/10   Pain level post-treatment: 0/10    Activity Tolerance:    Fair    Please refer to the flowsheet for vital signs taken during this treatment.   After treatment:   []  Patient left in no apparent distress sitting up in chair  [x]  Patient left in no apparent distress in bed  [x]  Call bell left within reach  [x]  Nursing notified  []  Caregiver present  []  Bed alarm activated    COMMUNICATION/EDUCATION:   [] Role of Occupational Therapy in the acute care setting  [] Home safety education was provided and the patient/caregiver indicated understanding. [] Patient/family have participated as able in working towards goals and plan of care. [x] Patient/family agree to work toward stated goals and plan of care. [] Patient understands intent and goals of therapy, but is neutral about his/her participation. [] Patient is unable to participate in goal setting and plan of care.       Thank you for this referral.  JUNIOR Garrido  Time Calculation: 38 mins

## 2021-11-01 NOTE — PROGRESS NOTES
[facility] Hospitalists  Progress Note    Patient: Romie Hernandez Age: 64 y.o. : 1965 MR#: [id number] SSN: [id number]  Date: 2021     Subjective/24-hour events:     Continues to tolerate tube feeds without difficulty but no significant improvement in overall clinical picture with regard to functional status. Assessment:   Adult failure to thrive  Multiple electrolyte abnormalities: Hypokalemia, hypomagnesemia, hyponatremia, hypophosphatemia  Transaminitis/elevated LFTs  Intractable nausea and vomiting  GERD  Vitamin D deficiency  Hyperlipidemia  Severe protein calorie malnutrition  Recent history of HSV encephalitis  History of single seizure     Plan:   Continue tube feeds as ordered, monitor electrolytes. Management per clinical nutrition. Psychiatry evaluation to evaluate for possible depressive disorder as etiology of symptoms. Case discussed with Dr. Addie Lopez via phone and he will see formally in AM.  Assistance appreciated in advance. \  PT/OT as tolerated. Order placed for inpatient rehab screening. Await decision this will not  SLP to evaluate swallowing. There was no obvious issue with swallowing prior to NGT placement but would like to formally assess. Repeat labs in the morning. Plan of care discussed with patient's  at bedside, questions answered.     Case discussed with:  [x]Patient  []Family  [x]Nursing  []Case Management  DVT Prophylaxis:  [x]Lovenox  []Hep SQ  []SCDs  []Coumadin   []On Heparin gtt    Objective:   VS:   Visit Vitals  /64 (BP 1 Location: Right arm, BP Patient Position: At rest)   Pulse (!) 122   Temp 100 °F (37.8 °C)   Resp 16   Ht 5' (1.524 m)   Wt 66.2 kg (146 lb)   LMP 2019 Comment: unknown   SpO2 95%   BMI 28.51 kg/m²      Tmax/24hrs: Temp (24hrs), Av.2 °F (37.3 °C), Min:98.4 °F (36.9 °C), Max:100 °F (37.8 °C)      Intake/Output Summary (Last 24 hours) at 2021 3760  Last data filed at 2021 1000  Gross per 24 hour   Intake 440 ml   Output 1800 ml   Net -1360 ml       General:  In NAD. Nontoxic-appearing. Cardiovascular: Regular, tachycardic. Pulmonary:  Lungs clear bilaterally, no wheezes. No accessory muscle use. GI:  Abdomen soft, NTTP. Extremities:  Warm, no edema or ischemia. Neuro: Awake and alert, moves extremities spontaneously. Affect depressed.     Labs:    Recent Results (from the past 24 hour(s))   PHOSPHORUS    Collection Time: 11/01/21 12:30 AM   Result Value Ref Range    Phosphorus 3.1 2.5 - 4.9 MG/DL       Signed By: Nikos Kulkarni MD     November 1, 2021

## 2021-11-01 NOTE — PROGRESS NOTES
Nutrition Assessment     Type and Reason for Visit: Reassess, Consult    Nutrition Recommendations/Plan:   - Continue tube feeding of Jevity 1.5 at goal rate of 40 mL/hr with 30 mL q 6 hour water flushes via NGT. (goal regimen to provide 1440 kcal, 61 gm protein, 730 mL free water, 96% RDIs). - Monitor meal intake. Plan to adjust tube feeding regimen if consistent improvement in po intake. Nutrition Assessment:  Tolerating tube feeds at goal rate of 40 mL/hr via NGT. Continues with poor po intake, lunch tray at bedside untouched during visit. BM 10/29. Denies vomiting or abdominal pain. Zofran q 8 hours for nausea with daily MVI & thiamine. Malnutrition Assessment:  Malnutrition Status: Severe malnutrition     Estimated Daily Nutrient Needs:  Energy (kcal):  1473-3433  Protein (g):         Fluid (ml/day):  2789-6725    Nutrition Related Findings:  BM 10/29, loose. Phos WNL. No significant improvement in overall clinical picture with regard to functional status per MD. Psych and SLP consulted      Current Nutrition Therapies:  ADULT DIET Regular; GI [place] (GERD/Peptic Ulcer)  ADULT TUBE FEEDING Nasogastric; Standard with Fiber; Delivery Method: Continuous; Continuous Initial Rate (mL/hr): 20; Continuous Advance Tube Feeding: Yes; Advancement Volume (mL/hr): 10; Advancement Frequency: Q 6 hours; Continuous Goal Rate (mL. ..     Anthropometric Measures:  · Height:  5' (152.4 cm)  · Current Body Wt:  67.1 kg (147 lb 14.9 oz)  · BMI: 28.9    Nutrition Diagnosis:   · Severe malnutrition, In context of chronic illness related to altered GI function (poor appetite with nausea and vomiting) as evidenced by poor intake prior to admission, weight loss greater than or equal to 10% in 6 months    Nutrition Intervention:  Food and/or Nutrient Delivery: Continue current diet, Continue tube feeding  Nutrition Education and Counseling: No recommendations at this time, Education not indicated  Coordination of Nutrition Care: Continue to monitor while inpatient (pt discussed with RN)    Goals:  Nutritional needs will be met through adequate oral intake and/or nutrition support within the next 7 days. Nutrition Monitoring and Evaluation:   Behavioral-Environmental Outcomes: None identified  Food/Nutrient Intake Outcomes: Food and nutrient intake, Enteral nutrition intake/tolerance  Physical Signs/Symptoms Outcomes: Biochemical data, Meal time behavior, Nutrition focused physical findings, Nausea/vomiting    Discharge Planning:     Too soon to determine     Electronically signed by Radha Thompson RD on 11/1/2021 at 2:35 PM    Contact Number: [phone]

## 2021-11-02 LAB
ANION GAP SERPL CALC-SCNC: 4 MMOL/L (ref 3–18)
BUN SERPL-MCNC: 12 MG/DL (ref 7–18)
BUN/CREAT SERPL: 33 (ref 12–20)
CALCIUM SERPL-MCNC: 8.1 MG/DL (ref 8.5–10.1)
CHLORIDE SERPL-SCNC: 97 MMOL/L (ref 100–111)
CO2 SERPL-SCNC: 30 MMOL/L (ref 21–32)
CREAT SERPL-MCNC: 0.36 MG/DL (ref 0.6–1.3)
GLUCOSE SERPL-MCNC: 103 MG/DL (ref 74–99)
PHOSPHATE SERPL-MCNC: 3.2 MG/DL (ref 2.5–4.9)
POTASSIUM SERPL-SCNC: 4 MMOL/L (ref 3.5–5.5)
SODIUM SERPL-SCNC: 131 MMOL/L (ref 136–145)

## 2021-11-02 PROCEDURE — 97535 SELF CARE MNGMENT TRAINING: CPT

## 2021-11-02 PROCEDURE — 74011250637 HC RX REV CODE- 250/637: Performed by: PSYCHIATRY & NEUROLOGY

## 2021-11-02 PROCEDURE — 36415 COLL VENOUS BLD VENIPUNCTURE: CPT

## 2021-11-02 PROCEDURE — 74011250637 HC RX REV CODE- 250/637: Performed by: INTERNAL MEDICINE

## 2021-11-02 PROCEDURE — 74011250636 HC RX REV CODE- 250/636: Performed by: FAMILY MEDICINE

## 2021-11-02 PROCEDURE — 97110 THERAPEUTIC EXERCISES: CPT

## 2021-11-02 PROCEDURE — 99222 1ST HOSP IP/OBS MODERATE 55: CPT | Performed by: PSYCHIATRY & NEUROLOGY

## 2021-11-02 PROCEDURE — C9113 INJ PANTOPRAZOLE SODIUM, VIA: HCPCS | Performed by: FAMILY MEDICINE

## 2021-11-02 PROCEDURE — 74011250637 HC RX REV CODE- 250/637: Performed by: FAMILY MEDICINE

## 2021-11-02 PROCEDURE — 65270000029 HC RM PRIVATE

## 2021-11-02 PROCEDURE — 74011250636 HC RX REV CODE- 250/636: Performed by: INTERNAL MEDICINE

## 2021-11-02 PROCEDURE — 99233 SBSQ HOSP IP/OBS HIGH 50: CPT | Performed by: FAMILY MEDICINE

## 2021-11-02 PROCEDURE — 2709999900 HC NON-CHARGEABLE SUPPLY

## 2021-11-02 PROCEDURE — 74011000250 HC RX REV CODE- 250: Performed by: FAMILY MEDICINE

## 2021-11-02 PROCEDURE — 80048 BASIC METABOLIC PNL TOTAL CA: CPT

## 2021-11-02 PROCEDURE — 84100 ASSAY OF PHOSPHORUS: CPT

## 2021-11-02 RX ORDER — MIRTAZAPINE 15 MG/1
15 TABLET, ORALLY DISINTEGRATING ORAL
Status: DISCONTINUED | OUTPATIENT
Start: 2021-11-02 | End: 2021-11-24 | Stop reason: HOSPADM

## 2021-11-02 RX ADMIN — ENOXAPARIN SODIUM 40 MG: 100 INJECTION SUBCUTANEOUS at 09:18

## 2021-11-02 RX ADMIN — LEVETIRACETAM 750 MG: 100 SOLUTION ORAL at 17:52

## 2021-11-02 RX ADMIN — THERA TABS 1 TABLET: TAB at 09:19

## 2021-11-02 RX ADMIN — SODIUM CHLORIDE 40 MG: 9 INJECTION, SOLUTION INTRAMUSCULAR; INTRAVENOUS; SUBCUTANEOUS at 09:19

## 2021-11-02 RX ADMIN — Medication 100 MG: at 09:19

## 2021-11-02 RX ADMIN — ONDANSETRON 4 MG: 4 TABLET, ORALLY DISINTEGRATING ORAL at 12:19

## 2021-11-02 RX ADMIN — LEVETIRACETAM 750 MG: 100 SOLUTION ORAL at 09:19

## 2021-11-02 RX ADMIN — Medication 10 ML: at 06:50

## 2021-11-02 RX ADMIN — Medication 10 ML: at 22:44

## 2021-11-02 RX ADMIN — MIRTAZAPINE 15 MG: 15 TABLET, ORALLY DISINTEGRATING ORAL at 22:42

## 2021-11-02 NOTE — PROGRESS NOTES
Psychiatric Consult    Per Dr. Christine Andrew request the undersigned was asked to perform a psychiatric evaluation on the patient. For that purpose, the undersigned discussed the case with Dr. Michele Torres, the patient was examined, and the chart was reviewed. Please be referred to the full consultation report which has been dictated. Clinical Impression  Axis I: Mood disorder, (depression), associated to medical condition. Axis II: Noncontributory. Axis III: Adult failure to thrive by history. History of a recent HSV encephalitis. History of multiple electrolyte imbalance, resolving severe protein calorie malnutrition. Otherwise deferred to attending and consultants. Recommendations  First of all I want to thank Dr. Christine Andrew request for the psychiatric evaluation. As indicated above, please be referred to the dictated psychiatric consultation report which is self-explanatory. 1.  The patient has had a history of multiple medical problems which apparently started with a recent history of HSV encephalitis. 2.  Per chart review, the patient required to be hospitalized at [facility] from July 28, 2021 through August 12, 2021.  3.  It appears by chart review that the patient has a long and difficult recovery from her encephalitis and had undergone acute rehab as well as home physical therapy and Occupational Therapy. According to her  she has been feeling significantly better until about 4 to 6 weeks [facility]. 4.  Since then further decline was noted with the patient being described as losing around 50 pounds of weight since the end of June. Also by chart review the patient was described as having increased confusion, increased difficulty with ambulation, and worsening of nausea and vomiting. Apparently her eating has been reduced to her being able to eat no more than 1 or 2 bites of food without vomiting.   5.  This apparently prompted for the patient to have an EGD on September 22 with biopsies performed showing negative results. 6.  Eventually the patient required to have a nasogastric tube insertion as here on eating has been reduced to almost nothing. The patient is considered to have severe calorie malnutrition. 7.  So obviously, the patient has had a very difficult recovery in general.  Questions have been raised by Dr. Lai Finch regarding the patient becoming depressed and so the reason for which I was asked to see her. As a stated the chart was reviewed and the patient was examined. 8.  For a review of the patient's full mental status examination today please be referred to the dictated note which is self-explanatory. Of note is that the patient does show some evidence of mild cognitive impairment which could be easily understood by virtue of what the patient been through. 9.  Prior to her chart review also, the same as by today's interview with the patient, she has had a negative history of psychiatric difficulties in the past.  10. She did describe today requiring we met earlier, that she feels somewhat depressed, however this appears to be the result rather than the reason for which the patient is described as having continuous GI problems. Nonetheless her depression could indirectly worsen the patient's appetite which has been rather poor for an extended period of time. 11.  So for that reason I will suggest the patient to be started on a low-dose of mirtazapine, since this antidepressant is available on a disintegrating form, and tends to be not only relaxing but also could improve the patient's sleep at night. Again please refer to the dictated note which is self-explanatory. We will be glad to follow patient psychiatrically as necessary.     Jordin Prieto MD, Magda Henderson

## 2021-11-02 NOTE — ADT AUTH CERT NOTES
Vomiting - Care Day 12 (10/31/2021) by Rudy Smith RN       Review Status Review Entered   Completed 11/1/2021 09:00      Criteria Review      Care Day: 12 Care Date: 10/31/2021 Level of Care: Inpatient Floor    Guideline Day 2    Level Of Care    (X) Floor to discharge    Clinical Status    (X) * Hemodynamic stability    11/1/2021 09:00:00 EDT by Rudy Smith      BP95/78  Pulse(!) 104  Temp98.4 °F (36.9 °C)  Resp16  Ht5' (1.524 m)  Wt67.1 kg (148 lb)  LMP04/01/2019Comment: unknown  RxN504%  BMI28.90 kg/m²    (X) * Vomiting absent or controlled    ( ) * Electrolyte abnormalities absent or acceptable for next level of care    (X) * Life-threatening causes of vomiting excluded    (X) * Pain absent or managed    ( ) * Discharge plans and education understood    Activity    (X) * Ambulatory or acceptable for next level of care    Routes    (X) * Oral hydration    (X) * Oral medications or regimen acceptable for next level of care    (X) * Oral diet or acceptable for next level of care    Medications    (X) Possible antiemetics    11/1/2021 09:00:00 EDT by Rudy Smith      zofran 4mg po q8hrs PRN x 1    * Milestone   Additional Notes   10/31/21       Subjective/24-hour events:       Tolerating TF though did have some mild residuals this AM per nursing staff. No abdominal pain or vomiting reported.       Assessment:   Adult failure to thrive   Multiple electrolyte abnormalities: Hypokalemia, hypomagnesemia, hyponatremia, hypophosphatemia   Transaminitis/elevated LFTs   Intractable nausea and vomiting   GERD   Vitamin D deficiency   Hyperlipidemia   Severe protein calorie malnutrition   Recent history of HSV encephalitis       Plan:   Continue tube feeds as ordered, management per clinical nutrition. Monitor electrolytes, replace as necessary. Continue PT/OT.  Current recommendation is for acute rehab at discharge. Sukhjinder Alonso for rehab screening placed on Friday.  Will await decision. Supportive care otherwise.  Further plan pending response to tube feeds and overall course.      Vomiting - Care Day 9 (10/28/2021) by Javon Peterson       Review Status Review Entered   Completed 10/29/2021 16:00      Criteria Review      Care Day: 9 Care Date: 10/28/2021 Level of Care: Inpatient Floor    Guideline Day 2    Level Of Care    (X) Floor to discharge    10/29/2021 15:59:56 EDT by Javon Peterson      IP FOOR    Clinical Status    (X) * Hemodynamic stability    10/29/2021 15:59:56 EDT by Javon Peterson      HR , BP 81//86    (X) * Vomiting absent or controlled    10/29/2021 16:00:25 EDT by Nazia Miller to tolerate intake without any significant N/V but remains marginal.    ( ) * Electrolyte abnormalities absent or acceptable for next level of care    10/29/2021 16:00:36 EDT by Javon Peterson      Sodium: 134 (L)  Potassium: 4.2  Glucose: 67 (L)  BUN: 10  Creatinine: 0.37 (L)  Calcium: 8.6  Phosphorus: 2.1 (L)    (X) * Life-threatening causes of vomiting excluded    (X) * Pain absent or managed    ( ) * Discharge plans and education understood    Activity    (X) * Ambulatory or acceptable for next level of care    10/29/2021 15:59:56 EDT by Javon Peterson      activity as tolerated w/ assist, pt/ot    Routes    (X) * Oral hydration    10/29/2021 15:59:56 EDT by Javon Peterson      adult diet    (X) * Oral medications or regimen acceptable for next level of care    10/29/2021 16:00:11 EDT by Javon Peterson      keppra 750 mg po bid, protonix 40 mg iv qd, theragran 1 tab po qd, thiamine 100 mg po qd, lovenox 40 mg sq qd    (X) * Oral diet or acceptable for next level of care    10/29/2021 15:59:56 EDT by Javon Peterson      adult diet    Interventions    (X) Electrolytes    10/29/2021 15:59:56 EDT by Javon Peterson      effer-k 40 meq po bid, neutra-phos 2 pckt po x1    * Milestone   Additional Notes   10/28/21   IP MEDICAL      ATTENDING NOTE Subjective/24-hour events:   Essentially unchanged clinically. Able to tolerate intake without any significant N/V but remains marginal.       General:  In NAD.  Nontoxic-appearing.     Cardiovascular:  RRR.     Pulmonary:  Lungs clear bilaterally, no wheezes.  No accessory muscle use. GI:  Abdomen soft, NTTP. Extremities:  Warm, no edema or ischemia. Neuro: Awake and alert, moves extremities spontaneously. Assessment:   Adult failure to thrive   Multiple electrolyte abnormalities: Hypokalemia, hypomagnesemia, hyponatremia, hypophosphatemia   Transaminitis/elevated LFTs   Intractable nausea and vomiting   GERD   Vitamin D deficiency   Hyperlipidemia   Severe protein calorie malnutrition   Recent history of HSV encephalitis       Plan:   K+ in normal range today, continue to replace as needed and monitor. Continue phos replacement. Patient not wanting NGT placement.  Will need to focus on improving functional status as best as able.  Not much else to be done in hospital at this point and will likely look at discharge in the next day or so.  Current recommendation is home with [facility] vs SNF. Latonia Sullivan f/u with CM in AM   Will need to make decision on whether she wishes to consider SNF placement - if not, disposition will be home with [facility] services. NEUROLOGY NOTE   Interval history:   Patient was seen today in follow-up.  No acute changes overnight.  Continues to have the diplopia and blurring when looking to the right side; nystagmus more when looking to the left side.  Sometimes become confused [telling me that she is in [address] some fluctuations.  Moving her arms and leg symmetrically.  NMDA receptor antibodies are negative.  Thiamine level pending.     Assessment:     A 64 y.o.  female patient with medical history of HSV encephalitis in June 2021 [prolonged admission at [facility]], seizure from HSV on [place] came to the hospital for nausea, vomiting, generalized weakness, blurring of vision, diplopia, and altered mental status.  MRI of the brain showed old lesions possibly from her previous HSV encephalitis; no acute stroke or new lesions.  Had a lumbar puncture: CSF profile unremarkable. HSV PCR negative.   NMDA ab is negative.  Initial impression was possible Warnicke encephalopathy [patient has prolonged nausea, vomiting, poor intake of food, unsteadiness, ophthalmoplegia].  Started on thiamine per Wernicke's protocol.  For possible impression of autoimmune encephalitis following HSV [where patient with previous HSV developed some worsening symptoms after initial recovery], was treated with methylprednisolone .   She has some improvement in her mental status. Wi    Plan:   -Continue with thiamine   -We will follow thiamine level   -Follow metabolic panels and correct any changes   -PT/OT eval and follow recommendations.    -We will follow patient as needed         PHYSICAL THERAPY NOTE   Patient is confused, oriented to person, place, and year. She is found soiled with urine. Patient initially does not follow commands to sit up at EOB, repeats \" I will\" or \"I'm going\", but does not initiate task. She requires minimal assistance to initiate rolling r/l at first and appreciate assistance from nursing student for full bed linen change. Then progresses to rolling SBA. Supine to sit transfer with CGA and reports having to urinate.  Patient does not follow commands to transfer to [facility] and [facility] when attempting to assist.      LABS   WBC: 9.4   HGB: 11.4 (L)   HCT: 33.9 (L)   PLATELET: 448   Sodium: 134 (L)   Potassium: 4.2   Glucose: 67 (L)   BUN: 10   Creatinine: 0.37 (L)   Calcium: 8.6   Phosphorus: 2.1 (L)      Color: YELLOW   Appearance: TURBID   Specific gravity: 1.007   pH (UA): 8.5 (H)   Protein: Negative   Glucose: Negative   Ketone: Negative   Blood: MODERATE (A)   Bilirubin: Negative   Urobilinogen: 1.0   Nitrites: Negative   Leukocyte Esterase: LARGE (A)   Epithelial cells: 3+   WBC: 35 to 40   RBC: Negative   Bacteria: 4+ (A)   NO IMAGING  10/28      VS:T98.6F, HR , BP 99//73, RR 16-20, 98% RA   adult diet, activity as tolerated w/ assist, is/ pt/ot      Vomiting - Care Day 8 (10/27/2021) by Kirby Gomes       Review Status Review Entered   Completed 10/28/2021 08:31      Criteria Review      Care Day: 8 Care Date: 10/27/2021 Level of Care: Inpatient Floor    Guideline Day 2    Clinical Status    (X) * Hemodynamic stability    10/28/2021 08:29:18 EDT by Kirby Gomes      105/64; 98.1; 72; 17; 97% RA    (X) * Vomiting absent or controlled    10/28/2021 08:29:18 EDT by Elva Caban to complain of nausea but no significant vomiting.    (X) * Electrolyte abnormalities absent or acceptable for next level of care    10/28/2021 08:29:18 EDT by Corinna Silva see summary note for results    (X) * Life-threatening causes of vomiting excluded    10/28/2021 08:29:18 EDT by Corinna Silva none noted    (X) * Pain absent or managed    10/28/2021 08:30:23 EDT by Corinna Silva no issue noted    ( ) * Discharge plans and education understood    Activity    (X) * Ambulatory or acceptable for next level of care    10/28/2021 08:30:23 EDT by Corinna Silva as tolerated    Routes    (X) * Oral hydration    10/28/2021 08:30:23 EDT by Kirby Gomes      Regular; GI [place] (GERD/Peptic Ulcer) and supplements    (X) * Oral medications or regimen acceptable for next level of care    (X) * Oral diet or acceptable for next level of care    10/28/2021 08:30:23 EDT by Kirby Puentes; GI [place] (GERD/Peptic Ulcer) and supplements    * Milestone   Additional Notes   10/27/2021 Continued Stay Review   Medical Bed      MEDS:   Lovenox 40mg qd SC   Keppra 750mg bid PO   Protonix 40mg qd IV   Effer-k 40meq bid PO   Nettra-phos 2packet bid PO   Theragran 1 qd PO   Thiamine 250mg qd IV      Family Medicine MD Note:      Reports having some improved intake with breakfast today but has not had any of her large as of yet. Continues to complain of nausea but no significant vomiting.       Assessment:   Adult failure to thrive   Multiple electrolyte abnormalities: Hypokalemia, hypomagnesemia, hyponatremia, hypophosphatemia   Transaminitis/elevated LFTs   Intractable nausea and vomiting   GERD   Vitamin D deficiency   Hyperlipidemia   Severe protein calorie malnutrition   Recent history of HSV encephalitis       Plan:   Potassium level normal today status post replacement yesterday.  Continue to monitor.  Have changed K-Dur to Effer K due to difficulty swallowing tablets. Replace phos and monitor. Continue antiemetic as needed.  Therapy as tolerated.  Current recommendation for disposition is for rehab versus home health with 24/7 assist.  Discussed with case management, will await final decision on disposition.       LABS:      10/27/2021 05:04   Sodium: 136   Potassium: 3.8   Chloride: 102   CO2: 29   Anion gap: 5   Glucose: 116 (H)   BUN: 6 (L)   Creatinine: 0.43 (L)   BUN/Creatinine ratio: 14   Calcium: 8.4 (L)   Phosphorus: 1.9 (L)   Magnesium: 1.7   GFR est non-AA: >60

## 2021-11-02 NOTE — PROGRESS NOTES
Pt with NG tube partially out and clogged. Pt not tolerating manipulation.  Informed MD and MD ok with removal for the night and will reassess in am.

## 2021-11-02 NOTE — PROGRESS NOTES
Problem: Pressure Injury - Risk of  Goal: *Prevention of pressure injury  Description: Document Christofer Scale and appropriate interventions in the flowsheet. Outcome: Progressing Towards Goal  Note: Pressure Injury Interventions:  Sensory Interventions: Assess changes in LOC, Keep linens dry and wrinkle-free    Moisture Interventions: Absorbent underpads, Check for incontinence Q2 hours and as needed, Internal/External urinary devices    Activity Interventions: Pressure redistribution bed/mattress(bed type)    Mobility Interventions: Pressure redistribution bed/mattress (bed type), PT/OT evaluation    Nutrition Interventions: Document food/fluid/supplement intake    Friction and Shear Interventions: Minimize layers                Problem: Patient Education: Go to Patient Education Activity  Goal: Patient/Family Education  Outcome: Progressing Towards Goal     Problem: Falls - Risk of  Goal: *Absence of Falls  Description: Document Theresa Fall Risk and appropriate interventions in the flowsheet.   Outcome: Progressing Towards Goal  Note: Fall Risk Interventions:  Mobility Interventions: Patient to call before getting OOB    Mentation Interventions: Adequate sleep, hydration, pain control    Medication Interventions: Bed/chair exit alarm    Elimination Interventions: Bed/chair exit alarm, Call light in reach    History of Falls Interventions: Bed/chair exit alarm         Problem: Patient Education: Go to Patient Education Activity  Goal: Patient/Family Education  Outcome: Progressing Towards Goal     Problem: Patient Education: Go to Patient Education Activity  Goal: Patient/Family Education  Outcome: Progressing Towards Goal     Problem: Patient Education: Go to Patient Education Activity  Goal: Patient/Family Education  Outcome: Progressing Towards Goal     Problem: Patient Education: Go to Patient Education Activity  Goal: Patient/Family Education  Outcome: Progressing Towards Goal

## 2021-11-02 NOTE — PROGRESS NOTES
Problem: Mobility Impaired (Adult and Pediatric)  Goal: *Acute Goals and Plan of Care (Insert Text)  Description: Physical Therapy Goals  Initiated 10/21/2021 and to be accomplished within 7 day(s)  1. Patient will move from supine to sit and sit to supine , scoot up and down, and roll side to side in bed with supervision/set-up. 2.  Patient will transfer from bed to chair and chair to bed with minimal assistance/contact guard assist using the least restrictive device. 3.  Patient will perform sit to stand with minimal assistance/contact guard assist.  4.  Patient will ambulate with minimal assistance/contact guard assist for 50 feet with the least restrictive device. 5.  Patient will ascend/descend stairs pending necessity for discharge and if safe to perform. 6.  Patient will perform BLE therex as tolerated     PLOF: Pt reports she lives in a [address] [place] [address] with her , states she was ambulatory with no AD however per chart review, pt has been using a wc recently as she has increased weakness and difficulty walking, pt only oriented to self and time this date. Outcome: Not Progressing Towards Goal   PHYSICAL THERAPY TREATMENT    Patient: Surinder García (17 y.o. female)  Date: 11/2/2021  Diagnosis: Vomiting [R11.10]   Precautions: Fall, Skin  ASSESSMENT:  Seated in recliner upon entry. Non-verbal; self selected d/t sore throat. Unable to verbally answer PT questions or report orientation status. Poor non-verbal communication skills to makes needs known. Decreased attention. Max verbal cues to complete seated AROM BLE; stops after each rep and requires encouragement to continue. Performed exercises as noted below. Attempted sit to stand; provided 5 minutes for prep.  bpm and O2 saturation 97% on room air. Patient with no active participation in transfer. Remained seated in chair. Educated on need for RN assistance with mobility; verbalized understanding. Call bell in reach.      Progression toward goals:   []      Improving appropriately and progressing toward goals  []      Improving slowly and progressing toward goals  [x]      Not making progress toward goals     PLAN:  Patient continues to benefit from skilled intervention to address the above impairments. Continue treatment per established plan of care. Discharge Recommendations:  Nikita Olmedo  Further Equipment Recommendations for Discharge:  TBD with progression     SUBJECTIVE:   Patient stated ow    OBJECTIVE DATA SUMMARY:   Critical Behavior:  Neurologic State: Alert  Orientation Level: Oriented to person; Unable to verbalize  Cognition: Decreased attention/concentration  Safety/Judgement: Fall prevention  Psychosocial  Patient Behaviors: Calm  Functional Mobility:  Balance:   Sitting: Impaired; Without support  Sitting - Static: Good (unsupported)  Sitting - Dynamic: Fair (occasional)  Therapeutic Exercises:   Seated BLE AROM hip abduction, ankle pumps, knee extension, hip flexion x10    Pain:  Pain level pre-treatment: 0/10  Pain level post-treatment: 0/10     Activity Tolerance:   Poor     After treatment:   [x] Patient left in no apparent distress sitting up in chair  [] Patient left in no apparent distress in bed  [x] Call bell left within reach  [x] Nursing notified  [] Caregiver present  [] Bed alarm activated  [] SCDs applied      COMMUNICATION/EDUCATION:   [x]         Role of physical therapy in the acute care setting. [x]         Fall prevention education was provided and the patient/caregiver indicated understanding. [x]         Patient/family have participated as able in working toward goals and plan of care. [x]         Patient/family agree to work toward stated goals and plan of care. []         Patient understands intent and goals of therapy, but is neutral about his/her participation. []         Patient is unable to participate in stated goals/plan of care: ongoing with therapy staff.       Amanda Pepper, PT Time Calculation: 19 mins

## 2021-11-02 NOTE — CONSULTS
[facility]    Name:  Anneliese Rivera  MR#:   [id number]  :  1965  ACCOUNT #:  [de-identified]  DATE OF SERVICE:  2021    PSYCHIATRIC CONSULTATION    IDENTIFYING INFORMATION:  The patient is a 78-year-old female, kindly referred to me for a psychiatric evaluation by her attending hospitalist, Dr. Zara Austin. For the purpose of this evaluation, the patient was examined, the case was discussed with Dr. Zara Austin, and the chart was reviewed. All of the information is considered to be reliable. BASIS FOR ADMISSION:  Attention is invited then to the patient's history and physical exam which details the patient's medical problems, specifically since  or July of this year when she required to be admitted to [facility] with a diagnosis of HSV encephalitis. The patient was described as being able to improve after her hospital stay which lasted around three weeks or so and had begun to feel better. However, shortly after the improvement being noticed, the patient began to get worse again to the point in which she began to complain of almost constant nausea and vomiting. This prompted the patient to be evaluated by GI with an EGD done in September with gastric biopsies being reported as negative. It appears that prior to her being referred to the emergency room for admission, the patient was evaluated by Gastroenterology again at which time the referral was made since the patient was unable to ambulate. She obviously had severe calorie malnutrition in addition to very possibly multiple electrolyte imbalances, this prompting the patient to be admitted to the emergency room and admitted to the care of the hospitalist group. Since then, the patient has had several consultations with negative results in general.  Upon observation of the patient, Dr. Zara Austin is questioning her having a mood disorder that may be associated to the current difficulties that she is having.   For that purpose, I was asked to see the patient in consultation and so the reason for this evaluation. PSYCHIATRIC HISTORY:  The patient has a negative history of psychiatric difficulties in the past.  During the evaluation today, she was very compliant, and even though she was obviously having difficulties with some throat pain, this possibly being associated to the fact that she required a nasogastric tube to be placed to be able to feed her, she tried to be as cooperative as possible. There was some evidence during the evaluation of her showing some mild cognitive impairment, some of these completely understandable due to the fact that she has been in the hospital now 11 or 12 days and so she had some difficulties which were very mild by the way with orientation today. Nonetheless, the patient did agree to the fact that with everything that she has been through, she has become somewhat depressed. The patient denied feeling helpless and hopeless; however, she does note that her own physical abilities have been reduced due to the problems that she is currently having with nausea and vomiting. The patient in addition has complained about the presence of new onset vertigo, the same as double vision out of her right eye and so the reason for which she also has had a neurology consult with attention invited to the workup which is self-explanatory. MEDICAL HISTORY:  Rather complicated as stated. 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 It appears that, at least by chart review, the patient began to have difficulties sometime in June, although that is not clear. The patient was able to be discharged with outpatient treatment referrals as stated and received combination of occupational therapy and physical therapy with some improvement being noted.   However, due to the problems that she is having with nausea and vomiting, she is considered to have multiple electrolyte imbalances which are in the process of being taking care of. She was described as having history of vitamin D deficiency, intractable nausea and vomiting, and had an episode apparently of a seizure activity. The patient does have a history of migraine headaches. The episode of seizure happened sometime in 06/2021. The patient is status post bilateral breast augmentation. She is status post cholecystectomy, colonoscopy, endoscopy, and tympanoplasty left-sided five times. Her surgical history is apparently negative. REVIEW OF SYSTEMS:  The patient does have a history of weight loss, malaise, and fatigue. HEENT is remarkable for the presence of visual changes, dizziness, and/or blurred vision. Neurology:  Remarkable for history of seizures one episode and confusion with mild memory impairment being noted at the time of the evaluation. Psych: The patient had initially denied the presence of depression when she was admitted. However, she does agree today to her feeling somewhat hopeless because of everything that she has been through. Negative outpatient psychiatric history described. The last physical exam performed for the patient was upon evaluation by Dr. Sylvester Man yesterday. Her examination then was described as the patient being in no acute physical distress, nontoxic. Cardiovascular showed presence of regular rhythm; however, increased heart rate to 122 from yesterday's vital signs. Lungs:  Clear. GI:  Negative. Neuro:  Awake and alert. Moving extremities spontaneously. Depressed affect. Multiple labs have been performed throughout the patient's hospital stay. The most recent ones included a BMP this morning that showed sodium 131, potassium 4.0, chloride of 97, BUN 12, creatinine 0.36 with estimated GFR above 60 mL/min.   Other studies of importance included an electrocardiogram when she was initially admitted that showed the presence of sinus tachy with a ventricular rate response of 102 beats per minute, , QTc 430. Last CBC was done on 10/28 and showed decreased RBC to 3.65, hemoglobin 11.4, and hematocrit of 33.9. Urinalysis on the same day showed the presence of large amount of leukocyte esterase, 35-40 wbc's, large amounts of leukocyte esterase, negative nitrites. Upon admission, the patient had elevated liver enzymes; however, hepatitis A, B, and C studies came back negative from 10/21. There was an MRI of the brain performed on 10/21 with results clearly discussed by the neurology consultant, attention invited to her note. ALCOHOL AND DRUG HISTORY:  Negative. PERSONAL HISTORY AND FAMILY HISTORY:  The patient has been  to her  for 29 years. He is retired from [employer]. They met in [facility] [place] she said and got  and moved to this area, this associated to the patient's  working with the [employer]. As stated, he is currently retired. The couple have a son who is 12years of age and living at home. Relationship within the patient and her  is very strong and very close. She described him as being very supportive of her and always trying to help her out. MENTAL STATUS EXAM:  This is a female not acutely toxic, however, looking very tired during this evaluation. The presence of a nasogastric tube the same as IVs were noted upon evaluation of the patient. During this evaluation, she was found to be coherent, showing quality of continuity of associations without evidence of flight of ideas and/or pressure of speech. She denied any current ideas of reference or influence, any hallucinatory process, and she is not delusional.  Cognition is basically intact with some minor deficits. She knew this is the month of November. She knew the year. She was not sure about the date. She thought it was 11/01 by the way, during the evaluation.   Her remote memory appears to be completely normal, her immediate recall and her recent memories are basically appropriate without any major deficits being noted. Throughout the evaluation, the patient did mention that she is beginning to feel somewhat depressed, this being associated to the multiple medical problems that she has been going through. There is no evidence during this evaluation of potential for harming self and/or others. The patient appears to be insightful and her judgment appears to be appropriate. In my opinion, she has capacity. CLINICAL IMPRESSION:  AXIS I:  Mood disorder (depression) associated to medical condition. AXIS II:  Noncontributory. AXIS III:  Adult failure to thrive by history, history of recent HSV encephalitis, history of multiple electrolyte imbalances resulting in severe protein-calorie malnutrition, is slowly improving. Otherwise, deferred to attending and consultants. RECOMMENDATIONS:  First of all, I want to thank Dr. Sylvester Man for this request to evaluate the patient who appears to be a very nice and patient, patient. She obviously has been through a lot and is very understandable to see why Dr. Sylvester Man is concerned about her mental status. 1.  As stated, the patient has a history of multiple medical problems which apparently started with a recent history of HSV encephalitis. 2.  The patient required to be hospitalized at [facility] from 07/28/2021 through 08/12/2021 and was able to be discharged with improvement after being in the hospital being noted. 3.  However, this was short-lived. The patient began to regress and specifically began to have problems with continuous nausea and vomiting which prompted a GI evaluation with upper EGD and negative gastric biopsies being obtained. Due to the patient's medical difficulties, with her not being able to eat more than very small amounts of food and to drink very little also, she was referred and required admission medically.   4. Since then, the patient has had a neurology evaluation which was very concise and describes the patient's current difficulties very clearly. She also has had a GI followup as stated. The patient is also being followed by our nutritional and dietetic service hoping to help with her malnutrition and feeding difficulties. 5.  The patient's current mental status shows no major evidence of cognitive impairment. However, there is evidence of depression. There is no evidence of psychosis and specifically there is no evidence of potential for self-harm and/or harming others. 6.  The patient's depression is very easy to be understood. In my opinion, however, the patient's depression is the result and not the reason for which she is currently having the GI difficulties that were described and the failure to thrive. 7.  I will suggest, however, beginning treatment with Remeron 15 mg of the dissolving tablets which could help the patient due to her difficulty swallowing, with Remeron also being an antidepressant that may help with her sleeping problems at night if she has any, but also may be able to increase her appetite. 8.  We will be very glad to follow the patient psychiatrically as necessary. I will be discussing the case again with Dr. Kannan Parks hopefully today.         Lori Mccurdy MD, LFAPA      [id number]  D:  11/02/2021 11:22  T:  11/02/2021 15:22  JOB #:  [id number]

## 2021-11-02 NOTE — ROUTINE PROCESS
Bedside and Verbal shift change report given to Shahida Briones (oncoming nurse) by Sen Benton RN (offgoing nurse). Report included the following information SBAR, Kardex, Intake/Output, MAR and Recent Results.

## 2021-11-02 NOTE — PROGRESS NOTES
Problem: Self Care Deficits Care Plan (Adult)  Goal: *Acute Goals and Plan of Care (Insert Text)  Description: Occupational Therapy Goals  Initiated 10/21/2021 and updated 10/29/21 within 7 day(s). 1.  Patient will perform bed mobility in preparation for selfcare with modified independence. 2.  Patient will perform grooming task/functional activity standing for 4-7 minutes with supervision/set-up, F+ balance. 3.  Patient will perform lower body dressing with supervision/set-up seated and standing using AE prn (Pt has reacher at home). 4.  Patient will perform toilet transfers with supervision/set-up. 5.  Patient will perform all aspects of toileting with supervision/set-up. 6.  Patient will participate in upper extremity therapeutic exercise/activities with modified independence for 8 minutes. 7.  Patient will utilize energy conservation techniques during functional activities with verbal cues. Prior Level of Function: Patient was independent with self-care and functional mobility PTA. Patient's spouse reports she would walk unsteady at home d/t vertigo and for the stairs she would bump up/down on her bottom    OCCUPATIONAL THERAPY TREATMENT    Patient: Allen Boland (59 y.o. female)  Date: 11/2/2021  Diagnosis: Vomiting [R11.10] <principal problem not specified>  Precautions: Fall, Skin    Chart, occupational therapy assessment, plan of care, and goals were reviewed. ASSESSMENT:  Upon entering the room, the patient was supine in bed, alert, and agreeable to participate in OT session. Patient minimally verbal this session, reporting her throats her. Patient required increased time for ALL tasks this session, moderate encouragement given and vcs for task initiation. Patient moderate assist for supine to sit and scooting, however demos ability to perform scoots in chair with stand by assist vs. Assistance needed when in bed.  Patient demos good carryover of EC techniques from previous sessions and performed PLB without command EOB. Patient appears to have mild anxiety this session as evident by time taken to \"prepare self\" for next task. Vitals assessed EOB by CNA; /72, O2 99 % and  bpm. Patient minimal assistance for sit <> stand and minimal assistance for functional transfer using rolling walker. The patient presents with fair dynamic standing balance, vcs were given for correct placement of hands/feet. Patient moderate assist for lower body dressing seated, max assist for toileting standing and stand by assist for grooming tasks/ upper body dressing seated. Patient left in recliner, all needs met and alarm donned. Progression toward goals:  [x]          Improving appropriately and progressing toward goals  []          Improving slowly and progressing toward goals  []          Not making progress toward goals and plan of care will be adjusted     PLAN:  Patient continues to benefit from skilled intervention to address the above impairments. Continue treatment per established plan of care. Discharge Recommendations:  Rehab  Further Equipment Recommendations for Discharge:  shower chair, rolling walker, and wheelchair      SUBJECTIVE:   Patient stated one second    OBJECTIVE DATA SUMMARY:   Cognitive/Behavioral Status:  Neurologic State: Alert  Orientation Level: Oriented to person, Disoriented to time, Disoriented to situation  Cognition: Follows commands, Decreased attention/concentration  Safety/Judgement: Fall prevention    Functional Mobility and Transfers for ADLs:   Bed Mobility:  Supine to Sit: Moderate assistance;Assist x1;Additional time;Bed Modified  Scooting: Moderate assistance     Transfers:  Sit to Stand: Minimum assistance  Stand to Sit: Minimum assistance   Toilet Transfer : Minimum assistance (simulation with recliner, approx. 4 ft away)      Balance:  Sitting: Impaired; Without support  Sitting - Static: Good (unsupported)  Sitting - Dynamic: Fair (occasional)  Standing: Impaired; With support  Standing - Static: Fair  Standing - Dynamic : Fair    ADL Intervention:  Feeding  Feeding Assistance:  (tube feed)    Grooming  Grooming Assistance: Set-up; Stand-by assistance  Position Performed: Seated edge of bed  Washing Face: Stand-by assistance    Upper Body Dressing Assistance  Dressing Assistance: Set-up; Stand-by assistance  Hospital Gown: Stand-by assistance    Lower Body Dressing Assistance  Dressing Assistance: Moderate assistance  Socks: Moderate assistance  Position Performed: Seated edge of bed    Toileting  Toileting Assistance: Maximum assistance  Bowel Hygiene: Maximum assistance (standing)    Cognitive Retraining  Safety/Judgement: Fall prevention      Pain:  Pain indicated in throat, no numerical value given  Pain Intervention(s): Medication (see MAR); Response to intervention: Nurse notified, See doc flow    Activity Tolerance:    Patient demonstrated fair activity tolerance during OT evaluation. Please refer to the flowsheet for vital signs taken during this treatment. After treatment:   [x]  Patient left in no apparent distress sitting up in chair  []  Patient left in no apparent distress in bed  [x]  Call bell left within reach  [x]  Nursing notified  []  Caregiver present  [x] alarm activated    COMMUNICATION/EDUCATION:   [x] Role of Occupational Therapy in the acute care setting  [x] Home safety education was provided and the patient/caregiver indicated understanding. [x] Patient/family have participated as able in working towards goals and plan of care. [x] Patient/family agree to work toward stated goals and plan of care. [] Patient understands intent and goals of therapy, but is neutral about his/her participation. [] Patient is unable to participate in goal setting and plan of care.       Thank you for this referral.  Cory Ness OTR/L  Time Calculation: 45 mins

## 2021-11-03 LAB
ANION GAP SERPL CALC-SCNC: 7 MMOL/L (ref 3–18)
BUN SERPL-MCNC: 12 MG/DL (ref 7–18)
BUN/CREAT SERPL: 35 (ref 12–20)
CALCIUM SERPL-MCNC: 8.7 MG/DL (ref 8.5–10.1)
CHLORIDE SERPL-SCNC: 99 MMOL/L (ref 100–111)
CO2 SERPL-SCNC: 26 MMOL/L (ref 21–32)
CREAT SERPL-MCNC: 0.34 MG/DL (ref 0.6–1.3)
GLUCOSE SERPL-MCNC: 93 MG/DL (ref 74–99)
PHOSPHATE SERPL-MCNC: 3.8 MG/DL (ref 2.5–4.9)
POTASSIUM SERPL-SCNC: 3.8 MMOL/L (ref 3.5–5.5)
SODIUM SERPL-SCNC: 132 MMOL/L (ref 136–145)

## 2021-11-03 PROCEDURE — 74011250637 HC RX REV CODE- 250/637: Performed by: INTERNAL MEDICINE

## 2021-11-03 PROCEDURE — 74011250636 HC RX REV CODE- 250/636: Performed by: INTERNAL MEDICINE

## 2021-11-03 PROCEDURE — 36415 COLL VENOUS BLD VENIPUNCTURE: CPT

## 2021-11-03 PROCEDURE — 99232 SBSQ HOSP IP/OBS MODERATE 35: CPT | Performed by: INTERNAL MEDICINE

## 2021-11-03 PROCEDURE — 84100 ASSAY OF PHOSPHORUS: CPT

## 2021-11-03 PROCEDURE — 74011250637 HC RX REV CODE- 250/637: Performed by: PSYCHIATRY & NEUROLOGY

## 2021-11-03 PROCEDURE — 74011250637 HC RX REV CODE- 250/637: Performed by: FAMILY MEDICINE

## 2021-11-03 PROCEDURE — 74011250636 HC RX REV CODE- 250/636: Performed by: FAMILY MEDICINE

## 2021-11-03 PROCEDURE — 74011000250 HC RX REV CODE- 250: Performed by: FAMILY MEDICINE

## 2021-11-03 PROCEDURE — 99231 SBSQ HOSP IP/OBS SF/LOW 25: CPT | Performed by: PSYCHIATRY & NEUROLOGY

## 2021-11-03 PROCEDURE — 65270000029 HC RM PRIVATE

## 2021-11-03 PROCEDURE — 80048 BASIC METABOLIC PNL TOTAL CA: CPT

## 2021-11-03 PROCEDURE — C9113 INJ PANTOPRAZOLE SODIUM, VIA: HCPCS | Performed by: FAMILY MEDICINE

## 2021-11-03 RX ORDER — MECLIZINE HCL 12.5 MG 12.5 MG/1
12.5 TABLET ORAL 3 TIMES DAILY
Status: DISCONTINUED | OUTPATIENT
Start: 2021-11-03 | End: 2021-11-04

## 2021-11-03 RX ORDER — PANTOPRAZOLE SODIUM 40 MG/1
40 TABLET, DELAYED RELEASE ORAL
Status: DISCONTINUED | OUTPATIENT
Start: 2021-11-03 | End: 2021-11-24 | Stop reason: HOSPADM

## 2021-11-03 RX ADMIN — MIRTAZAPINE 15 MG: 15 TABLET, ORALLY DISINTEGRATING ORAL at 21:08

## 2021-11-03 RX ADMIN — PANTOPRAZOLE SODIUM 40 MG: 40 TABLET, DELAYED RELEASE ORAL at 16:53

## 2021-11-03 RX ADMIN — MECLIZINE 12.5 MG: 12.5 TABLET ORAL at 21:08

## 2021-11-03 RX ADMIN — Medication 10 ML: at 21:10

## 2021-11-03 RX ADMIN — MECLIZINE 12.5 MG: 12.5 TABLET ORAL at 16:53

## 2021-11-03 RX ADMIN — THERA TABS 1 TABLET: TAB at 08:25

## 2021-11-03 RX ADMIN — SODIUM CHLORIDE 40 MG: 9 INJECTION, SOLUTION INTRAMUSCULAR; INTRAVENOUS; SUBCUTANEOUS at 08:25

## 2021-11-03 RX ADMIN — LEVETIRACETAM 750 MG: 100 SOLUTION ORAL at 08:26

## 2021-11-03 RX ADMIN — ENOXAPARIN SODIUM 40 MG: 100 INJECTION SUBCUTANEOUS at 08:25

## 2021-11-03 RX ADMIN — LEVETIRACETAM 750 MG: 100 SOLUTION ORAL at 18:09

## 2021-11-03 RX ADMIN — Medication 100 MG: at 08:25

## 2021-11-03 NOTE — ROUTINE PROCESS
Bedside and Verbal shift change report given to Radha Severino (oncoming nurse) by Agustina Chakraborty (offgoing nurse). Report included the following information SBAR, Kardex, Procedure Summary, Intake/Output, MAR and Recent Results.

## 2021-11-03 NOTE — ROUTINE PROCESS
Bedside and Verbal shift change report given to Shahida Dillon (oncoming nurse) by Steven Bean RN (offgoing nurse). Report included the following information SBAR, Kardex, Intake/Output, MAR and Recent Results.

## 2021-11-03 NOTE — CONSULTS
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 Patient presents with a highly complex clinical picture. Presentation seems consistent with personality change post herpes encephalitis. Unable to exclude post-HSVE immune-mediated encephalitis    0 WBC in CSF 10/22, negative HSV PCR in CSF, lack of any enhancing lesions on MRI brain argues against recurrent HSV encephalitis/relapse of HSV encephalitis    Unfortunately herpes encephalitis carries significant morbidity and 97% the patient will likely not return to prior level of function after treatment of infection. Persistent nausea/vomiting-status post EGD 9/22 without any pathology. CT abdomen/pelvis 10/15/2021 without pathology. GI follow-up appreciated. ? Central cause of emesis    Recommendations:    1. No indication for antiviral therapy at this time  2. Continue steroids, thiamine per neurology  3. Consider obtaining opinion from tertiary care center     Will sign off. F/u prn. thanks    Thank you for consultation request. Above plan was discussed in details with patient, patient's  and dr Maru Kaufman. 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 In June this year she became lethargic, poorly responsive, and was admitted at [facility] and subsequently rehab 6/14/21- 7/28/21. She was treated with IV acyclovir. She was intubated. She had a long and difficult recovery, had undergone acute rehab as well as PT and OT at home.   Per her  the patient was doing better until about 4 to 6 weeks ago and after that she developed again confusion, difficulty ambulating, nausea and vomiting. She also reported diplopia. She was followed outpatient with GI, neurology with no definitive etiology identified. She continued to feel weak along with nausea/vomiting and presented to [facility] ED on 10/20/2021 for further evaluation. No hx of seizures, but had abnormal EEG, was on Keppra and was not aware needed to be continued. MRI revealed remote vascular injury right MCA/left TA distribution. No acute changes. She had lumbar puncture done on 10/22 which revealed 0 WBCs. Neurology consulted. NMDA receptor CSF negative. HSV PCR and CSF negative. Patient was started on steroids, thiamine to cover for Wernicke's encephalopathy, post herpes immune mediated encephalitis. Family is concerned that patient could have recurrent herpes encephalitis. I have been consulted for further recommendations. Patient was able to answer questions appropriately when I evaluated her. She is afraid to eat since she feels nauseous whenever she tries to eat. Currently on liquid diet. Was able to swallow liquids without much difficulty when I evaluated her. She denies any headaches, chest pain, shortness of breath, abdominal pain. She recollects that she was in the hospital and received IV acyclovir.       Past Medical History:   Diagnosis Date    Arthritis     Diarrhea     no diarrhea since 9/14/2021    Encephalitis 06/14/2021    intubated    GERD (gastroesophageal reflux disease)     H. pylori infection 2019    IBS (irritable bowel syndrome)     Memory difficulty     since encephalitis    Migraine     Ovarian cyst 2019    Seizures ([address] 06/16/2021    x 1    Vomiting        Past Surgical History:   Procedure Laterality Date    HX BREAST AUGMENTATION  2005    HX CHOLECYSTECTOMY      HX COLONOSCOPY  2016    HX ENDOSCOPY  2019    HX TYMPANOPLASTY      left x 5 times       home Medication List    Details   potassium gluconate 595 mg (99 mg) tablet Take 99 mg by mouth daily. To begin taking when the packets run out      potassium chloride (KLOR-CON) 20 mEq pack Take 20 mEq by mouth daily. Mix with water      meclizine (ANTIVERT) 12.5 mg tablet Take 12.5 mg by mouth three (3) times daily as needed for Dizziness. Dizziness      ondansetron (ZOFRAN ODT) 8 mg disintegrating tablet Take 8 mg by mouth every eight (8) hours as needed for Nausea or Vomiting. LORazepam (Ativan) 1 mg tablet Take 1 Tablet by mouth every eight (8) hours as needed (Vomiting). Max Daily Amount: 3 mg. Qty: 10 Tablet, Refills: 0    Associated Diagnoses: Non-intractable vomiting with nausea, unspecified vomiting type      pantoprazole (PROTONIX) 20 mg tablet Take 20 mg by mouth daily. Take on empty stomah      levETIRAcetam (KEPPRA) 250 mg tablet Take 250 mg by mouth two (2) times a day. amantadine HCL (SYMMETREL) 100 mg capsule Take 200 mg by mouth two (2) times a day.              Current Facility-Administered Medications   Medication Dose Route Frequency    pantoprazole (PROTONIX) tablet 40 mg  40 mg Oral ACB&D    mirtazapine (REMERON SOL-TAB) disintegrating tablet 15 mg  15 mg Oral QHS    phenol throat spray (CHLORASEPTIC) 1 Spray  1 Spray Oral PRN    therapeutic multivitamin (THERAGRAN) tablet 1 Tablet  1 Tablet Oral DAILY    levETIRAcetam (KEPPRA) oral solution 750 mg  750 mg Oral BID    sodium chloride (NS) flush 5-40 mL  5-40 mL IntraVENous Q8H    sodium chloride (NS) flush 5-40 mL  5-40 mL IntraVENous PRN    acetaminophen (TYLENOL) tablet 650 mg  650 mg Oral Q6H PRN    Or    acetaminophen (TYLENOL) suppository 650 mg  650 mg Rectal Q6H PRN    polyethylene glycol (MIRALAX) packet 17 g  17 g Oral DAILY PRN    ondansetron (ZOFRAN ODT) tablet 4 mg  4 mg Oral Q8H PRN    Or    ondansetron (ZOFRAN) injection 8 mg  8 mg IntraVENous Q6H PRN    enoxaparin (LOVENOX) injection 40 mg  40 mg SubCUTAneous DAILY    thiamine HCL (B-1) tablet 100 mg  100 mg Oral DAILY       Allergies: Minocycline    No family history on file. Social History     Socioeconomic History    Marital status:      Spouse name: Not on file    Number of children: Not on file    Years of education: Not on file    Highest education level: Not on file   Occupational History    Not on file   Tobacco Use    Smoking status: Never Smoker    Smokeless tobacco: Never Used   Substance and Sexual Activity    Alcohol use: Yes     Alcohol/week: 1.0 standard drink     Types: 1 Glasses of wine per week     Comment: occasional    Drug use: Never    Sexual activity: Not on file   Other Topics Concern    Not on file   Social History Narrative    Not on file     Social Determinants of Health     Financial Resource Strain:     Difficulty of Paying Living Expenses: Not on file   Food Insecurity:     Worried About Running Out of Food in the Last Year: Not on file    Kishor of Food in the Last Year: Not on file   Transportation Needs:     Lack of Transportation (Medical): Not on file    Lack of Transportation (Non-Medical):  Not on file   Physical Activity:     Days of Exercise per Week: Not on file    Minutes of Exercise per Session: Not on file   Stress:     Feeling of Stress : Not on file   Social Connections:     Frequency of Communication with Friends and Family: Not on file    Frequency of Social Gatherings with Friends and Family: Not on file    Attends Jainism Services: Not on file    Active Member of Clubs or Organizations: Not on file    Attends Club or Organization Meetings: Not on file    Marital Status: Not on file   Intimate Partner Violence:     Fear of Current or Ex-Partner: Not on file    Emotionally Abused: Not on file    Physically Abused: Not on file    Sexually Abused: Not on file   Housing Stability:     Unable to Pay for Housing in the Last Year: Not on file    Number of [place] in the Last Year: Not on file    Unstable Housing in the Last Year: Not on file     Social History     Tobacco Use   Smoking Status Never Smoker   Smokeless Tobacco Never Used        Temp (24hrs), Av.7 °F (37.1 °C), Min:97.7 °F (36.5 °C), Max:99.3 °F (37.4 °C)    Visit Vitals  BP 97/63   Pulse (!) 119   Temp 97.7 °F (36.5 °C)   Resp 15   Ht 5' (1.524 m)   Wt 66.2 kg (146 lb)   LMP 2019 Comment: unknown   SpO2 98%   BMI 28.51 kg/m²       ROS: 12 point ROS obtained in details. Pertinent positives as mentioned in HPI,   otherwise negative    Physical Exam:      General:   awake alert and oriented   Skin:   no rashes or skin lesions noted on limited exam   HEENT:  Normocephalic, atraumatic, EOMI, no scleral icterus or pallor; no conjunctival hemmohage;  nasal and oral mucous are moist and without evidence of lesions. No thrush. Dentition good. Neck supple, no bruits. Lymph Nodes:   no cervical, axillary or inguinal adenopathy   Lungs:   non-labored, bilaterally clear to auscultation- no crackles wheezes rales or rhonchi   Heart:  RRR, s1 and s2; no rubs or gallops, no edema, + pedal pulses   Abdomen:  soft, non-distended, active bowel sounds, no hepatomegaly, no splenomegaly. Non-tender   Genitourinary:  deferred   Extremities:   no clubbing, cyanosis; no joint effusions or swelling; Full ROM of all large joints to the upper and lower extremities; muscle mass appropriate for age   Neurologic:  No gross focal sensory abnormalities; generalized weakness without any focal loss of muscle strength to upper and lower extremities. Speech appropirate.   Right lid lag, pupils are equally round reactive, tongue midline   Psychiatric:   interactive, no suicidal or homicidal ideations         Labs: Results:   Chemistry Recent Labs     21  0229 21  0436   GLU 93 103*   * 131*   K 3.8 4.0   CL 99* 97*   CO2 26 30   BUN 12 12   CREA 0.34* 0.36*   CA 8.7 8.1*   AGAP 7 4   BUCR 35* 33*      CBC w/Diff No results for input(s): WBC, RBC, HGB, HCT, PLT, GRANS, LYMPH, EOS, HGBEXT, HCTEXT, PLTEXT in the last 72 hours. Microbiology No results for input(s): CULT in the last 72 hours. RADIOLOGY:    All available imaging studies/reports in [facility] for this admission were reviewed      Disclaimer: Sections of this note are dictated utilizing voice recognition software, which may have resulted in some phonetic based errors in grammar and contents. Even though attempts were made to correct all the mistakes, some may have been missed, and remained in the body of the document. If questions arise, please contact our department.     Dr. Esperanza Cottrell, Infectious Disease Specialist  [phone]  November 3, 2021  1:20 PM

## 2021-11-03 NOTE — PROGRESS NOTES
Hospitalist Progress Note    Subjective:   Daily Progress Note: 11/3/2021    Patient was sitting up in bed. Her appetite remains poor. Has had nausea but no vomiting. She is slow in response. Denies any pain. Has had some visual disturbances but cannot describe it. Current Facility-Administered Medications   Medication Dose Route Frequency    pantoprazole (PROTONIX) tablet 40 mg  40 mg Oral ACB&D    mirtazapine (REMERON SOL-TAB) disintegrating tablet 15 mg  15 mg Oral QHS    phenol throat spray (CHLORASEPTIC) 1 Spray  1 Spray Oral PRN    therapeutic multivitamin (THERAGRAN) tablet 1 Tablet  1 Tablet Oral DAILY    levETIRAcetam (KEPPRA) oral solution 750 mg  750 mg Oral BID    sodium chloride (NS) flush 5-40 mL  5-40 mL IntraVENous Q8H    sodium chloride (NS) flush 5-40 mL  5-40 mL IntraVENous PRN    acetaminophen (TYLENOL) tablet 650 mg  650 mg Oral Q6H PRN    Or    acetaminophen (TYLENOL) suppository 650 mg  650 mg Rectal Q6H PRN    polyethylene glycol (MIRALAX) packet 17 g  17 g Oral DAILY PRN    ondansetron (ZOFRAN ODT) tablet 4 mg  4 mg Oral Q8H PRN    Or    ondansetron (ZOFRAN) injection 8 mg  8 mg IntraVENous Q6H PRN    enoxaparin (LOVENOX) injection 40 mg  40 mg SubCUTAneous DAILY    thiamine HCL (B-1) tablet 100 mg  100 mg Oral DAILY        Review of Systems  Pertinent items are noted in HPI. Objective:     Visit Vitals  BP 97/63   Pulse (!) 119   Temp 97.7 °F (36.5 °C)   Resp 15   Ht 5' (1.524 m)   Wt 66.2 kg (146 lb)   LMP 2019 Comment: unknown   SpO2 98%   BMI 28.51 kg/m²      O2 Device: None (Room air)    Temp (24hrs), Av.7 °F (37.1 °C), Min:97.7 °F (36.5 °C), Max:99.3 °F (37.4 °C)      No intake/output data recorded.    1901 -  0700  In: 160 [P.O.:100]  Out: 600 [Urine:600]    General appearance - alert, cachetic, and in no distress  Chest -clear air entry noted in bases, no wheezes  Heart - S1 and S2 normal  Abdomen - soft, nontender, nondistended, Bowel sounds present  Neurological -alert, moves all extremities, no tremors noted. Musculoskeletal - no joint tenderness or erythema of knees bilaterally  Extremities - no pedal edema noted      Data Review    Recent Results (from the past 24 hour(s))   PHOSPHORUS    Collection Time: 11/03/21  2:29 AM   Result Value Ref Range    Phosphorus 3.8 2.5 - 4.9 MG/DL   METABOLIC PANEL, BASIC    Collection Time: 11/03/21  2:29 AM   Result Value Ref Range    Sodium 132 (L) 136 - 145 mmol/L    Potassium 3.8 3.5 - 5.5 mmol/L    Chloride 99 (L) 100 - 111 mmol/L    CO2 26 21 - 32 mmol/L    Anion gap 7 3.0 - 18 mmol/L    Glucose 93 74 - 99 mg/dL    BUN 12 7.0 - 18 MG/DL    Creatinine 0.34 (L) 0.6 - 1.3 MG/DL    BUN/Creatinine ratio 35 (H) 12 - 20      GFR est AA >60 >60 ml/min/1.73m2    GFR est non-AA >60 >60 ml/min/1.73m2    Calcium 8.7 8.5 - 10.1 MG/DL         Assessment/Plan:     ASSESSMENT:    1. Adult failure to thrive  2. Multiple electrolyte disturbances, better currently  3. Intermittent vomiting with nausea could be due to central vertigo  4. Recent history of herpes encephalitis s/p treatment  5. 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 6.  Chronic mild hyponatremia due to nausea  7. Poor appetite and severe protein calorie malnutrition  8. GERD  9. Dyslipidemia  10 history of seizure disorder    PLAN:    The chart was reviewed thoroughly  CSF is negative for HSV PCR  ID has been consulted  Discussed with family that there is no need for herpes treatment as there is no active infection  GI reconsulted for PEG tube placement  Continue Remeron for poor appetite  Patient will need feeding assistance  Dietitian to follow this patient  Poor long-term prognosis  Discussed with patient's sister, Ms. Isabel Brooke, after getting permission from patient at length for more than 30 minutes and also spoke to patient's  about patient's current clinical status and plan of treatment. They verbalized understanding about it. Total time to take care of this patient was 35 minutes and more than 50% of time was spent counseling and coordinating care. Disclaimer: Sections of this note are dictated using utilizing voice recognition software, which may have resulted in some phonetic based errors in grammar and contents. Even though attempts were made to correct all the mistakes, some may have been missed, and remained in the body of the document. If questions arise, please contact our department.

## 2021-11-03 NOTE — PROGRESS NOTES
[facility] Hospitalists  Progress Note    Patient: Jean Claude Couch Age: 64 y.o. : 1965 MR#: [id number] SSN: [id number]  Date: 2021     Subjective/24-hour events:     Patient sitting in chair at bedside. Continues to tolerate tube feeds, nothing new or acute overnight. Patient's brother present    Assessment:   Adult failure to thrive  Multiple electrolyte abnormalities: Hypokalemia, hypomagnesemia, hyponatremia, hypophosphatemia  Transaminitis/elevated LFTs  Intractable nausea and vomiting  Hyponatremia, mild  GERD  Vitamin D deficiency  Hyperlipidemia  Severe protein calorie malnutrition  Recent history of HSV encephalitis  History of single seizure     Plan:   Psychiatry evaluation today, await recommendations. Have followed up with neurology and discussed case with Dr. Charanjit Lehman via phone today. Will review case to see if any further neurologic w/u may be of benefit. Continue tube feeds, clinical nutrition continuing to follow. SLP eval.  Monitor labs - electrolytes stable today. Will need to continue to follow sodium. Continue PT/OT, monitor progress. Disposition TBD. Supportive care o/w. Follow. Case discussed with:  [x]Patient  []Family  [x]Nursing  []Case Management  DVT Prophylaxis:  [x]Lovenox  []Hep SQ  []SCDs  []Coumadin   []On Heparin gtt    Objective:   VS:   Visit Vitals  /67 (BP 1 Location: Left upper arm, BP Patient Position: At rest)   Pulse (!) 110   Temp 98.9 °F (37.2 °C)   Resp 20   Ht 5' (1.524 m)   Wt 66.2 kg (146 lb)   LMP 2019 Comment: unknown   SpO2 98%   BMI 28.51 kg/m²      Tmax/24hrs: Temp (24hrs), Av.9 °F (37.2 °C), Min:98.4 °F (36.9 °C), Max:99.3 °F (37.4 °C)      Intake/Output Summary (Last 24 hours) at 2021  Last data filed at 2021 0355  Gross per 24 hour   Intake 160 ml   Output    Net 160 ml       General:  In NAD. Nontoxic-appearing. Cardiovascular: Regular, mildly tachycardic.   Pulmonary:  Lungs clear bilaterally, no wheezes. No accessory muscle use. GI:  Abdomen soft, NTTP. Extremities:  Warm, no edema or ischemia. Neuro: Awake and alert, moves extremities spontaneously. Affect depressed.     Labs:    Recent Results (from the past 24 hour(s))   PHOSPHORUS    Collection Time: 11/02/21  4:36 AM   Result Value Ref Range    Phosphorus 3.2 2.5 - 4.9 MG/DL   METABOLIC PANEL, BASIC    Collection Time: 11/02/21  4:36 AM   Result Value Ref Range    Sodium 131 (L) 136 - 145 mmol/L    Potassium 4.0 3.5 - 5.5 mmol/L    Chloride 97 (L) 100 - 111 mmol/L    CO2 30 21 - 32 mmol/L    Anion gap 4 3.0 - 18 mmol/L    Glucose 103 (H) 74 - 99 mg/dL    BUN 12 7.0 - 18 MG/DL    Creatinine 0.36 (L) 0.6 - 1.3 MG/DL    BUN/Creatinine ratio 33 (H) 12 - 20      GFR est AA >60 >60 ml/min/1.73m2    GFR est non-AA >60 >60 ml/min/1.73m2    Calcium 8.1 (L) 8.5 - 10.1 MG/DL       Signed By: Radha Pendleton MD     November 2, 2021

## 2021-11-03 NOTE — PROGRESS NOTES
Chart reviewed. Met with patient at bedside. Patient has been vaccinated for covid so will not require a quarantine bed. Pt has been accepted at 5 different facilities; pt states she has no preference.   Rachael Camacho RN - Outcomes Manager  [phone]

## 2021-11-03 NOTE — ROUTINE PROCESS
Spoke with patient.  Patient stated it is okay to give information to her [facility]

## 2021-11-03 NOTE — PROGRESS NOTES
[email or web address]  [phone]    Gastroenterology follow up-Progress note    Impression:   1. Intractable nausea w/ vomiting  - EGD 9/22/2021 neg, CT 10/14/2021 neg, MRI head as noted below  2. Failure to Thrive  3. HSV encephalitis  4. Elevated LFT - last drawn 10/20 , , , tBili 2.9    MRI Brain 10/21/2021  IMPRESSION     Evidence for what appears to be now remote vascular injury to the right MCA  right and left TA distribution     more severe on the right with associated volume loss and localized expansion of  the ventricular system     No acute infarction no evidence of acute intracranial enhancement seen     Pattern best fits with a atherosclerotic vascular event however focal area of  meningitis or even severe encephalitis consent times stimulate vascular  occlusion      Plan:   1. Consider PEG placement prior to d/c although may not help current status. Nausea and vomiting will likely be unimproved w/ PEG placement and thus not improve her clinical status. PT would prefer to avoid placement if possible but would like to discuss procedure and options with , mother, and sister. 2. Medical management per primary team.  3. Will f/u tomorrow once pt has discussed w/ family. Chief Complaint: Nausea w/ vomiting, failure to thrive      Subjective: PT seen earlier in admission for intractable nausea and vomiting, now consulted for possible peg placement for failure to thrive/poor appetite. ROS: Denies any fevers, chills, rash. General: well developed, well nourished, no acute distress  Eyes: conjunctiva normal, EOM normal  Cardiovascular: heart normal, intact distal pulses, normal rate and regular rhythm  Pulmonary: breath sounds normal and effort normal  Abdominal: appearance normal, bowel sounds normal and soft, non-acute, mild diffuse TTP.     Patient Active Problem List   Diagnosis Code    Vomiting R11.10    Severe protein-calorie malnutrition ([facility] [address] E43 Visit Vitals  /70   Pulse (!) 104   Temp 98.5 °F (36.9 °C)   Resp 16   Ht 5' (1.524 m)   Wt 66.2 kg (146 lb)   LMP 04/01/2019 Comment: unknown   SpO2 100%   BMI 28.51 kg/m²           Intake/Output Summary (Last 24 hours) at 11/3/2021 1652  Last data filed at 11/3/2021 0631  Gross per 24 hour   Intake    Output 600 ml   Net -600 ml       CBC w/Diff    Lab Results   Component Value Date/Time    WBC 9.4 10/28/2021 04:12 AM    RBC 3.65 (L) 10/28/2021 04:12 AM    HGB 11.4 (L) 10/28/2021 04:12 AM    HCT 33.9 (L) 10/28/2021 04:12 AM    MCV 92.9 10/28/2021 04:12 AM    MCH 31.2 10/28/2021 04:12 AM    MCHC 33.6 10/28/2021 04:12 AM    RDW 16.6 (H) 10/28/2021 04:12 AM     10/28/2021 04:12 AM    Lab Results   Component Value Date/Time    GRANS 66 10/20/2021 01:12 PM    LYMPH 23 10/20/2021 01:12 PM    EOS 0 10/20/2021 01:12 PM    BASOS 0 10/20/2021 01:12 PM      Basic Metabolic Profile   Recent Labs     11/03/21  0229   *   K 3.8   CL 99*   CO2 26   BUN 12   CA 8.7   PHOS 3.8        Hepatic Function    Lab Results   Component Value Date/Time    ALB 3.2 (L) 10/20/2021 01:12 PM    TP 6.8 10/20/2021 01:12 PM     (H) 10/20/2021 01:12 PM    No results found for: TBIL       Coags   No results for input(s): PTP, INR, APTT, INREXT in the last 72 hours. LAUREN Gomez  11/03/21, 4:57 PM   [facility]. [email or web address]  Phone: [phone]  Pager: [phone]

## 2021-11-03 NOTE — CONSULTS
Nutrition Assessment     Type and Reason for Visit: Reassess, Consult    Nutrition Recommendations/Plan:   - Continue oral diet and add Ensure Enlive, TID.  - Monitor and encourage po intake as tolerated. - Agree with PEG placement as pt with inadequate oral intake since admission and weeks PTA. Nutrition Assessment:  NGT partially out and clogged, did not tolerate manipulation and removed 11/2 around 6pm. Previously tolerating TF at goal rate of 40 mL/hr. NGT remains out and po intake remains poor, only eating a few bites of meals. Agreeable to restart oral nutritional supplements while pt without EN access. No plan for NGT replacement and GI consulted for PEG placement. Malnutrition Assessment:  Malnutrition Status: Severe malnutrition     Estimated Daily Nutrient Needs:  Energy (kcal):  5369-8696  Protein (g):         Fluid (ml/day):  4329-1961    Nutrition Related Findings:  BM 11/3. Pertinent medications: remeron, theragran and thiamine. Pt denies emesis. Current Nutrition Therapies:  ADULT DIET Regular; GI [place] (GERD/Peptic Ulcer)    Anthropometric Measures:  · Height:  5' (152.4 cm)  · Current Body Wt:  67.1 kg (147 lb 14.9 oz)  · BMI: 28.9    Nutrition Diagnosis:   · Severe malnutrition, In context of chronic illness related to altered GI function (poor appetite with nausea and vomiting) as evidenced by poor intake prior to admission, weight loss greater than or equal to 10% in 6 months    Nutrition Intervention:  Food and/or Nutrient Delivery: Continue current diet, Start oral nutrition supplement  Nutrition Education and Counseling: No recommendations at this time, Education not indicated  Coordination of Nutrition Care: Continue to monitor while inpatient (nutritional plan discussed with Dr. Martha Bañuelos)    Goals:  Nutritional needs will be met through adequate oral intake and/or nutrition support within the next 7 days.        Nutrition Monitoring and Evaluation:   Behavioral-Environmental Outcomes: None identified  Food/Nutrient Intake Outcomes: Food and nutrient intake, Supplement intake  Physical Signs/Symptoms Outcomes: Biochemical data, Meal time behavior, Nutrition focused physical findings, Nausea/vomiting    Discharge Planning:     Too soon to determine     Electronically signed by Jeff Del Rosario RD on 11/3/2021 at 2:24 PM    Contact Number: [phone]

## 2021-11-04 LAB
BASOPHILS # BLD: 0 K/UL (ref 0–0.1)
BASOPHILS NFR BLD: 0 % (ref 0–2)
COVID-19 RAPID TEST, COVR: NOT DETECTED
DIFFERENTIAL METHOD BLD: ABNORMAL
EOSINOPHIL # BLD: 0.1 K/UL (ref 0–0.4)
EOSINOPHIL NFR BLD: 1 % (ref 0–5)
ERYTHROCYTE [DISTWIDTH] IN BLOOD BY AUTOMATED COUNT: 16 % (ref 11.6–14.5)
HCT VFR BLD AUTO: 26.5 % (ref 35–45)
HGB BLD-MCNC: 8.6 G/DL (ref 12–16)
LYMPHOCYTES # BLD: 1.9 K/UL (ref 0.9–3.6)
LYMPHOCYTES NFR BLD: 27 % (ref 21–52)
MCH RBC QN AUTO: 31 PG (ref 24–34)
MCHC RBC AUTO-ENTMCNC: 32.5 G/DL (ref 31–37)
MCV RBC AUTO: 95.7 FL (ref 78–100)
MONOCYTES # BLD: 0.7 K/UL (ref 0.05–1.2)
MONOCYTES NFR BLD: 10 % (ref 3–10)
NEUTS SEG # BLD: 4.2 K/UL (ref 1.8–8)
NEUTS SEG NFR BLD: 60 % (ref 40–73)
PHOSPHATE SERPL-MCNC: 4.5 MG/DL (ref 2.5–4.9)
PLATELET # BLD AUTO: 375 K/UL (ref 135–420)
PMV BLD AUTO: 9.8 FL (ref 9.2–11.8)
RBC # BLD AUTO: 2.77 M/UL (ref 4.2–5.3)
SOURCE, COVRS: NORMAL
WBC # BLD AUTO: 6.9 K/UL (ref 4.6–13.2)

## 2021-11-04 PROCEDURE — 84100 ASSAY OF PHOSPHORUS: CPT

## 2021-11-04 PROCEDURE — 74011250637 HC RX REV CODE- 250/637: Performed by: PSYCHIATRY & NEUROLOGY

## 2021-11-04 PROCEDURE — 97530 THERAPEUTIC ACTIVITIES: CPT

## 2021-11-04 PROCEDURE — 65270000029 HC RM PRIVATE

## 2021-11-04 PROCEDURE — 85025 COMPLETE CBC W/AUTO DIFF WBC: CPT

## 2021-11-04 PROCEDURE — 97535 SELF CARE MNGMENT TRAINING: CPT

## 2021-11-04 PROCEDURE — 74011250636 HC RX REV CODE- 250/636: Performed by: INTERNAL MEDICINE

## 2021-11-04 PROCEDURE — 2709999900 HC NON-CHARGEABLE SUPPLY

## 2021-11-04 PROCEDURE — 97164 PT RE-EVAL EST PLAN CARE: CPT

## 2021-11-04 PROCEDURE — 36415 COLL VENOUS BLD VENIPUNCTURE: CPT

## 2021-11-04 PROCEDURE — 97116 GAIT TRAINING THERAPY: CPT

## 2021-11-04 PROCEDURE — 87635 SARS-COV-2 COVID-19 AMP PRB: CPT

## 2021-11-04 PROCEDURE — 74011250637 HC RX REV CODE- 250/637: Performed by: INTERNAL MEDICINE

## 2021-11-04 PROCEDURE — 99232 SBSQ HOSP IP/OBS MODERATE 35: CPT | Performed by: INTERNAL MEDICINE

## 2021-11-04 PROCEDURE — 74011250637 HC RX REV CODE- 250/637: Performed by: FAMILY MEDICINE

## 2021-11-04 PROCEDURE — 99233 SBSQ HOSP IP/OBS HIGH 50: CPT | Performed by: PSYCHIATRY & NEUROLOGY

## 2021-11-04 RX ORDER — PREDNISONE 20 MG/1
60 TABLET ORAL
Status: DISCONTINUED | OUTPATIENT
Start: 2021-11-05 | End: 2021-11-09

## 2021-11-04 RX ORDER — SODIUM CHLORIDE 9 MG/ML
250 INJECTION, SOLUTION INTRAVENOUS ONCE
Status: COMPLETED | OUTPATIENT
Start: 2021-11-04 | End: 2021-11-04

## 2021-11-04 RX ORDER — CLONAZEPAM 0.5 MG/1
0.25 TABLET ORAL 2 TIMES DAILY
Status: DISCONTINUED | OUTPATIENT
Start: 2021-11-05 | End: 2021-11-05

## 2021-11-04 RX ORDER — SODIUM CHLORIDE 9 MG/ML
250 INJECTION INTRAMUSCULAR; INTRAVENOUS; SUBCUTANEOUS ONCE
Status: DISCONTINUED | OUTPATIENT
Start: 2021-11-04 | End: 2021-11-04

## 2021-11-04 RX ORDER — CLONAZEPAM 0.5 MG/1
0.25 TABLET ORAL 3 TIMES DAILY
Status: DISCONTINUED | OUTPATIENT
Start: 2021-11-04 | End: 2021-11-04

## 2021-11-04 RX ORDER — MIDODRINE HYDROCHLORIDE 5 MG/1
2.5 TABLET ORAL 2 TIMES DAILY WITH MEALS
Status: DISCONTINUED | OUTPATIENT
Start: 2021-11-04 | End: 2021-11-14

## 2021-11-04 RX ORDER — MECLIZINE HCL 12.5 MG 12.5 MG/1
12.5 TABLET ORAL
Status: DISCONTINUED | OUTPATIENT
Start: 2021-11-04 | End: 2021-11-24 | Stop reason: HOSPADM

## 2021-11-04 RX ADMIN — LEVETIRACETAM 750 MG: 100 SOLUTION ORAL at 17:08

## 2021-11-04 RX ADMIN — LEVETIRACETAM 750 MG: 100 SOLUTION ORAL at 08:16

## 2021-11-04 RX ADMIN — Medication 100 MG: at 08:17

## 2021-11-04 RX ADMIN — MIDODRINE HYDROCHLORIDE 2.5 MG: 5 TABLET ORAL at 18:51

## 2021-11-04 RX ADMIN — Medication 10 ML: at 05:37

## 2021-11-04 RX ADMIN — PANTOPRAZOLE SODIUM 40 MG: 40 TABLET, DELAYED RELEASE ORAL at 17:09

## 2021-11-04 RX ADMIN — PANTOPRAZOLE SODIUM 40 MG: 40 TABLET, DELAYED RELEASE ORAL at 08:17

## 2021-11-04 RX ADMIN — CLONAZEPAM 0.25 MG: 0.5 TABLET ORAL at 17:09

## 2021-11-04 RX ADMIN — ENOXAPARIN SODIUM 40 MG: 100 INJECTION SUBCUTANEOUS at 08:19

## 2021-11-04 RX ADMIN — THERA TABS 1 TABLET: TAB at 08:17

## 2021-11-04 RX ADMIN — Medication 10 ML: at 17:12

## 2021-11-04 RX ADMIN — MIRTAZAPINE 15 MG: 15 TABLET, ORALLY DISINTEGRATING ORAL at 22:18

## 2021-11-04 RX ADMIN — MECLIZINE 12.5 MG: 12.5 TABLET ORAL at 08:20

## 2021-11-04 RX ADMIN — SODIUM CHLORIDE 250 ML/HR: 900 INJECTION, SOLUTION INTRAVENOUS at 11:50

## 2021-11-04 NOTE — ROUTINE PROCESS
Bedside and Verbal shift change report given to Shahida Dillon (oncoming nurse) by Artem Swartz RN (offgoing nurse). Report included the following information SBAR, Kardex, Intake/Output, MAR and Recent Results.

## 2021-11-04 NOTE — PROGRESS NOTES
Problem: Mobility Impaired (Adult and Pediatric)  Goal: *Acute Goals and Plan of Care (Insert Text)  Description: Physical Therapy Goals  Initiated 10/21/2021, re-evaluated 11/4/21 and goals adjusted as needed and to be accomplished within 7 day(s)  1. Patient will move from supine to sit and sit to supine , scoot up and down, and roll side to side in bed with supervision/set-up. 2.  Patient will transfer from bed to chair and chair to bed with supervision using the least restrictive device. 3.  Patient will perform sit to stand with SBA. 4.  Patient will ambulate with SBA for 50 feet with the least restrictive device. 5.  Patient will ascend/descend stairs pending necessity for discharge and if safe to perform. 6.  Patient will perform BLE therex as tolerated     PLOF: Pt reports she lives in a [address] [place] [address] with her , states she was ambulatory with no AD however per chart review, pt has been using a wc recently as she has increased weakness and difficulty walking, pt only oriented to self and time this date. Outcome: Progressing Towards Goal   PHYSICAL THERAPY RE-EVALUATION    Patient: Faith Cramer (54 y.o. female)  Date: 11/4/2021  Primary Diagnosis: Vomiting [R11.10]        Precautions:  Fall      ASSESSMENT :  Pt cleared to participate in PT session, pt received semi-reclined in bed and agreeable to therapy session. Completing with OT to maximize safety and mobility. Based on the objective data described below, the patient presents with decreased endurance, decreased strength, decreased balance reactions, gait deviations, confusion, and decreased independence in functional mobility. Pt completing bed mobility with CGA. Pt found with feet off side of bed, able to use bedrail to return to sitting EOB. Pt performing LE exercise with SBA. Pt standing to RW with tactile cues, needing cuing to begin transitions. Pt standing with CGA and ambulating to toilet. Toileting with supervision.  Pt then standing from toilet with grab bar and CGA. Pt standign ~6 minutes to perform standing hygiene tasks with OT, needing 1 UE support on sink. Pt returned to recliner with chair alarm activated. Pt positioned for comfort and educated to call for assist before getting up, pt verbalized understanding. Pt left with all needs met and call bell in reach. RN notified of position and participation. Patient will benefit from skilled intervention to address the above impairments. Patient's rehabilitation potential is considered to be Fair  Factors which may influence rehabilitation potential include:   []         None noted  [x]         Mental ability/status  []         Medical condition  []         Home/family situation and support systems  []         Safety awareness  []         Pain tolerance/management  []         Other:      PLAN :  Recommendations and Planned Interventions:   [x]           Bed Mobility Training             [x]    Neuromuscular Re-Education  [x]           Transfer Training                   []    Orthotic/Prosthetic Training  [x]           Gait Training                          []    Modalities  [x]           Therapeutic Exercises           []    Edema Management/Control  [x]           Therapeutic Activities            [x]    Family Training/Education  [x]           Patient Education  []           Other (comment):    Frequency/Duration: Patient will be followed by physical therapy 1-2 times per day/4-7 days per week to address goals. Discharge Recommendations: Rehab  Further Equipment Recommendations for Discharge: rolling walker     SUBJECTIVE:   Patient stated I have been  29 years.     OBJECTIVE DATA SUMMARY:   Hospital course since last seen and reason for re-evaluation: Pt due for re-evaluation, meeting 2 goals. Pt demonstrating ambulation in room with CGA, pt needs increased tactile cues for mobility. Pt continues to be confused.  Will continue to benefit from skilled PT to continue to progress towards goals. Past Medical History:   Diagnosis Date    Arthritis     Diarrhea     no diarrhea since 9/14/2021    Encephalitis 06/14/2021    intubated    GERD (gastroesophageal reflux disease)     H. pylori infection 2019    IBS (irritable bowel syndrome)     Memory difficulty     since encephalitis    Migraine     Ovarian cyst 2019    Seizures ([facility] [address] 06/16/2021    x 1    Vomiting      Past Surgical History:   Procedure Laterality Date    HX BREAST AUGMENTATION  2005    HX CHOLECYSTECTOMY      HX COLONOSCOPY  2016    HX ENDOSCOPY  2019    HX TYMPANOPLASTY      left x 5 times     Barriers to Learning/Limitations: yes;  physical and altered mental status (i.e.Sedation, Confusion)  Compensate with: Visual Cues, Verbal Cues, and Tactile Cues  Home Situation:   Home Situation  Home Environment: Private residence  One/Two Story Residence: Two story, live on 1st floor  Living Alone: No  Support Systems: Spouse/Significant Other  Patient Expects to be Discharged to[de-identified] House  Current DME Used/Available at Home: Shower chair  Tub or Shower Type: Tub/Shower combination  Critical Behavior:  Neurologic State: Alert; Confused     Cognition: Follows commands     Psychosocial  Patient Behaviors: Calm; Confused; Cooperative    Strength:    Strength: Generally decreased, functional    Tone & Sensation:   Tone: Normal    Sensation: Intact    Range Of Motion:  AROM: Within functional limits    Posture:  Posture (WDL): Within defined limits     Functional Mobility:  Bed Mobility:     Supine to Sit: Contact guard assistance; Bed Modified; Additional time     Scooting: Contact guard assistance (sitting EOB )  Transfers:  Sit to Stand: Contact guard assistance; Minimum assistance  Stand to Sit: Contact guard assistance    Balance:   Sitting: Intact  Sitting - Static: Good (unsupported)  Sitting - Dynamic: Good (unsupported)  Standing: Impaired;  With support  Standing - Static: Good  Standing - Dynamic : Fair  Ambulation/Gait Training:  Distance (ft): 20 Feet (ft) (then x7)  Assistive Device: Walker, rolling  Ambulation - Level of Assistance: Contact guard assistance  Gait Abnormalities: Decreased step clearance  Base of Support: Center of gravity altered; Narrowed     Speed/Helen: Slow; Shuffled  Step Length: Right shortened; Left shortened    Therapeutic Exercises:   Instructed in and completed LAQ and ankle pumps x10 reps in short sitting on EOB. Pain:  Pain level pre-treatment: 0/10   Pain level post-treatment: 0/10    Activity Tolerance:   Good activity tolerance     Please refer to the flowsheet for vital signs taken during this treatment. After treatment:   [x]         Patient left in no apparent distress sitting up in chair  []         Patient left in no apparent distress in bed  [x]         Call bell left within reach  [x]         Nursing notified  []         Caregiver present  [x]       chair alarm activated  []         SCDs applied    COMMUNICATION/EDUCATION:   [x]         Role of Physical Therapy in the acute care setting. [x]         Fall prevention education was provided and the patient/caregiver indicated understanding. [x]         Patient/family have participated as able in goal setting and plan of care. [x]         Patient/family agree to work toward stated goals and plan of care. []         Patient understands intent and goals of therapy, but is neutral about his/her participation. []         Patient is unable to participate in goal setting/plan of care: ongoing with therapy staff.  []         Other:     Thank you for this referral.  Malaika Galarza, PT   Time Calculation: 38 mins

## 2021-11-04 NOTE — PROGRESS NOTES
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  On exam she was found to have ophthalmoplegia and B1 was started concerning for possible Wernicke type of presentation. Patient was initially started on high-dose thiamine for possible Wernicke's. For worsening symptoms, the possibility of autoimmune encephalitis following previous HSV infection was considered. Started on IV Solu-Medrol 1 g IV per day. Her amantidine dose was decreased for possible oculogyric crisis. MRI of the brain during this admission showed encephalomalacia in the distribution of right MCA right and left TA. She had a lumbar puncture was unremarkable profile except for increased oligoclonial bands NMDA and HSV PCR were negative. Interval history:  Patient was seen today in follow-up. No acute changes overnight.   Continues to have the diplopia and nausea has remained a significant issue and she has been considered for a PEG        Medications:    Current Facility-Administered Medications   Medication Dose Route Frequency Provider Last Rate Last Admin    clonazePAM (KlonoPIN) tablet 0.25 mg  0.25 mg Oral TID Luan Mccurdy MD        [START ON 11/5/2021] predniSONE (DELTASONE) tablet 60 mg  60 mg Oral DAILY WITH BREAKFAST Luan Mccurdy MD        0.9% sodium chloride infusion  250 mL/hr IntraVENous ONCE Elena Talbot MD        pantoprazole (PROTONIX) tablet 40 mg  40 mg Oral ACB&D Elena Talbot MD   40 mg at 11/04/21 0817    mirtazapine (REMERON SOL-TAB) disintegrating tablet 15 mg  15 mg Oral QHS Russell Decker MD   15 mg at 11/03/21 2108    phenol throat spray (CHLORASEPTIC) 1 Spray  1 [place] Oral PRN Jyoti Blanco, MD   1 Spray at 10/31/21 1742    therapeutic multivitamin (THERAGRAN) tablet 1 Tablet  1 Tablet Oral DAILY Carla Lutz MD   1 Tablet at 11/04/21 0817    levETIRAcetam (KEPPRA) oral solution 750 mg  750 mg Oral BID Darío Sahu MD   750 mg at 11/04/21 0816    sodium chloride (NS) flush 5-40 mL  5-40 mL IntraVENous Q8H Marycruz Johnson MD   10 mL at 11/04/21 0537    sodium chloride (NS) flush 5-40 mL  5-40 mL IntraVENous PRN Marycruz Johnson MD        acetaminophen (TYLENOL) tablet 650 mg  650 mg Oral Q6H PRN Marycruz Johnson MD        Or   Jamesetta Medal acetaminophen (TYLENOL) suppository 650 mg  650 mg Rectal Q6H PRN Marycruz Johnson MD        polyethylene glycol (MIRALAX) packet 17 g  17 g Oral DAILY PRN Marycruz Johnson MD        ondansetron (ZOFRAN ODT) tablet 4 mg  4 mg Oral Q8H PRN Marycruz Johnson MD   4 mg at 11/02/21 1219    Or    ondansetron (ZOFRAN) injection 8 mg  8 mg IntraVENous Q6H PRN Marycruz Johnson MD   8 mg at 10/22/21 2137    enoxaparin (LOVENOX) injection 40 mg  40 mg SubCUTAneous DAILY Marycruz Johnson MD   40 mg at 11/04/21 0819    thiamine HCL (B-1) tablet 100 mg  100 mg Oral DAILY Darío Sahu MD   100 mg at 11/04/21 0817       Vital signs:    Visit Vitals  BP 92/66 (BP 1 Location: Left upper arm, BP Patient Position: At rest)   Pulse (!) 111   Temp 97.1 °F (36.2 °C)   Resp 16   Ht 5' (1.524 m)   Wt 66.2 kg (146 lb)   LMP 04/01/2019 Comment: unknown   SpO2 97%   BMI 28.51 kg/m²             Objective:   General: awake and alert, she appears uncomfortable due to nause  Chest: no labored breathing.   Mental status: Awake, alert, oriented to self and situation  Speech and languge: fluent, coherent,   and comprehension intact            Assessment:    A 64 y.o.  female patient with medical history of HSV encephalitis in June 2021 [prolonged admission at [facility]], seizure from HSV on Keppra who came to the hospital for nausea, vomiting, generalized weakness, blurring of vision, diplopia, and altered mental status. MRI of the brain showed old lesions possibly from her previous HSV encephalitis; no acute stroke or new lesions. Had a lumbar puncture: CSF profile unremarkable. HSV PCR negative. NMDA ab is negative. Her thiamine level did come back low which suggests this was probably the cause of her opthalmoplegial Although her CSF studies were essentially benign, her oligoclonal bands did come back elevated suggesting some continued immune activation. 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  Sh[facility] A[facility] 36. Neurophysiology  Neuromuscular Medicine

## 2021-11-04 NOTE — PROGRESS NOTES
Hospitalist Progress Note    Subjective:   Daily Progress Note: 11/4/2021    Patient is more awake today. Does not have good eye contact and has poor concentration. Denies any headaches or dizziness. No chest pain abdominal pain. No nausea or vomiting currently but intermittent nausea present. She understands that she wants to eat but she says she cannot eat as she does not have desire to eat. Continues to have visual disturbances. Current Facility-Administered Medications   Medication Dose Route Frequency    clonazePAM (KlonoPIN) tablet 0.25 mg  0.25 mg Oral TID    [START ON 11/5/2021] predniSONE (DELTASONE) tablet 60 mg  60 mg Oral DAILY WITH BREAKFAST    pantoprazole (PROTONIX) tablet 40 mg  40 mg Oral ACB&D    mirtazapine (REMERON SOL-TAB) disintegrating tablet 15 mg  15 mg Oral QHS    phenol throat spray (CHLORASEPTIC) 1 Spray  1 Spray Oral PRN    therapeutic multivitamin (THERAGRAN) tablet 1 Tablet  1 Tablet Oral DAILY    levETIRAcetam (KEPPRA) oral solution 750 mg  750 mg Oral BID    sodium chloride (NS) flush 5-40 mL  5-40 mL IntraVENous Q8H    sodium chloride (NS) flush 5-40 mL  5-40 mL IntraVENous PRN    acetaminophen (TYLENOL) tablet 650 mg  650 mg Oral Q6H PRN    Or    acetaminophen (TYLENOL) suppository 650 mg  650 mg Rectal Q6H PRN    polyethylene glycol (MIRALAX) packet 17 g  17 g Oral DAILY PRN    ondansetron (ZOFRAN ODT) tablet 4 mg  4 mg Oral Q8H PRN    Or    ondansetron (ZOFRAN) injection 8 mg  8 mg IntraVENous Q6H PRN    enoxaparin (LOVENOX) injection 40 mg  40 mg SubCUTAneous DAILY    thiamine HCL (B-1) tablet 100 mg  100 mg Oral DAILY        Review of Systems  Pertinent items are noted in HPI.     Objective:     Visit Vitals  BP (!) 96/59 (BP 1 Location: Right upper arm, BP Patient Position: At rest)   Pulse (!) 106   Temp 98.5 °F (36.9 °C)   Resp 16   Ht 5' (1.524 m)   Wt 66.2 kg (146 lb)   LMP 04/01/2019 Comment: unknown   SpO2 99%   BMI 28.51 kg/m²      O2 Device: None (Room air)    Temp (24hrs), Av.5 °F (36.9 °C), Min:98.1 °F (36.7 °C), Max:98.7 °F (37.1 °C)      No intake/output data recorded.  1901 -  0700  In: -   Out: 600 [Urine:600]    General appearance - alert, cachetic, poor eye contact and in no distress  Chest -clear air entry noted in bases, no wheezes  Heart - S1 and S2 normal  Abdomen - soft, nontender, nondistended, Bowel sounds present  Neurological -alert, moves all extremities, no tremors noted. Musculoskeletal - no joint tenderness or erythema of knees bilaterally  Extremities - no pedal edema noted      Data Review    Recent Results (from the past 24 hour(s))   PHOSPHORUS    Collection Time: 21  3:32 AM   Result Value Ref Range    Phosphorus 4.5 2.5 - 4.9 MG/DL   CBC WITH AUTOMATED DIFF    Collection Time: 21  3:32 AM   Result Value Ref Range    WBC 6.9 4.6 - 13.2 K/uL    RBC 2.77 (L) 4.20 - 5.30 M/uL    HGB 8.6 (L) 12.0 - 16.0 g/dL    HCT 26.5 (L) 35.0 - 45.0 %    MCV 95.7 78.0 - 100.0 FL    MCH 31.0 24.0 - 34.0 PG    MCHC 32.5 31.0 - 37.0 g/dL    RDW 16.0 (H) 11.6 - 14.5 %    PLATELET 264 837 - 144 K/uL    MPV 9.8 9.2 - 11.8 FL    NEUTROPHILS 60 40 - 73 %    LYMPHOCYTES 27 21 - 52 %    MONOCYTES 10 3 - 10 %    EOSINOPHILS 1 0 - 5 %    BASOPHILS 0 0 - 2 %    ABS. NEUTROPHILS 4.2 1.8 - 8.0 K/UL    ABS. LYMPHOCYTES 1.9 0.9 - 3.6 K/UL    ABS. MONOCYTES 0.7 0.05 - 1.2 K/UL    ABS. EOSINOPHILS 0.1 0.0 - 0.4 K/UL    ABS. BASOPHILS 0.0 0.0 - 0.1 K/UL    DF AUTOMATED           Assessment/Plan:     ASSESSMENT:    1. Adult failure to thrive  2. Multiple electrolyte disturbances, better currently  3. Intermittent vomiting with nausea could be due to central vertigo  4. Recent history of herpes encephalitis s/p treatment  5. 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 6.  Chronic mild hyponatremia due to nausea  7. Poor appetite and severe protein calorie malnutrition  8. GERD  9. Dyslipidemia  10 history of seizure disorder    PLAN:    ID input noted about not needing for another HSV treatment  Neurology input noted about starting patient on prednisone for possible treatment of post HSV autoimmune encephalopathy  Patient will be started on low-dose of midodrine and we will go down on clonazepam in order to avoid hypotension  Discussed with patient's  at bedside and sister over the phone about patient needing PEG tube  Discussed with GI nurse practitioner to schedule her for PEG tube placement as soon as possible  Continue Remeron and multivitamin for poor appetite      Total time to take care of this patient was 30 minutes and more than 50% of time was spent counseling and coordinating care. Disclaimer: Sections of this note are dictated using utilizing voice recognition software, which may have resulted in some phonetic based errors in grammar and contents. Even though attempts were made to correct all the mistakes, some may have been missed, and remained in the body of the document. If questions arise, please contact our department.

## 2021-11-04 NOTE — ROUTINE PROCESS
Bedside and Verbal shift change report given to Tate Nuñez (oncoming nurse) by Suad Rooney (offgoing nurse). Report included the following information SBAR, Kardex, Procedure Summary, Intake/Output, MAR and Recent Results.

## 2021-11-04 NOTE — MED STUDENT NOTES
*ATTENTION:  This note has been created by a medical student for educational purposes only. Please do not refer to the content of this note for clinical decision-making, billing, or other purposes. Please see attending physicians note to obtain clinical information on this patient. *       Subjective   Patient reports increased energy level and strength as compared to yesterday. She mentions a left frontal headache and some dizziness due to her positional vertigo. Patient reports her appetite has not improved. Patient denies any chest pain, shortness of breath, leg pain, or cough.     Objective  PE:     General appearance: female alert and laying in bed in no acute distress   CV: Normal S1 and S2, no murmur, rubs, or gallops  Pulmonary: clear to auscultation in all fields, no tenderness to palpation  Extremities: no calf pain or pedal edema  Abdomen: Normoactive bowel sounds, no tenderness to palpation, non distended  Skin: no rashes or lesions  Neuro: alert and oriented x3     Assessment  1. Failure to thrive  2. Multiple electrolyte abnormalities: Hypokalemia, hypomagnesemia, hyponatremia, hypophosphatemia  3. Transaminitis/elevated LFTs  Intractable nausea and vomiting  4. GERD  5. Vitamin D deficiency  6. Hyperlipidemia  7. Severe protein calorie malnutrition  8. Recent history of HSV encephalitis  9. History of single seizure      Plan  Patient reports some increased strength and is eating small amount. Look towards discussion on NG tube placement. Energy level and alterness look good today.

## 2021-11-04 NOTE — PROGRESS NOTES
Problem: Self Care Deficits Care Plan (Adult)  Goal: *Acute Goals and Plan of Care (Insert Text)  Description: Occupational Therapy Goals  Initiated 10/21/2021 and updated 10/29/21 within 7 day(s). 1.  Patient will perform bed mobility in preparation for selfcare with modified independence. 2.  Patient will perform grooming task/functional activity standing for 4-7 minutes with supervision/set-up, F+ balance. 3.  Patient will perform lower body dressing with supervision/set-up seated and standing using AE prn (Pt has reacher at home). 4.  Patient will perform toilet transfers with supervision/set-up. 5.  Patient will perform all aspects of toileting with supervision/set-up. 6.  Patient will participate in upper extremity therapeutic exercise/activities with modified independence for 8 minutes. 7.  Patient will utilize energy conservation techniques during functional activities with verbal cues. Prior Level of Function: Patient was independent with self-care and functional mobility PTA. Patient's spouse reports she would walk unsteady at home d/t vertigo and for the stairs she would bump up/down on her bottom  Outcome: Progressing Towards Goal   OCCUPATIONAL THERAPY TREATMENT    Patient: Diana Hanks (48 y.o. female)  Date: 11/4/2021  Diagnosis: Vomiting [R11.10]   <principal problem not specified>       Precautions: Fall  PLOF: Patient was independent with self-care and functional mobility PTA. Patient's spouse reports she would walk unsteady at home d/t vertigo and for the stairs she would bump up/down on her bottom    Chart, occupational therapy assessment, plan of care, and goals were reviewed. ASSESSMENT:  PT co tx to maximize pt safety and participation. Pt alert this date however continues to demo confusion. She benefits from vc's and tactile cueing throughout session to initiate transitions.  Pt presented w/ REGINA feet off bed upon therapist arrival. She was able to transition to EOB with CGA utilizing SR in prep for functional tasks. Once sitting EOB she demo G balance and engaged in grooming task brushing her hair with S/U. STS transfer CGA with RW requiring vc's for proper hand placement. Pt maneuvered into BR and performed toilet transfer with CGA utilizing grab bar. She performed toileting w/ Supervision while seated then required CGA while in stance for clothing mgmt. Once toileting routine completed she came to sink side and stood ~ 6 mins and performed simple grooming tasks w/ SBA requiring 1 UE support. Pt returned to reclining chair at end of session. She was left with B LE's elevated, chair alarm active, and all needs left within reach. RN made aware of pt's participation and position. Progression toward goals:  []          Improving appropriately and progressing toward goals  [x]          Improving slowly and progressing toward goals  []          Not making progress toward goals and plan of care will be adjusted     PLAN:  Patient continues to benefit from skilled intervention to address the above impairments. Continue treatment per established plan of care. Discharge Recommendations:  Rehab   Further Equipment Recommendations for Discharge:  RW     SUBJECTIVE:   Patient stated  I have a [de-identified] year old son. \"     OBJECTIVE DATA SUMMARY:   Cognitive/Behavioral Status:  Neurologic State: Alert, Confused  Orientation Level: Oriented X4  Cognition: Follows commands  Safety/Judgement: Fall prevention    Functional Mobility and Transfers for ADLs:   Bed Mobility:     Supine to Sit: Contact guard assistance; Bed Modified;  Additional time     Scooting: Contact guard assistance (sitting EOB )   Transfers:  Sit to Stand: Contact guard assistance; Minimum assistance  Stand to Sit: Contact guard assistance      Toilet Transfer : Contact guard assistance           Bathroom Mobility: Contact guard assistance (w/ RW)        Balance:  Sitting: Intact  Sitting - Static: Good (unsupported)  Sitting - Dynamic: Good (unsupported)  Standing: Impaired; With support  Standing - Static: Good  Standing - Dynamic : Fair    ADL Intervention:       Grooming  Position Performed: Standing  Washing Hands: Stand-by assistance  Brushing Teeth: Stand-by assistance      Lower Body Dressing Assistance  Protective Undergarmet: Contact guard assistance  Cues: Verbal cues provided  Adaptive Equipment Used: Grab bar; Walker    Toileting  Bladder Hygiene: Supervision (seated )  Clothing Management: Contact guard assistance  Cues: Verbal cues provided  Adaptive Equipment: Grab bars; Walker      Pain:  Pain level pre-treatment: 0/10   Pain level post-treatment: 0/10    Activity Tolerance:    Fair   Please refer to the flowsheet for vital signs taken during this treatment. After treatment:   [x]  Patient left in no apparent distress sitting up in chair  []  Patient left in no apparent distress in bed  [x]  Call bell left within reach  [x]  Nursing notified  []  Caregiver present  [x]  Chair alarm activated    COMMUNICATION/EDUCATION:   [x] Role of Occupational Therapy in the acute care setting  [] Home safety education was provided and the patient/caregiver indicated understanding. [x] Patient/family have participated as able in working towards goals and plan of care. [x] Patient/family agree to work toward stated goals and plan of care. [] Patient understands intent and goals of therapy, but is neutral about his/her participation. [] Patient is unable to participate in goal setting and plan of care.       Thank you for this referral.  JUNIOR Woodson  Time Calculation: 38 mins

## 2021-11-04 NOTE — PROGRESS NOTES
[email or web address]  [phone]    Gastroenterology follow up-Progress note    Impression:  1. Intractable nausea w/ vomiting  - EGD 9/22/2021 neg, CT 10/14/2021 neg, MRI head as noted below  2. Failure to Thrive  3. HSV encephalitis  4. Elevated LFT - last drawn 10/20 , , , tBili 2.9     MRI Brain 10/21/2021  IMPRESSION   Evidence for what appears to be now remote vascular injury to the right MCA  right and left TA distributionmore severe on the right with associated volume loss and localized expansion of the ventricular system No acute infarction no evidence of acute intracranial enhancement seen Pattern best fits with a atherosclerotic vascular event however focal area of  meningitis or even severe encephalitis consent times stimulate vascular  occlusion    Plan:  1. PEG tomorrow  2. NPO after midnight  3. COVID screening ordered  4. Hold Lovenox after midnight   5. Continue medical management per primary team    Chief Complaint: Nausea/vomiting, failure to thrive      Subjective:  Seen with  at bedside. Discussed risks/benefits of PEG. They are aware that PEG will not address her nausea/vomiting but will hopefully improve her nutritional status. Per , her vomiting has improved, no episodes in at least several days. ROS: Denies any fevers, chills, rash.        General: well developed, well nourished, no acute distress  Eyes: conjunctiva normal, EOM normal  Cardiovascular: heart normal, intact distal pulses, normal rate and regular rhythm  Pulmonary: breath sounds normal and effort normal  Abdominal: appearance normal, bowel sounds normal and soft, non-acute, non-tender    Patient Active Problem List   Diagnosis Code    Vomiting R11.10    Severe protein-calorie malnutrition (HCC) E43         Visit Vitals  BP 92/66 (BP 1 Location: Left upper arm, BP Patient Position: At rest)   Pulse (!) 111   Temp 97.1 °F (36.2 °C)   Resp 16   Ht 5' (1.524 m)   Wt 66.2 kg (146 lb)   LMP 04/01/2019 Comment: unknown   SpO2 97%   BMI 28.51 kg/m²         No intake or output data in the 24 hours ending 11/04/21 1508    CBC w/Diff    Lab Results   Component Value Date/Time    WBC 6.9 11/04/2021 03:32 AM    RBC 2.77 (L) 11/04/2021 03:32 AM    HGB 8.6 (L) 11/04/2021 03:32 AM    HCT 26.5 (L) 11/04/2021 03:32 AM    MCV 95.7 11/04/2021 03:32 AM    MCH 31.0 11/04/2021 03:32 AM    MCHC 32.5 11/04/2021 03:32 AM    RDW 16.0 (H) 11/04/2021 03:32 AM     11/04/2021 03:32 AM    Lab Results   Component Value Date/Time    GRANS 60 11/04/2021 03:32 AM    LYMPH 27 11/04/2021 03:32 AM    EOS 1 11/04/2021 03:32 AM    BASOS 0 11/04/2021 03:32 AM      Basic Metabolic Profile   Recent Labs     11/04/21  0332 11/03/21  0229 11/03/21 0229   NA  --   --  132*   K  --   --  3.8   CL  --   --  99*   CO2  --   --  26   BUN  --   --  12   CA  --   --  8.7   PHOS 4.5   < > 3.8    < > = values in this interval not displayed. Hepatic Function    Lab Results   Component Value Date/Time    ALB 3.2 (L) 10/20/2021 01:12 PM    TP 6.8 10/20/2021 01:12 PM     (H) 10/20/2021 01:12 PM    No results found for: TBIL       Coags   No results for input(s): PTP, INR, APTT, INREXT in the last 72 hours. LAUREN Kapadia  11/04/21, 3:14 PM   [facility]. [email or web address]  Phone: [phone]  Pager: [phone]

## 2021-11-05 ENCOUNTER — ANESTHESIA EVENT (OUTPATIENT)
Dept: ENDOSCOPY | Age: 56
DRG: 056 | End: 2021-11-05
Payer: COMMERCIAL

## 2021-11-05 ENCOUNTER — APPOINTMENT (OUTPATIENT)
Dept: INTERVENTIONAL RADIOLOGY/VASCULAR | Age: 56
DRG: 056 | End: 2021-11-05
Attending: INTERNAL MEDICINE
Payer: COMMERCIAL

## 2021-11-05 LAB
ANION GAP SERPL CALC-SCNC: 8 MMOL/L (ref 3–18)
BUN SERPL-MCNC: 8 MG/DL (ref 7–18)
BUN/CREAT SERPL: 17 (ref 12–20)
CALCIUM SERPL-MCNC: 9.3 MG/DL (ref 8.5–10.1)
CHLORIDE SERPL-SCNC: 98 MMOL/L (ref 100–111)
CO2 SERPL-SCNC: 27 MMOL/L (ref 21–32)
CREAT SERPL-MCNC: 0.47 MG/DL (ref 0.6–1.3)
GLUCOSE SERPL-MCNC: 111 MG/DL (ref 74–99)
PHOSPHATE SERPL-MCNC: 3 MG/DL (ref 2.5–4.9)
POTASSIUM SERPL-SCNC: 4.3 MMOL/L (ref 3.5–5.5)
SODIUM SERPL-SCNC: 133 MMOL/L (ref 136–145)

## 2021-11-05 PROCEDURE — 74011000250 HC RX REV CODE- 250: Performed by: INTERNAL MEDICINE

## 2021-11-05 PROCEDURE — 74011000258 HC RX REV CODE- 258: Performed by: INTERNAL MEDICINE

## 2021-11-05 PROCEDURE — 76937 US GUIDE VASCULAR ACCESS: CPT

## 2021-11-05 PROCEDURE — 80048 BASIC METABOLIC PNL TOTAL CA: CPT

## 2021-11-05 PROCEDURE — 99232 SBSQ HOSP IP/OBS MODERATE 35: CPT | Performed by: INTERNAL MEDICINE

## 2021-11-05 PROCEDURE — 2709999900 HC NON-CHARGEABLE SUPPLY

## 2021-11-05 PROCEDURE — 74011250636 HC RX REV CODE- 250/636: Performed by: INTERNAL MEDICINE

## 2021-11-05 PROCEDURE — 74011250637 HC RX REV CODE- 250/637: Performed by: PSYCHIATRY & NEUROLOGY

## 2021-11-05 PROCEDURE — 74011636637 HC RX REV CODE- 636/637: Performed by: PSYCHIATRY & NEUROLOGY

## 2021-11-05 PROCEDURE — 74011250637 HC RX REV CODE- 250/637: Performed by: FAMILY MEDICINE

## 2021-11-05 PROCEDURE — 74011250637 HC RX REV CODE- 250/637: Performed by: INTERNAL MEDICINE

## 2021-11-05 PROCEDURE — 84100 ASSAY OF PHOSPHORUS: CPT

## 2021-11-05 PROCEDURE — 65270000029 HC RM PRIVATE

## 2021-11-05 PROCEDURE — 36415 COLL VENOUS BLD VENIPUNCTURE: CPT

## 2021-11-05 RX ORDER — DEXTROSE MONOHYDRATE AND SODIUM CHLORIDE 5; .9 G/100ML; G/100ML
50 INJECTION, SOLUTION INTRAVENOUS CONTINUOUS
Status: DISCONTINUED | OUTPATIENT
Start: 2021-11-05 | End: 2021-11-06

## 2021-11-05 RX ORDER — IPRATROPIUM BROMIDE AND ALBUTEROL SULFATE 2.5; .5 MG/3ML; MG/3ML
3 SOLUTION RESPIRATORY (INHALATION)
Status: DISCONTINUED | OUTPATIENT
Start: 2021-11-05 | End: 2021-11-12

## 2021-11-05 RX ADMIN — THERA TABS 1 TABLET: TAB at 09:38

## 2021-11-05 RX ADMIN — Medication 10 ML: at 00:01

## 2021-11-05 RX ADMIN — LEVETIRACETAM 750 MG: 100 SOLUTION ORAL at 17:14

## 2021-11-05 RX ADMIN — DEXTROSE MONOHYDRATE AND SODIUM CHLORIDE 50 ML/HR: 5; .9 INJECTION, SOLUTION INTRAVENOUS at 14:00

## 2021-11-05 RX ADMIN — PANTOPRAZOLE SODIUM 40 MG: 40 TABLET, DELAYED RELEASE ORAL at 06:44

## 2021-11-05 RX ADMIN — PANTOPRAZOLE SODIUM 40 MG: 40 TABLET, DELAYED RELEASE ORAL at 17:15

## 2021-11-05 RX ADMIN — Medication 10 ML: at 19:37

## 2021-11-05 RX ADMIN — Medication 100 MG: at 09:38

## 2021-11-05 RX ADMIN — MIDODRINE HYDROCHLORIDE 2.5 MG: 5 TABLET ORAL at 09:38

## 2021-11-05 RX ADMIN — MIDODRINE HYDROCHLORIDE 2.5 MG: 5 TABLET ORAL at 17:15

## 2021-11-05 RX ADMIN — CLONAZEPAM 0.25 MG: 0.5 TABLET ORAL at 09:37

## 2021-11-05 RX ADMIN — MIRTAZAPINE 15 MG: 15 TABLET, ORALLY DISINTEGRATING ORAL at 21:41

## 2021-11-05 RX ADMIN — Medication 10 ML: at 21:41

## 2021-11-05 RX ADMIN — ENOXAPARIN SODIUM 40 MG: 100 INJECTION SUBCUTANEOUS at 09:38

## 2021-11-05 RX ADMIN — LEVETIRACETAM 750 MG: 100 SOLUTION ORAL at 09:37

## 2021-11-05 RX ADMIN — PREDNISONE 60 MG: 20 TABLET ORAL at 09:38

## 2021-11-05 NOTE — PROGRESS NOTES
[email or web address]  [phone]    Gastroenterology follow up-Progress note    Impression:  1. Intractable nausea w/ vomiting - suspected central cause, tolerating PO, no vomiting in about a week  - EGD 9/22/2021 neg, CT 10/14/2021 neg, MRI head as noted below  2. Failure to Thrive  3. Protein calorie malnutrition  4. HSV encephalitis - neuro following  5. Elevated LFT - last drawn 10/20 , , , tBili 2.9    Plan:  1. PEG tomorrow, posted to OR schedule  2. NPO after midnight  3. COVID screening completed 11/4, negative  4. Hold Lovenox after midnight   5. Continue medical management per primary team    Chief Complaint: Nausea/vomiting, failure to thrive      Subjective:  PEG scheduled for today unable to be completed due to lack of IV access, attempted x7 in pre-op by multiple providers including use of vein finder. Subsequently IV access has been obtained. Discussed w/ Dr. Morteza King, procedure scheduled for tomorrow morning as to not delay her discharge. ROS: Denies any fevers, chills, rash.        General: well developed, well nourished, no acute distress  Eyes: conjunctiva normal, disconjugate gaze  Cardiovascular: heart normal, intact distal pulses, normal rate and regular rhythm  Pulmonary: breath sounds normal and effort normal  Abdominal: appearance normal, bowel sounds normal and soft, non-acute, non-tender    Patient Active Problem List   Diagnosis Code    Vomiting R11.10    Severe protein-calorie malnutrition (HCC) E43         Visit Vitals  /73   Pulse 86   Temp 98.4 °F (36.9 °C)   Resp 18   Ht 5' (1.524 m)   Wt 66.2 kg (146 lb)   LMP 04/01/2019 Comment: unknown   SpO2 97%   BMI 28.51 kg/m²           Intake/Output Summary (Last 24 hours) at 11/5/2021 1503  Last data filed at 11/4/2021 2101  Gross per 24 hour   Intake 120 ml   Output    Net 120 ml       CBC w/Diff    Lab Results   Component Value Date/Time    WBC 6.9 11/04/2021 03:32 AM    RBC 2.77 (L) 11/04/2021 03:32 AM    HGB 8.6 (L) 11/04/2021 03:32 AM    HCT 26.5 (L) 11/04/2021 03:32 AM    MCV 95.7 11/04/2021 03:32 AM    MCH 31.0 11/04/2021 03:32 AM    MCHC 32.5 11/04/2021 03:32 AM    RDW 16.0 (H) 11/04/2021 03:32 AM     11/04/2021 03:32 AM    Lab Results   Component Value Date/Time    GRANS 60 11/04/2021 03:32 AM    LYMPH 27 11/04/2021 03:32 AM    EOS 1 11/04/2021 03:32 AM    BASOS 0 11/04/2021 03:32 AM      Basic Metabolic Profile   Recent Labs     11/05/21  0311   *   K 4.3   CL 98*   CO2 27   BUN 8   CA 9.3   PHOS 3.0        Hepatic Function    Lab Results   Component Value Date/Time    ALB 3.2 (L) 10/20/2021 01:12 PM    TP 6.8 10/20/2021 01:12 PM     (H) 10/20/2021 01:12 PM    No results found for: TBIL       Coags   No results for input(s): PTP, INR, APTT, INREXT in the last 72 hours. LAUREN Drummond  11/05/21, 3:21 PM   [facility]. [email or web address]  Phone: [phone]  Pager: [phone]

## 2021-11-05 NOTE — PROGRESS NOTES
conducted a Follow up consultation and Spiritual Assessment for Allen Boland, who is a 64 y.o.  female. The  provided the following Interventions:  Continued the relationship of care and support. Listened empathically as she shared concerns related to her health. Assured her I would be trusting for her best in her upcoming procedure. Chart reviewed. The following outcomes were achieved:  Patient expressed gratitude for 's visit. Assessment:  There are no further spiritual or Advent issues which require Spiritual Care Services interventions at this time. Plan:  Chaplains will continue to follow and will provide pastoral care on an as needed/requested basis.  recommends bedside caregivers page  on duty if patient shows signs of acute spiritual or emotional distress.        [address] [place]   [phone]

## 2021-11-05 NOTE — ROUTINE PROCESS
TRANSFER - OUT REPORT:    Verbal report given to Celine(name) on Sheng Huitron  being transferred to [address] [place] [address](unit) for ordered procedure     Report consisted of patients Situation, Background, Assessment and   Recommendations(SBAR). Information from the following report(s) SBAR was reviewed with the receiving nurse. Lines:       Opportunity for questions and clarification was provided.       Patient transported with:   [employer]

## 2021-11-05 NOTE — ROUTINE PROCESS
TRANSFER - IN REPORT:    Verbal report received from [address] [place] [address],[address], RN on Tacey Jason  being received from [address] for ordered procedure      Report consisted of patients Situation, Background, Assessment and   Recommendations(SBAR). Information from the following report(s) SBAR was reviewed with the receiving nurse. Opportunity for questions and clarification was provided. Assessment completed upon patients arrival to unit and care assumed.

## 2021-11-05 NOTE — CONSULTS
Nutrition Assessment     Type and Reason for Visit: Reassess, Consult    Nutrition Recommendations/Plan:   - Allow pt oral diet as desired/tolerated. Discontinue oral supplements due to poor intake. - Once NGT placement confirmed, start tube feeding of Jevity 1.5 at goal rate of 40 mL/hr with 30 mL q 6 hour water flushes. (goal enteral regimen to provide: 1440 kcal, 61 gm protein, 730 mL free water, 96% RDIs). - IVF per MD.    Nutrition Assessment:  NPO since midnight for PEG placement due to inadequate po intake and severe malnutrition. PEG placement rescheduled due to lack of IV access today. Plan for NGT placement today and consulted to start tube feeds- verbal order from MD to resume oral diet to allow pt to eat if desired. Plan for PICC line placement today. Discussed with MD & RN along with family at bedside. Previously tolerating TF via NGT 10/30-11/2. Malnutrition Assessment:  Malnutrition Status: Severe malnutrition     Estimated Daily Nutrient Needs:  Energy (kcal):  1408-6313  Protein (g):         Fluid (ml/day):  4094-2317    Nutrition Related Findings:  Formed BM 11/4. Pertinent medications: steroid, MVI & thiamine. Hyponatremia.       Additional Caloric Sources: D5 NS at 50 mL/hr (60 gm dextrose, 204 kcal per day)    Current Nutrition Therapies:  ADULT ORAL NUTRITION SUPPLEMENT Breakfast, Lunch, Dinner; Standard High Calorie/High Protein  DIET NPO Sips of Water with Meds    Anthropometric Measures:  · Height:  5' (152.4 cm)  · Current Body Wt:  67.1 kg (147 lb 14.9 oz)  · BMI: 28.9    Nutrition Diagnosis:   · Severe malnutrition, In context of chronic illness related to inadequate protein-energy intake (decreased appetite) as evidenced by poor intake prior to admission, weight loss greater than or equal to 10% in 6 months    Nutrition Intervention:  Food and/or Nutrient Delivery: Start oral diet, Start tube feeding, Modify oral nutrition supplement, Vitamin supplement  Nutrition Education and Counseling: No recommendations at this time, Education not indicated  Coordination of Nutrition Care: Continue to monitor while inpatient (nutritional plan discussed with RN and MD, verbal orders received)    Goals:  Nutritional needs will be met through adequate oral intake and/or nutrition support within the next 7 days.        Nutrition Monitoring and Evaluation:   Behavioral-Environmental Outcomes: None identified  Food/Nutrient Intake Outcomes: Food and nutrient intake, Supplement intake, Enteral nutrition intake/tolerance, Vitamin/mineral intake  Physical Signs/Symptoms Outcomes: Biochemical data, GI status, Nausea/vomiting, Meal time behavior, Nutrition focused physical findings    Discharge Planning:    Continue current diet, Enteral nutrition (pending PEG placement)     Electronically signed by Marco Antonio Burleson RD on 11/5/2021 at 2:54 PM    Contact Number: [phone]

## 2021-11-05 NOTE — PROGRESS NOTES
Unable to see pt for OT re-evaluation at this time due to:    Pt is occupied with nursing on 1st attempt, and now leaving the floor for PICC line placement. Will follow up later as pt's schedule allows.  Thank you for this referral.    Deanne Aviles MS, OTR/L

## 2021-11-05 NOTE — ROUTINE PROCESS
TRANSFER - IN REPORT:    Verbal report received from [place] RN(name) on Rowan Lori  being received from [address] [place] [address](unit) for ordered procedure      Report consisted of patients Situation, Background, Assessment and   Recommendations(SBAR). Information from the following report(s) SBAR and Kardex was reviewed with the receiving nurse. Opportunity for questions and clarification was provided. Assessment completed upon patients arrival to unit and care assumed.

## 2021-11-05 NOTE — PROGRESS NOTES
Hospitalist Progress Note    Subjective:   Daily Progress Note: 11/5/2021    Patient is very sleepy when I saw her. She got clonazepam earlier today. She went for PEG tube placement but it could not be done because patient lost her IV line per nursing. Patient opens her eyes on tactile commands but cannot provide any meaningful history. Current Facility-Administered Medications   Medication Dose Route Frequency    predniSONE (DELTASONE) tablet 60 mg  60 mg Oral DAILY WITH BREAKFAST    ceFAZolin (ANCEF) 2 g in sterile water (preservative free) 20 mL IV syringe  2 g IntraVENous ONCE    midodrine (PROAMATINE) tablet 2.5 mg  2.5 mg Oral BID WITH MEALS    meclizine (ANTIVERT) tablet 12.5 mg  12.5 mg Oral Q6H PRN    pantoprazole (PROTONIX) tablet 40 mg  40 mg Oral ACB&D    mirtazapine (REMERON SOL-TAB) disintegrating tablet 15 mg  15 mg Oral QHS    phenol throat spray (CHLORASEPTIC) 1 Spray  1 Spray Oral PRN    therapeutic multivitamin (THERAGRAN) tablet 1 Tablet  1 Tablet Oral DAILY    levETIRAcetam (KEPPRA) oral solution 750 mg  750 mg Oral BID    sodium chloride (NS) flush 5-40 mL  5-40 mL IntraVENous Q8H    sodium chloride (NS) flush 5-40 mL  5-40 mL IntraVENous PRN    acetaminophen (TYLENOL) tablet 650 mg  650 mg Oral Q6H PRN    Or    acetaminophen (TYLENOL) suppository 650 mg  650 mg Rectal Q6H PRN    polyethylene glycol (MIRALAX) packet 17 g  17 g Oral DAILY PRN    ondansetron (ZOFRAN ODT) tablet 4 mg  4 mg Oral Q8H PRN    Or    ondansetron (ZOFRAN) injection 8 mg  8 mg IntraVENous Q6H PRN    enoxaparin (LOVENOX) injection 40 mg  40 mg SubCUTAneous DAILY    thiamine HCL (B-1) tablet 100 mg  100 mg Oral DAILY        Review of Systems  Pertinent items are noted in HPI.     Objective:     Visit Vitals  BP 97/66   Pulse 88   Temp 97.9 °F (36.6 °C)   Resp 20   Ht 5' (1.524 m)   Wt 66.2 kg (146 lb)   LMP 04/01/2019 Comment: unknown   SpO2 100%   BMI 28.51 kg/m²      O2 Device: None (Room air)    Temp (24hrs), Av °F (36.7 °C), Min:97.6 °F (36.4 °C), Max:98.3 °F (36.8 °C)      No intake/output data recorded.  1901 -  0700  In: 120 [P.O.:120]  Out: -     General appearance -sleepy, cachetic, not in any acute distress  Chest - diminished air entry noted in bases, no wheezes, poor inspiratory efforts  Heart - S1 and S2 normal  Abdomen - soft, nontender, nondistended, Bowel sounds present  Neurological -sleepy but opens eyes on tactile commands, moves extremities on tactile commands  Musculoskeletal - no joint tenderness or erythema of knees bilaterally  Extremities - no pedal edema noted      Data Review    Recent Results (from the past 24 hour(s))   COVID-19 RAPID TEST    Collection Time: 21  6:50 PM   Result Value Ref Range    Specimen source Nasopharyngeal      COVID-19 rapid test Not detected NOTD     PHOSPHORUS    Collection Time: 21  3:11 AM   Result Value Ref Range    Phosphorus 3.0 2.5 - 4.9 MG/DL   METABOLIC PANEL, BASIC    Collection Time: 21  3:11 AM   Result Value Ref Range    Sodium 133 (L) 136 - 145 mmol/L    Potassium 4.3 3.5 - 5.5 mmol/L    Chloride 98 (L) 100 - 111 mmol/L    CO2 27 21 - 32 mmol/L    Anion gap 8 3.0 - 18 mmol/L    Glucose 111 (H) 74 - 99 mg/dL    BUN 8 7.0 - 18 MG/DL    Creatinine 0.47 (L) 0.6 - 1.3 MG/DL    BUN/Creatinine ratio 17 12 - 20      GFR est AA >60 >60 ml/min/1.73m2    GFR est non-AA >60 >60 ml/min/1.73m2    Calcium 9.3 8.5 - 10.1 MG/DL         Assessment/Plan:     ASSESSMENT:    1. Adult failure to thrive  2. Multiple electrolyte disturbances, better currently  3. Intermittent vomiting with nausea could be due to central vertigo  4. Recent history of herpes encephalitis s/p treatment  5. 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 6.  Chronic mild hyponatremia due to nausea  7. Poor appetite and severe protein calorie malnutrition  8. GERD  9.   Dyslipidemia  10 history of seizure disorder    PLAN:    Continue prednisone as started by neurologist.  Discussed with neurologist and we will stop clonazepam as it makes patient sleepy  Continue midodrine as needed  Discussed with GI nurse practitioner to try to schedule this patient for PEG tube placement this weekend as she got IV line. I also talked to interventional radiologist to put another IV line if possible. NG tube will be placed to start patient on tube feeding  Continue IV fluid  Check ABG to rule out CO2 narcosis  DuoNebs will be ordered  Continue Remeron and multivitamin for poor appetite    Anticipatory discharge: Once PEG tube is placed, patient can be discharged to skilled nursing facility 48 hours after G tube placement    Total time to take care of this patient was 30 minutes and more than 50% of time was spent counseling and coordinating care. Disclaimer: Sections of this note are dictated using utilizing voice recognition software, which may have resulted in some phonetic based errors in grammar and contents. Even though attempts were made to correct all the mistakes, some may have been missed, and remained in the body of the document. If questions arise, please contact our department.

## 2021-11-06 ENCOUNTER — APPOINTMENT (OUTPATIENT)
Dept: GENERAL RADIOLOGY | Age: 56
DRG: 056 | End: 2021-11-06
Attending: INTERNAL MEDICINE
Payer: COMMERCIAL

## 2021-11-06 ENCOUNTER — ANESTHESIA (OUTPATIENT)
Dept: ENDOSCOPY | Age: 56
DRG: 056 | End: 2021-11-06
Payer: COMMERCIAL

## 2021-11-06 PROBLEM — F06.31 DEPRESSION DUE TO PHYSICAL ILLNESS: Chronic | Status: ACTIVE | Noted: 2021-11-06

## 2021-11-06 LAB
ANION GAP SERPL CALC-SCNC: 7 MMOL/L (ref 3–18)
BASOPHILS # BLD: 0 K/UL (ref 0–0.1)
BASOPHILS NFR BLD: 0 % (ref 0–2)
BUN SERPL-MCNC: 7 MG/DL (ref 7–18)
BUN/CREAT SERPL: 27 (ref 12–20)
CALCIUM SERPL-MCNC: 8.4 MG/DL (ref 8.5–10.1)
CHLORIDE SERPL-SCNC: 104 MMOL/L (ref 100–111)
CO2 SERPL-SCNC: 26 MMOL/L (ref 21–32)
CREAT SERPL-MCNC: 0.26 MG/DL (ref 0.6–1.3)
DIFFERENTIAL METHOD BLD: ABNORMAL
EOSINOPHIL # BLD: 0 K/UL (ref 0–0.4)
EOSINOPHIL NFR BLD: 0 % (ref 0–5)
ERYTHROCYTE [DISTWIDTH] IN BLOOD BY AUTOMATED COUNT: 16.2 % (ref 11.6–14.5)
GLUCOSE SERPL-MCNC: 100 MG/DL (ref 74–99)
HCT VFR BLD AUTO: 24.2 % (ref 35–45)
HGB BLD-MCNC: 8 G/DL (ref 12–16)
LYMPHOCYTES # BLD: 0.9 K/UL (ref 0.9–3.6)
LYMPHOCYTES NFR BLD: 15 % (ref 21–52)
MCH RBC QN AUTO: 31.9 PG (ref 24–34)
MCHC RBC AUTO-ENTMCNC: 33.1 G/DL (ref 31–37)
MCV RBC AUTO: 96.4 FL (ref 78–100)
MONOCYTES # BLD: 0.5 K/UL (ref 0.05–1.2)
MONOCYTES NFR BLD: 9 % (ref 3–10)
NEUTS SEG # BLD: 4.6 K/UL (ref 1.8–8)
NEUTS SEG NFR BLD: 76 % (ref 40–73)
PHOSPHATE SERPL-MCNC: 3 MG/DL (ref 2.5–4.9)
PLATELET # BLD AUTO: 375 K/UL (ref 135–420)
PMV BLD AUTO: 9.4 FL (ref 9.2–11.8)
POTASSIUM SERPL-SCNC: 4.1 MMOL/L (ref 3.5–5.5)
RBC # BLD AUTO: 2.51 M/UL (ref 4.2–5.3)
SODIUM SERPL-SCNC: 137 MMOL/L (ref 136–145)
WBC # BLD AUTO: 6.1 K/UL (ref 4.6–13.2)

## 2021-11-06 PROCEDURE — 74011250637 HC RX REV CODE- 250/637: Performed by: PSYCHIATRY & NEUROLOGY

## 2021-11-06 PROCEDURE — 74011000258 HC RX REV CODE- 258: Performed by: INTERNAL MEDICINE

## 2021-11-06 PROCEDURE — 65270000029 HC RM PRIVATE

## 2021-11-06 PROCEDURE — 80048 BASIC METABOLIC PNL TOTAL CA: CPT

## 2021-11-06 PROCEDURE — 74011250636 HC RX REV CODE- 250/636: Performed by: INTERNAL MEDICINE

## 2021-11-06 PROCEDURE — 36592 COLLECT BLOOD FROM PICC: CPT

## 2021-11-06 PROCEDURE — 74018 RADEX ABDOMEN 1 VIEW: CPT

## 2021-11-06 PROCEDURE — 2709999900 HC NON-CHARGEABLE SUPPLY

## 2021-11-06 PROCEDURE — 85025 COMPLETE CBC W/AUTO DIFF WBC: CPT

## 2021-11-06 PROCEDURE — 74011250637 HC RX REV CODE- 250/637: Performed by: FAMILY MEDICINE

## 2021-11-06 PROCEDURE — 74011250637 HC RX REV CODE- 250/637: Performed by: INTERNAL MEDICINE

## 2021-11-06 PROCEDURE — 99231 SBSQ HOSP IP/OBS SF/LOW 25: CPT | Performed by: PSYCHIATRY & NEUROLOGY

## 2021-11-06 PROCEDURE — 99232 SBSQ HOSP IP/OBS MODERATE 35: CPT | Performed by: INTERNAL MEDICINE

## 2021-11-06 PROCEDURE — 84100 ASSAY OF PHOSPHORUS: CPT

## 2021-11-06 PROCEDURE — 77030008771 HC TU NG SALEM SUMP -A

## 2021-11-06 PROCEDURE — 74011636637 HC RX REV CODE- 636/637: Performed by: PSYCHIATRY & NEUROLOGY

## 2021-11-06 RX ORDER — THIAMINE HYDROCHLORIDE 100 MG/ML
250 INJECTION, SOLUTION INTRAMUSCULAR; INTRAVENOUS DAILY
Status: DISCONTINUED | OUTPATIENT
Start: 2021-11-07 | End: 2021-11-06

## 2021-11-06 RX ORDER — CHOLECALCIFEROL (VITAMIN D3) 125 MCG
5 CAPSULE ORAL
Status: DISCONTINUED | OUTPATIENT
Start: 2021-11-06 | End: 2021-11-24 | Stop reason: HOSPADM

## 2021-11-06 RX ORDER — LANOLIN ALCOHOL/MO/W.PET/CERES
100 CREAM (GRAM) TOPICAL DAILY
Status: DISCONTINUED | OUTPATIENT
Start: 2021-11-10 | End: 2021-11-08

## 2021-11-06 RX ORDER — LACOSAMIDE 10 MG/ML
100 SOLUTION ORAL EVERY 12 HOURS
Status: DISCONTINUED | OUTPATIENT
Start: 2021-11-06 | End: 2021-11-24 | Stop reason: HOSPADM

## 2021-11-06 RX ORDER — THIAMINE HYDROCHLORIDE 100 MG/ML
500 INJECTION, SOLUTION INTRAMUSCULAR; INTRAVENOUS DAILY
Status: DISCONTINUED | OUTPATIENT
Start: 2021-11-07 | End: 2021-11-06 | Stop reason: SDUPTHER

## 2021-11-06 RX ADMIN — LACOSAMIDE 100 MG: 10 SOLUTION ORAL at 22:15

## 2021-11-06 RX ADMIN — PANTOPRAZOLE SODIUM 40 MG: 40 TABLET, DELAYED RELEASE ORAL at 17:41

## 2021-11-06 RX ADMIN — MIDODRINE HYDROCHLORIDE 2.5 MG: 5 TABLET ORAL at 17:41

## 2021-11-06 RX ADMIN — MIRTAZAPINE 15 MG: 15 TABLET, ORALLY DISINTEGRATING ORAL at 22:16

## 2021-11-06 RX ADMIN — Medication 100 MG: at 10:46

## 2021-11-06 RX ADMIN — THERA TABS 1 TABLET: TAB at 10:46

## 2021-11-06 RX ADMIN — LEVETIRACETAM 750 MG: 100 SOLUTION ORAL at 10:46

## 2021-11-06 RX ADMIN — LACOSAMIDE 100 MG: 10 SOLUTION ORAL at 14:55

## 2021-11-06 RX ADMIN — PANTOPRAZOLE SODIUM 40 MG: 40 TABLET, DELAYED RELEASE ORAL at 10:46

## 2021-11-06 RX ADMIN — MIDODRINE HYDROCHLORIDE 2.5 MG: 5 TABLET ORAL at 10:46

## 2021-11-06 RX ADMIN — ENOXAPARIN SODIUM 40 MG: 100 INJECTION SUBCUTANEOUS at 10:47

## 2021-11-06 RX ADMIN — Medication 5 MG: at 22:16

## 2021-11-06 RX ADMIN — PREDNISONE 60 MG: 20 TABLET ORAL at 10:46

## 2021-11-06 RX ADMIN — ONDANSETRON 8 MG: 2 INJECTION INTRAMUSCULAR; INTRAVENOUS at 10:47

## 2021-11-06 RX ADMIN — Medication 10 ML: at 19:10

## 2021-11-06 RX ADMIN — DEXTROSE MONOHYDRATE AND SODIUM CHLORIDE 50 ML/HR: 5; .9 INJECTION, SOLUTION INTRAVENOUS at 01:52

## 2021-11-06 RX ADMIN — Medication 10 ML: at 22:06

## 2021-11-06 NOTE — PROGRESS NOTES
Physician Progress Note      Deshaun Schmitz  CSN #:                  [id number]  :                       1965  ADMIT DATE:       10/20/2021 12:58 PM  [address] [place] DATE:  RESPONDING  PROVIDER #:        Jeff MARTÍNEZ MD          QUERY TEXT:    Pt admitted with Failure to thrive with unintentional weight loss of 50 pounds: Albumin 3.2 . On admission noted to have recent history of HSV encephalitis, Hypokalemia, Hypomagnesemia, Hyponatremia, Mild transaminitis, Intractable nausea and vomiting. Please document in progress notes and discharge summary the likely cause of the failure to thrive. The medical record reflects the following:  Risk Factors: ***  Clinical Indicators: ***  Treatment: ***  Options provided:  -- *** (weakness/fatigue/falls? reason for admission) due to other, Please document other cause. -- failure to thrive due to history of HSV encephalitis  -- failure to thrive due to multiple electrolyte disturbances  -- failure to thrive due to Wernicke's encephalopathy  -- failure to thrive due to remote vascular injury to right MCA and bilateral TA distribution with volume loss  -- *** (weakness/fatigue/falls? reason for admission) due to other, Please document other cause. -- Other - I will add my own diagnosis  -- Disagree - Not applicable / Not valid  -- Disagree - Clinically unable to determine / Unknown  -- Refer to Clinical Documentation Reviewer    PROVIDER RESPONSE TEXT:    This patient has *** (location) ulcer associated with diabetic neuropathy and atherosclerosis. Query created by:  Arnaldo Reid on 2021 12:27 PM      Electronically signed by:  Jeff Coughlin MD 2021 12:51 PM

## 2021-11-06 NOTE — PROGRESS NOTES
Neurology Consult    Patient: Surinder García MRN: [id number]  SSN: [id number]    YOB: 1965  Age: 64 y.o. Sex: female      HPI:  Surinder García is a 64 y.o. 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 MRI brain showing known gliosis/encephalomalacia in right anterior temporal, frontal >> left frontal lobes without acute process. LP +oligoclonal bands; negative NMDA and HSV PCR. 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 Interval history:  Remains on prednisone 60mg daily  Clonazepam stopped 2/2 sedation per primary team 5 NOV 2021  No plan for PEG at this time due to concern for patient agitation. Per RN, sundowning at night -- walking in hallway. Family concerned for worsened mental status in setting of levetiracetam use; was not taking 2/2 nausea at time of outpatient follow up in October 2021 and was added back on admission 20 OCT 2021. Past Medical History:   Diagnosis Date    Arthritis     Diarrhea     no diarrhea since 9/14/2021    Encephalitis 06/14/2021    intubated    GERD (gastroesophageal reflux disease)     H. pylori infection 2019    IBS (irritable bowel syndrome)     Memory difficulty     since encephalitis    Migraine     Ovarian cyst 2019    Seizures ([facility] [address] 06/16/2021    x 1    Vomiting      Past Surgical History:   Procedure Laterality Date    HX BREAST AUGMENTATION  2005    HX CHOLECYSTECTOMY      HX COLONOSCOPY  2016    HX ENDOSCOPY  2019    HX TYMPANOPLASTY      left x 5 times      History reviewed. No pertinent family history. Social History     Tobacco Use    Smoking status: Never Smoker    Smokeless tobacco: Never Used   Substance Use Topics    Alcohol use:  Yes     Alcohol/week: 1.0 standard drink     Types: 1 Glasses of wine per week     Comment: occasional      Current Facility-Administered Medications   Medication Dose Route Frequency Provider Last Rate Last Admin    dextrose 5% and 0.9% NaCl infusion  50 mL/hr IntraVENous CONTINUOUS Leigh Ann Elizalde MD 50 mL/hr at 11/06/21 0152 50 mL/hr at 11/06/21 0152    albuterol-ipratropium (DUO-NEB) 2.5 MG-0.5 MG/3 ML  3 mL Nebulization Q6H RT Leigh Ann Elizalde MD        ceFAZolin (ANCEF) 2 g in sterile water (preservative free) 20 mL IV syringe  2 g IntraVENous ONCE [place]        predniSONE (DELTASONE) tablet 60 mg  60 mg Oral DAILY WITH BREAKFAST Juan Manuel Caraballo MD   60 mg at 11/05/21 0747    midodrine (PROAMATINE) tablet 2.5 mg  2.5 mg Oral BID WITH MEALS Leigh Ann Elizalde MD   2.5 mg at 11/05/21 1715    meclizine (ANTIVERT) tablet 12.5 mg  12.5 mg Oral Q6H PRN Leigh Ann Elizalde MD        pantoprazole (PROTONIX) tablet 40 mg  40 mg Oral ACB&D Leigh Ann Elizalde MD   40 mg at 11/05/21 1715    mirtazapine (REMERON SOL-TAB) disintegrating tablet 15 mg  15 mg Oral QHS Azar Cai MD   15 mg at 11/05/21 2141    phenol throat spray (CHLORASEPTIC) 1 Spray  1 [place] Oral PRN Janet Mackey MD   1 [place] at 10/31/21 1742    therapeutic multivitamin (THERAGRAN) tablet 1 Tablet  1 Tablet Oral DAILY Janet Mackey MD   1 Tablet at 11/05/21 0938    levETIRAcetam (KEPPRA) oral solution 750 mg  750 mg Oral BID Gray Polanco MD   750 mg at 11/05/21 1714    sodium chloride (NS) flush 5-40 mL  5-40 mL IntraVENous Q8H Ameena Viveros MD   10 mL at 11/05/21 2141    sodium chloride (NS) flush 5-40 mL  5-40 mL IntraVENous PRN Ameena Viveros MD        acetaminophen (TYLENOL) tablet 650 mg  650 mg Oral Q6H PRN Ameena Viveros MD        Or   [place] acetaminophen (TYLENOL) suppository 650 mg  650 mg Rectal Q6H PRN Ameena Viveros MD        polyethylene glycol (MIRALAX) packet 17 g  17 g Oral DAILY PRN Ameena Viveros MD        ondansetron (ZOFRAN ODT) tablet 4 mg  4 mg Oral Q8H PRN Jeana Perera MD   4 mg at 11/02/21 1219    Or    ondansetron [facility]) injection 8 mg  8 mg IntraVENous Q6H PRN Jeana Perera MD   8 mg at 10/22/21 2137    enoxaparin (LOVENOX) injection 40 mg  40 mg SubCUTAneous DAILY Jeana Perera MD   40 mg at 11/05/21 7273    thiamine HCL (B-1) tablet 100 mg  100 mg Oral DAILY Prakash Carrera MD   100 mg at 11/05/21 0587        Allergies   Allergen Reactions    Minocycline Other (comments)     dizzy       Objective:     Vitals:    11/05/21 1650 11/05/21 1936 11/06/21 0331 11/06/21 0743   BP: 103/68 101/69 108/71 117/79   Pulse: 88 90 74 70   Resp: 18 16 16 16   Temp: 98.2 °F (36.8 °C) 97.8 °F (36.6 °C) 97 °F (36.1 °C) 97.4 °F (36.3 °C)   SpO2: 99% 97% 100% 95%   Weight:       Height:            Physical Exam:  General: Eyes closed, no distress  HEENT: MM, non-icteric  CV: regular rate and rhythm  Pulm: non-labored on room air  Ext: no edema    Neuro:  MS: Sleeping, wakes easily to voice. Responds to questions and follows simple commands. Intact naming, repetition. Oriented to self, year but not month (Sept) or [facility]). Impaired attention. Abstraction 0/3. CN: Pupils pinpoint; reactive. Eyes conjugate; variable full horizontal movement with lateral gaze bilaterally; +nystagmus on left lateral gaze, extinguishes. Facial sensation intact to LT. Facial muscles full and symmetric. Hearing intact to finger rub bilaterally. Tongue protrudes midline. MOTOR & SENSORY: Normal bulk and tone. No adventitious movements. Spontaneous movement in all extremities. Able to squeeze hands bilaterally; 4/5 biceps, triceps and antigravity in BLEs.   Coordination & Gait: Not assessed    Relevant labs/studies:    Assessment & Plan:   64 y.o. female, with history of HSV-1 encephalitis June 4358 complicated by seizure on levetiracetam, admitted with intractable nausea/emesis, encephalopathy and FTT, noted to have ophthalmoplegia and B1 low at 34 s/p thiamine for wernicke's encephalopathy, and LP +oligoclonal bands concerning for possible autoimmune encephalitis. Continued fluctuating mental status with sundowning at night per bedside RN; impaired abstract thinking and attention on examination which is consistent; on chart review, EOMs have continued to improve. Unclear if levetiracetam is playing role as per family concern; no new seizure reported.     -Remains on prednisone 60mg daily; continue to monitor for potential benefit  -Will change levetiracetam to lacosamide 100mg Q12H  -Pending response to steroids, change in AEDs, would consider repeat EEG to evaluate for ongoing seizure tendency  -Delirium precautions, avoid sedating medications as able during daytime hours, OOB to chair daily, frequent reorientation  -Will follow  -Please contact with questions, concerns     Signed By: North Aguilar MD   Neurologist    November 6, 2021

## 2021-11-06 NOTE — PROGRESS NOTES
[email or web address]  [phone]    Gastroenterology Progress Note      11/6/2021 8:10 AM    Admit Date: 10/20/2021    Admit Diagnosis: Vomiting [R11.10]      Subjective:     Verona Dey is a 64yo lady with recent herpetic encephalitis who has failed to thrive following infection. We were consulted for possible PEG placement to aid with nutritional support. Overnight patient was extremely combative, pulling IVs and catheters. Was able to be finally redirected to leave PICC in place. She remains disoriented in place and time. She is aware of self and knows about her  Ebony Lopez.     Visit Vitals  /79 (BP 1 Location: Left upper arm, BP Patient Position: At rest)   Pulse 70   Temp 97.4 °F (36.3 °C)   Resp 16   Ht 5' (1.524 m)   Wt 66.2 kg (146 lb)   SpO2 95%   BMI 28.51 kg/m²     Current Facility-Administered Medications   Medication Dose Route Frequency    dextrose 5% and 0.9% NaCl infusion  50 mL/hr IntraVENous CONTINUOUS    albuterol-ipratropium (DUO-NEB) 2.5 MG-0.5 MG/3 ML  3 mL Nebulization Q6H RT    ceFAZolin (ANCEF) 2 g in sterile water (preservative free) 20 mL IV syringe  2 g IntraVENous ONCE    predniSONE (DELTASONE) tablet 60 mg  60 mg Oral DAILY WITH BREAKFAST    midodrine (PROAMATINE) tablet 2.5 mg  2.5 mg Oral BID WITH MEALS    meclizine (ANTIVERT) tablet 12.5 mg  12.5 mg Oral Q6H PRN    pantoprazole (PROTONIX) tablet 40 mg  40 mg Oral ACB&D    mirtazapine (REMERON SOL-TAB) disintegrating tablet 15 mg  15 mg Oral QHS    phenol throat spray (CHLORASEPTIC) 1 Spray  1 Spray Oral PRN    therapeutic multivitamin (THERAGRAN) tablet 1 Tablet  1 Tablet Oral DAILY    levETIRAcetam (KEPPRA) oral solution 750 mg  750 mg Oral BID    sodium chloride (NS) flush 5-40 mL  5-40 mL IntraVENous Q8H    sodium chloride (NS) flush 5-40 mL  5-40 mL IntraVENous PRN    acetaminophen (TYLENOL) tablet 650 mg  650 mg Oral Q6H PRN    Or    acetaminophen (TYLENOL) suppository 650 mg  650 mg Rectal Q6H PRN    polyethylene glycol (MIRALAX) packet 17 g  17 g Oral DAILY PRN    ondansetron (ZOFRAN ODT) tablet 4 mg  4 mg Oral Q8H PRN    Or    ondansetron (ZOFRAN) injection 8 mg  8 mg IntraVENous Q6H PRN    enoxaparin (LOVENOX) injection 40 mg  40 mg SubCUTAneous DAILY    thiamine HCL (B-1) tablet 100 mg  100 mg Oral DAILY         Objective:      Physical Exam:  constitutional: appearance: well developed, normal habitus, no deformities, in no acute distress. skin: inspection: no rashes, ulcers, icterus or other lesions; no clubbing or telangiectasias. palpation: no induration or subcutaneos nodules. eyes: inspection: normal conjunctivae and lids; no jaundice pupils: symmetrical, normoreactive to light, normal accommodation and size. ENMT: mouth: normal oral mucosa,lips and gums; good dentition. respiratory: effort: normal chest excursion; no intercostal retraction or accessory muscle use. cardiovascular: abdominal aorta: normal size and position; no bruits. palpation: PMI of normal size and position; normal rhythm; no thrill or murmurs. abdominal: abdomen: normal consistency; no tenderness or masses. hernias: no hernias appreciated. liver: normal size and consistency. spleen: not palpable. neurologic: oriented to person, not to place or time.     Data Review:   Labs:    Recent Results (from the past 24 hour(s))   PHOSPHORUS    Collection Time: 11/06/21  1:45 AM   Result Value Ref Range    Phosphorus 3.0 2.5 - 4.9 MG/DL   CBC WITH AUTOMATED DIFF    Collection Time: 11/06/21  1:45 AM   Result Value Ref Range    WBC 6.1 4.6 - 13.2 K/uL    RBC 2.51 (L) 4.20 - 5.30 M/uL    HGB 8.0 (L) 12.0 - 16.0 g/dL    HCT 24.2 (L) 35.0 - 45.0 %    MCV 96.4 78.0 - 100.0 FL    MCH 31.9 24.0 - 34.0 PG    MCHC 33.1 31.0 - 37.0 g/dL    RDW 16.2 (H) 11.6 - 14.5 %    PLATELET 589 508 - 046 K/uL    MPV 9.4 9.2 - 11.8 FL    NEUTROPHILS 76 (H) 40 - 73 %    LYMPHOCYTES 15 (L) 21 - 52 %    MONOCYTES 9 3 - 10 %    EOSINOPHILS 0 0 - 5 %    BASOPHILS 0 0 - 2 %    ABS. NEUTROPHILS 4.6 1.8 - 8.0 K/UL    ABS. LYMPHOCYTES 0.9 0.9 - 3.6 K/UL    ABS. MONOCYTES 0.5 0.05 - 1.2 K/UL    ABS. EOSINOPHILS 0.0 0.0 - 0.4 K/UL    ABS. BASOPHILS 0.0 0.0 - 0.1 K/UL    DF AUTOMATED     METABOLIC PANEL, BASIC    Collection Time: 11/06/21  1:45 AM   Result Value Ref Range    Sodium 137 136 - 145 mmol/L    Potassium 4.1 3.5 - 5.5 mmol/L    Chloride 104 100 - 111 mmol/L    CO2 26 21 - 32 mmol/L    Anion gap 7 3.0 - 18 mmol/L    Glucose 100 (H) 74 - 99 mg/dL    BUN 7 7.0 - 18 MG/DL    Creatinine 0.26 (L) 0.6 - 1.3 MG/DL    BUN/Creatinine ratio 27 (H) 12 - 20      GFR est AA >60 >60 ml/min/1.73m2    GFR est non-AA >60 >60 ml/min/1.73m2    Calcium 8.4 (L) 8.5 - 10.1 MG/DL         Assessment/Plan:     64yo lady with recent herpetic encephalitis who has failed to thrive following infection. We were consulted for possible PEG placement to aid with nutritional support. Discussed case with primary attending, as well as with her POA,  Rhenda Merlin ([phone]). Given ongoing encephalopathy with aggressive behavior, we agree that PEG should be avoided. If she would remove it prior to tract maturation (approximately 4 weeks), serious complications would occur, such as peritonitis. Plan is to continue to support nutrition through NGT feeding, considering PEG if she still requires it, and if she is easily redirectable. Thank you for this consult. We will not actively follow, but remain available for questions and in case PEG is still needed and safe to be placed.     Christiano Banuelos MD  November 6, 2021

## 2021-11-06 NOTE — PROGRESS NOTES
Hospitalist Progress Note    Subjective:   Daily Progress Note: 11/6/2021    Patient is very alert currently. Denies any headaches or dizziness but her visual disturbances is better. She just had NG tube placed. Denies any chest pain or shortness of breath or cough. No nausea or vomiting. Reportedly, patient becomes very agitated and starts walking out of her room during nighttime for last few days.     Current Facility-Administered Medications   Medication Dose Route Frequency    lacosamide (VIMPAT) oral solution 100 mg  100 mg Oral Q12H    [START ON 11/10/2021] thiamine HCL (B-1) tablet 100 mg  100 mg Oral DAILY    [START ON 11/7/2021] thiamine (B-1) injection 500 mg  500 mg IntraVENous DAILY    melatonin tablet 5 mg  5 mg Oral QHS    albuterol-ipratropium (DUO-NEB) 2.5 MG-0.5 MG/3 ML  3 mL Nebulization Q6H RT    ceFAZolin (ANCEF) 2 g in sterile water (preservative free) 20 mL IV syringe  2 g IntraVENous ONCE    predniSONE (DELTASONE) tablet 60 mg  60 mg Oral DAILY WITH BREAKFAST    midodrine (PROAMATINE) tablet 2.5 mg  2.5 mg Oral BID WITH MEALS    meclizine (ANTIVERT) tablet 12.5 mg  12.5 mg Oral Q6H PRN    pantoprazole (PROTONIX) tablet 40 mg  40 mg Oral ACB&D    mirtazapine (REMERON SOL-TAB) disintegrating tablet 15 mg  15 mg Oral QHS    phenol throat spray (CHLORASEPTIC) 1 Spray  1 Spray Oral PRN    therapeutic multivitamin (THERAGRAN) tablet 1 Tablet  1 Tablet Oral DAILY    sodium chloride (NS) flush 5-40 mL  5-40 mL IntraVENous Q8H    sodium chloride (NS) flush 5-40 mL  5-40 mL IntraVENous PRN    acetaminophen (TYLENOL) tablet 650 mg  650 mg Oral Q6H PRN    Or    acetaminophen (TYLENOL) suppository 650 mg  650 mg Rectal Q6H PRN    polyethylene glycol (MIRALAX) packet 17 g  17 g Oral DAILY PRN    ondansetron (ZOFRAN ODT) tablet 4 mg  4 mg Oral Q8H PRN    Or    ondansetron (ZOFRAN) injection 8 mg  8 mg IntraVENous Q6H PRN    enoxaparin (LOVENOX) injection 40 mg  40 mg SubCUTAneous DAILY        Review of Systems  Pertinent items are noted in HPI. Objective:     Visit Vitals  /69 (BP 1 Location: Left upper arm, BP Patient Position: At rest)   Pulse 75   Temp 97.1 °F (36.2 °C)   Resp 16   Ht 5' (1.524 m)   Wt 66.2 kg (146 lb)   LMP 2019 Comment: unknown   SpO2 (!) 18%   BMI 28.51 kg/m²      O2 Device: None (Room air)    Temp (24hrs), Av.7 °F (36.5 °C), Min:97 °F (36.1 °C), Max:98.4 °F (36.9 °C)      No intake/output data recorded.  1901 -  0700  In: 955.8 [P.O.:570; I.V.:385.8]  Out: 100 [Urine:100]    General appearance -awake, follows commands appropriately, NG tube is in situ, not in acute distress. Chest -CTA bilaterally without any wheezes currently  Heart - S1 and S2 normal  Abdomen - soft, nontender, nondistended, Bowel sounds present  Neurological -awake, follows commands appropriately, moves all 4 extremities well. Musculoskeletal - no joint tenderness or erythema of knees bilaterally  Extremities - no pedal edema noted      Data Review    Recent Results (from the past 24 hour(s))   PHOSPHORUS    Collection Time: 21  1:45 AM   Result Value Ref Range    Phosphorus 3.0 2.5 - 4.9 MG/DL   CBC WITH AUTOMATED DIFF    Collection Time: 21  1:45 AM   Result Value Ref Range    WBC 6.1 4.6 - 13.2 K/uL    RBC 2.51 (L) 4.20 - 5.30 M/uL    HGB 8.0 (L) 12.0 - 16.0 g/dL    HCT 24.2 (L) 35.0 - 45.0 %    MCV 96.4 78.0 - 100.0 FL    MCH 31.9 24.0 - 34.0 PG    MCHC 33.1 31.0 - 37.0 g/dL    RDW 16.2 (H) 11.6 - 14.5 %    PLATELET 719 291 - 875 K/uL    MPV 9.4 9.2 - 11.8 FL    NEUTROPHILS 76 (H) 40 - 73 %    LYMPHOCYTES 15 (L) 21 - 52 %    MONOCYTES 9 3 - 10 %    EOSINOPHILS 0 0 - 5 %    BASOPHILS 0 0 - 2 %    ABS. NEUTROPHILS 4.6 1.8 - 8.0 K/UL    ABS. LYMPHOCYTES 0.9 0.9 - 3.6 K/UL    ABS. MONOCYTES 0.5 0.05 - 1.2 K/UL    ABS. EOSINOPHILS 0.0 0.0 - 0.4 K/UL    ABS.  BASOPHILS 0.0 0.0 - 0.1 K/UL    DF AUTOMATED     METABOLIC PANEL, BASIC    Collection Time: 21 1:45 AM   Result Value Ref Range    Sodium 137 136 - 145 mmol/L    Potassium 4.1 3.5 - 5.5 mmol/L    Chloride 104 100 - 111 mmol/L    CO2 26 21 - 32 mmol/L    Anion gap 7 3.0 - 18 mmol/L    Glucose 100 (H) 74 - 99 mg/dL    BUN 7 7.0 - 18 MG/DL    Creatinine 0.26 (L) 0.6 - 1.3 MG/DL    BUN/Creatinine ratio 27 (H) 12 - 20      GFR est AA >60 >60 ml/min/1.73m2    GFR est non-AA >60 >60 ml/min/1.73m2    Calcium 8.4 (L) 8.5 - 10.1 MG/DL         Assessment/Plan:     ASSESSMENT:    1. Adult failure to thrive  2. Multiple electrolyte disturbances, better currently  3. Intermittent vomiting with nausea could be due to central vertigo  4. Recent history of herpes encephalitis s/p treatment  5. 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 6.  Chronic mild hyponatremia due to nausea  7. Poor appetite and severe protein calorie malnutrition  8. GERD  9. Dyslipidemia  10. History of seizure disorder  11. Intermittent encephalopathy with sundowning due to #5, #4 and Wernicke's encephalopathy  12. Positive oligoclonal bands concerning for possible autoimmune encephalitis    PLAN:    Continue prednisone as started by neurologist.  Clonazepam was discontinued after talking to Dr. Bryson Berry. Patient has been changed from [address] to lacosamide 100 mg twice daily for seizure disorder  Discussed with GI earlier today and they did not feel comfortable putting PEG tube due to patient's recurrent agitation especially nighttime and high risk for pulling out the PEG tube  Start tube feeding with NG tube as well as patient can eat by mouth  We will discontinue IV fluid  NG tube will be placed to start patient on tube feeding  We will also put patient on IV thiamine for 3 days followed by p.o. thiamine  Continue Remeron and multivitamin for poor appetite    Anticipatory discharge:  Once PEG tube is placed, patient can be discharged to skilled nursing facility 48 hours after G tube placement    Total time to take care of this patient was 30 minutes and more than 50% of time was spent counseling and coordinating care. Disclaimer: Sections of this note are dictated using utilizing voice recognition software, which may have resulted in some phonetic based errors in grammar and contents. Even though attempts were made to correct all the mistakes, some may have been missed, and remained in the body of the document. If questions arise, please contact our department.

## 2021-11-06 NOTE — ROUTINE PROCESS
End of Shift Note     Bedside and verbal shift change report given to Nam Andrea RN (On coming nurse) by Armaan Tanner RN (Off going nurse).   Report included the following information:      --Procedure Summary     --MAR,     --Recent Results     --Med Rec Status    SBAR Recommendations: PEG    Issues for Provider to address Confusion          Activity This Shift     [x] Bed Rest Order   [] Refused   [] Dangled    [] TDWB         Ambulating:     [] Bathroom     [] BSC     [] Room/Hallway      Up in Chair for meals    []Yes [] No   Voiding       [x] Yes  [] No  Leo          [] Yes  [] No  Incontinent [x] Yes  [] No    DUE TO VOID POUR        [] Yes [] No  Purewick    [] Yes [] No  New Onset [] Yes [] No Straight Cath   []Yes  [] No  Condom Cath  [] Yes [] No  MD Called      [] Yes  [] No   Blood Sugars Managed []Yes [x] No    Bowels Moved [] Yes [x] No    Incontinent     [] Yes [] No Passed Gas [x]Yes [] No    New Onset  []Yes [] No        MD Called []Yes  [] No     CHG Bath Done     Before Surgery     After Surgery      [x] Yes  [] No  [] Yes  [x] No       Drain Removed [] Yes  [] No [x] N/A    Dressing Changed [] Yes   [] No [x] N/A      Nausea/Vomiting [] Yes   [x] No     Ice Packs Changed [] Yes   [] No  [x] N/A    Incentive Spirometer  [] Yes  [x] No      SCD Pumps On     Ankle Pumping  [] Yes   [x] No      [] Yes   [x] No        Telemetry Monitoring [] Yes   [x] No   Rhythm

## 2021-11-06 NOTE — PROGRESS NOTES
[address] [facility], [facility] [address]  Inpatient Progress Note     Date of Service: 11/06/21  Hospital Day: 17     Subjective/Interval History   11/06/21    Treatment Team Notes:  Notes reviewed and/or discussed and report that Logan Dc is a patient with a history of multiple medical problems, with a history of failure to thrive with secondary multiple electrolyte imbalances which have resolved. Prior history of HSV encephalitis with attention being invited to the dictated psychiatric auscultation report which is self-explanatory. Patient interview: Logan Dc was interviewed by this writer today. The patient was a started with treatment with mirtazapine disintegrating tablets, 15 mg at bedtime which she is taking appropriately. During session today, she was much more responding to our visit when compared to the where she was doing when I saw her last 2 days ago. There is actually an evidence of improvement on the patient's mood, responding to some \"joking\" during the session this morning. Her mood and affect are considered to be more appropriate than when initially seen. She is also much more clear cognitively.       Objective     Visit Vitals  /69 (BP 1 Location: Left upper arm, BP Patient Position: At rest)   Pulse 75   Temp 97.1 °F (36.2 °C)   Resp 16   Ht 5' (1.524 m)   Wt 66.2 kg (146 lb)   SpO2 (!) 18%   BMI 28.51 kg/m²      Above vitals noted    Recent Results (from the past 24 hour(s))   PHOSPHORUS    Collection Time: 11/06/21  1:45 AM   Result Value Ref Range    Phosphorus 3.0 2.5 - 4.9 MG/DL   CBC WITH AUTOMATED DIFF    Collection Time: 11/06/21  1:45 AM   Result Value Ref Range    WBC 6.1 4.6 - 13.2 K/uL    RBC 2.51 (L) 4.20 - 5.30 M/uL    HGB 8.0 (L) 12.0 - 16.0 g/dL    HCT 24.2 (L) 35.0 - 45.0 %    MCV 96.4 78.0 - 100.0 FL    MCH 31.9 24.0 - 34.0 PG    MCHC 33.1 31.0 - 37.0 g/dL    RDW 16.2 (H) 11.6 - 14.5 %    PLATELET 542 247 - 605 K/uL    MPV 9.4 9.2 - 11.8 FL NEUTROPHILS 76 (H) 40 - 73 %    LYMPHOCYTES 15 (L) 21 - 52 %    MONOCYTES 9 3 - 10 %    EOSINOPHILS 0 0 - 5 %    BASOPHILS 0 0 - 2 %    ABS. NEUTROPHILS 4.6 1.8 - 8.0 K/UL    ABS. LYMPHOCYTES 0.9 0.9 - 3.6 K/UL    ABS. MONOCYTES 0.5 0.05 - 1.2 K/UL    ABS. EOSINOPHILS 0.0 0.0 - 0.4 K/UL    ABS. BASOPHILS 0.0 0.0 - 0.1 K/UL    DF AUTOMATED     METABOLIC PANEL, BASIC    Collection Time: 11/06/21  1:45 AM   Result Value Ref Range    Sodium 137 136 - 145 mmol/L    Potassium 4.1 3.5 - 5.5 mmol/L    Chloride 104 100 - 111 mmol/L    CO2 26 21 - 32 mmol/L    Anion gap 7 3.0 - 18 mmol/L    Glucose 100 (H) 74 - 99 mg/dL    BUN 7 7.0 - 18 MG/DL    Creatinine 0.26 (L) 0.6 - 1.3 MG/DL    BUN/Creatinine ratio 27 (H) 12 - 20      GFR est AA >60 >60 ml/min/1.73m2    GFR est non-AA >60 >60 ml/min/1.73m2    Calcium 8.4 (L) 8.5 - 10.1 MG/DL     Above results noted, being managed by the primary care team    Mental Status Examination     Appearance/Hygiene 64 y.o. WHITE/NON- female  Hygiene: Limited   Behavior/Social Relatedness  in bed, responding appropriately during the visit.    Musculoskeletal Gait/Station: Not assessed  Tone (flaccid, cogwheeling, spastic): not assessed  Psychomotor (hyperkinetic, hypokinetic): calm   Involuntary movements (tics, dyskinesias, akathisa, stereotypies): none   Speech   Rate, rhythm, volume, fluency and articulation are appropriate   Mood   appears less depressed   Affect    more appropriate to situation   Thought Process Linear and goal directed   Thought Content and Perceptual Disturbances Denies self-injurious behavior (SIB), suicidal ideation (SI), aggressive behavior or homicidal ideation (HI)  Denies auditory and visual hallucinations   Sensorium and Cognition  Grossly intact   Insight  improving   Judgment  fair        Assessment/Plan      Psychiatric Diagnoses:   Patient Active Problem List   Diagnosis Code    Vomiting R11.10    Severe protein-calorie malnutrition ([facility] [address] E43 Medical Diagnoses: Per attending hospitalist and consultants    Psychosocial and contextual factors: Same    Level of impairment/disability: Severe at present    1. We will continue treatment with mirtazapine as ordered. 2.  Reviewed instructions, risks, benefits and side effects of medications  3.   Disposition/Discharge Date: self-care/home, TBD    Rama Cobos MD, [address] [place] [address]

## 2021-11-06 NOTE — PROGRESS NOTES
Physician Progress Note      PATIENTDadeisi Hdz  CSN #:                  [id number]  :                       1965  ADMIT DATE:       10/20/2021 12:58 PM  [address] [place] DATE:  RESPONDING  PROVIDER #:        Julia MARTÍNEZ MD          QUERY TEXT:    Mio Bains I have resent this query to you as the previous query was sent out in error before it was completed. I am sorry for the inconvenience. Pt admitted with Failure to thrive with unintentional weight loss of 50 pounds: Albumin 3.2 . On admission noted to have recent history of HSV encephalitis, Hypokalemia, Hypomagnesemia, Hyponatremia, Mild transaminitis, Intractable nausea and vomiting. Please document in progress notes and discharge summary the likely cause of the failure to thrive. The medical record reflects the following:    Risk Factors: PMH of  Recent history of herpes encephalitis s/p treatment. 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 Chronic mild hyponatremia due to nausea. Poor appetite and severe protein calorie malnutrition, Multiple electrolyte disturbances on admission now resolving. Clinical Indicators: H&P- Failure to thrive with unintentional weight loss of 50 pounds. Presented to the emergency department for further evaluation of unintentional weight loss and poorly controlled nausea and vomiting. Neuro-  s/p thiamine for possible wernicke's encephalopathy. MRI brain showing known gliosis/encephalomalacia in right anterior temporal, frontal >> left frontal lobes without acute process. LP +oligoclonal bands; negative NMDA and HSV PCR. 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 Positive oligoclonal bands concerning for possible autoimmune encephalitis  11/5 GI- Intractable nausea w/ vomiting - suspected central cause    Treatment: IVF, NGT TF, electrolyte replacement,  MRI,  thiamine, labs, LP, GI, Neuro, RD consults,    Thank you,  [facility],[address], [address]  Options provided:  -- failure to thrive due to history of HSV encephalitis  -- failure to thrive due to Wernicke's encephalopathy  -- failure to thrive due to remote vascular injury to right MCA and bilateral TA distribution with volume loss  -- Failure to thrive due to  due to other (please specify), please document other cause. -- Other - I will add my own diagnosis  -- Disagree - Not applicable / Not valid  -- Disagree - Clinically unable to determine / Unknown  -- Refer to Clinical Documentation Reviewer    PROVIDER RESPONSE TEXT:    This patient has failure to thrive due to remote vascular injury to right MCA and bilateral TA distribution with volume loss. Query created by:  Radha Templeton on 11/6/2021 1:02 PM      Electronically signed by:  Malissa Angulo MD 11/6/2021 1:06 PM

## 2021-11-07 LAB
MAGNESIUM SERPL-MCNC: 2.3 MG/DL (ref 1.6–2.6)
PHOSPHATE SERPL-MCNC: 3.2 MG/DL (ref 2.5–4.9)
POTASSIUM SERPL-SCNC: 4.4 MMOL/L (ref 3.5–5.5)

## 2021-11-07 PROCEDURE — 97535 SELF CARE MNGMENT TRAINING: CPT

## 2021-11-07 PROCEDURE — 84132 ASSAY OF SERUM POTASSIUM: CPT

## 2021-11-07 PROCEDURE — 65270000029 HC RM PRIVATE

## 2021-11-07 PROCEDURE — 74011000258 HC RX REV CODE- 258: Performed by: INTERNAL MEDICINE

## 2021-11-07 PROCEDURE — 74011636637 HC RX REV CODE- 636/637: Performed by: PSYCHIATRY & NEUROLOGY

## 2021-11-07 PROCEDURE — 74011250636 HC RX REV CODE- 250/636: Performed by: INTERNAL MEDICINE

## 2021-11-07 PROCEDURE — 2709999900 HC NON-CHARGEABLE SUPPLY

## 2021-11-07 PROCEDURE — 36592 COLLECT BLOOD FROM PICC: CPT

## 2021-11-07 PROCEDURE — 74011250637 HC RX REV CODE- 250/637: Performed by: INTERNAL MEDICINE

## 2021-11-07 PROCEDURE — 99232 SBSQ HOSP IP/OBS MODERATE 35: CPT | Performed by: INTERNAL MEDICINE

## 2021-11-07 PROCEDURE — 83735 ASSAY OF MAGNESIUM: CPT

## 2021-11-07 PROCEDURE — 84100 ASSAY OF PHOSPHORUS: CPT

## 2021-11-07 PROCEDURE — 74011250637 HC RX REV CODE- 250/637: Performed by: PSYCHIATRY & NEUROLOGY

## 2021-11-07 PROCEDURE — 97168 OT RE-EVAL EST PLAN CARE: CPT

## 2021-11-07 PROCEDURE — 74011250637 HC RX REV CODE- 250/637: Performed by: FAMILY MEDICINE

## 2021-11-07 RX ADMIN — PREDNISONE 60 MG: 20 TABLET ORAL at 08:09

## 2021-11-07 RX ADMIN — LACOSAMIDE 100 MG: 10 SOLUTION ORAL at 09:24

## 2021-11-07 RX ADMIN — ENOXAPARIN SODIUM 40 MG: 100 INJECTION SUBCUTANEOUS at 08:09

## 2021-11-07 RX ADMIN — THERA TABS 1 TABLET: TAB at 08:09

## 2021-11-07 RX ADMIN — Medication 10 ML: at 21:38

## 2021-11-07 RX ADMIN — MIDODRINE HYDROCHLORIDE 2.5 MG: 5 TABLET ORAL at 17:40

## 2021-11-07 RX ADMIN — LACOSAMIDE 100 MG: 10 SOLUTION ORAL at 21:36

## 2021-11-07 RX ADMIN — MIDODRINE HYDROCHLORIDE 2.5 MG: 5 TABLET ORAL at 08:09

## 2021-11-07 RX ADMIN — MIRTAZAPINE 15 MG: 15 TABLET, ORALLY DISINTEGRATING ORAL at 21:36

## 2021-11-07 RX ADMIN — Medication 5 MG: at 21:36

## 2021-11-07 RX ADMIN — PANTOPRAZOLE SODIUM 40 MG: 40 TABLET, DELAYED RELEASE ORAL at 08:09

## 2021-11-07 RX ADMIN — Medication 10 ML: at 14:00

## 2021-11-07 RX ADMIN — THIAMINE HYDROCHLORIDE 500 MG: 100 INJECTION, SOLUTION INTRAMUSCULAR; INTRAVENOUS at 09:24

## 2021-11-07 RX ADMIN — PANTOPRAZOLE SODIUM 40 MG: 40 TABLET, DELAYED RELEASE ORAL at 17:40

## 2021-11-07 RX ADMIN — Medication 10 ML: at 05:02

## 2021-11-07 NOTE — PROGRESS NOTES
Neurology Consult    Patient: Sheng Huitron MRN: [id number]  SSN: [id number]    YOB: 1965  Age: 64 y.o. Sex: female      HPI:  Sheng Huitron is a 64 y.o. 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 MRI brain showing known gliosis/encephalomalacia in right anterior temporal, frontal >> left frontal lobes without acute process. LP +oligoclonal bands; negative NMDA and HSV PCR. 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 Interval history:  No acute events overnight. Now on lacosamide 100mg BID. This morning, reports feeling well. Some nausea yesterday, not present this morning. Feels her thinking is somewhat clearing. Denies other significant complaints. Past Medical History:   Diagnosis Date    Arthritis     Diarrhea     no diarrhea since 9/14/2021    Encephalitis 06/14/2021    intubated    GERD (gastroesophageal reflux disease)     H. pylori infection 2019    IBS (irritable bowel syndrome)     Memory difficulty     since encephalitis    Migraine     Ovarian cyst 2019    Seizures ([address] 06/16/2021    x 1    Vomiting      Past Surgical History:   Procedure Laterality Date    HX BREAST AUGMENTATION  2005    HX CHOLECYSTECTOMY      HX COLONOSCOPY  2016    HX ENDOSCOPY  2019    HX TYMPANOPLASTY      left x 5 times      History reviewed. No pertinent family history. Social History     Tobacco Use    Smoking status: Never Smoker    Smokeless tobacco: Never Used   Substance Use Topics    Alcohol use:  Yes     Alcohol/week: 1.0 standard drink     Types: 1 Glasses of wine per week     Comment: occasional      Current Facility-Administered Medications   Medication Dose Route Frequency Provider Last Rate Last Admin    lacosamide (VIMPAT) oral solution 100 mg  100 mg Oral Q12H Levi Reynolds MD   100 mg at 11/07/21 0924    [START ON 11/10/2021] thiamine HCL (B-1) tablet 100 mg  100 mg Oral DAILY Imelda Key MD        melatonin tablet 5 mg  5 mg Oral QHS Imelda Key MD   5 mg at 11/06/21 2216    thiamine (B-1) 500 mg in 0.9% sodium chloride 50 mL IVPB  500 mg IntraVENous DAILY Imelda Key  mL/hr at 11/07/21 0924 500 mg at 11/07/21 0924    albuterol-ipratropium (DUO-NEB) 2.5 MG-0.5 MG/3 ML  3 mL Nebulization Q6H RT Imelda Key MD        predniSONE (DELTASONE) tablet 60 mg  60 mg Oral DAILY WITH BREAKFAST Danuta Thornton MD   60 mg at 11/07/21 0809    midodrine (PROAMATINE) tablet 2.5 mg  2.5 mg Oral BID WITH MEALS Imelda Key MD   2.5 mg at 11/07/21 0809    meclizine (ANTIVERT) tablet 12.5 mg  12.5 mg Oral Q6H PRN Imelda Key MD        pantoprazole (PROTONIX) tablet 40 mg  40 mg Oral ACB&D Imelda Key MD   40 mg at 11/07/21 0809    mirtazapine (REMERON SOL-TAB) disintegrating tablet 15 mg  15 mg Oral QHS Liberty Castillo MD   15 mg at 11/06/21 2216    phenol throat spray (CHLORASEPTIC) 1 Spray  1 Bruno Oral PRN Paul Nogueira MD   1 Spray at 10/31/21 1742    therapeutic multivitamin (THERAGRAN) tablet 1 Tablet  1 Tablet Oral DAILY Paul Nogueira MD   1 Tablet at 11/07/21 0809    sodium chloride (NS) flush 5-40 mL  5-40 mL IntraVENous Q8H Jennifer Gill MD   10 mL at 11/07/21 0502    sodium chloride (NS) flush 5-40 mL  5-40 mL IntraVENous PRN Jennifer Gill MD        acetaminophen (TYLENOL) tablet 650 mg  650 mg Oral Q6H PRN Jennifer Gill MD        Or   [employer] acetaminophen (TYLENOL) suppository 650 mg  650 mg Rectal Q6H PRN Jennifer Gill MD        polyethylene glycol (MIRALAX) packet 17 g  17 g Oral DAILY PRN Jennifer Gill MD        ondansetron (ZOFRAN ODT) tablet 4 mg  4 mg Oral Q8H PRN Jennifer Gill MD   4 mg at 11/02/21 1219    Or    ondansetron (ZOFRAN) injection 8 mg  8 mg IntraVENous Q6H PRN Jeana Perera MD   8 mg at 11/06/21 1047    enoxaparin (LOVENOX) injection 40 mg  40 mg SubCUTAneous DAILY Jeana Perera MD   40 mg at 11/07/21 0809        Allergies   Allergen Reactions    Minocycline Other (comments)     dizzy       Objective:     Vitals:    11/06/21 1523 11/06/21 1934 11/07/21 0349 11/07/21 0736   BP: 102/61 96/68 109/73 117/75   Pulse: 85 93 83 88   Resp: 18 16 20 16   Temp: 97.4 °F (36.3 °C) 97.6 °F (36.4 °C) 98.6 °F (37 °C) 98.5 °F (36.9 °C)   SpO2: 97% 98% 97% 99%   Weight:       Height:            Physical Exam:  General: Awake, alert  HEENT: MM, non-icteric. Wearing patch. Pulm: non-labored on room air  Ext: no edema    Neuro:  MS: Awake, alert, interactive. Language fluent and appropriate; follows commands. Oriented to self, year but not month (Dec) or city. Impaired attention; unable to recite months of year in reverse order. Abstraction 3/3 correct. CN: Pupils equal and reactive. Eyes conjugate with grossly full aBduction and aDduction  And gaze evoked nystagmus. Facial sensation intact to LT. Facial muscles full and symmetric. Hearing intact to finger rub bilaterally. Tongue protrudes midline. MOTOR & SENSORY: Normal bulk and tone. No adventitious movements. Grossly 5/5 throughout  Reflexes: Diffusely hyperreflexic, symmetric  Coordination: No dysmetria on FNF  Gait: not assessed    Assessment & Plan:   64 y.o. female, with history of HSV-1 encephalitis June 3339 complicated by seizure, admitted Weirnicke's encephalopathy in setting of intractable nausea/emesis (B1 29), with hospital course complicated by persistent encephalopathy. Noted some mental status improvement this morning,though with persistent impaired attention and with continued ocular nystagmus and intermittent nausea. Tolerating lacosamide BID well thus far (started 6 NOV 2021); continue to monitor clinnically. -Remains on prednisone 60mg daily; continue to monitor for potential benefit. -Continue lacosamide 100mg Q12H  -Strongly encourage PO intake; nausea management per primary  -Thiamine supplementation  -Delirium precautions, avoid sedating medications as able during daytime hours, OOB to chair daily, frequent reorientation  -Please contact with questions, concerns     Signed By: Claude Hayward MD   Neurologist    November 7, 2021

## 2021-11-07 NOTE — ROUTINE PROCESS
End of Shift Note     Bedside and verbal shift change report given to Nathan Dickson LPN (On coming nurse) by Sammi Holcomb RN (Off going nurse).   Report included the following information:      --Procedure Summary     --MAR,     --Recent Results     --Med Rec Status    SBAR Recommendations: enc food    Issues for Provider to address Confusion          Activity This Shift     [x] Bed Rest Order   [] Refused   [] Dangled    [] TDWB         Ambulating:     [] Bathroom     [] BSC     [] Room/Hallway      Up in Chair for meals    []Yes [] No   Voiding       [x] Yes  [] No  Leo          [] Yes  [] No  Incontinent [x] Yes  [] No    DUE TO VOID POUR        [] Yes [] No  Purewick    [] Yes [] No  New Onset [] Yes [] No Straight Cath   []Yes  [] No  Condom Cath  [] Yes [] No  MD Called      [] Yes  [] No   Blood Sugars Managed []Yes [x] No    Bowels Moved [] Yes [x] No    Incontinent     [] Yes [] No Passed Gas [x]Yes [] No    New Onset  []Yes [] No        MD Called []Yes  [] No     CHG Bath Done     Before Surgery     After Surgery      [x] Yes  [] No  [] Yes  [x] No       Drain Removed [] Yes  [] No [x] N/A    Dressing Changed [] Yes   [] No [x] N/A      Nausea/Vomiting [] Yes   [x] No     Ice Packs Changed [] Yes   [] No  [x] N/A    Incentive Spirometer  [] Yes  [x] No      SCD Pumps On     Ankle Pumping  [] Yes   [x] No      [] Yes   [x] No        Telemetry Monitoring [] Yes   [x] No   Rhythm

## 2021-11-07 NOTE — PROGRESS NOTES
Hospitalist Progress Note    Subjective:   Daily Progress Note: 11/7/2021    Patient feels fine. She has multiple family members at bedside. Denies any headaches or dizziness currently. She pulled out NG tube earlier today. Denies any chest pain or shortness of breath or cough. No nausea or vomiting. Pediatric confusion present. Current Facility-Administered Medications   Medication Dose Route Frequency    lacosamide (VIMPAT) oral solution 100 mg  100 mg Oral Q12H    [START ON 11/10/2021] thiamine HCL (B-1) tablet 100 mg  100 mg Oral DAILY    melatonin tablet 5 mg  5 mg Oral QHS    thiamine (B-1) 500 mg in 0.9% sodium chloride 50 mL IVPB  500 mg IntraVENous DAILY    albuterol-ipratropium (DUO-NEB) 2.5 MG-0.5 MG/3 ML  3 mL Nebulization Q6H RT    predniSONE (DELTASONE) tablet 60 mg  60 mg Oral DAILY WITH BREAKFAST    midodrine (PROAMATINE) tablet 2.5 mg  2.5 mg Oral BID WITH MEALS    meclizine (ANTIVERT) tablet 12.5 mg  12.5 mg Oral Q6H PRN    pantoprazole (PROTONIX) tablet 40 mg  40 mg Oral ACB&D    mirtazapine (REMERON SOL-TAB) disintegrating tablet 15 mg  15 mg Oral QHS    phenol throat spray (CHLORASEPTIC) 1 Spray  1 Spray Oral PRN    therapeutic multivitamin (THERAGRAN) tablet 1 Tablet  1 Tablet Oral DAILY    sodium chloride (NS) flush 5-40 mL  5-40 mL IntraVENous Q8H    sodium chloride (NS) flush 5-40 mL  5-40 mL IntraVENous PRN    acetaminophen (TYLENOL) tablet 650 mg  650 mg Oral Q6H PRN    Or    acetaminophen (TYLENOL) suppository 650 mg  650 mg Rectal Q6H PRN    polyethylene glycol (MIRALAX) packet 17 g  17 g Oral DAILY PRN    ondansetron (ZOFRAN ODT) tablet 4 mg  4 mg Oral Q8H PRN    Or    ondansetron (ZOFRAN) injection 8 mg  8 mg IntraVENous Q6H PRN    enoxaparin (LOVENOX) injection 40 mg  40 mg SubCUTAneous DAILY        Review of Systems  Pertinent items are noted in HPI.     Objective:     Visit Vitals  /75 (BP 1 Location: Left upper arm, BP Patient Position: At rest) Pulse 84   Temp 97.5 °F (36.4 °C)   Resp 16   Ht 5' (1.524 m)   Wt 66.2 kg (146 lb)   LMP 2019 Comment: unknown   SpO2 98%   BMI 28.51 kg/m²      O2 Device: None (Room air)    Temp (24hrs), Av.9 °F (36.6 °C), Min:97.4 °F (36.3 °C), Max:98.6 °F (37 °C)      701 - 1900  In: 697 [P.O.:954]  Out: -   1901 - 700  In: 475.8 [P.O.:30; I.V.:385.8]  Out: 100 [Urine:100]    General appearance -awake, follows commands appropriately, not in acute distress. Chest -CTA bilaterally without any wheezes currently  Heart - S1 and S2 normal  Abdomen - soft, nontender, nondistended, Bowel sounds present  Neurological -awake, follows commands appropriately, moves all 4 extremities well. Musculoskeletal - no joint tenderness or erythema of knees bilaterally  Extremities - no pedal edema noted      Data Review    Recent Results (from the past 24 hour(s))   PHOSPHORUS    Collection Time: 21  3:45 AM   Result Value Ref Range    Phosphorus 3.2 2.5 - 4.9 MG/DL   POTASSIUM    Collection Time: 21  3:45 AM   Result Value Ref Range    Potassium 4.4 3.5 - 5.5 mmol/L   MAGNESIUM    Collection Time: 21  3:45 AM   Result Value Ref Range    Magnesium 2.3 1.6 - 2.6 mg/dL         Assessment/Plan:     ASSESSMENT:    1. Adult failure to thrive  2. Multiple electrolyte disturbances, better currently  3. Intermittent vomiting with nausea could be due to central vertigo, currently stable  4. Recent history of herpes encephalitis s/p treatment  5. 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 6.  Chronic mild hyponatremia due to nausea  7. Poor appetite and severe protein calorie malnutrition  8. GERD  9. Dyslipidemia  10. History of seizure disorder  11. Intermittent encephalopathy with sundowning due to #5, and #4 and Wernicke's encephalopathy  12.   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  Patient has pulled out NG tube and she has been eating well so we will continue current diet and Remeron  Continue thiamine  Continue multivitamin    Anticipatory discharge: SNF placement when bed is found and the patient remained stable    Total time to take care of this patient was 30 minutes and more than 50% of time was spent counseling and coordinating care. Disclaimer: Sections of this note are dictated using utilizing voice recognition software, which may have resulted in some phonetic based errors in grammar and contents. Even though attempts were made to correct all the mistakes, some may have been missed, and remained in the body of the document. If questions arise, please contact our department.

## 2021-11-07 NOTE — PROGRESS NOTES
Problem: Self Care Deficits Care Plan (Adult)  Goal: *Acute Goals and Plan of Care (Insert Text)  Description: Occupational Therapy Goals  Re-evaluated 11/7/2021 continue with all goals for consistency. 1.  Patient will perform bed mobility in preparation for selfcare with modified independence. 2.  Patient will perform grooming task/functional activity standing for 4-7 minutes with supervision/set-up, F+ balance. 3.  Patient will perform lower body dressing with supervision/set-up seated and standing using AE prn (Pt has reacher at home). 4.  Patient will perform toilet transfers with supervision/set-up. 5.  Patient will perform all aspects of toileting with supervision/set-up. 6.  Patient will participate in upper extremity therapeutic exercise/activities with modified independence for 8 minutes. 7.  Patient will utilize energy conservation techniques during functional activities with verbal cues. Prior Level of Function: Patient was independent with self-care and functional mobility PTA. Patient's spouse reports she would walk unsteady at home d/t vertigo and for the stairs she would bump up/down on her bottom  Outcome: Progressing Towards Goal   OCCUPATIONAL THERAPY RE-EVALUATION    Patient: Sheng Huitron (64 y.o. female)  Date: 11/7/2021  Primary Diagnosis: Vomiting [R11.10]  Procedure(s) (LRB):  PERCUTANEOUS ENDOSCOPIC GASTROSTOMY TUBE INSERTION (N/A) 1 Day Post-Op   Precautions:   Fall    ASSESSMENT :  Pt presents in bed with mult family members present, alert and agreeable to participate in OT re-evaluation. Based on the objective data described below, the patient presents with decreased initiation and sequencing, decreased standing balance, generalized weakness and decreased activity tolerance, intermittent diplopia in R eye, intermittent dizziness, limiting her participation and independence with ADLs. Pt donned socks with Supervision and required CGA for management of the brief.  Pt maneuvered to the BR with FWW and SBA/CGA for safety due to pt's decreased safety awareness and standing balance. Pt performed toileting tasks with CGA requiring Min A to std from low toilet seat. Pt then completed std grooming task for ~ 2 minutes requesting to return to bed due to fatigue. Pt noted to be able to initiate mult ADL tasks when presented in context, however benefits from VCs for safe sequencing. Patient will benefit from skilled intervention to address the above impairments. Patient's rehabilitation potential is considered to be Good  Factors which may influence rehabilitation potential include:   []             None noted  [x]             Mental ability/status  [x]             Medical condition  [x]             Home/family situation and support systems  [x]             Safety awareness  []             Pain tolerance/management  []             Other:      PLAN :  Recommendations and Planned Interventions:   [x]               Self Care Training                  [x]      Therapeutic Activities  [x]               Functional Mobility Training   [x]      Cognitive Retraining  [x]               Therapeutic Exercises           [x]      Endurance Activities  [x]               Balance Training                    [x]      Neuromuscular Re-Education  []               Visual/Perceptual Training     [x]      Home Safety Training  [x]               Patient Education                   [x]      Family Training/Education  []               Other (comment):    Frequency/Duration: Patient will be followed by occupational therapy 1-2 times per day/3-5 days per week to address goals. Discharge Recommendations: Rehab  Further Equipment Recommendations for Discharge: shower chair and rolling walker     SUBJECTIVE:   Patient stated I ate a lot today.     OBJECTIVE DATA SUMMARY:   Hospital course since last seen and reason for reevaluation: Pt is due for OT re-evaluation.  Pt appears to be benefiting from receiving OT services during this hospitalization stay. Pt is making steady progress toward goals but continues to be limited by intermittent confusion and decreased endurance. OT will continue to follow the patient for further intervention during this hospitalization, in order to maximize ADL performance and overall functional independence. Past Medical History:   Diagnosis Date    Arthritis     Diarrhea     no diarrhea since 9/14/2021    Encephalitis 06/14/2021    intubated    GERD (gastroesophageal reflux disease)     H. pylori infection 2019    IBS (irritable bowel syndrome)     Memory difficulty     since encephalitis    Migraine     Ovarian cyst 2019    Seizures ([facility] [address] 06/16/2021    x 1    Vomiting      Past Surgical History:   Procedure Laterality Date    HX BREAST AUGMENTATION  2005    HX CHOLECYSTECTOMY      HX COLONOSCOPY  2016    HX ENDOSCOPY  2019    HX TYMPANOPLASTY      left x 5 times     Barriers to Learning/Limitations: None  Compensate with: visual, verbal, tactile, kinesthetic cues/model    Home Situation:   Home Situation  Home Environment: Private residence  One/Two Story Residence: Two story, live on 1st floor  Living Alone: No  Support Systems: Spouse/Significant Other  Patient Expects to be Discharged to[de-identified] House  Current DME Used/Available at Home: Shower chair  Tub or Shower Type: Tub/Shower combination  [x]  Right hand dominant   []  Left hand dominant    Cognitive/Behavioral Status:  Neurologic State: Alert  Orientation Level: Oriented to person; Oriented to place; Oriented to time  Cognition: Follows commands; Decreased attention/concentration; Poor safety awareness  Safety/Judgement: Awareness of environment; Fall prevention    Skin: visible skin intact  Edema: none noted    Vision/Perceptual:          Diplopia: Yes (occasional in R eye per pt)       Coordination: BUE  Coordination: Generally decreased, functional  Fine Motor Skills-Upper: Left Intact;  Right Intact    Gross Motor Skills-Upper: Left Intact; Right Intact    Balance:  Sitting: High guard  Standing: Impaired; With support  Standing - Static: Good  Standing - Dynamic : Fair    Strength: BUE  Strength: Generally decreased, functional   Tone & Sensation: BUE  Tone: Normal  Sensation: Intact   Range of Motion: BUE  AROM: Generally decreased, functional   Functional Mobility and Transfers for ADLs:  Bed Mobility:     Supine to Sit: Contact guard assistance   Transfers:  Sit to Stand: Contact guard assistance; Minimum assistance (Min A from toilet)  Stand to Sit: Stand-by assistance   Toilet Transfer : Contact guard assistance; Minimum assistance (Min A to std)    Bathroom Mobility: Stand-by assistance  ADL Assessment:   Feeding: Setup  Oral Facial Hygiene/Grooming: Setup  Bathing: Minimum assistance  Upper Body Dressing: Setup  Lower Body Dressing: Contact guard assistance  Toileting: Contact guard assistance     ADL Intervention:     Cognitive Retraining  Safety/Judgement: Awareness of environment; Fall prevention    Pain:  Pain level pre-treatment: 0/10   Pain level post-treatment: 0/10    Activity Tolerance:   Fair  Please refer to the flowsheet for vital signs taken during this treatment. After treatment:   [] Patient left in no apparent distress sitting up in chair  [x] Patient left in no apparent distress in bed  [x] Call bell left within reach  [x] Nursing notified  [] Caregiver present  [x] Bed alarm activated    COMMUNICATION/EDUCATION:   [x] Role of Occupational Therapy in the acute care setting  [x] Home safety education was provided and the patient/caregiver indicated understanding. [x] Patient/family have participated as able in goal setting and plan of care. [] Patient/family agree to work toward stated goals and plan of care. [] Patient understands intent and goals of therapy, but is neutral about his/her participation. [] Patient is unable to participate in goal setting and plan of care.     Thank you for this referral.  Lalo Real Ra Worthington OTR/L  Time Calculation: 28 mins

## 2021-11-08 LAB
HCT VFR BLD AUTO: 26.1 % (ref 35–45)
HGB BLD-MCNC: 8.4 G/DL (ref 12–16)

## 2021-11-08 PROCEDURE — 74011250636 HC RX REV CODE- 250/636: Performed by: INTERNAL MEDICINE

## 2021-11-08 PROCEDURE — 74011250637 HC RX REV CODE- 250/637: Performed by: INTERNAL MEDICINE

## 2021-11-08 PROCEDURE — 74011636637 HC RX REV CODE- 636/637: Performed by: PSYCHIATRY & NEUROLOGY

## 2021-11-08 PROCEDURE — 74011250637 HC RX REV CODE- 250/637: Performed by: PSYCHIATRY & NEUROLOGY

## 2021-11-08 PROCEDURE — 74011250637 HC RX REV CODE- 250/637: Performed by: FAMILY MEDICINE

## 2021-11-08 PROCEDURE — 36592 COLLECT BLOOD FROM PICC: CPT

## 2021-11-08 PROCEDURE — 65270000029 HC RM PRIVATE

## 2021-11-08 PROCEDURE — 85014 HEMATOCRIT: CPT

## 2021-11-08 PROCEDURE — 99232 SBSQ HOSP IP/OBS MODERATE 35: CPT | Performed by: INTERNAL MEDICINE

## 2021-11-08 PROCEDURE — 74011000258 HC RX REV CODE- 258: Performed by: INTERNAL MEDICINE

## 2021-11-08 PROCEDURE — 74011000250 HC RX REV CODE- 250: Performed by: INTERNAL MEDICINE

## 2021-11-08 PROCEDURE — 77030037878 HC DRSG MEPILEX >48IN BORD MOLN -B

## 2021-11-08 RX ADMIN — ONDANSETRON 4 MG: 4 TABLET, ORALLY DISINTEGRATING ORAL at 11:11

## 2021-11-08 RX ADMIN — IPRATROPIUM BROMIDE AND ALBUTEROL SULFATE 3 ML: .5; 2.5 SOLUTION RESPIRATORY (INHALATION) at 09:15

## 2021-11-08 RX ADMIN — THIAMINE HYDROCHLORIDE 500 MG: 100 INJECTION, SOLUTION INTRAMUSCULAR; INTRAVENOUS at 09:00

## 2021-11-08 RX ADMIN — Medication 10 ML: at 05:31

## 2021-11-08 RX ADMIN — MIDODRINE HYDROCHLORIDE 2.5 MG: 5 TABLET ORAL at 09:14

## 2021-11-08 RX ADMIN — PANTOPRAZOLE SODIUM 40 MG: 40 TABLET, DELAYED RELEASE ORAL at 09:15

## 2021-11-08 RX ADMIN — LACOSAMIDE 100 MG: 10 SOLUTION ORAL at 09:00

## 2021-11-08 RX ADMIN — PANTOPRAZOLE SODIUM 40 MG: 40 TABLET, DELAYED RELEASE ORAL at 17:14

## 2021-11-08 RX ADMIN — ENOXAPARIN SODIUM 40 MG: 100 INJECTION SUBCUTANEOUS at 09:15

## 2021-11-08 RX ADMIN — THERA TABS 1 TABLET: TAB at 09:14

## 2021-11-08 RX ADMIN — PREDNISONE 60 MG: 20 TABLET ORAL at 09:13

## 2021-11-08 RX ADMIN — MIDODRINE HYDROCHLORIDE 2.5 MG: 5 TABLET ORAL at 17:16

## 2021-11-08 NOTE — PROGRESS NOTES
Met with  and patient at bedside. They live in [place] so would like to find a rehab in [place] or near [facility] if possible.   Rachael Camacho RN - Outcomes Manager  [phone]

## 2021-11-08 NOTE — PROGRESS NOTES
Nutrition Note    GI recommending against PEG tube placement due to ongoing encephalopathy recurrent agitation and high risk of pulling out PEG tube. NGT was placed 11/6 and was started on tube feeds at goal rate of 40 mL/hr then pt pulled out NGT 11/7 early am. Pts family at bedside reporting pt with improved po intake consuming 75% of breakfast & lunch yesterday. Consumes Ensure supplements when provided, encouragement provided by family at bedside. Formed BM 11/6. Nutrition Recommendations/Plan:   - Continue oral diet as tolerated with Ensure Enlive, TID.  - Monitor and encourage po intake as tolerated, continue daily MVI & thiamine.  - Discontinue tube feediog orders as pt without EN access, no plan for PEG placement or replacement of NGT.     Electronically signed by Jovana Gramajo RD on 11/8/2021 at 12:35 PM    Contact: [phone]

## 2021-11-08 NOTE — PROGRESS NOTES
Hospitalist Progress Note    Subjective:   Daily Progress Note: 11/8/2021    Patient was seen in presence of multiple family members including sister and  as well as mother. Patient feels much better. Visual disturbances is getting better. Off-and-on dizziness especially when she gets up. Denies any chest pain abdominal pain. No more nausea or vomiting. Current Facility-Administered Medications   Medication Dose Route Frequency    lacosamide (VIMPAT) oral solution 100 mg  100 mg Oral Q12H    [START ON 11/10/2021] thiamine HCL (B-1) tablet 100 mg  100 mg Oral DAILY    melatonin tablet 5 mg  5 mg Oral QHS    thiamine (B-1) 500 mg in 0.9% sodium chloride 50 mL IVPB  500 mg IntraVENous DAILY    albuterol-ipratropium (DUO-NEB) 2.5 MG-0.5 MG/3 ML  3 mL Nebulization Q6H RT    predniSONE (DELTASONE) tablet 60 mg  60 mg Oral DAILY WITH BREAKFAST    midodrine (PROAMATINE) tablet 2.5 mg  2.5 mg Oral BID WITH MEALS    meclizine (ANTIVERT) tablet 12.5 mg  12.5 mg Oral Q6H PRN    pantoprazole (PROTONIX) tablet 40 mg  40 mg Oral ACB&D    mirtazapine (REMERON SOL-TAB) disintegrating tablet 15 mg  15 mg Oral QHS    phenol throat spray (CHLORASEPTIC) 1 Spray  1 Spray Oral PRN    therapeutic multivitamin (THERAGRAN) tablet 1 Tablet  1 Tablet Oral DAILY    sodium chloride (NS) flush 5-40 mL  5-40 mL IntraVENous Q8H    sodium chloride (NS) flush 5-40 mL  5-40 mL IntraVENous PRN    acetaminophen (TYLENOL) tablet 650 mg  650 mg Oral Q6H PRN    Or    acetaminophen (TYLENOL) suppository 650 mg  650 mg Rectal Q6H PRN    polyethylene glycol (MIRALAX) packet 17 g  17 g Oral DAILY PRN    ondansetron (ZOFRAN ODT) tablet 4 mg  4 mg Oral Q8H PRN    Or    ondansetron (ZOFRAN) injection 8 mg  8 mg IntraVENous Q6H PRN    enoxaparin (LOVENOX) injection 40 mg  40 mg SubCUTAneous DAILY        Review of Systems  Pertinent items are noted in HPI.     Objective:     Visit Vitals  /71   Pulse 78   Temp 97.7 °F (36.5 °C)   Resp 16   Ht 5' (1.524 m)   Wt 66.2 kg (146 lb)   LMP 2019 Comment: unknown   SpO2 96%   BMI 28.51 kg/m²      O2 Device: None (Room air)    Temp (24hrs), Av.7 °F (36.5 °C), Min:97.5 °F (36.4 °C), Max:98.1 °F (36.7 °C)      No intake/output data recorded.  1901 -  0700  In: 1184 [P.O.:1154]  Out: -     General appearance -awake, follows commands appropriately, not in acute distress. Chest -CTA bilaterally without any wheezes currently  Heart - S1 and S2 normal  Abdomen - soft, nontender, nondistended, Bowel sounds present  Neurological -awake, follows commands appropriately, moves all 4 extremities well. Extremities - no pedal edema noted      Data Review    Recent Results (from the past 24 hour(s))   HGB & HCT    Collection Time: 21  4:20 AM   Result Value Ref Range    HGB 8.4 (L) 12.0 - 16.0 g/dL    HCT 26.1 (L) 35.0 - 45.0 %         Assessment/Plan:     ASSESSMENT:    1. Adult failure to thrive  2. Multiple electrolyte disturbances, better currently  3. Intermittent vomiting with nausea could be due to central vertigo, currently stable  4. Recent history of herpes encephalitis s/p treatment  5. 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 6.  Chronic mild hyponatremia due to nausea  7. Poor appetite and severe protein calorie malnutrition  8. GERD  9. Dyslipidemia  10. History of seizure disorder  11. Intermittent encephalopathy with sundowning due to #5, and #4 and Wernicke's encephalopathy  12. 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  Continue current diet and Remeron  Continue thiamine and multivitamin  Discussed with neurologist and he mentioned we can go down on prednisone every week by 10 mg.   Patient is otherwise medically stable to be discharged to skilled nursing facility and it has been discussed with patient's primary member and they are in agreement with it. Anticipatory discharge: SNF placement when bed is found     Total time to take care of this patient was 30 minutes and more than 50% of time was spent counseling and coordinating care. Disclaimer: Sections of this note are dictated using utilizing voice recognition software, which may have resulted in some phonetic based errors in grammar and contents. Even though attempts were made to correct all the mistakes, some may have been missed, and remained in the body of the document. If questions arise, please contact our department.

## 2021-11-08 NOTE — PROGRESS NOTES
Pt tolerating food. No nausea, vomiting, or new pressure injuries.    Side rails up and bed alarm on

## 2021-11-09 LAB — PHOSPHATE SERPL-MCNC: 4.1 MG/DL (ref 2.5–4.9)

## 2021-11-09 PROCEDURE — 74011250637 HC RX REV CODE- 250/637: Performed by: INTERNAL MEDICINE

## 2021-11-09 PROCEDURE — 74011250636 HC RX REV CODE- 250/636: Performed by: INTERNAL MEDICINE

## 2021-11-09 PROCEDURE — 74011000258 HC RX REV CODE- 258: Performed by: INTERNAL MEDICINE

## 2021-11-09 PROCEDURE — 94640 AIRWAY INHALATION TREATMENT: CPT

## 2021-11-09 PROCEDURE — 74011250637 HC RX REV CODE- 250/637: Performed by: FAMILY MEDICINE

## 2021-11-09 PROCEDURE — 84100 ASSAY OF PHOSPHORUS: CPT

## 2021-11-09 PROCEDURE — 99232 SBSQ HOSP IP/OBS MODERATE 35: CPT | Performed by: INTERNAL MEDICINE

## 2021-11-09 PROCEDURE — 74011000250 HC RX REV CODE- 250: Performed by: INTERNAL MEDICINE

## 2021-11-09 PROCEDURE — 97535 SELF CARE MNGMENT TRAINING: CPT

## 2021-11-09 PROCEDURE — 94761 N-INVAS EAR/PLS OXIMETRY MLT: CPT

## 2021-11-09 PROCEDURE — 74011636637 HC RX REV CODE- 636/637: Performed by: PSYCHIATRY & NEUROLOGY

## 2021-11-09 PROCEDURE — 65270000029 HC RM PRIVATE

## 2021-11-09 PROCEDURE — 74011250637 HC RX REV CODE- 250/637: Performed by: PSYCHIATRY & NEUROLOGY

## 2021-11-09 RX ORDER — CHOLECALCIFEROL (VITAMIN D3) 125 MCG
5 CAPSULE ORAL
Qty: 30 TABLET | Refills: 0 | Status: SHIPPED
Start: 2021-11-09

## 2021-11-09 RX ORDER — LACOSAMIDE 10 MG/ML
100 SOLUTION ORAL EVERY 12 HOURS
Qty: 465 ML | Refills: 0 | Status: SHIPPED
Start: 2021-11-09

## 2021-11-09 RX ORDER — IPRATROPIUM BROMIDE AND ALBUTEROL SULFATE 2.5; .5 MG/3ML; MG/3ML
3 SOLUTION RESPIRATORY (INHALATION)
Qty: 30 NEBULE | Refills: 0 | Status: SHIPPED
Start: 2021-11-09

## 2021-11-09 RX ORDER — THIAMINE HCL 250 MG
TABLET ORAL
Qty: 44 TABLET | Refills: 0 | Status: SHIPPED
Start: 2021-11-09

## 2021-11-09 RX ORDER — PANTOPRAZOLE SODIUM 40 MG/1
40 TABLET, DELAYED RELEASE ORAL DAILY
Qty: 30 TABLET | Refills: 0 | Status: SHIPPED
Start: 2021-11-09

## 2021-11-09 RX ORDER — PREDNISONE 10 MG/1
TABLET ORAL
Qty: 105 TABLET | Refills: 0 | Status: SHIPPED
Start: 2021-11-09

## 2021-11-09 RX ORDER — MIRTAZAPINE 15 MG/1
15 TABLET, ORALLY DISINTEGRATING ORAL
Qty: 30 TABLET | Refills: 0 | Status: SHIPPED
Start: 2021-11-09

## 2021-11-09 RX ORDER — THERA TABS 400 MCG
1 TAB ORAL DAILY
Qty: 30 TABLET | Refills: 0 | Status: SHIPPED
Start: 2021-11-10

## 2021-11-09 RX ADMIN — Medication 5 MG: at 00:13

## 2021-11-09 RX ADMIN — THERA TABS 1 TABLET: TAB at 09:36

## 2021-11-09 RX ADMIN — Medication 10 ML: at 06:00

## 2021-11-09 RX ADMIN — MIRTAZAPINE 15 MG: 15 TABLET, ORALLY DISINTEGRATING ORAL at 00:13

## 2021-11-09 RX ADMIN — IPRATROPIUM BROMIDE AND ALBUTEROL SULFATE 3 ML: .5; 2.5 SOLUTION RESPIRATORY (INHALATION) at 13:27

## 2021-11-09 RX ADMIN — THIAMINE HYDROCHLORIDE 500 MG: 100 INJECTION, SOLUTION INTRAMUSCULAR; INTRAVENOUS at 09:00

## 2021-11-09 RX ADMIN — MIRTAZAPINE 15 MG: 15 TABLET, ORALLY DISINTEGRATING ORAL at 21:18

## 2021-11-09 RX ADMIN — MIDODRINE HYDROCHLORIDE 2.5 MG: 5 TABLET ORAL at 16:51

## 2021-11-09 RX ADMIN — MIDODRINE HYDROCHLORIDE 2.5 MG: 5 TABLET ORAL at 09:36

## 2021-11-09 RX ADMIN — ENOXAPARIN SODIUM 40 MG: 100 INJECTION SUBCUTANEOUS at 09:39

## 2021-11-09 RX ADMIN — IPRATROPIUM BROMIDE AND ALBUTEROL SULFATE 3 ML: .5; 2.5 SOLUTION RESPIRATORY (INHALATION) at 09:39

## 2021-11-09 RX ADMIN — LACOSAMIDE 100 MG: 10 SOLUTION ORAL at 00:13

## 2021-11-09 RX ADMIN — PANTOPRAZOLE SODIUM 40 MG: 40 TABLET, DELAYED RELEASE ORAL at 09:36

## 2021-11-09 RX ADMIN — LACOSAMIDE 100 MG: 10 SOLUTION ORAL at 21:26

## 2021-11-09 RX ADMIN — LACOSAMIDE 100 MG: 10 SOLUTION ORAL at 09:59

## 2021-11-09 RX ADMIN — PREDNISONE 60 MG: 20 TABLET ORAL at 09:36

## 2021-11-09 RX ADMIN — Medication 5 MG: at 21:17

## 2021-11-09 RX ADMIN — Medication 10 ML: at 00:12

## 2021-11-09 RX ADMIN — Medication 10 ML: at 14:00

## 2021-11-09 RX ADMIN — PANTOPRAZOLE SODIUM 40 MG: 40 TABLET, DELAYED RELEASE ORAL at 16:53

## 2021-11-09 NOTE — PROGRESS NOTES
Problem: Self Care Deficits Care Plan (Adult)  Goal: *Acute Goals and Plan of Care (Insert Text)  Description: Occupational Therapy Goals  Re-evaluated 11/7/2021 continue with all goals for consistency. 1.  Patient will perform bed mobility in preparation for selfcare with modified independence. 2.  Patient will perform grooming task/functional activity standing for 4-7 minutes with supervision/set-up, F+ balance. 3.  Patient will perform lower body dressing with supervision/set-up seated and standing using AE prn (Pt has reacher at home). 4.  Patient will perform toilet transfers with supervision/set-up. 5.  Patient will perform all aspects of toileting with supervision/set-up. 6.  Patient will participate in upper extremity therapeutic exercise/activities with modified independence for 8 minutes. 7.  Patient will utilize energy conservation techniques during functional activities with verbal cues. Prior Level of Function: Patient was independent with self-care and functional mobility PTA. Patient's spouse reports she would walk unsteady at home d/t vertigo and for the stairs she would bump up/down on her bottom. Outcome: Progressing Towards Goal   OCCUPATIONAL THERAPY TREATMENT    Patient: Jean Claude Couch (7 y.o. female)  Date: 11/9/2021  Diagnosis: Vomiting [R11.10]   <principal problem not specified>  Procedure(s) (LRB):  PERCUTANEOUS ENDOSCOPIC GASTROSTOMY TUBE INSERTION (N/A) 3 Days Post-Op  Precautions: Fall    Chart, occupational therapy assessment, plan of care, and goals were reviewed. ASSESSMENT:  Patient alert and agreeable to therapy session. She requires extra time and verbal/tactile cuing for all tasks. Supervision to CGA needed for self care tasks as well as cuing for initiation at times. Patient talks frequently about her ailments but is able to complete functional activities at a CGA level. No AD utilized to ambulate to the bathroom for toileting and grooming.   No LOB noted although patient stated she was weak. At end of session, patient sat in chair with chair alarm engaged and all needs were met. Progression toward goals:  []          Improving appropriately and progressing toward goals  [x]          Improving slowly and progressing toward goals  []          Not making progress toward goals and plan of care will be adjusted     PLAN:  Patient continues to benefit from skilled intervention to address the above impairments. Continue treatment per established plan of care. Discharge Recommendations:  Rehab vs Home Health with family support  Further Equipment Recommendations for Discharge:  shower chair and rolling walker     SUBJECTIVE:   Patient stated I have this weird thing where my eye goes in and out and I see two or three of everything.     OBJECTIVE DATA SUMMARY:   Cognitive/Behavioral Status:  Neurologic State: Alert, Confused  Orientation Level: Oriented to person, Oriented to place, Oriented to situation  Cognition: Follows commands  Safety/Judgement: Fall prevention    Functional Mobility and Transfers for ADLs:   Bed Mobility:  Supine to Sit: Supervision     Transfers:  Sit to Stand: Contact guard assistance  Stand to Sit: Supervision  Bed to Chair: Contact guard assistance   Toilet Transfer : Contact guard assistance   Bathroom Mobility: Contact guard assistance (without an AD)    Balance:  Sitting: Intact  Standing: Impaired    ADL Intervention:  Grooming  Grooming Assistance: Supervision  Position Performed: Standing (at sink)  Washing Hands: Supervision  Brushing/Combing Hair: Supervision    Upper [address]: Set-up (Donned as a robe)    Lower Body Dressing Assistance  Socks: Set-up  Leg Crossed Method Used: No  Position Performed: Seated edge of bed    Toileting  Toileting Assistance: Contact guard assistance  Bladder Hygiene: Supervision  Bowel Hygiene: Supervision  Clothing Management: Contact guard assistance    Cognitive Retraining  Safety/Judgement: Fall prevention    Pain:  Pain level pre-treatment: 0/10   Pain level post-treatment: 0/10  Pain Intervention(s): NA  Response to intervention: NA    Activity Tolerance:    Good  Please refer to the flowsheet for vital signs taken during this treatment. After treatment:   [x]  Patient left in no apparent distress sitting up in chair  []  Patient left in no apparent distress in bed  [x]  Call bell left within reach  [x]  Nursing notified  []  Caregiver present  [x]  Chair alarm activated    COMMUNICATION/EDUCATION:   [x] Role of Occupational Therapy in the acute care setting  [x] Home safety education was provided and the patient/caregiver indicated understanding. [x] Patient/family have participated as able in working towards goals and plan of care. [x] Patient/family agree to work toward stated goals and plan of care. [] Patient understands intent and goals of therapy, but is neutral about his/her participation. [] Patient is unable to participate in goal setting and plan of care.       Thank you for this referral.  Charity Ha MS OTR/L   Time Calculation: 26 mins

## 2021-11-09 NOTE — PROGRESS NOTES
Hospitalist Progress Note    Subjective:   Daily Progress Note: 11/9/2021    Patient was seen in presence of her . She is sitting up in a chair. Denies any headaches or dizziness. Off and on visual disks disturbances. No nausea or vomiting. Denies any chest pain or abdominal pain. No shortness of breath or cough. Current Facility-Administered Medications   Medication Dose Route Frequency    [START ON 11/10/2021] thiamine (B-1) tablet 250 mg  250 mg Oral DAILY    lacosamide (VIMPAT) oral solution 100 mg  100 mg Oral Q12H    melatonin tablet 5 mg  5 mg Oral QHS    thiamine (B-1) 500 mg in 0.9% sodium chloride 50 mL IVPB  500 mg IntraVENous DAILY    albuterol-ipratropium (DUO-NEB) 2.5 MG-0.5 MG/3 ML  3 mL Nebulization Q6H RT    predniSONE (DELTASONE) tablet 60 mg  60 mg Oral DAILY WITH BREAKFAST    midodrine (PROAMATINE) tablet 2.5 mg  2.5 mg Oral BID WITH MEALS    meclizine (ANTIVERT) tablet 12.5 mg  12.5 mg Oral Q6H PRN    pantoprazole (PROTONIX) tablet 40 mg  40 mg Oral ACB&D    mirtazapine (REMERON SOL-TAB) disintegrating tablet 15 mg  15 mg Oral QHS    phenol throat spray (CHLORASEPTIC) 1 Spray  1 Spray Oral PRN    therapeutic multivitamin (THERAGRAN) tablet 1 Tablet  1 Tablet Oral DAILY    sodium chloride (NS) flush 5-40 mL  5-40 mL IntraVENous Q8H    sodium chloride (NS) flush 5-40 mL  5-40 mL IntraVENous PRN    acetaminophen (TYLENOL) tablet 650 mg  650 mg Oral Q6H PRN    Or    acetaminophen (TYLENOL) suppository 650 mg  650 mg Rectal Q6H PRN    polyethylene glycol (MIRALAX) packet 17 g  17 g Oral DAILY PRN    ondansetron (ZOFRAN ODT) tablet 4 mg  4 mg Oral Q8H PRN    Or    ondansetron (ZOFRAN) injection 8 mg  8 mg IntraVENous Q6H PRN    enoxaparin (LOVENOX) injection 40 mg  40 mg SubCUTAneous DAILY        Review of Systems  Pertinent items are noted in HPI.     Objective:     Visit Vitals  /74   Pulse 68   Temp 97.5 °F (36.4 °C)   Resp 18   Ht 5' (1.524 m)   Wt 66.2 kg (146 lb)   LMP 2019 Comment: unknown   SpO2 99%   BMI 28.51 kg/m²      O2 Device: None (Room air)    Temp (24hrs), Av.4 °F (36.3 °C), Min:97 °F (36.1 °C), Max:97.5 °F (36.4 °C)      No intake/output data recorded. No intake/output data recorded. General appearance -awake, follows commands appropriately, not in acute distress. Chest -CTA bilaterally without any wheezes currently  Heart - S1 and S2 normal  Abdomen - soft, nontender, nondistended, Bowel sounds present  Neurological -awake, follows commands appropriately, moves all 4 extremities well. Sensation to touch normal in all 4 extremities. Extremities - no pedal edema noted      Data Review    Recent Results (from the past 24 hour(s))   PHOSPHORUS    Collection Time: 21  5:30 AM   Result Value Ref Range    Phosphorus 4.1 2.5 - 4.9 MG/DL         Assessment/Plan:     ASSESSMENT:    1. Adult failure to thrive  2. Multiple electrolyte disturbances, better currently  3. Intermittent vomiting with nausea could be due to central vertigo, currently stable  4. Recent history of herpes encephalitis s/p treatment  5. 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 6.  Chronic mild hyponatremia due to nausea  7. Poor appetite and severe protein calorie malnutrition  8. GERD  9. Dyslipidemia  10. History of seizure disorder  11. Intermittent encephalopathy with sundowning due to #5, and #4 and Wernicke's encephalopathy  12. 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  Continue current diet and Remeron  Continue thiamine and multivitamin  Discussed with neurologist and he mentioned we can go down on prednisone every week by 10 mg.   Patient is otherwise medically stable to be discharged to skilled nursing facility and it has been discussed with patient's primary member and they are in agreement with it.    Anticipatory discharge: SNF placement when bed is found     Total time to take care of this patient was 30 minutes and more than 50% of time was spent counseling and coordinating care. Disclaimer: Sections of this note are dictated using utilizing voice recognition software, which may have resulted in some phonetic based errors in grammar and contents. Even though attempts were made to correct all the mistakes, some may have been missed, and remained in the body of the document. If questions arise, please contact our department.

## 2021-11-09 NOTE — DISCHARGE SUMMARY
Physician Discharge Summary       Patient: Baltazar Murillo MRN: [id number]  SSN: [id number]    YOB: 1965  Age: 64 y.o. Sex: female    PCP: Kiara Ace NP    Allergies: Minocycline    Admit date: 10/20/2021  Admitting Provider: Adalberto Wolf MD    Discharge date: 11/9/2021  Discharging Provider: Nichole Erwin MD    * Admission Diagnoses: Vomiting [R11.10]    * Discharge Diagnoses:      1. Adult failure to thrive  2. Multiple electrolyte disturbances, better currently  3. Intermittent vomiting with nausea could be due to central vertigo, currently stable  4. Recent history of herpes encephalitis s/p treatment  5. 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 6.  Chronic mild hyponatremia due to nausea  7. Poor appetite and severe protein calorie malnutrition  8. GERD  9. Dyslipidemia  10. History of seizure disorder  11. Intermittent encephalopathy with sundowning due to #5, and #4 and Wernicke's encephalopathy  12. 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 Please refer hospital admission H&P for further detail. Patient was admitted here with a diagnosis of adult failure to thrive. Patient also had multiple letter light disturbances at the time of for admission due to dehydration in which were repleted appropriately. Patient also had intermittent nausea and vomiting at the time of admission which was thought secondary to central vertigo. Patient underwent MRI brain which showed remote vascular injury to right MCA and bilateral TA distribution with volume loss. Patient underwent lumbar puncture and her CSF showed positive for oligoclonal bands concerning for autoimmune encephalitis.   Patient was started on prednisone by a neurologist.  Patient also had CT chest done which showed 2 small nodular opacity in the right lung. Initially patient was requiring NG tube for feeding. She was also having intermittent confusion and agitation especially during nighttime. She also had mild hyponatremia which was thought secondary to nausea got better. Patient was seen by ID did not think she needs any further HSV treatment. Neurology was reconsulted as there was a concern that patient could have confusion from [address]. Keppra was changed to Vimpat. It was also found out that patient has low vitamin B1 level and had ophthalmoplegia, started on IV thiamine followed by p.o. thiamine. Patient was also started on Remeron for her low appetite as well as melatonin. She had borderline asymptomatic hypotension was treated initially with midodrine. Patient was clinically started getting better with all this changes made in form of starting Remeron, changing from Keppra to Vimpat, starting her on prednisone, using meclizine as needed, and starting her on thiamine. She was able to tolerate p.o. diet and medication. Initially we were planning to put PEG tube but it was canceled. PT and OT recommended SNF placement and she will be discharged to [facility] once authorization is obtained.     * Procedures:   Procedure(s):  PERCUTANEOUS ENDOSCOPIC GASTROSTOMY TUBE INSERTION  Cardiology and IR Orders (From admission to next 24h)     Start     Ordered    11/05/21 1215  IR US GUIDED VASCULAR ACCESS  [[id number]]  ONE TIME         11/05/21 1210                Consults: ID, Neurology and Psychiatry    Significant Diagnostic Studies: MRI brain, LP, CT chest, KUB    Discharge Exam:  Please refer to my progress note from November 9, 2021 for further detail    * Discharge Condition: improved  * Disposition: [place] ([facility])    Discharge Medications:  Current Discharge Medication List      START taking these medications    Details   albuterol-ipratropium (DUO-NEB) 2.5 mg-0.5 mg/3 ml nebu 3 mL by Nebulization route every six (6) hours as needed for Wheezing. Qty: 30 Nebule, Refills: 0  Start date: 11/9/2021      lacosamide (VIMPAT) 10 mg/mL soln oral solution Take 10 mL by mouth every twelve (12) hours. Max Daily Amount: 200 mg. Qty: 465 mL, Refills: 0  Start date: 11/9/2021    Associated Diagnoses: Seizure ([address]      melatonin 5 mg tablet Take 1 Tablet by mouth nightly. Qty: 30 Tablet, Refills: 0  Start date: 11/9/2021      mirtazapine (REMERON SOL-TAB) 15 mg disintegrating tablet Take 1 Tablet by mouth nightly. Qty: 30 Tablet, Refills: 0  Start date: 11/9/2021      predniSONE (DELTASONE) 10 mg tablet 5 tablets p.o. daily for 1 week then 4 tablets p.o. daily for 1 week then 3 tablets p.o. daily for 1 week then 2 tablets p.o. daily for 1 week then 1 tablet daily for 1 week and then stop  Qty: 105 Tablet, Refills: 0  Start date: 11/9/2021      therapeutic multivitamin [facility]) tablet Take 1 Tablet by mouth daily. Qty: 30 Tablet, Refills: 0  Start date: 11/10/2021      thiamine  mg tablet 250 mg p.o. daily for 1 week then 100 mg p.o. daily  Qty: 44 Tablet, Refills: 0  Start date: 11/9/2021         CONTINUE these medications which have CHANGED    Details   pantoprazole (PROTONIX) 40 mg tablet Take 1 Tablet by mouth daily. Take on empty stomah  Qty: 30 Tablet, Refills: 0  Start date: 11/9/2021         CONTINUE these medications which have NOT CHANGED    Details   meclizine (ANTIVERT) 12.5 mg tablet Take 12.5 mg by mouth three (3) times daily as needed for Dizziness. Dizziness      ondansetron (ZOFRAN ODT) 8 mg disintegrating tablet Take 8 mg by mouth every eight (8) hours as needed for Nausea or Vomiting.          STOP taking these medications       potassium gluconate 595 mg (99 mg) tablet Comments:   Reason for Stopping:         potassium chloride (KLOR-CON) 20 mEq pack Comments:   Reason for Stopping:         LORazepam (Ativan) 1 mg tablet Comments:   Reason for Stopping:         levETIRAcetam (KEPPRA) 250 mg tablet Comments:   Reason for Stopping:         amantadine HCL (SYMMETREL) 100 mg capsule Comments:   Reason for Stopping:               * Follow-up Care/Patient Instructions: Activity: PT/OT Eval and Treat  Diet: Cardiac Diet  Wound Care: None needed    Follow-up Information     Follow up With Specialties Details Why Contact Info    Eva Allred NP Nurse Practitioner   [address] [facility] [address] [place] [phone]          Follow-up Appointments   Procedures    FOLLOW UP VISIT Appointment in: Other (Specify) Nursing home doctor to follow this patient With patient's private neurologist as scheduled or in 2 to 3 weeks     Nursing home doctor to follow this patient  With patient's private neurologist as scheduled or in 2 to 3 weeks     Standing Status:   Standing     Number of Occurrences:   1     Order Specific Question:   Appointment in     Answer: Other (Specify)       Total time of discharge is greater than 35 minutes    Disclaimer: Sections of this note are dictated using utilizing voice recognition software, which may have resulted in some phonetic based errors in grammar and contents. Even though attempts were made to correct all the mistakes, some may have been missed, and remained in the body of the document. If questions arise, please contact our department.     Signed:  Prakash Escobedo MD  11/9/2021  1:16 PM

## 2021-11-09 NOTE — ADT AUTH CERT NOTES
Vomiting - Care Day 20 (11/8/2021) by Bethany Childs       Review Status Review Entered   Completed 11/9/2021 08:27      Criteria Review      Care Day: 20 Care Date: 11/8/2021 Level of Care: Inpatient Floor    Guideline Day 2    Clinical Status    (X) * Hemodynamic stability    (X) * Vomiting absent or controlled    (X) * Electrolyte abnormalities absent or acceptable for next level of care    (X) * Life-threatening causes of vomiting excluded    (X) * Pain absent or managed    ( ) * Discharge plans and education understood    Activity    (X) * Ambulatory or acceptable for next level of care    Routes    (X) * Oral hydration    (X) * Oral medications or regimen acceptable for next level of care    (X) * Oral diet or acceptable for next level of care    * Milestone   Additional Notes   11/8/2021 Continued Stay Review   Medical Bed      138/73; 97; 104; 16; 99% RA      MEDS:   Duo-neb 3ml q6hrs Neb   Lovenox 40mg qd SC   Keppra 750mg bid PO   Proamatine 2.5mg bid PO   Antivert 12.5mg tid PO   Remeron 15mg qhd PO   Zofran 4mg q8hrs prn PO x1   Protonix 40mg bid PO   Prednisone 60mg qd PO   Theragran 1 qd PO   Thiamine 50mg qd PO         Hospitalist MD Note:      Daily Progress Note: 11/8/2021       Patient was seen in presence of multiple family members including sister and  as well as mother. Steffanie Homans feels much better.  Visual disturbances is getting better.  Off-and-on dizziness especially when she gets up.  Denies any chest pain abdominal pain.  No more nausea or vomiting.       General appearance -awake, follows commands appropriately, not in acute distress. Chest -CTA bilaterally without any wheezes currently   Heart - S1 and S2 normal   Abdomen - soft, nontender, nondistended, Bowel sounds present   Neurological -awake, follows commands appropriately, moves all 4 extremities well.    Extremities - no pedal edema noted      ASSESSMENT:       1.  Adult failure to thrive   2.  Multiple electrolyte disturbances, better currently   3.  Intermittent vomiting with nausea could be due to central vertigo, currently stable   4.  Recent history of herpes encephalitis s/p treatment   5.  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 6.  Chronic mild hyponatremia due to nausea   7.  Poor appetite and severe protein calorie malnutrition   8.  GERD   9.  Dyslipidemia   10.  History of seizure disorder   11.  Intermittent encephalopathy with sundowning due to #5, and #4 and Wernicke's encephalopathy   12.  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   Continue current diet and Remeron   Continue thiamine and multivitamin   Discussed with neurologist and he mentioned we can go down on prednisone every week by 10 mg.    Patient is otherwise medically stable to be discharged to skilled nursing facility and it has been discussed with patient's primary member and they are in agreement with it.       Anticipatory discharge: SNF placement when bed is found      LABS:      11/8/2021 04:20   HGB: 8.4 (L)   HCT: 26.1 (L)              Vomiting - Care Day 19 (11/7/2021) by Zulma Doe       Review Status Review Entered   Completed 11/8/2021 08:22      Criteria Review      Care Day: 19 Care Date: 11/7/2021 Level of Care: Inpatient Floor    Guideline Day 2    Clinical Status    (X) * Hemodynamic stability    11/8/2021 08:19:36 EST by Zulma Doe      133/82; 98.1; 89; 16; 97% RA    (X) * Vomiting absent or controlled    11/8/2021 08:19:36 EST by Zulma Doe      No nausea or vomiting    (X) * Electrolyte abnormalities absent or acceptable for next level of care    11/8/2021 08:20:16 EST by Zulma Doe      no issues noted    (X) * Life-threatening causes of vomiting excluded    11/8/2021 08:20:16 EST by Jean John none noted    (X) * Pain absent or managed 2021 08:20:16 EST by Meghan Mnatilla no issue noted    ( ) * Discharge plans and education understood    Activity    (X) * Ambulatory or acceptable for next level of care    2021 08:22:04 EST by Meghan Mantilla as tolerated    Routes    (X) * Oral hydration    2021 08:22:04 EST by Dola Apgar      Dysphagia - Soft & Bite Sized  ADULT TUBE FEEDING Nasogastric; Standard with Fiber; Delivery Method: Continuous; Continuous Initial Rate (mL/hr): 40; Continuous Advance Tube Feeding: No; Water Flush Volume (mL): 30; Water Flush Frequency: Q 6 hours    (X) * Oral medications or regimen acceptable for next level of care    2021 08:22:04 EST by Meghan Mantilla as toelrated    (X) * Oral diet or acceptable for next level of care    2021 08:22:04 EST by Dola Apgar      ADULT TUBE FEEDING Nasogastric; Standard with Fiber; Delivery Method: Continuous; Continuous Initial Rate (mL/hr): 40; Continuous Advance Tube Feeding: No; Water Flush Volume (mL): 30; Water Flush Frequency: Q 6 hours    * Milestone   Additional Notes   2021 Continued stay Review   Medical Bed      MEDS:   Lovenox 40mg qd SC   Keppra 750mg bid PO   Proamatine 2.5mg bid PO   Antivert 12.5mg tid PO   Remeron 15mg qhd PO   Zofran 4mg q8hrs prn PO x1   Protonix 40mg bid PO   Prednisone 60mg qd PO   Theragran 1 qd PO   Thiamine 50mg qd IV   D5NS 50ml/hr IV         Hospitalist MD Note:      Patient feels fine. Denies any headaches or dizziness currently.  She pulled out NG tube earlier today.  Denies any chest pain or shortness of breath or cough.  No nausea or vomiting.  Pediatric confusion present.          ASSESSMENT:       1.  Adult failure to thrive   2.  Multiple electrolyte disturbances, better currently   3.  Intermittent vomiting with nausea could be due to central vertigo, currently stable   4.  Recent history of herpes encephalitis s/p treatment   5.  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 6.  Chronic mild hyponatremia due to nausea   7.  Poor appetite and severe protein calorie malnutrition   8.  GERD   9.  Dyslipidemia   10.  History of seizure disorder   11.  Intermittent encephalopathy with sundowning due to #5, and #4 and Wernicke's encephalopathy   12.  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   Patient has pulled out NG tube and she has been eating well so we will continue current diet and Remeron   Continue thiamine   Continue multivitamin       Anticipatory discharge: SNF placement when bed is found and the patient remained stable. Neurology MD Note:      Nunu Fuentes is a 64 y.o. 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 MRI brain showing known gliosis/encephalomalacia in right anterior temporal, frontal >> left frontal lobes without acute process. LP +oligoclonal bands; negative NMDA and HSV PCR. 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       Interval history:   No acute events overnight. Now on lacosamide 100mg BID. This morning, reports feeling well. Some nausea yesterday, not present this morning. Feels her thinking is somewhat clearing. Denies other significant complaints. Assessment & Plan:   64 y.o. 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 Noted some mental status improvement this morning,though with persistent impaired attention and with continued ocular nystagmus and intermittent nausea.  Tolerating lacosamide BID well thus far (started 6 NOV 2021); continue to monitor clinnically.       -Remains on prednisone 60mg daily; continue to monitor for potential benefit.    -Continue lacosamide 100mg Q12H   -Strongly encourage PO intake; nausea management per primary   -Thiamine supplementation   -Delirium precautions, avoid sedating medications as able during daytime hours, OOB to chair daily, frequent reorientation      LABS:   11/7/2021 03:45   Potassium: 4.4   Phosphorus: 3.2   Magnesium: 2.3           Vomiting - Care Day 18 (11/6/2021) by Bruno Wolf       Review Status Review Entered   Completed 11/8/2021 08:13      Criteria Review      Care Day: 18 Care Date: 11/6/2021 Level of Care: Inpatient Floor    Guideline Day 2    Clinical Status    (X) * Hemodynamic stability    (X) * Vomiting absent or controlled    (X) * Electrolyte abnormalities absent or acceptable for next level of care    (X) * Life-threatening causes of vomiting excluded    (X) * Pain absent or managed    ( ) * Discharge plans and education understood    Activity    (X) * Ambulatory or acceptable for next level of care    Routes    ( ) * Oral hydration    ( ) * Oral medications or regimen acceptable for next level of care    ( ) * Oral diet or acceptable for next level of care    * Milestone   Additional Notes   11/6/2021 Continued stay Review   Medical Bed      102/61; 97.4; 85; 18; 97% RA      MEDS:   Lovenox 40mg qd SC   Keppra 750mg bid PO   Proamatine 2.5mg bid PO   Antivert 12.5mg tid PO   Remeron 15mg qhd PO   Zofran 4mg q8hrs prn PO x1   Protonix 40mg bid PO   Prednisone 60mg qd PO   Theragran 1 qd PO   Thiamine 50mg qd IV   D5NS 50ml/hr IV         Gastroenterology MD Note:      Renetta Prince is a 64yo lady with recent herpetic encephalitis who has failed to thrive following infection.  We were consulted for possible PEG placement to aid with nutritional support.       Overnight patient was extremely combative, pulling IVs and catheters. Was able to be finally redirected to leave PICC in place. She remains disoriented in place and time. She is aware of self and knows about her  Myranda Frank. Assessment/Plan:       62yo lady with recent herpetic encephalitis who has failed to thrive following infection. We were consulted for possible PEG placement to aid with nutritional support. Discussed case with primary attending, as well as with her POA,  Jimmie Riedel ([phone]). Given ongoing encephalopathy with aggressive behavior, we agree that PEG should be avoided. If she would remove it prior to tract maturation (approximately 4 weeks), serious complications would occur, such as peritonitis. Plan is to continue to support nutrition through NGT feeding, considering PEG if she still requires it, and if she is easily redirectable.       Thank you for this consult. We will not actively follow, but remain available for questions and in case PEG is still needed and safe to be placed. Hospitalist MD Note:      Patient is very alert currently.  Denies any headaches or dizziness but her visual disturbances is better. Anu Dietrichnethy just had NG tube placed.  Denies any chest pain or shortness of breath or cough.  No nausea or vomiting.       Reportedly, patient becomes very agitated and starts walking out of her room during nighttime for last few days. ASSESSMENT:       1.  Adult failure to thrive   2.  Multiple electrolyte disturbances, better currently   3.  Intermittent vomiting with nausea could be due to central vertigo   4.  Recent history of herpes encephalitis s/p treatment   5.  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    6.  Chronic mild hyponatremia due to nausea   7.  Poor appetite and severe protein calorie malnutrition   8.  GERD   9.  Dyslipidemia   10.  History of seizure disorder   11.  Intermittent encephalopathy with sundowning due to #5, #4 and Wernicke's encephalopathy   12.  Positive oligoclonal bands concerning for possible autoimmune encephalitis       PLAN:       Continue prednisone as started by neurologist.   Clonazepam was discontinued after talking to Dr. Precious Boland. Patient has been changed from [address] to lacosamide 100 mg twice daily for seizure disorder   Discussed with GI earlier today and they did not feel comfortable putting PEG tube due to patient's recurrent agitation especially nighttime and high risk for pulling out the PEG tube   Start tube feeding with NG tube as well as patient can eat by mouth   We will discontinue IV fluid   NG tube will be placed to start patient on tube feeding   We will also put patient on IV thiamine for 3 days followed by p.o. thiamine   Continue Remeron and multivitamin for poor appetite       Anticipatory discharge: Once PEG tube is placed, patient can be discharged to skilled nursing facility 48 hours after G tube placement. LABS:      11/6/2021 01:45   WBC: 6.1   RBC: 2.51 (L)   HGB: 8.0 (L)   HCT: 24.2 (L)   MCV: 96.4   MCH: 31.9   MCHC: 33.1   RDW: 16.2 (H)   PLATELET: 601   MPV: 9.4   NEUTROPHILS: 76 (H)   LYMPHOCYTES: 15 (L)   MONOCYTES: 9   EOSINOPHILS: 0   BASOPHILS: 0   DF: AUTOMATED   ABS. NEUTROPHILS: 4.6   ABS. LYMPHOCYTES: 0.9   ABS. MONOCYTES: 0.5   ABS. EOSINOPHILS: 0.0   ABS.  BASOPHILS: 0.0   Sodium: 137   Potassium: 4.1   Chloride: 104   CO2: 26   Anion gap: 7   Glucose: 100 (H)   BUN: 7   Creatinine: 0.26 (L)   BUN/Creatinine ratio: 27 (H)   Calcium: 8.4 (L)   Phosphorus: 3.0   GFR est non-AA: >60   GFR est AA: >60      11/6/2021 11:42   XR ABD (KUB): Rpt:   IMPRESSION       The esophagogastric tube ends at the mid stomach.               Vomiting - Care Day 17 (11/5/2021) by Dany Rain       Review Status Review Entered   Completed 11/8/2021 08:07      Criteria Review      Care Day: 17 Care Date: 11/5/2021 Level of Care: Inpatient Floor    Guideline Day 2    Clinical Status    (X) * Hemodynamic stability    (X) * Vomiting absent or controlled    (X) * Electrolyte abnormalities absent or acceptable for next level of care    (X) * Life-threatening causes of vomiting excluded    (X) * Pain absent or managed    ( ) * Discharge plans and education understood    Activity    (X) * Ambulatory or acceptable for next level of care    Routes    ( ) * Oral hydration    ( ) * Oral medications or regimen acceptable for next level of care    ( ) * Oral diet or acceptable for next level of care    * Milestone   Additional Notes   11/5/2021 Continued stay Review   Medical Bed      101/69; 97.8; 90; 16; 97% RA      MEDS:   Lovenox 40mg qd SC   Keppra 750mg bid PO   Proamatine 2.5mg bid PO   Antivert 12.5mg tid PO   Remeron 15mg qhd PO   Zofran 4mg q8hrs prn PO x1   Protonix 40mg bid PO   Prednisone 60mg qd PO   Theragran 1 qd PO   Thiamine 100mg qd PO   D5NS 50ml/hr IV         Gastroenterology MD Note:      Impression:   1. Intractable nausea w/ vomiting - suspected central cause, tolerating PO, no vomiting in about a week   - EGD 9/22/2021 neg, CT 10/14/2021 neg, MRI head as noted below   2. Failure to Thrive   3. Protein calorie malnutrition   4. HSV encephalitis - neuro following   5. Elevated LFT - last drawn 10/20 , , , tBili 2.9       Plan:   1. PEG tomorrow, posted to OR schedule   2. NPO after midnight   3. COVID screening completed 11/4, negative   4. Hold Lovenox after midnight    5. Continue medical management per primary team       Chief Complaint: Nausea/vomiting, failure to thrive           Subjective:  PEG scheduled for today unable to be completed due to lack of IV access, attempted x7 in pre-op by multiple providers including use of vein finder. Subsequently IV access has been obtained. Discussed w/ Dr. Morteza King, procedure scheduled for tomorrow morning as to not delay her discharge.        Hospitalist MD Note:      Patient is very sleepy when I saw her. Karey Agee got clonazepam earlier today.  She went for PEG tube placement but it could not be done because patient lost her IV line per nursing.  Patient opens her eyes on tactile commands but cannot provide any meaningful history. ASSESSMENT:       1.  Adult failure to thrive   2.  Multiple electrolyte disturbances, better currently   3.  Intermittent vomiting with nausea could be due to central vertigo   4.  Recent history of herpes encephalitis s/p treatment   5.  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 6.  Chronic mild hyponatremia due to nausea   7.  Poor appetite and severe protein calorie malnutrition   8.  GERD   9.  Dyslipidemia   10 history of seizure disorder       PLAN:       Continue prednisone as started by neurologist.   Discussed with neurologist and we will stop clonazepam as it makes patient sleepy   Continue midodrine as needed   Discussed with GI nurse practitioner to try to schedule this patient for PEG tube placement this weekend as she got IV line.  I also talked to interventional radiologist to put another IV line if possible. NG tube will be placed to start patient on tube feeding   Continue IV fluid   Check ABG to rule out CO2 narcosis   DuoNebs will be ordered   Continue Remeron and multivitamin for poor appetite       Anticipatory discharge: Once PEG tube is placed, patient can be discharged to skilled nursing facility 48 hours after G tube placement.       LABS:      11/5/2021 03:11   Sodium: 133 (L)   Potassium: 4.3   Chloride: 98 (L)   CO2: 27   Anion gap: 8   Glucose: 111 (H)   BUN: 8   Creatinine: 0.47 (L)   BUN/Creatinine ratio: 17   Calcium: 9.3   Phosphorus: 3.0   GFR est non-AA: >60   GFR est AA: >60      11/5/2021 15:00   IR US GUIDED VASCULAR ACCESS: Rpt

## 2021-11-10 LAB — PHOSPHATE SERPL-MCNC: 3.6 MG/DL (ref 2.5–4.9)

## 2021-11-10 PROCEDURE — 74011250637 HC RX REV CODE- 250/637: Performed by: FAMILY MEDICINE

## 2021-11-10 PROCEDURE — 74011000250 HC RX REV CODE- 250: Performed by: INTERNAL MEDICINE

## 2021-11-10 PROCEDURE — 65270000029 HC RM PRIVATE

## 2021-11-10 PROCEDURE — 84100 ASSAY OF PHOSPHORUS: CPT

## 2021-11-10 PROCEDURE — 74011250637 HC RX REV CODE- 250/637: Performed by: INTERNAL MEDICINE

## 2021-11-10 PROCEDURE — 74011636637 HC RX REV CODE- 636/637: Performed by: INTERNAL MEDICINE

## 2021-11-10 PROCEDURE — 36415 COLL VENOUS BLD VENIPUNCTURE: CPT

## 2021-11-10 PROCEDURE — 94640 AIRWAY INHALATION TREATMENT: CPT

## 2021-11-10 PROCEDURE — 74011250636 HC RX REV CODE- 250/636: Performed by: INTERNAL MEDICINE

## 2021-11-10 PROCEDURE — 99232 SBSQ HOSP IP/OBS MODERATE 35: CPT | Performed by: FAMILY MEDICINE

## 2021-11-10 PROCEDURE — 74011250637 HC RX REV CODE- 250/637: Performed by: PSYCHIATRY & NEUROLOGY

## 2021-11-10 RX ADMIN — MIDODRINE HYDROCHLORIDE 2.5 MG: 5 TABLET ORAL at 16:25

## 2021-11-10 RX ADMIN — MIRTAZAPINE 15 MG: 15 TABLET, ORALLY DISINTEGRATING ORAL at 21:14

## 2021-11-10 RX ADMIN — MIDODRINE HYDROCHLORIDE 2.5 MG: 5 TABLET ORAL at 09:31

## 2021-11-10 RX ADMIN — IPRATROPIUM BROMIDE AND ALBUTEROL SULFATE 3 ML: .5; 2.5 SOLUTION RESPIRATORY (INHALATION) at 01:41

## 2021-11-10 RX ADMIN — Medication 250 MG: at 09:30

## 2021-11-10 RX ADMIN — PREDNISONE 50 MG: 20 TABLET ORAL at 09:31

## 2021-11-10 RX ADMIN — Medication 10 ML: at 01:33

## 2021-11-10 RX ADMIN — PANTOPRAZOLE SODIUM 40 MG: 40 TABLET, DELAYED RELEASE ORAL at 16:25

## 2021-11-10 RX ADMIN — LACOSAMIDE 100 MG: 10 SOLUTION ORAL at 09:36

## 2021-11-10 RX ADMIN — Medication 10 ML: at 13:48

## 2021-11-10 RX ADMIN — Medication 10 ML: at 21:17

## 2021-11-10 RX ADMIN — ENOXAPARIN SODIUM 40 MG: 100 INJECTION SUBCUTANEOUS at 09:34

## 2021-11-10 RX ADMIN — LACOSAMIDE 100 MG: 10 SOLUTION ORAL at 21:51

## 2021-11-10 RX ADMIN — Medication 10 ML: at 09:47

## 2021-11-10 RX ADMIN — THERA TABS 1 TABLET: TAB at 09:31

## 2021-11-10 RX ADMIN — PANTOPRAZOLE SODIUM 40 MG: 40 TABLET, DELAYED RELEASE ORAL at 06:39

## 2021-11-10 RX ADMIN — Medication 5 MG: at 21:14

## 2021-11-10 NOTE — PROGRESS NOTES
Attempted to see patient in the afternoon. Her spouse reports she is drowsy from medication and does not fully arouse with stimulation. Returned later in the afternoon, patient sitting EOB eating and c/o double. She requests need to return to supine. Will follow up at a later time as schedule permits.      Kat Whitmore PT, DPT

## 2021-11-10 NOTE — PROGRESS NOTES
[facility] Hospitalists  Progress Note    Patient: Clarence Burgos Age: 64 y.o. : 1965 MR#: [id number] SSN: [id number]  Date: 11/10/2021     Subjective/24-hour events:     Resting comfortably, nothing new or acute overnight.  present at bedside. 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 Monitor electrolytes. PT/OT. Patient medically stable and primary need at this point is skilled rehab. Have made patient and  aware that decision will need to be made soon with regard to placement especially if there are facilities that are willing to accept her. Updated care management on unit with regard to this issue as well. Will follow up again in AM.    Case discussed with:  [x]Patient  []Family  [x]Nursing  []Case Management  DVT Prophylaxis:  [x]Lovenox  []Hep SQ  []SCDs  []Coumadin   []On Heparin gtt    Objective:   VS:   Visit Vitals  BP (!) 108/58 (BP 1 Location: Left upper arm)   Pulse 99   Temp 97.6 °F (36.4 °C)   Resp 14   Ht 5' (1.524 m)   Wt 66.2 kg (146 lb)   LMP 2019 Comment: unknown   SpO2 96%   BMI 28.51 kg/m²      Tmax/24hrs: Temp (24hrs), Av.8 °F (36.6 °C), Min:97.4 °F (36.3 °C), Max:98.1 °F (36.7 °C)      Intake/Output Summary (Last 24 hours) at 11/10/2021 1710  Last data filed at 11/10/2021 1627  Gross per 24 hour   Intake 600 ml   Output 600 ml   Net 0 ml       General:  In NAD. Nontoxic-appearing. Cardiovascular:  RRR. Pulmonary:  Lungs clear bilaterally, no wheezes. No accessory muscle use.   GI:  Abdomen soft, NTTP.  Extremities:  Warm, no edema or ischemia. Neuro: Awake and alert, moves extremities spontaneously. Affect depressed.     Labs:    Recent Results (from the past 24 hour(s))   PHOSPHORUS    Collection Time: 11/10/21  1:57 AM   Result Value Ref Range    Phosphorus 3.6 2.5 - 4.9 MG/DL       Signed By: Grant Christie MD     November 10, 2021

## 2021-11-11 LAB — PHOSPHATE SERPL-MCNC: 4.2 MG/DL (ref 2.5–4.9)

## 2021-11-11 PROCEDURE — 74011250637 HC RX REV CODE- 250/637: Performed by: PSYCHIATRY & NEUROLOGY

## 2021-11-11 PROCEDURE — 74011636637 HC RX REV CODE- 636/637: Performed by: INTERNAL MEDICINE

## 2021-11-11 PROCEDURE — 97530 THERAPEUTIC ACTIVITIES: CPT

## 2021-11-11 PROCEDURE — 2709999900 HC NON-CHARGEABLE SUPPLY

## 2021-11-11 PROCEDURE — 84100 ASSAY OF PHOSPHORUS: CPT

## 2021-11-11 PROCEDURE — 74011250637 HC RX REV CODE- 250/637: Performed by: FAMILY MEDICINE

## 2021-11-11 PROCEDURE — 65270000029 HC RM PRIVATE

## 2021-11-11 PROCEDURE — 36415 COLL VENOUS BLD VENIPUNCTURE: CPT

## 2021-11-11 PROCEDURE — 74011250637 HC RX REV CODE- 250/637: Performed by: INTERNAL MEDICINE

## 2021-11-11 PROCEDURE — 97164 PT RE-EVAL EST PLAN CARE: CPT

## 2021-11-11 PROCEDURE — 74011000250 HC RX REV CODE- 250: Performed by: INTERNAL MEDICINE

## 2021-11-11 PROCEDURE — 99232 SBSQ HOSP IP/OBS MODERATE 35: CPT | Performed by: FAMILY MEDICINE

## 2021-11-11 PROCEDURE — 97535 SELF CARE MNGMENT TRAINING: CPT

## 2021-11-11 PROCEDURE — 77030040393 HC DRSG OPTIFOAM GENT MDII -B

## 2021-11-11 PROCEDURE — 74011250636 HC RX REV CODE- 250/636: Performed by: INTERNAL MEDICINE

## 2021-11-11 RX ADMIN — Medication 250 MG: at 10:11

## 2021-11-11 RX ADMIN — PANTOPRAZOLE SODIUM 40 MG: 40 TABLET, DELAYED RELEASE ORAL at 10:10

## 2021-11-11 RX ADMIN — THERA TABS 1 TABLET: TAB at 10:11

## 2021-11-11 RX ADMIN — IPRATROPIUM BROMIDE AND ALBUTEROL SULFATE 3 ML: .5; 2.5 SOLUTION RESPIRATORY (INHALATION) at 10:10

## 2021-11-11 RX ADMIN — LACOSAMIDE 100 MG: 10 SOLUTION ORAL at 21:46

## 2021-11-11 RX ADMIN — PANTOPRAZOLE SODIUM 40 MG: 40 TABLET, DELAYED RELEASE ORAL at 16:36

## 2021-11-11 RX ADMIN — LACOSAMIDE 100 MG: 10 SOLUTION ORAL at 10:14

## 2021-11-11 RX ADMIN — PREDNISONE 50 MG: 20 TABLET ORAL at 10:11

## 2021-11-11 RX ADMIN — Medication 10 ML: at 22:12

## 2021-11-11 RX ADMIN — MIDODRINE HYDROCHLORIDE 2.5 MG: 5 TABLET ORAL at 16:36

## 2021-11-11 RX ADMIN — ENOXAPARIN SODIUM 40 MG: 100 INJECTION SUBCUTANEOUS at 10:10

## 2021-11-11 RX ADMIN — Medication 5 MG: at 21:44

## 2021-11-11 RX ADMIN — IPRATROPIUM BROMIDE AND ALBUTEROL SULFATE 3 ML: .5; 2.5 SOLUTION RESPIRATORY (INHALATION) at 14:12

## 2021-11-11 RX ADMIN — MIRTAZAPINE 15 MG: 15 TABLET, ORALLY DISINTEGRATING ORAL at 21:44

## 2021-11-11 RX ADMIN — MIDODRINE HYDROCHLORIDE 2.5 MG: 5 TABLET ORAL at 10:11

## 2021-11-11 NOTE — PROGRESS NOTES
Problem: Mobility Impaired (Adult and Pediatric)  Goal: *Acute Goals and Plan of Care (Insert Text)  Description: Physical Therapy Goals  Re-evaluated 11/11/2021 and updated below  Initiated 10/21/2021, re-evaluated 11/4/21 and goals adjusted as needed and to be accomplished within 7 day(s)  1. Patient will move from supine to sit and sit to supine, scoot up and down, and roll side to side in bed with supervision/set-up. 2.  Patient will transfer from bed to chair and chair to bed with minimal assistance using the least restrictive device. 3.  Patient will perform sit to stand with minimal assistance. 4.  Patient will ambulate with minimal assistance for 25 feet with the least restrictive device. PLOF: Pt reports she lives in a [address] [place] [address] with her , states she was ambulatory with no AD however per chart review, pt has been using a wc recently as she has increased weakness and difficulty walking, pt only oriented to self and time this date. Outcome: Progressing Towards Goal   PHYSICAL THERAPY RE-EVALUATION    Patient: Fanny Malone (51 y.o. female)  Date: 11/11/2021  Primary Diagnosis: Vomiting [R11.10]  Procedure(s) (LRB):  PERCUTANEOUS ENDOSCOPIC GASTROSTOMY TUBE INSERTION (N/A) 5 Days Post-Op   Precautions: Fall  ASSESSMENT :  Re-evaluation s/p prolonged admission; goals reviewed and updated as indicated. Seated in recliner upon entry. Agreeable to standing trial with max encouragement. Reports nausea; 2 episodes of dry heaving with no emesis during session. /60,  bpm. Supervision for scooting to edge of chair in prep for standing. Attempted sit to stand for 25 minutes with patient with arms placed on arm rest and activated in prep for standing. Educated on use of momentum. Prepped for transfer 5-6 times however patient with no active participation in sit to stand and no progression of WB to BLE. Encouragement and education provided with no change.  Easily distracted and conversation demonstrates decreased insight to deficits and situation. Remained seated in chair at end of session. RN Homar  aware. Educated on need for RN assistance with mobility; verbalized understanding. Call bell in reach. Chair alarm on. Patient will continue to benefit from skilled intervention to address the above impairments. PLAN :  Recommendations and Planned Interventions:  [x]           Bed Mobility Training             [x]    Neuromuscular Re-Education  [x]           Transfer Training                   []    Orthotic/Prosthetic Training  [x]           Gait Training                          []    Modalities  [x]           Therapeutic Exercises           []    Edema Management/Control  [x]           Therapeutic Activities            [x]    Family Training/Education  [x]           Patient Education  []           Other (comment):    Frequency/Duration: Patient will continue to be followed by physical therapy 3-5 times a week  to address goals. Discharge Recommendations: Nikita Olmedo  Further Equipment Recommendations for Discharge: rolling walker     SUBJECTIVE:   Patient stated Just a minute.     OBJECTIVE DATA SUMMARY:     Past Medical History:   Diagnosis Date    Arthritis     Diarrhea     no diarrhea since 9/14/2021    Encephalitis 06/14/2021    intubated    GERD (gastroesophageal reflux disease)     H. pylori infection 2019    IBS (irritable bowel syndrome)     Memory difficulty     since encephalitis    Migraine     Ovarian cyst 2019    Seizures ([facility] [address] 06/16/2021    x 1    Vomiting      Past Surgical History:   Procedure Laterality Date    HX BREAST AUGMENTATION  2005    HX CHOLECYSTECTOMY      HX COLONOSCOPY  2016    HX ENDOSCOPY  2019    HX TYMPANOPLASTY      left x 5 times       Critical Behavior:  Neurologic State: Alert; Confused  Orientation Level: Oriented to person  Cognition: Decreased attention/concentration     Psychosocial  Patient Behaviors: Calm    Strength:    Manual Muscle Testing (LE)         R     L    Hip Flexion:   3/5  3/5  Knee EXT:   3/5  3/5  Knee FLEX:   3/5  3/5  Ankle DF:   3/5  3/5  _________________________________________________   Range Of Motion:  BLE AROM WFL  Functional Mobility:  Bed Mobility:  Scooting: Supervision  Neuro Re-education:  Unsupported sitting 25 minutes  Therapeutic Exercises:   Prep for sit to stand including scooting and BUE muscle activation x25 minutes  Pain:  Pain level pre-treatment: 0/10   Pain level post-treatment: 0/10     Activity Tolerance:   Poor     After treatment:   [x]         Patient left in no apparent distress sitting up in chair  []         Patient left in no apparent distress in bed  [x]         Call bell left within reach  [x]         Nursing notified  []         Caregiver present  [x]         Chair alarm activated  []         SCDs applied    COMMUNICATION/EDUCATION:   [x]         Role of physical therapy in the acute care setting. [x]         Fall prevention education was provided and the patient/caregiver indicated understanding. [x]         Patient/family have participated as able in goal setting and plan of care. [x]         Patient/family agree to work toward stated goals and plan of care. []         Patient understands intent and goals of therapy, but is neutral about his/her participation. []         Patient is unable to participate in goal setting/plan of care: ongoing with therapy staff.     Thank you for this referral.  Maddie Schafer, PT   Time Calculation: 30 mins

## 2021-11-11 NOTE — ROUTINE PROCESS
Bedside and verbal shift change report given to Noé Tabares RN by Kathy Magana RN.  Report included the following information:  -procedure summary  -MAR  -Recent Results  -Med Rec Status  -SBAR

## 2021-11-11 NOTE — PROGRESS NOTES
Problem: Pressure Injury - Risk of  Goal: *Prevention of pressure injury  Description: Document Christofer Scale and appropriate interventions in the flowsheet. Outcome: Progressing Towards Goal  Note: Pressure Injury Interventions:  Sensory Interventions: Assess changes in LOC, Minimize linen layers    Moisture Interventions: Absorbent underpads, Minimize layers    Activity Interventions: Increase time out of bed    Mobility Interventions: Pressure redistribution bed/mattress (bed type)    Nutrition Interventions: Document food/fluid/supplement intake    Friction and Shear Interventions: Minimize layers, Lift sheet                Problem: Patient Education: Go to Patient Education Activity  Goal: Patient/Family Education  Outcome: Progressing Towards Goal     Problem: Falls - Risk of  Goal: *Absence of Falls  Description: Document Theresa Fall Risk and appropriate interventions in the flowsheet.   Outcome: Progressing Towards Goal  Note: Fall Risk Interventions:  Mobility Interventions: Assess mobility with egress test, OT consult for ADLs, Patient to call before getting OOB, PT Consult for mobility concerns, PT Consult for assist device competence    Mentation Interventions: Adequate sleep, hydration, pain control    Medication Interventions: Patient to call before getting OOB, Teach patient to arise slowly    Elimination Interventions: Call light in reach, Patient to call for help with toileting needs    History of Falls Interventions: Door open when patient unattended         Problem: Patient Education: Go to Patient Education Activity  Goal: Patient/Family Education  Outcome: Progressing Towards Goal     Problem: Patient Education: Go to Patient Education Activity  Goal: Patient/Family Education  Outcome: Progressing Towards Goal     Problem: Patient Education: Go to Patient Education Activity  Goal: Patient/Family Education  Outcome: Progressing Towards Goal     Problem: Nutrition Deficit  Goal: *Optimize nutritional status  Outcome: Progressing Towards Goal     Problem: Patient Education: Go to Patient Education Activity  Goal: Patient/Family Education  Outcome: Progressing Towards Goal     Problem: Nausea/Vomiting (Adult)  Goal: *Absence of nausea/vomiting  Outcome: Progressing Towards Goal  Goal: *Palliation of nausea/vomiting (Palliative Care)  Outcome: Progressing Towards Goal     Problem: Patient Education: Go to Patient Education Activity  Goal: Patient/Family Education  Outcome: Progressing Towards Goal

## 2021-11-11 NOTE — WOUND CARE
Physical Exam  Musculoskeletal:        Legs:         Focused assessment   Patient received sitting up in chair. A & O x 2, very pleasant, eye patch in place over right eye. She is able to stand with stand by assist.       Friction injury left buttock. 3x2.5cm. bright red with yellow base, superficial.  No drainage noted. Patient unaware of its presence. Topical treatment protocol in place as follows:   Silicone dressing to left buttock friction injury. Change every 3 days and prn soilage. Care discussed with primary nurse, Lex Mora RN. Care turned over to nursing staff at this time. Mannie Hirsch RN, BSN, [facility]

## 2021-11-11 NOTE — PROGRESS NOTES
Problem: Self Care Deficits Care Plan (Adult)  Goal: *Acute Goals and Plan of Care (Insert Text)  Description: Occupational Therapy Goals  Re-evaluated 11/7/2021 continue with all goals for consistency. 1.  Patient will perform bed mobility in preparation for selfcare with modified independence. 2.  Patient will perform grooming task/functional activity standing for 4-7 minutes with supervision/set-up, F+ balance. 3.  Patient will perform lower body dressing with supervision/set-up seated and standing using AE prn (Pt has reacher at home). 4.  Patient will perform toilet transfers with supervision/set-up. 5.  Patient will perform all aspects of toileting with supervision/set-up. 6.  Patient will participate in upper extremity therapeutic exercise/activities with modified independence for 8 minutes. 7.  Patient will utilize energy conservation techniques during functional activities with verbal cues. Prior Level of Function: Patient was independent with self-care and functional mobility PTA. Patient's spouse reports she would walk unsteady at home d/t vertigo and for the stairs she would bump up/down on her bottom  Outcome: Progressing Towards Goal   OCCUPATIONAL THERAPY TREATMENT    Patient: Tiana Meredith (57 y.o. female)  Date: 11/11/2021  Diagnosis: Vomiting [R11.10]   <principal problem not specified>  Procedure(s) (LRB):  PERCUTANEOUS ENDOSCOPIC GASTROSTOMY TUBE INSERTION (N/A) 5 Days Post-Op  Precautions: Fall    Chart, occupational therapy assessment, plan of care, and goals were reviewed. ASSESSMENT:  Patient alert and agreeable to therapy session upon entry into room. She was sitting on EOB finishing her breakfast.  Socks donned without assistance in prep for functional standing. While seated, patient washed her hair with setup/supervision using a warm shampoo cap. VCs given at times for completion of activity or to redirect conversation.   She is focused on her vision abnormality in the right eye. An eye patch was given and provided some relief from the reported double vision and visual disturbances. CGA needed for sit to stand as well as for ambulation to recliner. Patient left seated in chair with chair alarm engaged and all needs met at end of session. Progression toward goals:  []          Improving appropriately and progressing toward goals  [x]          Improving slowly and progressing toward goals  []          Not making progress toward goals and plan of care will be adjusted     PLAN:  Patient continues to benefit from skilled intervention to address the above impairments. Continue treatment per established plan of care. Discharge Recommendations:  Home Health with 24/7 support vs Skilled Nursing Facility  Further Equipment Recommendations for Discharge:  rolling walker     SUBJECTIVE:   Patient stated Do you see that courtney out there on the roof?     OBJECTIVE DATA SUMMARY:   Cognitive/Behavioral Status:  Neurologic State: Alert, Confused  Orientation Level: Oriented to person, Oriented to place, Oriented to situation  Cognition: Follows commands, Decreased attention/concentration  Safety/Judgement: Fall prevention    Functional Mobility and Transfers for ADLs:   Bed Mobility:  Supine to Sit:  (Pt sitting on EOB upon arrival to room.)  Scooting: Supervision   Transfers:  Sit to Stand: Contact guard assistance  Stand to Sit: Supervision   Toilet Transfer : Stand-by assistance (simulated with chair)    Balance:  Sitting: Intact  Sitting - Static: Good (unsupported)  Sitting - Dynamic: Good (unsupported)  Standing: With support    ADL Intervention:  Grooming  Washing Face: Set-up;  Supervision (washing hair with shampoo cap)  Brushing/Combing Hair: Supervision    Lower Body Dressing Assistance  Socks: Modified independent  Leg Crossed Method Used: Yes  Position Performed: Seated edge of bed    Cognitive Retraining  Safety/Judgement: Fall prevention    Pain:  Pain level pre-treatment: 0/10   Pain level post-treatment: 0/10  Pain Intervention(s): NA   Response to intervention: NA    Activity Tolerance:    Good  Please refer to the flowsheet for vital signs taken during this treatment. After treatment:   [x]  Patient left in no apparent distress sitting up in chair  []  Patient left in no apparent distress in bed  [x]  Call bell left within reach  [x]  Nursing notified  []  Caregiver present  [x]  Chair alarm activated    COMMUNICATION/EDUCATION:   [x] Role of Occupational Therapy in the acute care setting  [x] Home safety education was provided and the patient/caregiver indicated understanding. [x] Patient/family have participated as able in working towards goals and plan of care. [x] Patient/family agree to work toward stated goals and plan of care. [] Patient understands intent and goals of therapy, but is neutral about his/her participation. [] Patient is unable to participate in goal setting and plan of care.       Thank you for this referral.  Irene Mendoza MS OTR/L   Time Calculation: 28 mins

## 2021-11-11 NOTE — PROGRESS NOTES
Nutrition Assessment     Type and Reason for Visit: Reassess, Consult    Nutrition Recommendations/Plan:  - Continue oral diet as tolerated with Ensure Enlive, TID.  - Monitor and encourage po intake as tolerated, continue daily MVI & thiamine. Nutrition Assessment:  Pt on soft and bite sized diet with variable po intake of meals & supplements. Good intake of breakfast & poor intake of lunch. Awaiting placement. Malnutrition Assessment:  Malnutrition Status: Severe malnutrition     Estimated Daily Nutrient Needs:  Energy (kcal):  4340-9897  Protein (g):         Fluid (ml/day):  8502-7567    Nutrition Related Findings:  Formed BM 11/10. GI recommended against PEG tube placement due pt at high risk of pulling out PEG tube. NGT pulled out 11/7. Pertinent medications: remeron, steroid, MVI & thiamine. Current Nutrition Therapies:  ADULT DIET Dysphagia - Soft & Bite Sized  ADULT ORAL NUTRITION SUPPLEMENT Breakfast, Lunch, Dinner; Standard High Calorie/High Protein    Anthropometric Measures:  · Height:  5' (152.4 cm)  · Current Body Wt:  67.1 kg (147 lb 14.9 oz)  · BMI: 28.9    Nutrition Diagnosis:   · Severe malnutrition, In context of chronic illness related to inadequate protein-energy intake (decreased appetite) as evidenced by poor intake prior to admission, weight loss greater than or equal to 10% in 6 months    Nutrition Intervention:  Food and/or Nutrient Delivery: Continue current diet, Continue oral nutrition supplement, Vitamin supplement, Mineral supplement  Nutrition Education and Counseling: No recommendations at this time, Education not indicated  Coordination of Nutrition Care: Continue to monitor while inpatient (pt discussed with nursing)    Goals:  PO nutrition intake will meet >75% of patient estimated nutritional needs within the next 7 days.        Nutrition Monitoring and Evaluation:   Behavioral-Environmental Outcomes: None identified  Food/Nutrient Intake Outcomes: Food and nutrient intake, Enteral nutrition intake/tolerance, Vitamin/mineral intake  Physical Signs/Symptoms Outcomes: Biochemical data, GI status, Nausea/vomiting, Meal time behavior, Nutrition focused physical findings    Discharge Planning:    Continue current diet     Electronically signed by Chente Aj RD on 11/11/2021 at 2:38 PM    Contact Number: [phone]

## 2021-11-11 NOTE — ROUTINE PROCESS
Bedside and Verbal shift change report given to [employer] (oncoming nurse) by Sarahi Otero RN (offgoing nurse). Report included the following information SBAR, Kardex, Procedure Summary, Intake/Output, MAR and Recent Results.

## 2021-11-11 NOTE — PROGRESS NOTES
[facility] Hospitalists  Progress Note    Patient: John Garg Age: 64 y.o. : 1965 MR#: [id number] SSN: [id number]  Date: 2021     Subjective/24-hour events:     Sitting in chair at bedside. Nothing new/acute overnight. 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 PT/OT, mobilize as tolerated. Decision on disposition still pending. D/W CM via phone - hopeful for decision by tomorrow. Case discussed with:  [x]Patient  []Family  [x]Nursing  [x]Case Management  DVT Prophylaxis:  [x]Lovenox  []Hep SQ  []SCDs  []Coumadin   []On Heparin gtt    Objective:   VS:   Visit Vitals  /63 (BP 1 Location: Left upper arm, BP Patient Position: At rest)   Pulse (!) 113   Temp 98 °F (36.7 °C)   Resp 16   Ht 5' (1.524 m)   Wt 66.2 kg (146 lb)   LMP 2019 Comment: unknown   SpO2 96%   BMI 28.51 kg/m²      Tmax/24hrs: Temp (24hrs), Av.7 °F (36.5 °C), Min:97.5 °F (36.4 °C), Max:98 °F (36.7 °C)      Intake/Output Summary (Last 24 hours) at 2021 1614  Last data filed at 2021 1410  Gross per 24 hour   Intake 1080 ml   Output 300 ml   Net 780 ml       General:  In NAD. Nontoxic-appearing. Cardiovascular:  Regular, mildly tachycardic. Pulmonary:  Lungs clear bilaterally, no wheezes. No accessory muscle use. GI:  Abdomen soft, NTTP. Extremities:  Warm, no edema or ischemia. Neuro: Awake and alert, moves extremities spontaneously.   A    Labs:    Recent Results (from the past 24 hour(s))   PHOSPHORUS    Collection Time: 21  2:36 AM   Result Value Ref Range    Phosphorus 4.2 2.5 - 4.9 MG/DL       Signed By: Marina Solorio MD     November 11, 2021

## 2021-11-12 LAB — PHOSPHATE SERPL-MCNC: 4.5 MG/DL (ref 2.5–4.9)

## 2021-11-12 PROCEDURE — 74011250637 HC RX REV CODE- 250/637: Performed by: PSYCHIATRY & NEUROLOGY

## 2021-11-12 PROCEDURE — 74011250636 HC RX REV CODE- 250/636: Performed by: INTERNAL MEDICINE

## 2021-11-12 PROCEDURE — 84100 ASSAY OF PHOSPHORUS: CPT

## 2021-11-12 PROCEDURE — 74011250637 HC RX REV CODE- 250/637: Performed by: INTERNAL MEDICINE

## 2021-11-12 PROCEDURE — 65270000029 HC RM PRIVATE

## 2021-11-12 PROCEDURE — 74011636637 HC RX REV CODE- 636/637: Performed by: INTERNAL MEDICINE

## 2021-11-12 PROCEDURE — 99232 SBSQ HOSP IP/OBS MODERATE 35: CPT | Performed by: FAMILY MEDICINE

## 2021-11-12 PROCEDURE — 74011250637 HC RX REV CODE- 250/637: Performed by: FAMILY MEDICINE

## 2021-11-12 RX ORDER — IPRATROPIUM BROMIDE AND ALBUTEROL SULFATE 2.5; .5 MG/3ML; MG/3ML
3 SOLUTION RESPIRATORY (INHALATION)
Status: DISCONTINUED | OUTPATIENT
Start: 2021-11-12 | End: 2021-11-24 | Stop reason: HOSPADM

## 2021-11-12 RX ADMIN — PANTOPRAZOLE SODIUM 40 MG: 40 TABLET, DELAYED RELEASE ORAL at 16:54

## 2021-11-12 RX ADMIN — MIDODRINE HYDROCHLORIDE 2.5 MG: 5 TABLET ORAL at 09:12

## 2021-11-12 RX ADMIN — Medication 10 ML: at 06:13

## 2021-11-12 RX ADMIN — PREDNISONE 50 MG: 20 TABLET ORAL at 09:12

## 2021-11-12 RX ADMIN — Medication 5 MG: at 22:16

## 2021-11-12 RX ADMIN — Medication 250 MG: at 09:12

## 2021-11-12 RX ADMIN — Medication 10 ML: at 22:17

## 2021-11-12 RX ADMIN — ENOXAPARIN SODIUM 40 MG: 100 INJECTION SUBCUTANEOUS at 09:12

## 2021-11-12 RX ADMIN — LACOSAMIDE 100 MG: 10 SOLUTION ORAL at 09:00

## 2021-11-12 RX ADMIN — THERA TABS 1 TABLET: TAB at 09:12

## 2021-11-12 RX ADMIN — LACOSAMIDE 100 MG: 10 SOLUTION ORAL at 22:15

## 2021-11-12 RX ADMIN — MIRTAZAPINE 15 MG: 15 TABLET, ORALLY DISINTEGRATING ORAL at 22:16

## 2021-11-12 RX ADMIN — MIDODRINE HYDROCHLORIDE 2.5 MG: 5 TABLET ORAL at 16:54

## 2021-11-12 NOTE — ROUTINE PROCESS
Bedside and Verbal shift change report given to [address] Rangel (oncoming nurse) by Yue Heredia RN (offgoing nurse). Report included the following information SBAR, Kardex, Intake/Output, MAR and Recent Results.

## 2021-11-12 NOTE — PROGRESS NOTES
Nutrition Note    Soft and bite sized diet with good intake of recent meals per chart review. Po intake improved. Noted phosphorus level being checked daily, has been WNL and stable. No other labs ordered or being checked. Will discontinue phosphorus order at this time (previously ordered & being monitored by this writer due to initial concern for refeeding syndrome with initiation of nutrition support). Nutrition Recommendations/Plan:  - Continue oral diet as tolerated with Ensure Enlive, TID.  - Monitor and encourage po intake as tolerated, continue daily MVI & thiamine.  - Discontinue daily phosphorus lab checks.     Electronically signed by Tomy Benitez RD on 11/12/2021 at 2:48 PM    Contact: [phone]

## 2021-11-12 NOTE — PROGRESS NOTES
Problem: Pressure Injury - Risk of  Goal: *Prevention of pressure injury  Description: Document Christofer Scale and appropriate interventions in the flowsheet. Outcome: Progressing Towards Goal  Note: Pressure Injury Interventions:  Sensory Interventions: Assess changes in LOC    Moisture Interventions: Absorbent underpads    Activity Interventions: Increase time out of bed    Mobility Interventions: Pressure redistribution bed/mattress (bed type)    Nutrition Interventions: Document food/fluid/supplement intake    Friction and Shear Interventions: Minimize layers                Problem: Patient Education: Go to Patient Education Activity  Goal: Patient/Family Education  Outcome: Progressing Towards Goal     Problem: Falls - Risk of  Goal: *Absence of Falls  Description: Document Theresa Fall Risk and appropriate interventions in the flowsheet.   Outcome: Progressing Towards Goal  Note: Fall Risk Interventions:  Mobility Interventions: Bed/chair exit alarm    Mentation Interventions: Adequate sleep, hydration, pain control    Medication Interventions: Patient to call before getting OOB    Elimination Interventions: Call light in reach    History of Falls Interventions: Door open when patient unattended         Problem: Patient Education: Go to Patient Education Activity  Goal: Patient/Family Education  Outcome: Progressing Towards Goal     Problem: Nutrition Deficit  Goal: *Optimize nutritional status  Outcome: Progressing Towards Goal     Problem: Patient Education: Go to Patient Education Activity  Goal: Patient/Family Education  Outcome: Progressing Towards Goal     Problem: Patient Education: Go to Patient Education Activity  Goal: Patient/Family Education  Outcome: Progressing Towards Goal

## 2021-11-12 NOTE — PROGRESS NOTES
visited with the family of Estefanía Oliveros while she was in the rest room. The  provided the following Interventions:  Continued a relationship of care and support as I followed up with the Leonardrob Lisa. Upon [address] request, agreed to check back in later this weekend on Mrs. Katey Hernandez. Plan:  Chaplains will continue to follow and will provide pastoral care on an as desired.  recommends bedside caregivers page  on duty if patient shows signs of acute spiritual or emotional distress.       [address] [place]   [phone]

## 2021-11-12 NOTE — ADT AUTH CERT NOTES
Vomiting - Care Day 22 (11/10/2021) by Kirby Gomes       Review Status Review Entered   Completed 11/11/2021 07:47      Criteria Review      Care Day: 22 Care Date: 11/10/2021 Level of Care: Inpatient Floor    Guideline Day 2    Clinical Status    (X) * Hemodynamic stability    (X) * Vomiting absent or controlled    (X) * Electrolyte abnormalities absent or acceptable for next level of care    (X) * Life-threatening causes of vomiting excluded    (X) * Pain absent or managed    ( ) * Discharge plans and education understood    Activity    (X) * Ambulatory or acceptable for next level of care    Routes    (X) * Oral hydration    (X) * Oral medications or regimen acceptable for next level of care    (X) * Oral diet or acceptable for next level of care    * Milestone   Additional Notes   11/10/2021 Continued Stay Review   Medical Bed      108/58; 97.6; 100; 16; 98% RA      Family Medicine MD Note:      Subjective/24-hour events:       Resting comfortably, nothing new or acute overnight. 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 Monitor electrolytes. PT/OT.    Patient medically stable and primary need at this point is skilled rehab. Holger Corrales made patient and  aware that decision will need to be made soon with regard to placement especially if there are facilities that are willing to accept her.  Updated care management on unit with regard to this issue as well.  Will follow up again in AM.      LABS:      11/10/2021 01:57   Phosphorus: 3.6              Vomiting - Care Day 21 (11/9/2021) by Jana Yates       Review Status Review Entered   Completed 11/9/2021 13:22      Criteria Review      Care Day: 21 Care Date: 11/9/2021 Level of Care: Inpatient Floor    Guideline Day 2    Clinical Status    (X) * Hemodynamic stability    (X) * Vomiting absent or controlled    (X) * Electrolyte abnormalities absent or acceptable for next level of care    (X) * Life-threatening causes of vomiting excluded    (X) * Pain absent or managed    ( ) * Discharge plans and education understood    Activity    (X) * Ambulatory or acceptable for next level of care    Routes    (X) * Oral hydration    (X) * Oral medications or regimen acceptable for next level of care    (X) * Oral diet or acceptable for next level of care    * Milestone   Additional Notes   11/9/2021 Continued Stay Review      120/74; 97.5; 68; 18; 99% RA      No Med changes today         Hospitalist MD Note:      Daily Progress Note: 11/9/2021       Patient was seen in presence of her . Ivania Patton is sitting up in a chair.  Denies any headaches or dizziness.  Off and on visual disks disturbances.  No nausea or vomiting.  Denies any chest pain or abdominal pain.  No shortness of breath or cough. Assessment/Plan:       ASSESSMENT:       1.  Adult failure to thrive   2.  Multiple electrolyte disturbances, better currently   3.  Intermittent vomiting with nausea could be due to central vertigo, currently stable   4.  Recent history of herpes encephalitis s/p treatment   5.  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    6.  Chronic mild hyponatremia due to nausea   7.  Poor appetite and severe protein calorie malnutrition   8.  GERD   9.  Dyslipidemia   10.  History of seizure disorder   11.  Intermittent encephalopathy with sundowning due to #5, and #4 and Wernicke's encephalopathy   12.  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   Continue current diet and Remeron   Continue thiamine and multivitamin   Discussed with neurologist and he mentioned we can go down on prednisone every week by 10 mg.    Patient is otherwise medically stable to be discharged to skilled nursing facility and it has been discussed with patient's primary member and they are in agreement with it.       Anticipatory discharge: SNF placement when bed is found       LABS:         11/9/2021 05:30   Phosphorus: 4.1

## 2021-11-13 PROCEDURE — 74011250637 HC RX REV CODE- 250/637: Performed by: PSYCHIATRY & NEUROLOGY

## 2021-11-13 PROCEDURE — 74011636637 HC RX REV CODE- 636/637: Performed by: INTERNAL MEDICINE

## 2021-11-13 PROCEDURE — 74011250637 HC RX REV CODE- 250/637: Performed by: INTERNAL MEDICINE

## 2021-11-13 PROCEDURE — 2709999900 HC NON-CHARGEABLE SUPPLY

## 2021-11-13 PROCEDURE — 99232 SBSQ HOSP IP/OBS MODERATE 35: CPT | Performed by: INTERNAL MEDICINE

## 2021-11-13 PROCEDURE — 74011250636 HC RX REV CODE- 250/636: Performed by: INTERNAL MEDICINE

## 2021-11-13 PROCEDURE — 74011250637 HC RX REV CODE- 250/637: Performed by: FAMILY MEDICINE

## 2021-11-13 PROCEDURE — 65270000029 HC RM PRIVATE

## 2021-11-13 RX ADMIN — MIRTAZAPINE 15 MG: 15 TABLET, ORALLY DISINTEGRATING ORAL at 21:57

## 2021-11-13 RX ADMIN — THERA TABS 1 TABLET: TAB at 09:22

## 2021-11-13 RX ADMIN — Medication 10 ML: at 21:58

## 2021-11-13 RX ADMIN — Medication 10 ML: at 06:40

## 2021-11-13 RX ADMIN — LACOSAMIDE 100 MG: 10 SOLUTION ORAL at 21:58

## 2021-11-13 RX ADMIN — MIDODRINE HYDROCHLORIDE 2.5 MG: 5 TABLET ORAL at 17:39

## 2021-11-13 RX ADMIN — LACOSAMIDE 100 MG: 10 SOLUTION ORAL at 09:24

## 2021-11-13 RX ADMIN — ENOXAPARIN SODIUM 40 MG: 100 INJECTION SUBCUTANEOUS at 09:23

## 2021-11-13 RX ADMIN — MIDODRINE HYDROCHLORIDE 2.5 MG: 5 TABLET ORAL at 09:22

## 2021-11-13 RX ADMIN — PANTOPRAZOLE SODIUM 40 MG: 40 TABLET, DELAYED RELEASE ORAL at 06:40

## 2021-11-13 RX ADMIN — PREDNISONE 50 MG: 20 TABLET ORAL at 09:21

## 2021-11-13 RX ADMIN — Medication 5 MG: at 21:57

## 2021-11-13 RX ADMIN — Medication 250 MG: at 09:21

## 2021-11-13 RX ADMIN — PANTOPRAZOLE SODIUM 40 MG: 40 TABLET, DELAYED RELEASE ORAL at 17:40

## 2021-11-13 RX ADMIN — Medication 10 ML: at 14:00

## 2021-11-13 NOTE — PROGRESS NOTES
Hospitalist Progress Note    Subjective:   Daily Progress Note: 11/13/2021    Patient is sitting up in chair. She states she continues to have vertigo especially when she tries to change position. Off-and-on nausea but no vomiting. Denies any chest pain or abdominal pain. No headaches. Dizziness present. Visual disturbances present. Current Facility-Administered Medications   Medication Dose Route Frequency    albuterol-ipratropium (DUO-NEB) 2.5 MG-0.5 MG/3 ML  3 mL Nebulization Q6H PRN    predniSONE (DELTASONE) tablet 50 mg  50 mg Oral DAILY WITH BREAKFAST    thiamine (B-1) tablet 250 mg  250 mg Oral DAILY    lacosamide (VIMPAT) oral solution 100 mg  100 mg Oral Q12H    melatonin tablet 5 mg  5 mg Oral QHS    midodrine (PROAMATINE) tablet 2.5 mg  2.5 mg Oral BID WITH MEALS    meclizine (ANTIVERT) tablet 12.5 mg  12.5 mg Oral Q6H PRN    pantoprazole (PROTONIX) tablet 40 mg  40 mg Oral ACB&D    mirtazapine (REMERON SOL-TAB) disintegrating tablet 15 mg  15 mg Oral QHS    phenol throat spray (CHLORASEPTIC) 1 Spray  1 Spray Oral PRN    therapeutic multivitamin (THERAGRAN) tablet 1 Tablet  1 Tablet Oral DAILY    sodium chloride (NS) flush 5-40 mL  5-40 mL IntraVENous Q8H    sodium chloride (NS) flush 5-40 mL  5-40 mL IntraVENous PRN    acetaminophen (TYLENOL) tablet 650 mg  650 mg Oral Q6H PRN    Or    acetaminophen (TYLENOL) suppository 650 mg  650 mg Rectal Q6H PRN    polyethylene glycol (MIRALAX) packet 17 g  17 g Oral DAILY PRN    ondansetron (ZOFRAN ODT) tablet 4 mg  4 mg Oral Q8H PRN    Or    ondansetron (ZOFRAN) injection 8 mg  8 mg IntraVENous Q6H PRN    enoxaparin (LOVENOX) injection 40 mg  40 mg SubCUTAneous DAILY        Review of Systems  Pertinent items are noted in HPI.     Objective:     Visit Vitals  /79   Pulse 69   Temp 98.2 °F (36.8 °C)   Resp 15   Ht 5' (1.524 m)   Wt 66.2 kg (146 lb)   LMP 04/01/2019 Comment: unknown   SpO2 99%   BMI 28.51 kg/m²      O2 Device: None (Room air)    Temp (24hrs), Av.9 °F (36.6 °C), Min:97.6 °F (36.4 °C), Max:98.2 °F (36.8 °C)      No intake/output data recorded.  1901 -  0700  In:  [P.O.:]  Out: 200 [Urine:200]    General appearance -awake, follows commands appropriately, not in acute distress. Chest -CTA bilaterally without any wheezes currently  Heart - S1 and S2 normal  Abdomen - soft, nontender, nondistended, Bowel sounds present  Neurological -awake, follows commands appropriately, moves all 4 extremities well. Sensation to touch normal in all 4 extremities. Extremities - no pedal edema noted      Data Review    No results found for this or any previous visit (from the past 24 hour(s)). Assessment/Plan:     ASSESSMENT:    1. Adult failure to thrive  2. Multiple electrolyte disturbances, better currently  3. Intermittent vomiting with nausea could be due to central vertigo, currently stable  4. Recent history of herpes encephalitis s/p treatment  5. 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 6.  Chronic mild hyponatremia due to nausea  7. Poor appetite and severe protein calorie malnutrition  8. GERD  9. Dyslipidemia  10. History of seizure disorder  11. Intermittent encephalopathy with sundowning due to #5, and #4 and Wernicke's encephalopathy  12. 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  Continue current diet and Remeron  Continue thiamine and multivitamin  Continue low-dose midodrine  Continue meclizine as needed  Continue current dietary supplements    Patient is otherwise medically stable to be discharged to skilled nursing facility. Anticipatory discharge: SNF placement when bed is found     Total time to take care of this patient was 30 minutes and more than 50% of time was spent counseling and coordinating care.      Disclaimer: Sections of this note are dictated using utilizing voice recognition software, which may have resulted in some phonetic based errors in grammar and contents. Even though attempts were made to correct all the mistakes, some may have been missed, and remained in the body of the document. If questions arise, please contact our department.

## 2021-11-13 NOTE — PROGRESS NOTES
[facility]  Progress Note    Patient: Alfonso Newton Age: 64 y.o. : 1965 MR#: [id number] SSN: [id number]  Date: 2021     Subjective/24-hour events:     Sitting in chair at bedside. Nothing new/acute overnight. 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 Continue PT/OT, mobilize as tolerated. Patient medically stable for discharge as soon as decision on disposition made and arrangements in place. Case discussed with:  [x]Patient  []Family  [x]Nursing  [x]Case Management  DVT Prophylaxis:  [x]Lovenox  []Hep SQ  []SCDs  []Coumadin   []On Heparin gtt    Objective:   VS:   Visit Vitals  /80 (BP 1 Location: Left upper arm, BP Patient Position: At rest)   Pulse 90   Temp 97.6 °F (36.4 °C)   Resp 16   Ht 5' (1.524 m)   Wt 66.2 kg (146 lb)   LMP 2019 Comment: unknown   SpO2 97%   BMI 28.51 kg/m²        General:  In NAD. Nontoxic-appearing. Cardiovascular:  Regular, mildly tachycardic. Pulmonary:  Lungs clear bilaterally, no wheezes. No accessory muscle use. GI:  Abdomen soft, NTTP. Extremities:  Warm, no edema or ischemia. Neuro: Awake and alert, moves extremities spontaneously.       Labs:    Recent Results (from the past 24 hour(s))   PHOSPHORUS    Collection Time: 21  3:00 AM   Result Value Ref Range    Phosphorus 4.5 2.5 - 4.9 MG/DL       Signed By: Rodriguez Hicks MD     2021

## 2021-11-14 LAB
ANION GAP SERPL CALC-SCNC: 8 MMOL/L (ref 3–18)
BUN SERPL-MCNC: 13 MG/DL (ref 7–18)
BUN/CREAT SERPL: 30 (ref 12–20)
CALCIUM SERPL-MCNC: 8.7 MG/DL (ref 8.5–10.1)
CHLORIDE SERPL-SCNC: 101 MMOL/L (ref 100–111)
CO2 SERPL-SCNC: 27 MMOL/L (ref 21–32)
CREAT SERPL-MCNC: 0.44 MG/DL (ref 0.6–1.3)
GLUCOSE SERPL-MCNC: 75 MG/DL (ref 74–99)
HCT VFR BLD AUTO: 31.6 % (ref 35–45)
HGB BLD-MCNC: 10.2 G/DL (ref 12–16)
POTASSIUM SERPL-SCNC: 3.7 MMOL/L (ref 3.5–5.5)
SODIUM SERPL-SCNC: 136 MMOL/L (ref 136–145)

## 2021-11-14 PROCEDURE — 74011250637 HC RX REV CODE- 250/637: Performed by: FAMILY MEDICINE

## 2021-11-14 PROCEDURE — 74011250637 HC RX REV CODE- 250/637: Performed by: INTERNAL MEDICINE

## 2021-11-14 PROCEDURE — 74011250637 HC RX REV CODE- 250/637: Performed by: PSYCHIATRY & NEUROLOGY

## 2021-11-14 PROCEDURE — 74011250636 HC RX REV CODE- 250/636: Performed by: INTERNAL MEDICINE

## 2021-11-14 PROCEDURE — 65270000029 HC RM PRIVATE

## 2021-11-14 PROCEDURE — 83036 HEMOGLOBIN GLYCOSYLATED A1C: CPT

## 2021-11-14 PROCEDURE — 85018 HEMOGLOBIN: CPT

## 2021-11-14 PROCEDURE — 99232 SBSQ HOSP IP/OBS MODERATE 35: CPT | Performed by: INTERNAL MEDICINE

## 2021-11-14 PROCEDURE — 36415 COLL VENOUS BLD VENIPUNCTURE: CPT

## 2021-11-14 PROCEDURE — 74011636637 HC RX REV CODE- 636/637: Performed by: INTERNAL MEDICINE

## 2021-11-14 PROCEDURE — 80048 BASIC METABOLIC PNL TOTAL CA: CPT

## 2021-11-14 RX ORDER — MIDODRINE HYDROCHLORIDE 2.5 MG/1
2.5 TABLET ORAL
Status: DISCONTINUED | OUTPATIENT
Start: 2021-11-14 | End: 2021-11-24 | Stop reason: HOSPADM

## 2021-11-14 RX ADMIN — MIRTAZAPINE 15 MG: 15 TABLET, ORALLY DISINTEGRATING ORAL at 21:36

## 2021-11-14 RX ADMIN — THERA TABS 1 TABLET: TAB at 08:34

## 2021-11-14 RX ADMIN — MIDODRINE HYDROCHLORIDE 2.5 MG: 5 TABLET ORAL at 08:35

## 2021-11-14 RX ADMIN — Medication 10 ML: at 21:37

## 2021-11-14 RX ADMIN — PREDNISONE 50 MG: 20 TABLET ORAL at 08:34

## 2021-11-14 RX ADMIN — Medication 250 MG: at 08:33

## 2021-11-14 RX ADMIN — PANTOPRAZOLE SODIUM 40 MG: 40 TABLET, DELAYED RELEASE ORAL at 17:51

## 2021-11-14 RX ADMIN — LACOSAMIDE 100 MG: 10 SOLUTION ORAL at 21:36

## 2021-11-14 RX ADMIN — LACOSAMIDE 100 MG: 10 SOLUTION ORAL at 08:33

## 2021-11-14 RX ADMIN — ENOXAPARIN SODIUM 40 MG: 100 INJECTION SUBCUTANEOUS at 08:31

## 2021-11-14 RX ADMIN — Medication 5 MG: at 21:36

## 2021-11-14 RX ADMIN — PANTOPRAZOLE SODIUM 40 MG: 40 TABLET, DELAYED RELEASE ORAL at 08:34

## 2021-11-14 NOTE — PROGRESS NOTES
Hospitalist Progress Note    Subjective:   Daily Progress Note: 11/14/2021    Patient is sitting up in chair. She states she continues to have vertigo especially when she tries to change position. No nausea or vomiting. Denies any chest pain or abdominal pain. No headaches. Dizziness present. Visual disturbances present. Current Facility-Administered Medications   Medication Dose Route Frequency    albuterol-ipratropium (DUO-NEB) 2.5 MG-0.5 MG/3 ML  3 mL Nebulization Q6H PRN    predniSONE (DELTASONE) tablet 50 mg  50 mg Oral DAILY WITH BREAKFAST    thiamine (B-1) tablet 250 mg  250 mg Oral DAILY    lacosamide (VIMPAT) oral solution 100 mg  100 mg Oral Q12H    melatonin tablet 5 mg  5 mg Oral QHS    midodrine (PROAMATINE) tablet 2.5 mg  2.5 mg Oral BID WITH MEALS    meclizine (ANTIVERT) tablet 12.5 mg  12.5 mg Oral Q6H PRN    pantoprazole (PROTONIX) tablet 40 mg  40 mg Oral ACB&D    mirtazapine (REMERON SOL-TAB) disintegrating tablet 15 mg  15 mg Oral QHS    phenol throat spray (CHLORASEPTIC) 1 Spray  1 Spray Oral PRN    therapeutic multivitamin (THERAGRAN) tablet 1 Tablet  1 Tablet Oral DAILY    sodium chloride (NS) flush 5-40 mL  5-40 mL IntraVENous Q8H    sodium chloride (NS) flush 5-40 mL  5-40 mL IntraVENous PRN    acetaminophen (TYLENOL) tablet 650 mg  650 mg Oral Q6H PRN    Or    acetaminophen (TYLENOL) suppository 650 mg  650 mg Rectal Q6H PRN    polyethylene glycol (MIRALAX) packet 17 g  17 g Oral DAILY PRN    ondansetron (ZOFRAN ODT) tablet 4 mg  4 mg Oral Q8H PRN    Or    ondansetron (ZOFRAN) injection 8 mg  8 mg IntraVENous Q6H PRN    enoxaparin (LOVENOX) injection 40 mg  40 mg SubCUTAneous DAILY        Review of Systems  Pertinent items are noted in HPI.     Objective:     Visit Vitals  /68   Pulse 77   Temp 97.5 °F (36.4 °C)   Resp 14   Ht 5' (1.524 m)   Wt 66.2 kg (146 lb)   LMP 04/01/2019 Comment: unknown   SpO2 100%   BMI 28.51 kg/m²      O2 Device: None (Room air)    Temp (24hrs), Av.8 °F (36.6 °C), Min:97.3 °F (36.3 °C), Max:98.7 °F (37.1 °C)      701 -  1900  In: 600 [P.O.:600]  Out: -   No intake/output data recorded. General appearance -awake, follows commands appropriately, not in acute distress. Chest -CTA bilaterally without any wheezes currently  Heart - S1 and S2 normal  Abdomen - soft, nontender, nondistended, Bowel sounds present  Neurological -awake, follows commands appropriately, moves all 4 extremities well. Sensation to touch normal in all 4 extremities. Extremities - no pedal edema noted      Data Review    Recent Results (from the past 24 hour(s))   METABOLIC PANEL, BASIC    Collection Time: 21  3:38 AM   Result Value Ref Range    Sodium 136 136 - 145 mmol/L    Potassium 3.7 3.5 - 5.5 mmol/L    Chloride 101 100 - 111 mmol/L    CO2 27 21 - 32 mmol/L    Anion gap 8 3.0 - 18 mmol/L    Glucose 75 74 - 99 mg/dL    BUN 13 7.0 - 18 MG/DL    Creatinine 0.44 (L) 0.6 - 1.3 MG/DL    BUN/Creatinine ratio 30 (H) 12 - 20      GFR est AA >60 >60 ml/min/1.73m2    GFR est non-AA >60 >60 ml/min/1.73m2    Calcium 8.7 8.5 - 10.1 MG/DL   HGB & HCT    Collection Time: 21  3:38 AM   Result Value Ref Range    HGB 10.2 (L) 12.0 - 16.0 g/dL    HCT 31.6 (L) 35.0 - 45.0 %         Assessment/Plan:     ASSESSMENT:    1. Adult failure to thrive  2. Multiple electrolyte disturbances, better currently  3. Intermittent vomiting with nausea could be due to central vertigo, currently stable  4. Recent history of herpes encephalitis s/p treatment  5. 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 6.  Chronic mild hyponatremia due to nausea  7. Poor appetite and severe protein calorie malnutrition  8. GERD  9. Dyslipidemia  10. History of seizure disorder  11. Intermittent encephalopathy with sundowning due to #5, and #4 and Wernicke's encephalopathy  12.   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  We will reduce the dose of prednisone at Tuesday if patient is still here  Continue current diet and Remeron  Continue thiamine and multivitamin  Discontinue midodrine and monitor this patient  Continue meclizine as needed  Continue current dietary supplements    Patient is otherwise medically stable to be discharged to skilled nursing facility. Anticipatory discharge: SNF placement when bed is found     Total time to take care of this patient was 30 minutes and more than 50% of time was spent counseling and coordinating care. Disclaimer: Sections of this note are dictated using utilizing voice recognition software, which may have resulted in some phonetic based errors in grammar and contents. Even though attempts were made to correct all the mistakes, some may have been missed, and remained in the body of the document. If questions arise, please contact our department.

## 2021-11-15 LAB
EST. AVERAGE GLUCOSE BLD GHB EST-MCNC: 85 MG/DL
HBA1C MFR BLD: 4.6 % (ref 4.2–5.6)

## 2021-11-15 PROCEDURE — 2709999900 HC NON-CHARGEABLE SUPPLY

## 2021-11-15 PROCEDURE — 97116 GAIT TRAINING THERAPY: CPT

## 2021-11-15 PROCEDURE — 74011250637 HC RX REV CODE- 250/637: Performed by: INTERNAL MEDICINE

## 2021-11-15 PROCEDURE — 74011250636 HC RX REV CODE- 250/636: Performed by: INTERNAL MEDICINE

## 2021-11-15 PROCEDURE — 74011250637 HC RX REV CODE- 250/637: Performed by: PSYCHIATRY & NEUROLOGY

## 2021-11-15 PROCEDURE — 65270000029 HC RM PRIVATE

## 2021-11-15 PROCEDURE — 97168 OT RE-EVAL EST PLAN CARE: CPT

## 2021-11-15 PROCEDURE — 74011250637 HC RX REV CODE- 250/637: Performed by: FAMILY MEDICINE

## 2021-11-15 PROCEDURE — 99232 SBSQ HOSP IP/OBS MODERATE 35: CPT | Performed by: PSYCHIATRY & NEUROLOGY

## 2021-11-15 PROCEDURE — 99232 SBSQ HOSP IP/OBS MODERATE 35: CPT | Performed by: INTERNAL MEDICINE

## 2021-11-15 PROCEDURE — 74011636637 HC RX REV CODE- 636/637: Performed by: INTERNAL MEDICINE

## 2021-11-15 PROCEDURE — 97535 SELF CARE MNGMENT TRAINING: CPT

## 2021-11-15 RX ORDER — MECLIZINE HCL 12.5 MG 12.5 MG/1
12.5 TABLET ORAL EVERY 6 HOURS
Status: DISCONTINUED | OUTPATIENT
Start: 2021-11-15 | End: 2021-11-15

## 2021-11-15 RX ORDER — MECLIZINE HCL 12.5 MG 12.5 MG/1
12.5 TABLET ORAL 3 TIMES DAILY
Status: DISCONTINUED | OUTPATIENT
Start: 2021-11-15 | End: 2021-11-21

## 2021-11-15 RX ADMIN — PREDNISONE 50 MG: 20 TABLET ORAL at 08:32

## 2021-11-15 RX ADMIN — MECLIZINE 12.5 MG: 12.5 TABLET ORAL at 16:38

## 2021-11-15 RX ADMIN — Medication 10 ML: at 21:33

## 2021-11-15 RX ADMIN — LACOSAMIDE 100 MG: 10 SOLUTION ORAL at 10:57

## 2021-11-15 RX ADMIN — LACOSAMIDE 100 MG: 10 SOLUTION ORAL at 22:21

## 2021-11-15 RX ADMIN — MECLIZINE 12.5 MG: 12.5 TABLET ORAL at 09:25

## 2021-11-15 RX ADMIN — Medication 250 MG: at 08:33

## 2021-11-15 RX ADMIN — Medication 5 MG: at 21:31

## 2021-11-15 RX ADMIN — Medication 10 ML: at 16:40

## 2021-11-15 RX ADMIN — MECLIZINE 12.5 MG: 12.5 TABLET ORAL at 21:31

## 2021-11-15 RX ADMIN — PANTOPRAZOLE SODIUM 40 MG: 40 TABLET, DELAYED RELEASE ORAL at 06:35

## 2021-11-15 RX ADMIN — MIRTAZAPINE 15 MG: 15 TABLET, ORALLY DISINTEGRATING ORAL at 21:31

## 2021-11-15 RX ADMIN — THERA TABS 1 TABLET: TAB at 08:34

## 2021-11-15 RX ADMIN — Medication 10 ML: at 05:03

## 2021-11-15 RX ADMIN — PANTOPRAZOLE SODIUM 40 MG: 40 TABLET, DELAYED RELEASE ORAL at 16:38

## 2021-11-15 RX ADMIN — ENOXAPARIN SODIUM 40 MG: 100 INJECTION SUBCUTANEOUS at 08:35

## 2021-11-15 NOTE — PROGRESS NOTES
Problem: Falls - Risk of  Goal: *Absence of Falls  Description: Document eRd Carreon Fall Risk and appropriate interventions in the flowsheet.   Outcome: Progressing Towards Goal  Note: Fall Risk Interventions:  Mobility Interventions: Bed/chair exit alarm, Patient to call before getting OOB    Mentation Interventions: Adequate sleep, hydration, pain control, Bed/chair exit alarm, Door open when patient unattended    Medication Interventions: Bed/chair exit alarm, Patient to call before getting OOB    Elimination Interventions: Bed/chair exit alarm, Call light in reach, Patient to call for help with toileting needs    History of Falls Interventions: Bed/chair exit alarm         Problem: Patient Education: Go to Patient Education Activity  Goal: Patient/Family Education  Outcome: Progressing Towards Goal     Problem: Patient Education: Go to Patient Education Activity  Goal: Patient/Family Education  Outcome: Progressing Towards Goal

## 2021-11-15 NOTE — ROUTINE PROCESS
Bedside and Verbal shift change report given to BRIONNA Hughes ,RN (oncoming nurse) by Corinne Shear (offgoing nurse). Report included the following information SBAR, Kardex, Procedure Summary, Intake/Output, MAR and Recent Results.

## 2021-11-15 NOTE — PROGRESS NOTES
Problem: Mobility Impaired (Adult and Pediatric)  Goal: *Acute Goals and Plan of Care (Insert Text)  Description: Physical Therapy Goals  Re-evaluated 11/11/2021 and updated below  Initiated 10/21/2021, re-evaluated 11/4/21 and goals adjusted as needed and to be accomplished within 7 day(s)  1. Patient will move from supine to sit and sit to supine, scoot up and down, and roll side to side in bed with Sweetie. 2.  Patient will transfer from bed to chair and chair to bed with Sweetie using the least restrictive device. 3.  Patient will perform sit to stand with Sweetie. 4.  Patient will ambulate with Sweetie for 100 feet with the least restrictive device. MET ALL GOALS at Simon level, goals updated on 11/15/21    PLOF: Pt reports she lives in a [address] [place] [address] with her , states she was ambulatory with no AD however per chart review, pt has been using a wc recently as she has increased weakness and difficulty walking, pt only oriented to self and time this date. Outcome: Progressing Towards Goal   PHYSICAL THERAPY TREATMENT    Patient: Baltazar Murillo (78 y.o. female)  Date: 11/15/2021  Diagnosis: Vomiting [R11.10]   <principal problem not specified>  Procedure(s) (LRB):  PERCUTANEOUS ENDOSCOPIC GASTROSTOMY TUBE INSERTION (N/A) 9 Days Post-Op  Precautions: Fall      ASSESSMENT:  Pt cleared to participate in PT session, pt received semi-reclined in recliner and agreeable to therapy session. Pt more alert this session and able to follow commands without tactile cues. Pt standing with SBA to RW, pt ambulating with appropriate balance with RW x200 feet in halls. Pt reporting dizziness but no outward signs. Pt returned to sitting in recliner with chair alarm activated. Pt positioned for comfort and educated to call for assist before getting up, pt verbalized understanding. Pt left with all needs met and call bell in reach. RN notified of position and participation. Pt met all goals this date, goals updated. Progression toward goals:   [x]      Improving appropriately and progressing toward goals  []      Improving slowly and progressing toward goals  []      Not making progress toward goals and plan of care will be adjusted     PLAN:  Patient continues to benefit from skilled intervention to address the above impairments. Continue treatment per established plan of care. Discharge Recommendations:  Home Health with 24/7 assist if unable to provide will need SNF/LTC  Further Equipment Recommendations for Discharge:  rolling walker     SUBJECTIVE:   Patient stated I am glad you think I am doing well.     OBJECTIVE DATA SUMMARY:   Critical Behavior:  Neurologic State: Alert  Orientation Level: Oriented to person, Oriented to place  Cognition: Decreased attention/concentration  Safety/Judgement: Fall prevention  Functional Mobility Training:  Bed Mobility:     Supine to Sit:  (found in recliner )     Scooting: Supervision    Transfers:  Sit to Stand: Stand-by assistance  Stand to Sit: Stand-by assistance    Balance:  Sitting: Intact  Sitting - Static: Good (unsupported)  Sitting - Dynamic: Good (unsupported)  Standing: Impaired; With support  Standing - Static: Good  Standing - Dynamic : Fair (+)      Posture:   Posture (WDL): Within defined limits     Ambulation/Gait Training:  Distance (ft): 200 Feet (ft)  Assistive Device: Walker, rolling  Ambulation - Level of Assistance: Stand-by assistance    Gait Abnormalities: Decreased step clearance    Speed/Helen: Slow  Step Length: Right shortened; Left shortened      Pain:  Pain level pre-treatment: 0/10  Pain level post-treatment: 0/10     Activity Tolerance:   Fair activity tolerance     Please refer to the flowsheet for vital signs taken during this treatment.   After treatment:   [] Patient left in no apparent distress sitting up in chair  [x] Patient left in no apparent distress in bed  [x] Call bell left within reach  [x] Nursing notified  [] Caregiver present  [x] chair alarm activated  [] SCDs applied      COMMUNICATION/EDUCATION:   [x]         Role of Physical Therapy in the acute care setting. [x]         Fall prevention education was provided and the patient/caregiver indicated understanding. [x]         Patient/family have participated as able in working toward goals and plan of care. [x]         Patient/family agree to work toward stated goals and plan of care. []         Patient understands intent and goals of therapy, but is neutral about his/her participation.   []         Patient is unable to participate in stated goals/plan of care: ongoing with therapy staff.  []         Other:        Ayesha Cline, PT   Time Calculation: 13 mins

## 2021-11-15 NOTE — PROGRESS NOTES
Problem: Self Care Deficits Care Plan (Adult)  Goal: *Acute Goals and Plan of Care (Insert Text)  Description: Occupational Therapy Goals  Re-evaluated 11/15/2021 and continue with all goals for consistency. 1.  Patient will perform bed mobility in preparation for selfcare with modified independence. 2.  Patient will perform grooming task/functional activity standing for 4-7 minutes with supervision/set-up, F+ balance. 3.  Patient will perform lower body dressing with supervision/set-up seated and standing using AE prn (Pt has reacher at home). 4.  Patient will perform toilet transfers with supervision/set-up. 5.  Patient will perform all aspects of toileting with supervision/set-up. 6.  Patient will participate in upper extremity therapeutic exercise/activities with modified independence for 8 minutes. 7.  Patient will utilize energy conservation techniques during functional activities with verbal cues. Prior Level of Function: Patient was independent with self-care and functional mobility PTA. Patient's spouse reports she would walk unsteady at home d/t vertigo and for the stairs she would bump up/down on her bottom  Outcome: Progressing Towards Goal   OCCUPATIONAL THERAPY RE-EVALUATION    Patient: Marisa Abarca (43 y.o. female)  Date: 11/15/2021  Primary Diagnosis: Vomiting [R11.10]  Procedure(s) (LRB):  PERCUTANEOUS ENDOSCOPIC GASTROSTOMY TUBE INSERTION (N/A) 9 Days Post-Op   Precautions:   Fall, Skin  PLOF: see above    ASSESSMENT :  Pt alert and agreeable to OT session. Pt sitting in arm chair and requesting to use toilet. Pt required extra time and v/c for initiation of standing. Pt does not require physical assistance however SBA required for sit>stand due to pt's anxiety and need for initiation. Pt performed functional ambulation to bathroom SBA with RW. Pt doffed brief in stance by ripping one side and then dropped to floor stepping out of brief in stance.  Educated pt that stepping out of brief in stance is not safe and could pose fall risk. Pt verbalized understanding. Pt required extra time on commode while perseverating on visual disturbances, however, performed toileting hygiene SUP and donning new brief with SUP. Pt performed sit>stand from commode SBA due to need for initiation and count down. Pt performed hand hygiene  with v/c to keep RW in front of body. Pt returned to arm chair and provided with green emesis bag due to complaints of nausea. Pt demonstrates WFL BUE AROM and 4/5 BUE strength during MMT in sitting position. Patient will benefit from skilled intervention to address the above impairments. Patient's rehabilitation potential is considered to be Good  Factors which may influence rehabilitation potential include:   []             None noted  []             Mental ability/status  [x]             Medical condition  []             Home/family situation and support systems  []             Safety awareness  []             Pain tolerance/management  []             Other:      PLAN :  Recommendations and Planned Interventions:   [x]               Self Care Training                  [x]      Therapeutic Activities  [x]               Functional Mobility Training   [x]      Cognitive Retraining  [x]               Therapeutic Exercises           [x]      Endurance Activities  [x]               Balance Training                    [x]      Neuromuscular Re-Education  [x]               Visual/Perceptual Training     [x]      Home Safety Training  [x]               Patient Education                   [x]      Family Training/Education  []               Other (comment):    Frequency/Duration: Patient will be followed by occupational therapy 1-2 times per day/3-6 days per week to address goals.   Discharge Recommendations: Home Health with increased family support vs. Merged with [facility]  Further Equipment Recommendations for Discharge: rolling walker     SUBJECTIVE:   Patient stated the thought of walking right now makes me sick but if I'm forced to do it that's good.     OBJECTIVE DATA SUMMARY:   Hospital course since last seen and reason for reevaluation: Pt has participated in OT sessions focused on increasing independence and safety with ADL tasks. Pt states she owuld like to continue to work on increasing endurance in stance and work on bedroom and bathroom mobility. Pt is limited by perseveration on physical limitations and visual disturbances as well as decreased attention and difficutly with initiation and termination of tasks.    Past Medical History:   Diagnosis Date    Arthritis     Diarrhea     no diarrhea since 9/14/2021    Encephalitis 06/14/2021    intubated    GERD (gastroesophageal reflux disease)     H. pylori infection 2019    IBS (irritable bowel syndrome)     Memory difficulty     since encephalitis    Migraine     Ovarian cyst 2019    Seizures ([facility] [address] 06/16/2021    x 1    Vomiting      Past Surgical History:   Procedure Laterality Date    HX BREAST AUGMENTATION  2005    HX CHOLECYSTECTOMY      HX COLONOSCOPY  2016    HX ENDOSCOPY  2019    HX TYMPANOPLASTY      left x 5 times     Barriers to Learning/Limitations: yes;  cognitive  Compensate with: visual, verbal, tactile, kinesthetic cues/model    Home Situation:   Home Situation  Home Environment: Private residence  One/Two Story Residence: Two story, live on 1st floor  Living Alone: No  Support Systems: Spouse/Significant Other  Patient Expects to be Discharged to[de-identified] House  Current DME Used/Available at Home: Shower chair  Tub or Shower Type: Tub/Shower combination  []  Right hand dominant   []  Left hand dominant    Cognitive/Behavioral Status:  Neurologic State: Alert  Orientation Level: Oriented to person; Oriented to place; Oriented to situation  Cognition: Follows commands; Decreased attention/concentration  Safety/Judgement: Fall prevention    Skin: intact BUE  Edema: none noted BUE    Vision/Perceptual:       Double vision          Coordination: BUE  Coordination: Within functional limits  Fine Motor Skills-Upper: Left Intact; Right Intact    Gross Motor Skills-Upper: Left Intact; Right Intact  Balance:  Sitting: Intact  Sitting - Static: Good (unsupported)  Sitting - Dynamic: Good (unsupported)  Standing: Impaired; With support  Standing - Static: Good  Standing - Dynamic : Fair (+)  Strength: BUE  Strength: Generally decreased, functional     Tone & Sensation: BUE  Tone: Normal  Sensation: Intact   Range of Motion: BUE  AROM: Within functional limits        Functional Mobility and Transfers for ADLs:  Bed Mobility:  Supine to Sit:  (found in recliner )  Scooting: Supervision  Transfers:  Sit to Stand: Stand-by assistance  Stand to Sit: Stand-by assistance    Toilet Transfer : Stand-by assistance    ADL Assessment:      Upper Body Dressing: Stand-by assistance (in stance)  Lower Body Dressing: Stand-by assistance  Toileting: Supervision   ADL Intervention:       Cognitive Retraining  Safety/Judgement: Fall prevention  Pain:  Pain level pre-treatment: 0/10   Pain level post-treatment: 0/10  Pain Intervention(s): Medication (see MAR); Rest,, Repositioning   Response to intervention: Nurse notified, See doc flow    Activity Tolerance:   fair  Please refer to the flowsheet for vital signs taken during this treatment. After treatment:   [x] Patient left in no apparent distress sitting up in chair  [] Patient left in no apparent distress in bed  [x] Call bell left within reach  [] Nursing notified  [] Caregiver present  [x] chair alarm activated    COMMUNICATION/EDUCATION:   [x] Role of Occupational Therapy in the acute care setting  [x] Home safety education was provided and the patient/caregiver indicated understanding. [x] Patient/family have participated as able in goal setting and plan of care. [x] Patient/family agree to work toward stated goals and plan of care.   [] Patient understands intent and goals of therapy, but is neutral about his/her participation. [] Patient is unable to participate in goal setting and plan of care.     Thank you for this referral.  Herminio Harmon, OT  Time Calculation: 32 mins

## 2021-11-15 NOTE — PROGRESS NOTES
Spoke with Ena Powell with [facility]. Asked about status of auth for patient to go to [facility]. She states they have not heard back but then said the facility does not want to take her know due to concerns for next level of care. She states she will speak with Tolu Schreiber at [address] to see if she will still accept her and start auth today. Received message from Ena Powell that [facility] can accept pt. They are starting auth today.   Alvin Kehr RN - Outcomes Manager  [phone]

## 2021-11-15 NOTE — PROGRESS NOTES
Hospitalist Progress Note    Subjective:   Daily Progress Note: 11/15/2021    Patient is lying in bed. She had an episode of dizziness earlier today with visual disturbances. No nausea or vomiting no chest pain shortness of breath or cough. No abdominal pain. No family member at bedside currently. Current Facility-Administered Medications   Medication Dose Route Frequency    meclizine (ANTIVERT) tablet 12.5 mg  12.5 mg Oral TID    midodrine (PROAMATINE) tablet 2.5 mg  2.5 mg Oral Q12H PRN    albuterol-ipratropium (DUO-NEB) 2.5 MG-0.5 MG/3 ML  3 mL Nebulization Q6H PRN    predniSONE (DELTASONE) tablet 50 mg  50 mg Oral DAILY WITH BREAKFAST    thiamine (B-1) tablet 250 mg  250 mg Oral DAILY    lacosamide (VIMPAT) oral solution 100 mg  100 mg Oral Q12H    melatonin tablet 5 mg  5 mg Oral QHS    meclizine (ANTIVERT) tablet 12.5 mg  12.5 mg Oral Q6H PRN    pantoprazole (PROTONIX) tablet 40 mg  40 mg Oral ACB&D    mirtazapine (REMERON SOL-TAB) disintegrating tablet 15 mg  15 mg Oral QHS    phenol throat spray (CHLORASEPTIC) 1 Spray  1 Spray Oral PRN    therapeutic multivitamin (THERAGRAN) tablet 1 Tablet  1 Tablet Oral DAILY    sodium chloride (NS) flush 5-40 mL  5-40 mL IntraVENous Q8H    sodium chloride (NS) flush 5-40 mL  5-40 mL IntraVENous PRN    acetaminophen (TYLENOL) tablet 650 mg  650 mg Oral Q6H PRN    Or    acetaminophen (TYLENOL) suppository 650 mg  650 mg Rectal Q6H PRN    polyethylene glycol (MIRALAX) packet 17 g  17 g Oral DAILY PRN    ondansetron (ZOFRAN ODT) tablet 4 mg  4 mg Oral Q8H PRN    Or    ondansetron (ZOFRAN) injection 8 mg  8 mg IntraVENous Q6H PRN    enoxaparin (LOVENOX) injection 40 mg  40 mg SubCUTAneous DAILY        Review of Systems  Pertinent items are noted in HPI.     Objective:     Visit Vitals  /85 (BP 1 Location: Left upper arm, BP Patient Position: Sitting)   Pulse 69   Temp 97.6 °F (36.4 °C)   Resp 18   Ht 5' (1.524 m)   Wt 66.2 kg (146 lb)   LMP 2019 Comment: unknown   SpO2 100%   BMI 28.51 kg/m²      O2 Device: None (Room air)    Temp (24hrs), Av.9 °F (36.6 °C), Min:97.6 °F (36.4 °C), Max:98.2 °F (36.8 °C)      No intake/output data recorded.  1901 - 11/15 0700  In: 1440 [P.O.:1440]  Out: -     General appearance -awake, follows commands appropriately, not in acute distress. Chest -CTA bilaterally without any wheezes currently  Heart - S1 and S2 normal  Abdomen - soft, nontender, nondistended, Bowel sounds present  Neurological -awake, follows commands appropriately, moves all 4 extremities well. Sensation to touch normal in all 4 extremities. Extremities - no pedal edema noted      Data Review    No results found for this or any previous visit (from the past 24 hour(s)). Assessment/Plan:     ASSESSMENT:    1. Adult failure to thrive, slowly improving  2. Multiple electrolyte disturbances, stable for a while  3. Intermittent vomiting with nausea could be due to central vertigo, currently stable  4. Recent history of herpes encephalitis s/p treatment  5. 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 6.  Chronic mild hyponatremia due to nausea  7. Poor appetite and severe protein calorie malnutrition, slowly improving  8. GERD  9. Dyslipidemia  10. History of seizure disorder  11. Intermittent encephalopathy with sundowning due to #5, and #4 and Wernicke's encephalopathy  12. 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  We will reduce the dose of prednisone tomorrow. We will use meclizine 3 times a day  Neurology has been reconsulted for further recommendation  Continue current diet and Remeron  Continue thiamine and multivitamin  Blood pressure is stable off midodrine.   Continue current dietary supplements  Spoke to patient's  who wants patient to go to skilled nursing facility. Discussed with case management and still waiting to get acceptance on [place] side where patient's family stays. Patient is otherwise medically stable to be discharged to skilled nursing facility. Anticipatory discharge: SNF placement when bed is found     Total time to take care of this patient was 30 minutes and more than 50% of time was spent counseling and coordinating care. Disclaimer: Sections of this note are dictated using utilizing voice recognition software, which may have resulted in some phonetic based errors in grammar and contents. Even though attempts were made to correct all the mistakes, some may have been missed, and remained in the body of the document. If questions arise, please contact our department.

## 2021-11-15 NOTE — PROGRESS NOTES
Neurology Progress Note    Patient ID:  Faith Cramer  [id number]  64 y.o.  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 Also I suspected that the she had some amantadine side effect -oculogyric crisis on admission and that was stopped. At admission she was severely ataxic with almost inability to ambulate and that improved. cognitively doing better. Able to ambulate with a walker. Improved nutrition status as she tolerates p.o. better. Dizziness/lightheadedness/vertiginous symptoms seems to still be bothering her. No seizures since admission. She was too somnolent on Keppra and was discontinued.     Current Facility-Administered Medications   Medication Dose Route Frequency    meclizine (ANTIVERT) tablet 12.5 mg  12.5 mg Oral TID    midodrine (PROAMATINE) tablet 2.5 mg  2.5 mg Oral Q12H PRN    albuterol-ipratropium (DUO-NEB) 2.5 MG-0.5 MG/3 ML  3 mL Nebulization Q6H PRN    predniSONE (DELTASONE) tablet 50 mg  50 mg Oral DAILY WITH BREAKFAST    thiamine (B-1) tablet 250 mg  250 mg Oral DAILY    lacosamide (VIMPAT) oral solution 100 mg  100 mg Oral Q12H    melatonin tablet 5 mg  5 mg Oral QHS    meclizine (ANTIVERT) tablet 12.5 mg  12.5 mg Oral Q6H PRN    pantoprazole (PROTONIX) tablet 40 mg  40 mg Oral ACB&D    mirtazapine (REMERON SOL-TAB) disintegrating tablet 15 mg  15 mg Oral QHS    phenol throat spray (CHLORASEPTIC) 1 Spray  1 Spray Oral PRN    therapeutic multivitamin (THERAGRAN) tablet 1 Tablet  1 Tablet Oral DAILY    sodium chloride (NS) flush 5-40 mL  5-40 mL IntraVENous Q8H    sodium chloride (NS) flush 5-40 mL  5-40 mL IntraVENous PRN    acetaminophen (TYLENOL) tablet 650 mg  650 mg Oral Q6H PRN    Or    acetaminophen (TYLENOL) suppository 650 mg  650 mg Rectal Q6H PRN    polyethylene glycol (MIRALAX) packet 17 g  17 g Oral DAILY PRN    ondansetron (ZOFRAN ODT) tablet 4 mg  4 mg Oral Q8H PRN    Or    ondansetron (ZOFRAN) injection 8 mg  8 mg IntraVENous Q6H PRN    enoxaparin (LOVENOX) injection 40 mg  40 mg SubCUTAneous DAILY          Objective: Active hospital medications were reviewed    Lab results and neuroradiology studies from the last 24 hours were reviewed. Prior to Admission medications    Medication Sig Start Date End Date Taking? Authorizing Provider   pantoprazole (PROTONIX) 40 mg tablet Take 1 Tablet by mouth daily. Take on empty stomah 11/9/21  Yes Benito Paz MD   albuterol-ipratropium (DUO-NEB) 2.5 mg-0.5 mg/3 ml nebu 3 mL by Nebulization route every six (6) hours as needed for Wheezing. 11/9/21  Yes Benito Paz MD   lacosamide (VIMPAT) 10 mg/mL soln oral solution Take 10 mL by mouth every twelve (12) hours. Max Daily Amount: 200 mg. 11/9/21  Yes Benito Paz MD   melatonin 5 mg tablet Take 1 Tablet by mouth nightly. 11/9/21  Yes Benito Paz MD   mirtazapine (REMERON SOL-TAB) 15 mg disintegrating tablet Take 1 Tablet by mouth nightly. 11/9/21  Yes Benito Paz MD   predniSONE (DELTASONE) 10 mg tablet 5 tablets p.o. daily for 1 week then 4 tablets p.o. daily for 1 week then 3 tablets p.o. daily for 1 week then 2 tablets p.o. daily for 1 week then 1 tablet daily for 1 week and then stop 11/9/21  Yes Benito Paz MD   therapeutic multivitamin [facility]) tablet Take 1 Tablet by mouth daily. 11/10/21  Yes Benito Paz MD   thiamine  mg tablet 250 mg p.o. daily for 1 week then 100 mg p.o. daily 11/9/21  Yes Benito Paz MD   potassium gluconate 595 mg (99 mg) tablet Take 99 mg by mouth daily. To begin taking when the packets run out   Yes Provider, Historical   potassium chloride (KLOR-CON) 20 mEq pack Take 20 mEq by mouth daily.  Mix with water   Yes Provider, Historical   meclizine (ANTIVERT) 12.5 mg tablet Take 12.5 mg by mouth three (3) times daily as needed for Dizziness. Dizziness   Yes Provider, Historical   ondansetron (ZOFRAN ODT) 8 mg disintegrating tablet Take 8 mg by mouth every eight (8) hours as needed for Nausea or Vomiting. 8/23/21  Yes Provider, Historical   LORazepam (Ativan) 1 mg tablet Take 1 Tablet by mouth every eight (8) hours as needed (Vomiting). Max Daily Amount: 3 mg. Patient not taking: Reported on 10/21/2021 10/15/21   Liliana NEAL MD   levETIRAcetam (KEPPRA) 250 mg tablet Take 250 mg by mouth two (2) times a day. Patient not taking: Reported on 10/21/2021 8/26/21 8/26/22  Provider, Historical   amantadine HCL (SYMMETREL) 100 mg capsule Take 200 mg by mouth two (2) times a day. 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 and I reviewed the patients new imaging test results. General Exam  No acute distress, mucous membranes normal color and hydration status    Neurologic Exam    Mental status:  Alert, oriented to person, place, time and circumstance  Language: normal fluency and comprehension  No visual spatial neglect or overt apraxia    Cranial nerves: PERRL, improved extraocular movements,  face symmetric to movement, Tongue midline with normal strength, palat symmetric    Motor:  moves all 4 extremities symmetric. Gait: Able to ambulate with a walker, unsteady, caution.       Assessment:     Baltazar Murillo is a 64 y.o. 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 Patient is not significantly improved, residual deficits at this time gait ataxia, chronic dizziness, memory disturbance. She continues to improve, however based on the MRI findings of encephalomalacia on the right superior MCA and left TA  and overall history I suspect she possibly with a lying exam lifelong deficits. Overall she made a remarkable improvement.     Plan:     -Start meclizine 12.5 mg scheduled and after few days if helping, consider as needed for dizziness   -If meclizine not effective, can try one of the SSRIs like Paxil started 10 mg every morning and increase gradually for chronic dizziness  -Continue B1 tablets 100 mg/day note that her diet is improving  -Tapering prednisone -this could be tapered down by 20 mg/week and then stopped  -She would benefit from rehab including vestibular rehab  -Monitor for steroid-induced diabetes -I will order an A1c  -Outpatient neurology follow-up    Signed:  Latasha Hung MD  Adult Neurologist  11/15/2021  1:45 PM

## 2021-11-16 PROCEDURE — 74011250637 HC RX REV CODE- 250/637: Performed by: INTERNAL MEDICINE

## 2021-11-16 PROCEDURE — 74011250637 HC RX REV CODE- 250/637: Performed by: PSYCHIATRY & NEUROLOGY

## 2021-11-16 PROCEDURE — 74011250636 HC RX REV CODE- 250/636: Performed by: INTERNAL MEDICINE

## 2021-11-16 PROCEDURE — 97530 THERAPEUTIC ACTIVITIES: CPT

## 2021-11-16 PROCEDURE — 74011250637 HC RX REV CODE- 250/637: Performed by: FAMILY MEDICINE

## 2021-11-16 PROCEDURE — 65270000029 HC RM PRIVATE

## 2021-11-16 PROCEDURE — 74011636637 HC RX REV CODE- 636/637: Performed by: INTERNAL MEDICINE

## 2021-11-16 PROCEDURE — 99232 SBSQ HOSP IP/OBS MODERATE 35: CPT | Performed by: INTERNAL MEDICINE

## 2021-11-16 PROCEDURE — 97535 SELF CARE MNGMENT TRAINING: CPT

## 2021-11-16 RX ORDER — PREDNISONE 20 MG/1
40 TABLET ORAL
Status: DISCONTINUED | OUTPATIENT
Start: 2021-11-17 | End: 2021-11-24 | Stop reason: HOSPADM

## 2021-11-16 RX ADMIN — PREDNISONE 50 MG: 20 TABLET ORAL at 08:37

## 2021-11-16 RX ADMIN — ENOXAPARIN SODIUM 40 MG: 100 INJECTION SUBCUTANEOUS at 08:36

## 2021-11-16 RX ADMIN — Medication 10 ML: at 05:54

## 2021-11-16 RX ADMIN — MECLIZINE 12.5 MG: 12.5 TABLET ORAL at 08:37

## 2021-11-16 RX ADMIN — MIRTAZAPINE 15 MG: 15 TABLET, ORALLY DISINTEGRATING ORAL at 21:43

## 2021-11-16 RX ADMIN — MECLIZINE 12.5 MG: 12.5 TABLET ORAL at 21:43

## 2021-11-16 RX ADMIN — LACOSAMIDE 100 MG: 10 SOLUTION ORAL at 21:40

## 2021-11-16 RX ADMIN — Medication 250 MG: at 08:35

## 2021-11-16 RX ADMIN — Medication 10 ML: at 21:50

## 2021-11-16 RX ADMIN — PANTOPRAZOLE SODIUM 40 MG: 40 TABLET, DELAYED RELEASE ORAL at 08:37

## 2021-11-16 RX ADMIN — Medication 5 MG: at 21:43

## 2021-11-16 RX ADMIN — MECLIZINE 12.5 MG: 12.5 TABLET ORAL at 16:11

## 2021-11-16 RX ADMIN — PANTOPRAZOLE SODIUM 40 MG: 40 TABLET, DELAYED RELEASE ORAL at 16:11

## 2021-11-16 RX ADMIN — LACOSAMIDE 100 MG: 10 SOLUTION ORAL at 09:41

## 2021-11-16 RX ADMIN — THERA TABS 1 TABLET: TAB at 08:37

## 2021-11-16 NOTE — ROUTINE PROCESS
Bedside and Verbal shift change report given to [facility] (oncoming nurse) by Palomo Bellamy (offgoing nurse). Report included the following information SBAR, Kardex, Procedure Summary, Intake/Output, MAR and Recent Results.

## 2021-11-16 NOTE — ROUTINE PROCESS
End of Shift Note     Bedside and verbal shift change report given to Indiana Cornelius RN (On coming nurse) by Sammi Holcomb RN (Off going nurse).   Report included the following information:      --Procedure Summary     --MAR,     --Recent Results     --Med Rec Status    SBAR Recommendations: weakness    Issues for Provider to address confusion          Activity This Shift     [] Bed Rest Order   [] Refused   [] Dangled    [] TDWB         Ambulating:     [] Bathroom     [x] BSC     [] Room/Hallway      Up in Chair for meals    []Yes [] No   Voiding       [x] Yes  [] No  Leo          [] Yes  [] No  Incontinent [x] Yes  [] No    DUE TO VOID POUR        [] Yes [] No  Purewick    [x] Yes [] No  New Onset [] Yes [] No Straight Cath   []Yes  [] No  Condom Cath  [] Yes [] No  MD Called      [] Yes  [] No   Blood Sugars Managed []Yes [x] No    Bowels Moved [x] Yes [] No    Incontinent     [x] Yes [] No Passed Gas [x]Yes [] No    New Onset  []Yes [x] No        MD Called []Yes  [] No     CHG Bath Done     Before Surgery     After Surgery      [] Yes  [x] No  [] Yes  [x] No       Drain Removed [] Yes  [] No [x] N/A    Dressing Changed [] Yes   [] No [x] N/A      Nausea/Vomiting [] Yes   [x] No     Ice Packs Changed [] Yes   [] No  [x] N/A    Incentive Spirometer  [] Yes  [x] No      SCD Pumps On     Ankle Pumping  [] Yes   [x] No      [] Yes   [x] No        Telemetry Monitoring [] Yes   [x] No   Rhythm

## 2021-11-16 NOTE — PROGRESS NOTES
Problem: Self Care Deficits Care Plan (Adult)  Goal: *Acute Goals and Plan of Care (Insert Text)  Description: Occupational Therapy Goals  Re-evaluated 11/15/2021 and continue with all goals for consistency. 1.  Patient will perform bed mobility in preparation for selfcare with modified independence. 2.  Patient will perform grooming task/functional activity standing for 4-7 minutes with supervision/set-up, F+ balance. 3.  Patient will perform lower body dressing with supervision/set-up seated and standing using AE prn (Pt has reacher at home). 4.  Patient will perform toilet transfers with supervision/set-up. 5.  Patient will perform all aspects of toileting with supervision/set-up. 6.  Patient will participate in upper extremity therapeutic exercise/activities with modified independence for 8 minutes. 7.  Patient will utilize energy conservation techniques during functional activities with verbal cues. Prior Level of Function: Patient was independent with self-care and functional mobility PTA. Patient's spouse reports she would walk unsteady at home d/t vertigo and for the stairs she would bump up/down on her bottom  Outcome: Progressing Towards Goal   OCCUPATIONAL THERAPY TREATMENT    Patient: Karey Mcbride (49 y.o. female)  Date: 11/16/2021  Diagnosis: Vomiting [R11.10]   <principal problem not specified>  Procedure(s) (LRB):  PERCUTANEOUS ENDOSCOPIC GASTROSTOMY TUBE INSERTION (N/A) 10 Days Post-Op  Precautions: Fall, Skin  PLOF: Patient was independent with self-care and functional mobility PTA. Patient's spouse reports she would walk unsteady at home d/t vertigo and for the stairs she would bump up/down on her bottom    Chart, occupational therapy assessment, plan of care, and goals were reviewed. ASSESSMENT:  Pt presented sitting upright in chair with soiled hospital gown and chux pad from urine upon entry.  STS transfer SBA with RW and performed valentín area hygiene with SBA requiring 1 UE support on RW. She returned back to sitting in chair with clean chux pad and engaged in LB dressing doffing/donning socks SBA with leg cross technique. She performed UB dressing donning clean hospital gown with SBA @ chair level. Pt was positioned for comfort and left with B LE's elevated and all needs left within reach. Educated on need for RN assistance with mobility and transfers, pt verbalized understanding. Progression toward goals:  []          Improving appropriately and progressing toward goals  [x]          Improving slowly and progressing toward goals  []          Not making progress toward goals and plan of care will be adjusted     PLAN:  Patient continues to benefit from skilled intervention to address the above impairments. Continue treatment per established plan of care. Discharge Recommendations: Rehab vs HH with 24/7 assist  Further Equipment Recommendations for Discharge:  RW     SUBJECTIVE:   Patient stated  I am such a mess. \"     OBJECTIVE DATA SUMMARY:   Cognitive/Behavioral Status:  Neurologic State: Alert, Confused  Orientation Level: Oriented to person, Oriented to place  Cognition: Follows commands, Impaired decision making  Safety/Judgement: Fall prevention    Functional Mobility and Transfers for ADLs:      Transfers:  Sit to Stand: Stand-by assistance  Stand to Sit: Stand-by assistance       Balance:  Sitting: Intact  Standing: Impaired; With support  Standing - Static: Good  Standing - Dynamic : Fair (+)    ADL Intervention:     Lower Body Bathing  Bathing Assistance: Stand-by assistance  Perineal  : Stand-by assistance  Position Performed: Standing  Cues: Verbal cues provided  Adaptive Equipment: Thanh Trujillo;  Wipes(personal cleansing cloth)    Upper Body [address]: Stand-by assistance    Lower Body Dressing Assistance  Socks: Stand-by assistance  Leg Crossed Method Used: Yes  Position Performed: Seated in chair  Cues: Verbal cues provided         Cognitive Retraining  Safety/Judgement: Fall prevention        Pain:  Pain level pre-treatment:0/10   Pain level post-treatment: 0/10    Activity Tolerance:    Fair   Please refer to the flowsheet for vital signs taken during this treatment. After treatment:   [x]  Patient left in no apparent distress sitting up in chair  []  Patient left in no apparent distress in bed  [x]  Call bell left within reach  [x]  Nursing notified  []  Caregiver present  []  Bed alarm activated    COMMUNICATION/EDUCATION:   [x] Role of Occupational Therapy in the acute care setting  [] Home safety education was provided and the patient/caregiver indicated understanding. [x] Patient/family have participated as able in working towards goals and plan of care. [x] Patient/family agree to work toward stated goals and plan of care. [] Patient understands intent and goals of therapy, but is neutral about his/her participation. [] Patient is unable to participate in goal setting and plan of care.       Thank you for this referral.  JUNIOR Celis  Time Calculation: 24 mins

## 2021-11-16 NOTE — PROGRESS NOTES
Hospitalist Progress Note    Subjective:   Daily Progress Note: 11/16/2021    Patient has been sitting up in a chair. She feels better today. Off-and-on visual disturbances and dizziness. No nausea vomiting. Denies any chest pain abdominal pain. No tingling or numbness. I saw patient walking in hallway with the help of PT yesterday. Current Facility-Administered Medications   Medication Dose Route Frequency    meclizine (ANTIVERT) tablet 12.5 mg  12.5 mg Oral TID    midodrine (PROAMATINE) tablet 2.5 mg  2.5 mg Oral Q12H PRN    albuterol-ipratropium (DUO-NEB) 2.5 MG-0.5 MG/3 ML  3 mL Nebulization Q6H PRN    predniSONE (DELTASONE) tablet 50 mg  50 mg Oral DAILY WITH BREAKFAST    thiamine (B-1) tablet 250 mg  250 mg Oral DAILY    lacosamide (VIMPAT) oral solution 100 mg  100 mg Oral Q12H    melatonin tablet 5 mg  5 mg Oral QHS    meclizine (ANTIVERT) tablet 12.5 mg  12.5 mg Oral Q6H PRN    pantoprazole (PROTONIX) tablet 40 mg  40 mg Oral ACB&D    mirtazapine (REMERON SOL-TAB) disintegrating tablet 15 mg  15 mg Oral QHS    phenol throat spray (CHLORASEPTIC) 1 Spray  1 Spray Oral PRN    therapeutic multivitamin (THERAGRAN) tablet 1 Tablet  1 Tablet Oral DAILY    sodium chloride (NS) flush 5-40 mL  5-40 mL IntraVENous Q8H    sodium chloride (NS) flush 5-40 mL  5-40 mL IntraVENous PRN    acetaminophen (TYLENOL) tablet 650 mg  650 mg Oral Q6H PRN    Or    acetaminophen (TYLENOL) suppository 650 mg  650 mg Rectal Q6H PRN    polyethylene glycol (MIRALAX) packet 17 g  17 g Oral DAILY PRN    ondansetron (ZOFRAN ODT) tablet 4 mg  4 mg Oral Q8H PRN    Or    ondansetron (ZOFRAN) injection 8 mg  8 mg IntraVENous Q6H PRN    enoxaparin (LOVENOX) injection 40 mg  40 mg SubCUTAneous DAILY        Review of Systems  Pertinent items are noted in HPI.     Objective:     Visit Vitals  /74   Pulse 82   Temp 97.7 °F (36.5 °C)   Resp 17   Ht 5' (1.524 m)   Wt 66.2 kg (146 lb)   LMP 04/01/2019 Comment: unknown SpO2 99%   BMI 28.51 kg/m²      O2 Device: None (Room air)    Temp (24hrs), Av.8 °F (36.6 °C), Min:97.4 °F (36.3 °C), Max:98.1 °F (36.7 °C)      No intake/output data recorded.  1901 -  0700  In: 1 [P.O.:970]  Out: -     General appearance -awake, follows commands appropriately, not in acute distress. Chest -CTA bilaterally without any wheezes currently  Heart - S1 and S2 normal  Abdomen - soft, nontender, nondistended, Bowel sounds present  Neurological -awake, follows commands appropriately, moves all 4 extremities well. Sensation to touch normal in all 4 extremities. Extremities - no pedal edema noted      Data Review    No results found for this or any previous visit (from the past 24 hour(s)). Assessment/Plan:     ASSESSMENT:    1. Adult failure to thrive, stable and improving  2. Multiple electrolyte disturbances, resolved currently  3. Intermittent vomiting with nausea could be due to central vertigo, currently stable  4. Recent history of herpes encephalitis s/p treatment  5. 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 6.  Chronic mild hyponatremia due to nausea, resolved  7. Poor appetite and severe protein calorie malnutrition, slowly improving  8. GERD  9. Dyslipidemia  10. History of seizure disorder  11. Intermittent encephalopathy with sundowning due to #5, and #4 and Wernicke's encephalopathy, better currently  12. 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  We will reduce the dose of prednisone  Resume meclizine 3 times a day  Neurology input noted  Continue current diet and Remeron  Continue thiamine and multivitamin  Blood pressure is stable off midodrine. Continue current dietary supplements  Spoke to patient's  yesterday and he wants patient to go to skilled nursing facility.     Patient is otherwise medically stable to be discharged to skilled nursing facility. Anticipatory discharge: SNF placement when bed is found     Total time to take care of this patient was 30 minutes and more than 50% of time was spent counseling and coordinating care. Disclaimer: Sections of this note are dictated using utilizing voice recognition software, which may have resulted in some phonetic based errors in grammar and contents. Even though attempts were made to correct all the mistakes, some may have been missed, and remained in the body of the document. If questions arise, please contact our department.

## 2021-11-16 NOTE — PROGRESS NOTES
Nutrition Assessment     Type and Reason for Visit: Reassess, Consult    Nutrition Recommendations/Plan:  - Continue oral diet as tolerated with Ensure Enlive, TID. Clarify flavor preference. - Monitor and encourage po intake as tolerated, continue daily MVI & thiamine. Nutrition Assessment:  Continues with fair to good intake of recent meals & consumption of supplements (likes vanilla). Continues to c/o mild nausea with vertigo but denies nausea or abdominal pain. Awaiting placement. Malnutrition Assessment:  Malnutrition Status: Severe malnutrition     Estimated Daily Nutrient Needs:  Energy (kcal):  5510-5939  Protein (g):         Fluid (ml/day):  8416-6326    Nutrition Related Findings:  BM 11/13. GI recommended against PEG tube placement due pt at high risk of pulling out PEG tube. NGT pulled out 11/7. Pertinent medications: remeron, steroid, MVI & thiamine. Current Nutrition Therapies:  ADULT DIET Dysphagia - Soft & Bite Sized  ADULT ORAL NUTRITION SUPPLEMENT Breakfast, Lunch, Dinner; Standard High Calorie/High Protein    Anthropometric Measures:  · Height:  5' (152.4 cm)  · Current Body Wt:  67.1 kg (147 lb 14.9 oz)  · BMI: 28.9    Nutrition Diagnosis:   · Severe malnutrition, In context of chronic illness related to inadequate protein-energy intake (decreased appetite) as evidenced by poor intake prior to admission, weight loss greater than or equal to 10% in 6 months    Nutrition Intervention:  Food and/or Nutrient Delivery: Continue current diet, Continue oral nutrition supplement, Vitamin supplement  Nutrition Education and Counseling: No recommendations at this time, Education not indicated  Coordination of Nutrition Care: Continue to monitor while inpatient    Goals:  PO nutrition intake will continue to meet >75% of patient estimated nutritional needs within the next 7 days.        Nutrition Monitoring and Evaluation:   Behavioral-Environmental Outcomes: None identified  Food/Nutrient Intake Outcomes: Food and nutrient intake, Vitamin/mineral intake, Supplement intake  Physical Signs/Symptoms Outcomes: Biochemical data, Chewing or swallowing, GI status, Meal time behavior, Nutrition focused physical findings    Discharge Planning:    Continue current diet, Continue oral nutrition supplement     Electronically signed by Emeterio Castillo RD on 11/16/2021 at 12:35 PM    Contact Number: [phone]

## 2021-11-17 PROCEDURE — 65270000029 HC RM PRIVATE

## 2021-11-17 PROCEDURE — 74011250637 HC RX REV CODE- 250/637: Performed by: FAMILY MEDICINE

## 2021-11-17 PROCEDURE — 74011250636 HC RX REV CODE- 250/636: Performed by: INTERNAL MEDICINE

## 2021-11-17 PROCEDURE — 97116 GAIT TRAINING THERAPY: CPT

## 2021-11-17 PROCEDURE — 74011636637 HC RX REV CODE- 636/637: Performed by: INTERNAL MEDICINE

## 2021-11-17 PROCEDURE — 2709999900 HC NON-CHARGEABLE SUPPLY

## 2021-11-17 PROCEDURE — 74011250637 HC RX REV CODE- 250/637: Performed by: PSYCHIATRY & NEUROLOGY

## 2021-11-17 PROCEDURE — 97164 PT RE-EVAL EST PLAN CARE: CPT

## 2021-11-17 PROCEDURE — 74011250637 HC RX REV CODE- 250/637: Performed by: INTERNAL MEDICINE

## 2021-11-17 PROCEDURE — 99232 SBSQ HOSP IP/OBS MODERATE 35: CPT | Performed by: INTERNAL MEDICINE

## 2021-11-17 RX ORDER — PHENAZOPYRIDINE HYDROCHLORIDE 100 MG/1
100 TABLET, FILM COATED ORAL
Status: DISCONTINUED | OUTPATIENT
Start: 2021-11-18 | End: 2021-11-24 | Stop reason: HOSPADM

## 2021-11-17 RX ADMIN — ENOXAPARIN SODIUM 40 MG: 100 INJECTION SUBCUTANEOUS at 09:29

## 2021-11-17 RX ADMIN — MIRTAZAPINE 15 MG: 15 TABLET, ORALLY DISINTEGRATING ORAL at 21:00

## 2021-11-17 RX ADMIN — LACOSAMIDE 100 MG: 10 SOLUTION ORAL at 20:54

## 2021-11-17 RX ADMIN — MECLIZINE 12.5 MG: 12.5 TABLET ORAL at 09:29

## 2021-11-17 RX ADMIN — PANTOPRAZOLE SODIUM 40 MG: 40 TABLET, DELAYED RELEASE ORAL at 17:43

## 2021-11-17 RX ADMIN — MECLIZINE 12.5 MG: 12.5 TABLET ORAL at 17:43

## 2021-11-17 RX ADMIN — Medication 250 MG: at 09:29

## 2021-11-17 RX ADMIN — PREDNISONE 40 MG: 20 TABLET ORAL at 08:13

## 2021-11-17 RX ADMIN — LACOSAMIDE 100 MG: 10 SOLUTION ORAL at 09:30

## 2021-11-17 RX ADMIN — THERA TABS 1 TABLET: TAB at 09:29

## 2021-11-17 RX ADMIN — PANTOPRAZOLE SODIUM 40 MG: 40 TABLET, DELAYED RELEASE ORAL at 08:13

## 2021-11-17 RX ADMIN — MECLIZINE 12.5 MG: 12.5 TABLET ORAL at 21:00

## 2021-11-17 RX ADMIN — Medication 5 MG: at 21:00

## 2021-11-17 RX ADMIN — Medication 10 ML: at 21:01

## 2021-11-17 RX ADMIN — Medication 10 ML: at 06:15

## 2021-11-17 NOTE — PROGRESS NOTES
Hospitalist Progress Note    Subjective:   Daily Progress Note: 11/17/2021    Patient feels fine. Had dizziness episode earlier today just took meclizine. No nausea or vomiting. Off-and-on visual disturbances present. No shortness of breath or cough. Current Facility-Administered Medications   Medication Dose Route Frequency    predniSONE (DELTASONE) tablet 40 mg  40 mg Oral DAILY WITH BREAKFAST    meclizine (ANTIVERT) tablet 12.5 mg  12.5 mg Oral TID    midodrine (PROAMATINE) tablet 2.5 mg  2.5 mg Oral Q12H PRN    albuterol-ipratropium (DUO-NEB) 2.5 MG-0.5 MG/3 ML  3 mL Nebulization Q6H PRN    thiamine (B-1) tablet 250 mg  250 mg Oral DAILY    lacosamide (VIMPAT) oral solution 100 mg  100 mg Oral Q12H    melatonin tablet 5 mg  5 mg Oral QHS    meclizine (ANTIVERT) tablet 12.5 mg  12.5 mg Oral Q6H PRN    pantoprazole (PROTONIX) tablet 40 mg  40 mg Oral ACB&D    mirtazapine (REMERON SOL-TAB) disintegrating tablet 15 mg  15 mg Oral QHS    phenol throat spray (CHLORASEPTIC) 1 Spray  1 Spray Oral PRN    therapeutic multivitamin (THERAGRAN) tablet 1 Tablet  1 Tablet Oral DAILY    sodium chloride (NS) flush 5-40 mL  5-40 mL IntraVENous Q8H    sodium chloride (NS) flush 5-40 mL  5-40 mL IntraVENous PRN    acetaminophen (TYLENOL) tablet 650 mg  650 mg Oral Q6H PRN    Or    acetaminophen (TYLENOL) suppository 650 mg  650 mg Rectal Q6H PRN    polyethylene glycol (MIRALAX) packet 17 g  17 g Oral DAILY PRN    ondansetron (ZOFRAN ODT) tablet 4 mg  4 mg Oral Q8H PRN    Or    ondansetron (ZOFRAN) injection 8 mg  8 mg IntraVENous Q6H PRN    enoxaparin (LOVENOX) injection 40 mg  40 mg SubCUTAneous DAILY        Review of Systems  Pertinent items are noted in HPI.     Objective:     Visit Vitals  /72   Pulse 81   Temp 97.5 °F (36.4 °C)   Resp 22   Ht 5' (1.524 m)   Wt 66.2 kg (146 lb)   LMP 04/01/2019 Comment: unknown   SpO2 99%   BMI 28.51 kg/m²      O2 Device: None (Room air)    Temp (24hrs), Av.7 °F (36.5 °C), Min:97.4 °F (36.3 °C), Max:98.2 °F (36.8 °C)      No intake/output data recorded. 11/15 1901 -  0700  In: 720 [P.O.:720]  Out: 500 [Urine:500]    General appearance -awake, follows commands appropriately, not in acute distress. Chest -CTA bilaterally, no wheezes currently. Heart - S1 and S2 normal  Abdomen - soft, nontender, nondistended, Bowel sounds present  Neurological -awake, follows commands appropriately, moves all 4 extremities well. No tremors. Extremities - no pedal edema noted    Data Review    No results found for this or any previous visit (from the past 24 hour(s)). Assessment/Plan:     ASSESSMENT:    1. Adult failure to thrive, stable and improving  2. Multiple electrolyte disturbances, resolved currently  3. Intermittent vomiting with nausea could be due to central vertigo, currently stable  4. Recent history of herpes encephalitis s/p treatment  5. 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 6.  Chronic mild hyponatremia due to nausea, resolved  7. Poor appetite and severe protein calorie malnutrition, slowly improving  8. GERD  9. Dyslipidemia  10. History of seizure disorder  11. Intermittent encephalopathy with sundowning due to #5, and #4 and Wernicke's encephalopathy, better currently  12. 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  Resume meclizine 3 times a day  Neurology input noted  Continue current diet and Remeron  Continue thiamine and multivitamin  Blood pressure is stable off midodrine.   Continue current dietary supplements  Patient may benefit from cognitive therapy    Spoke to patient's  at bedside and he will make sure patient follows with outpatient neurologist and ophthalmologist upon discharge    Patient is otherwise medically stable to be discharged to skilled nursing facility. Anticipatory discharge: SNF placement when bed is found. Discussed with case management    Total time to take care of this patient was 30 minutes and more than 50% of time was spent counseling and coordinating care. Disclaimer: Sections of this note are dictated using utilizing voice recognition software, which may have resulted in some phonetic based errors in grammar and contents. Even though attempts were made to correct all the mistakes, some may have been missed, and remained in the body of the document. If questions arise, please contact our department.

## 2021-11-17 NOTE — ROUTINE PROCESS
Bedside and Verbal shift change report given to [address] [facility] [facility] (oncoming nurse) by Brandyn Arango RN (offgoing nurse). Report included the following information SBAR, Kardex, Intake/Output, MAR and Recent Results.

## 2021-11-17 NOTE — PROGRESS NOTES
Patient complained of burning sensation and pain when voiding . Notified  Dr. Óscar Castro  received telephone order for UA sample  and  Pyridium 100 mg 3x daily. Patient is resting in  Bed with bed alarm on.   Call Sandra Reza within  Reach  Doroteo Pittman LPN

## 2021-11-17 NOTE — PROGRESS NOTES
Called and spoke with , Carmen Negrete. He states Fany, his  from [facility] ([phone]) just called him and told him patient was approved for 17 days at [address] [place] [address]. He will transport patient when she is discharge. Will contact Royce in the morning to plan discharge time.   Clive Bronson RN - Outcomes Manager  [phone]

## 2021-11-17 NOTE — PROGRESS NOTES
Problem: Mobility Impaired (Adult and Pediatric)  Goal: *Acute Goals and Plan of Care (Insert Text)  Description: Physical Therapy Goals  Re-evaluated 11/11/2021 and updated below  Initiated 10/21/2021, re-evaluated 11/4/21 and goals adjusted as needed and to be accomplished within 7 day(s)  1. Patient will move from supine to sit and sit to supine, scoot up and down, and roll side to side in bed with Sweetie. 2.  Patient will transfer from bed to chair and chair to bed with Sweetie using the least restrictive device. 3.  Patient will perform sit to stand with Sweetie. 4.  Patient will ambulate with Sweetie for 250 feet with the least restrictive device. 5.  Patient will negotiate 5 steps with unilateral HR and Sweetie. PLOF: Pt reports she lives in a [address] [place] [address] with her , states she was ambulatory with no AD however per chart review, pt has been using a wc recently as she has increased weakness and difficulty walking, pt only oriented to self and time this date. Outcome: Progressing Towards Goal    PHYSICAL THERAPY RE-EVALUATION    Patient: Allen Boland (25 y.o. female)  Date: 11/17/2021  Primary Diagnosis: Vomiting [R11.10]  Procedure(s) (LRB):  PERCUTANEOUS ENDOSCOPIC GASTROSTOMY TUBE INSERTION (N/A) 11 Days Post-Op   Precautions:   Fall, Skin      ASSESSMENT :  Patient is making steady progress towards mobility goals with increased participation and improved command following. Based on the objective data described below, the patient presents with decreased strength, decreased dynamic balance, decreased safety awareness, resulting in decreased independence with functional mobility. Supervision for sit to stand transfer to RW. Patient ambulates in hallway for 250 ft with slow, steady gait with one episode of dizziness that did not cause LOB. Educated patient to move head slowly and to focus on object that is not moving. Patient negotiates 4 steps with B HR and CGA for safety.  She has increased fatigue and requires rest break before going back down the stairs. Patient left sitting up in the chair with spouse present, call bell in reach, and chair alarm activated. Patient will benefit from skilled intervention to address the above impairments. Patient's rehabilitation potential is considered to be Good  Factors which may influence rehabilitation potential include:   []         None noted  []         Mental ability/status  [x]         Medical condition  [x]         Home/family situation and support systems  [x]         Safety awareness  []         Pain tolerance/management  []         Other:      PLAN :  Recommendations and Planned Interventions:   [x]           Bed Mobility Training             [x]    Neuromuscular Re-Education  [x]           Transfer Training                   []    Orthotic/Prosthetic Training  [x]           Gait Training                          []    Modalities  [x]           Therapeutic Exercises           []    Edema Management/Control  [x]           Therapeutic Activities            [x]    Family Training/Education  [x]           Patient Education  []           Other (comment):    Frequency/Duration: Patient will be followed by physical therapy 1-2 times per day/3-5 days per week to address goals. Discharge Recommendations: Home Health with 24/7 supervision vs SNF  Further Equipment Recommendations for Discharge: bedside commode, shower chair and RW     SUBJECTIVE:   Patient stated My eyes have to re-focus .     OBJECTIVE DATA SUMMARY:   Hospital course since last seen and reason for re-evaluation: Patient due for PT re-evaluation. She is making steady progress and seems to benefiting for PT services.   Past Medical History:   Diagnosis Date    Arthritis     Diarrhea     no diarrhea since 9/14/2021    Encephalitis 06/14/2021    intubated    GERD (gastroesophageal reflux disease)     H. pylori infection 2019    IBS (irritable bowel syndrome)     Memory difficulty     since encephalitis    Migraine     Ovarian cyst 2019    Seizures ([facility] 06/16/2021    x 1    Vomiting      Past Surgical History:   Procedure Laterality Date    HX BREAST AUGMENTATION  2005    HX CHOLECYSTECTOMY      HX COLONOSCOPY  2016    HX ENDOSCOPY  2019    HX TYMPANOPLASTY      left x 5 times     Barriers to Learning/Limitations: yes; intermittent confusion    Compensate with: Visual Cues, Verbal Cues and Tactile Cues  Home Situation:   Home Situation  Home Environment: Private residence  One/Two Story Residence: Two story, live on 1st floor  Living Alone: No  Support Systems: Spouse/Significant Other  Patient Expects to be Discharged to[de-identified] House  Current DME Used/Available at Home: Shower chair  Tub or Shower Type: Tub/Shower combination  Critical Behavior:              Psychosocial  Patient Behaviors: Calm; Cooperative                 Strength:    Strength: Generally decreased, functional                    Tone & Sensation:   Tone: Normal              Sensation: Intact               Range Of Motion:  AROM: Within functional limits                       Posture:        Functional Mobility:  Transfers:  Sit to Stand: Supervision  Stand to Sit: Supervision                       Balance:   Sitting: Intact  Standing: Impaired; With support  Standing - Static: Good  Standing - Dynamic : Fair ((+))     Ambulation/Gait Training:  Distance (ft): 250 Feet (ft)  Assistive Device: Walker, rolling  Ambulation - Level of Assistance: Supervision                       Stairs:  Number of Stairs Trained: 4  Stairs - Level of Assistance: Contact guard assistance  Rail Use: Both         Pain:  Pain level pre-treatment: 0/10   Pain level post-treatment: 0/10   Pain Intervention(s) : Medication (see MAR); Rest, Ice, Repositioning   Response to intervention: Nurse notified, See doc flow    Activity Tolerance:   Fair  Please refer to the flowsheet for vital signs taken during this treatment.   After treatment:   [x] Patient left in no apparent distress sitting up in chair  []         Patient left in no apparent distress in bed  []         Call bell left within reach  [x]         Nursing notified  []         Caregiver present  [x]         Chair alarm activated  []         SCDs applied    COMMUNICATION/EDUCATION:   [x]         Role of Physical Therapy in the acute care setting. [x]         Fall prevention education was provided and the patient/caregiver indicated understanding. [x]         Patient/family have participated as able in goal setting and plan of care. [x]         Patient/family agree to work toward stated goals and plan of care. []         Patient understands intent and goals of therapy, but is neutral about his/her participation. []         Patient is unable to participate in goal setting/plan of care: ongoing with therapy staff.  []         Other:     Thank you for this referral.  Latia Chaney, PT   Time Calculation: 24 mins

## 2021-11-18 LAB
AMORPH CRY URNS QL MICRO: ABNORMAL
ANION GAP SERPL CALC-SCNC: 6 MMOL/L (ref 3–18)
APPEARANCE UR: ABNORMAL
BACTERIA URNS QL MICRO: ABNORMAL /HPF
BASOPHILS # BLD: 0 K/UL (ref 0–0.1)
BASOPHILS NFR BLD: 0 % (ref 0–2)
BILIRUB UR QL: NEGATIVE
BUN SERPL-MCNC: 16 MG/DL (ref 7–18)
BUN/CREAT SERPL: 28 (ref 12–20)
CALCIUM SERPL-MCNC: 9.1 MG/DL (ref 8.5–10.1)
CHLORIDE SERPL-SCNC: 99 MMOL/L (ref 100–111)
CO2 SERPL-SCNC: 28 MMOL/L (ref 21–32)
COLOR UR: YELLOW
CREAT SERPL-MCNC: 0.57 MG/DL (ref 0.6–1.3)
DIFFERENTIAL METHOD BLD: ABNORMAL
EOSINOPHIL # BLD: 0 K/UL (ref 0–0.4)
EOSINOPHIL NFR BLD: 0 % (ref 0–5)
EPITH CASTS URNS QL MICRO: ABNORMAL /LPF (ref 0–5)
ERYTHROCYTE [DISTWIDTH] IN BLOOD BY AUTOMATED COUNT: 16.6 % (ref 11.6–14.5)
GLUCOSE SERPL-MCNC: 116 MG/DL (ref 74–99)
GLUCOSE UR STRIP.AUTO-MCNC: NEGATIVE MG/DL
HCT VFR BLD AUTO: 38.9 % (ref 35–45)
HGB BLD-MCNC: 12.2 G/DL (ref 12–16)
HGB UR QL STRIP: NEGATIVE
IMM GRANULOCYTES # BLD AUTO: 0.3 K/UL (ref 0–0.04)
IMM GRANULOCYTES NFR BLD AUTO: 2 % (ref 0–0.5)
KETONES UR QL STRIP.AUTO: NEGATIVE MG/DL
LEUKOCYTE ESTERASE UR QL STRIP.AUTO: ABNORMAL
LYMPHOCYTES # BLD: 2.2 K/UL (ref 0.9–3.6)
LYMPHOCYTES NFR BLD: 13 % (ref 21–52)
MCH RBC QN AUTO: 32.8 PG (ref 24–34)
MCHC RBC AUTO-ENTMCNC: 31.4 G/DL (ref 31–37)
MCV RBC AUTO: 104.6 FL (ref 78–100)
MONOCYTES # BLD: 1 K/UL (ref 0.05–1.2)
MONOCYTES NFR BLD: 6 % (ref 3–10)
NEUTS SEG # BLD: 14.1 K/UL (ref 1.8–8)
NEUTS SEG NFR BLD: 80 % (ref 40–73)
NITRITE UR QL STRIP.AUTO: POSITIVE
NRBC # BLD: 0 K/UL (ref 0–0.01)
NRBC BLD-RTO: 0 PER 100 WBC
PH UR STRIP: 7 [PH] (ref 5–8)
PLATELET # BLD AUTO: 429 K/UL (ref 135–420)
PMV BLD AUTO: 9.1 FL (ref 9.2–11.8)
POTASSIUM SERPL-SCNC: 3.9 MMOL/L (ref 3.5–5.5)
PROT UR STRIP-MCNC: NEGATIVE MG/DL
RBC # BLD AUTO: 3.72 M/UL (ref 4.2–5.3)
RBC #/AREA URNS HPF: NEGATIVE /HPF (ref 0–5)
SODIUM SERPL-SCNC: 133 MMOL/L (ref 136–145)
SP GR UR REFRACTOMETRY: 1.01 (ref 1–1.03)
UROBILINOGEN UR QL STRIP.AUTO: 1 EU/DL (ref 0.2–1)
WBC # BLD AUTO: 17.6 K/UL (ref 4.6–13.2)
WBC URNS QL MICRO: ABNORMAL /HPF (ref 0–4)

## 2021-11-18 PROCEDURE — 74011250636 HC RX REV CODE- 250/636: Performed by: INTERNAL MEDICINE

## 2021-11-18 PROCEDURE — 74011250637 HC RX REV CODE- 250/637: Performed by: PSYCHIATRY & NEUROLOGY

## 2021-11-18 PROCEDURE — 36415 COLL VENOUS BLD VENIPUNCTURE: CPT

## 2021-11-18 PROCEDURE — 74011250636 HC RX REV CODE- 250/636: Performed by: HOSPITALIST

## 2021-11-18 PROCEDURE — 81001 URINALYSIS AUTO W/SCOPE: CPT

## 2021-11-18 PROCEDURE — 87077 CULTURE AEROBIC IDENTIFY: CPT

## 2021-11-18 PROCEDURE — 74011250637 HC RX REV CODE- 250/637: Performed by: INTERNAL MEDICINE

## 2021-11-18 PROCEDURE — 97530 THERAPEUTIC ACTIVITIES: CPT

## 2021-11-18 PROCEDURE — 65270000029 HC RM PRIVATE

## 2021-11-18 PROCEDURE — 74011250637 HC RX REV CODE- 250/637: Performed by: FAMILY MEDICINE

## 2021-11-18 PROCEDURE — 85025 COMPLETE CBC W/AUTO DIFF WBC: CPT

## 2021-11-18 PROCEDURE — 74011000250 HC RX REV CODE- 250: Performed by: HOSPITALIST

## 2021-11-18 PROCEDURE — 2709999900 HC NON-CHARGEABLE SUPPLY

## 2021-11-18 PROCEDURE — 87186 SC STD MICRODIL/AGAR DIL: CPT

## 2021-11-18 PROCEDURE — 74011636637 HC RX REV CODE- 636/637: Performed by: INTERNAL MEDICINE

## 2021-11-18 PROCEDURE — 80048 BASIC METABOLIC PNL TOTAL CA: CPT

## 2021-11-18 PROCEDURE — 99232 SBSQ HOSP IP/OBS MODERATE 35: CPT | Performed by: HOSPITALIST

## 2021-11-18 PROCEDURE — 97535 SELF CARE MNGMENT TRAINING: CPT

## 2021-11-18 PROCEDURE — 87086 URINE CULTURE/COLONY COUNT: CPT

## 2021-11-18 RX ADMIN — ENOXAPARIN SODIUM 40 MG: 100 INJECTION SUBCUTANEOUS at 10:02

## 2021-11-18 RX ADMIN — PHENAZOPYRIDINE HYDROCHLORIDE 100 MG: 100 TABLET ORAL at 10:03

## 2021-11-18 RX ADMIN — Medication 10 ML: at 21:46

## 2021-11-18 RX ADMIN — MECLIZINE 12.5 MG: 12.5 TABLET ORAL at 10:03

## 2021-11-18 RX ADMIN — MIRTAZAPINE 15 MG: 15 TABLET, ORALLY DISINTEGRATING ORAL at 21:45

## 2021-11-18 RX ADMIN — THERA TABS 1 TABLET: TAB at 10:03

## 2021-11-18 RX ADMIN — PREDNISONE 40 MG: 20 TABLET ORAL at 08:19

## 2021-11-18 RX ADMIN — Medication 5 MG: at 21:45

## 2021-11-18 RX ADMIN — MECLIZINE 12.5 MG: 12.5 TABLET ORAL at 15:48

## 2021-11-18 RX ADMIN — PHENAZOPYRIDINE HYDROCHLORIDE 100 MG: 100 TABLET ORAL at 13:23

## 2021-11-18 RX ADMIN — MECLIZINE 12.5 MG: 12.5 TABLET ORAL at 21:45

## 2021-11-18 RX ADMIN — LACOSAMIDE 100 MG: 10 SOLUTION ORAL at 20:43

## 2021-11-18 RX ADMIN — Medication 250 MG: at 10:02

## 2021-11-18 RX ADMIN — PANTOPRAZOLE SODIUM 40 MG: 40 TABLET, DELAYED RELEASE ORAL at 08:19

## 2021-11-18 RX ADMIN — WATER 1 G: 1 INJECTION INTRAMUSCULAR; INTRAVENOUS; SUBCUTANEOUS at 16:06

## 2021-11-18 NOTE — PROGRESS NOTES
Problem: Pressure Injury - Risk of  Goal: *Prevention of pressure injury  Description: Document Christofer Scale and appropriate interventions in the flowsheet. Outcome: Progressing Towards Goal  Note: Pressure Injury Interventions:  Sensory Interventions: Assess changes in LOC    Moisture Interventions: Absorbent underpads    Activity Interventions: Pressure redistribution bed/mattress(bed type)    Mobility Interventions: Pressure redistribution bed/mattress (bed type)    Nutrition Interventions: Document food/fluid/supplement intake    Friction and Shear Interventions: HOB 30 degrees or less, Foam dressings/transparent film/skin sealants                Problem: Patient Education: Go to Patient Education Activity  Goal: Patient/Family Education  Outcome: Progressing Towards Goal     Problem: Falls - Risk of  Goal: *Absence of Falls  Description: Document Theresa Fall Risk and appropriate interventions in the flowsheet. Outcome: Progressing Towards Goal  Note: Fall Risk Interventions:  Mobility Interventions: Bed/chair exit alarm    Mentation Interventions: Bed/chair exit alarm    Medication Interventions: Bed/chair exit alarm    Elimination Interventions: Call light in reach    History of Falls Interventions: Door open when patient unattended         Problem: Falls - Risk of  Goal: *Absence of Falls  Description: Document Ann Blood Fall Risk and appropriate interventions in the flowsheet.   Outcome: Progressing Towards Goal  Note: Fall Risk Interventions:  Mobility Interventions: Bed/chair exit alarm    Mentation Interventions: Bed/chair exit alarm    Medication Interventions: Bed/chair exit alarm    Elimination Interventions: Call light in reach    History of Falls Interventions: Door open when patient unattended         Problem: Patient Education: Go to Patient Education Activity  Goal: Patient/Family Education  Outcome: Progressing Towards Goal     Problem: Nutrition Deficit  Goal: *Optimize nutritional status  Outcome: Progressing Towards Goal     Problem: Nausea/Vomiting (Adult)  Goal: *Absence of nausea/vomiting  Outcome: Progressing Towards Goal  Goal: *Palliation of nausea/vomiting (Palliative Care)  Outcome: Progressing Towards Goal     Problem: Patient Education: Go to Patient Education Activity  Goal: Patient/Family Education  Outcome: Progressing Towards Goal

## 2021-11-18 NOTE — ROUTINE PROCESS
Bedside and Verbal shift change report given to Tracy Blank (oncoming nurse) by Joni Olvera RN (offgoing nurse). Report included the following information SBAR, Kardex, Intake/Output, MAR and Recent Results.

## 2021-11-18 NOTE — PROGRESS NOTES
Nutrition Note    Intake significant improved since beginning of admission. Continues with fair to good intake of recent meals & consumption of supplements. Intermittent episodes of mild nausea with dizziness but denies nausea or abdominal pain. Awaiting placement. BM 11/15. Remains on remeron, steroid MVI & thiamine. Nutrition Recommendations/Plan:  - Continue oral diet as tolerated with Ensure Enlive, TID.  - Monitor and encourage po intake as tolerated, continue daily MVI & thiamine.     Electronically signed by Colton Hunt RD on 11/18/2021 at [facility]: [phone]

## 2021-11-18 NOTE — PROGRESS NOTES
Patient ambulate to bathroom with assist. Patient is back in bed  Alarm is set . Call  [phone] within reach.  Allan Bellamy LPN

## 2021-11-18 NOTE — PROGRESS NOTES
Hospitalist Progress Note    Subjective:   Daily Progress Note: 11/18/2021    Am glucose 75. Remains afebrile. Patient seen and examined at bedside, she is still dizzy. Nausea is better, appetite improved. She denies constipation, HA. +dysuria, which started a couple of days ago. UA collected, getting ready to be sent. Assessment/Plan:     ASSESSMENT:    1.  Dizziness and ataxia secondary to B1 deficiency improving. Continue supplements, meclizine prn. Consider low-dose Paxil and increase gradually for chronic dizziness. She would benefit from rehab including vestibular rehab.  2.  Multiple electrolyte disturbances, resolved currently  3. Intermittent vomiting with nausea could be due to central vertigo, currently stable  4. Recent history of herpes encephalitis s/p treatment  5. 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 6.  Chronic mild hyponatremia due to nausea, resolved  7. Poor appetite and severe protein calorie malnutrition, slowly improving. Nutritionist follows. Multivit. 8.  GERD  9. Dyslipidemia  10. History of seizure disorder  11. Intermittent encephalopathy with sundowning due to #5, and #4 and Wernicke's encephalopathy, better currently  12. 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 Continue prednisone and Vimpat as recommended by neurologist.  13. Dysuria - follow UA, urine cx. 14.  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 Oculogyric full code. Patient would benefit from outpatient follow-up with neurology, and ophthalmology,  is aware. Anticipate SNF transfer once urine culture resulted. Patient is otherwise medically stable to be discharged to skilled nursing facility. Anticipatory discharge: SNF placement when bed is found.   Discussed with case management    Total time to take care of this patient was 30 minutes and more than 50% of time was spent counseling and coordinating care. Disclaimer: Sections of this note are dictated using utilizing voice recognition software, which may have resulted in some phonetic based errors in grammar and contents. Even though attempts were made to correct all the mistakes, some may have been missed, and remained in the body of the document. If questions arise, please contact our department. Current Facility-Administered Medications   Medication Dose Route Frequency    phenazopyridine (PYRIDIUM) tablet 100 mg  100 mg Oral TIDPC    predniSONE (DELTASONE) tablet 40 mg  40 mg Oral DAILY WITH BREAKFAST    meclizine (ANTIVERT) tablet 12.5 mg  12.5 mg Oral TID    midodrine (PROAMATINE) tablet 2.5 mg  2.5 mg Oral Q12H PRN    albuterol-ipratropium (DUO-NEB) 2.5 MG-0.5 MG/3 ML  3 mL Nebulization Q6H PRN    thiamine (B-1) tablet 250 mg  250 mg Oral DAILY    lacosamide (VIMPAT) oral solution 100 mg  100 mg Oral Q12H    melatonin tablet 5 mg  5 mg Oral QHS    meclizine (ANTIVERT) tablet 12.5 mg  12.5 mg Oral Q6H PRN    pantoprazole (PROTONIX) tablet 40 mg  40 mg Oral ACB&D    mirtazapine (REMERON SOL-TAB) disintegrating tablet 15 mg  15 mg Oral QHS    phenol throat spray (CHLORASEPTIC) 1 Spray  1 Spray Oral PRN    therapeutic multivitamin (THERAGRAN) tablet 1 Tablet  1 Tablet Oral DAILY    sodium chloride (NS) flush 5-40 mL  5-40 mL IntraVENous Q8H    sodium chloride (NS) flush 5-40 mL  5-40 mL IntraVENous PRN    acetaminophen (TYLENOL) tablet 650 mg  650 mg Oral Q6H PRN    Or    acetaminophen (TYLENOL) suppository 650 mg  650 mg Rectal Q6H PRN    polyethylene glycol (MIRALAX) packet 17 g  17 g Oral DAILY PRN    ondansetron (ZOFRAN ODT) tablet 4 mg  4 mg Oral Q8H PRN    Or    ondansetron (ZOFRAN) injection 8 mg  8 mg IntraVENous Q6H PRN    enoxaparin (LOVENOX) injection 40 mg  40 mg SubCUTAneous DAILY        Review of Systems  Pertinent items are noted in HPI.     Objective:     Visit Vitals  /79 (BP 1 Location: Left upper arm, BP Patient Position: At rest)   Pulse 68   Temp 97.9 °F (36.6 °C)   Resp 18   Ht 5' (1.524 m)   Wt 66.2 kg (146 lb)   LMP 2019 Comment: unknown   SpO2 99%   BMI 28.51 kg/m²      O2 Device: None (Room air)    Temp (24hrs), Av °F (36.7 °C), Min:97.8 °F (36.6 °C), Max:98.2 °F (36.8 °C)      No intake/output data recorded.  1901 -  0700  In: 240 [P.O.:240]  Out: 500 [Urine:500]    General appearance -awake, follows commands appropriately, not in acute distress. Chest -CTA bilaterally, no wheezes currently. Heart - S1 and S2 normal  Abdomen - soft, nontender, nondistended, Bowel sounds present  Neurological -awake, follows commands appropriately, moves all 4 extremities well. No tremors. Extremities - no pedal edema noted    Data Review    No results found for this or any previous visit (from the past 24 hour(s)).

## 2021-11-18 NOTE — PROGRESS NOTES
Problem: Self Care Deficits Care Plan (Adult)  Goal: *Acute Goals and Plan of Care (Insert Text)  Description: Occupational Therapy Goals  Re-evaluated 11/15/2021 and continue with all goals for consistency. 1.  Patient will perform bed mobility in preparation for selfcare with modified independence. 2.  Patient will perform grooming task/functional activity standing for 4-7 minutes with supervision/set-up, F+ balance. 3.  Patient will perform lower body dressing with supervision/set-up seated and standing using AE prn (Pt has reacher at home). 4.  Patient will perform toilet transfers with supervision/set-up. 5.  Patient will perform all aspects of toileting with supervision/set-up. 6.  Patient will participate in upper extremity therapeutic exercise/activities with modified independence for 8 minutes. 7.  Patient will utilize energy conservation techniques during functional activities with verbal cues. Prior Level of Function: Patient was independent with self-care and functional mobility PTA. Patient's spouse reports she would walk unsteady at home d/t vertigo and for the stairs she would bump up/down on her bottom  Outcome: Progressing Towards Goal   OCCUPATIONAL THERAPY TREATMENT    Patient: Marisa Abarca (26 y.o. female)  Date: 11/18/2021  Diagnosis: Vomiting [R11.10]   <principal problem not specified>  Procedure(s) (LRB):  PERCUTANEOUS ENDOSCOPIC GASTROSTOMY TUBE INSERTION (N/A) 12 Days Post-Op  Precautions: Fall, Skin  PLOF: Patient was independent with self-care and functional mobility PTA. Patient's spouse reports she would walk unsteady at home d/t vertigo and for the stairs she would bump up/down on her bottom    Chart, occupational therapy assessment, plan of care, and goals were reviewed. ASSESSMENT:  Pt presented sitting upright in reclining chair upon entry with spouse present. Pt agreeable to work with OT however reports slight dizziness which she has frequently.  Pt educated on mult energy conservation techniques to utilize in home environment, including pacing and deep breathing to prevent SOB and fatigue, and increase activity tolerance and safety w/ADLs and functional mobility, pt verbalized understanding. STS transfer Supervision with RW and maneuvered into BR ~ 12 ft to sink in prep for ADL tasks. She engaged in hand hygiene in stance ~ 1.5 mins w/ SBA utilizing 1/0 UE support. Pt performed functional room and hallway mobility w/ SBA and RW ~ 55 ft to improve overall functional activity tolerance needed for self cares. She returned to sitting in reclining chair at end of session. She was left with BARBARA COLLINS's elevated, spouse present, and all needs left within reach. Progression toward goals:  []          Improving appropriately and progressing toward goals  [x]          Improving slowly and progressing toward goals  []          Not making progress toward goals and plan of care will be adjusted     PLAN:  Patient continues to benefit from skilled intervention to address the above impairments. Continue treatment per established plan of care. Discharge Recommendations:   with 24/7 Supervision vs Rehab   Further Equipment Recommendations for Discharge:  BSC, RW, shower chair     SUBJECTIVE:   Patient stated   Feels like things are coming at me through the walls. \"     OBJECTIVE DATA SUMMARY:   Cognitive/Behavioral Status:  Neurologic State: Alert, Confused  Orientation Level: Oriented to person  Cognition: Follows commands  Safety/Judgement: Fall prevention    Functional Mobility and Transfers for ADLs:      Transfers:  Sit to Stand: Supervision  Stand to Sit: Supervision         Balance:  Sitting: Intact  Standing: Impaired;  With support  Standing - Static: Good  Standing - Dynamic : Fair (+)    ADL Intervention:       Grooming  Position Performed: Standing  Washing Hands: Stand-by assistance      Cognitive Retraining  Safety/Judgement: Fall prevention        Pain:  Pain level pre-treatment: 0/10   Pain level post-treatment: 0/10    Activity Tolerance:    Fair   Please refer to the flowsheet for vital signs taken during this treatment. After treatment:   [x]  Patient left in no apparent distress sitting up in chair  []  Patient left in no apparent distress in bed  [x]  Call bell left within reach  [x]  Nursing notified  [x]  Spouse present  []  Bed alarm activated    COMMUNICATION/EDUCATION:   [x] Role of Occupational Therapy in the acute care setting  [] Home safety education was provided and the patient/caregiver indicated understanding. [x] Patient/family have participated as able in working towards goals and plan of care. [x] Patient/family agree to work toward stated goals and plan of care. [] Patient understands intent and goals of therapy, but is neutral about his/her participation. [] Patient is unable to participate in goal setting and plan of care.       Thank you for this referral.  JUNIOR Cristina  Time Calculation: 23 mins

## 2021-11-18 NOTE — PROGRESS NOTES
Pt received up in chair, worked with OT earlier this morning, states she just took her Meclizine and reports she is very tired/feeling dizzy, states she has been up since 2 AM, kindly requests to try back later.  Evelyn Lyles, PT, DPT

## 2021-11-19 PROCEDURE — 74011250636 HC RX REV CODE- 250/636: Performed by: INTERNAL MEDICINE

## 2021-11-19 PROCEDURE — 74011250637 HC RX REV CODE- 250/637: Performed by: INTERNAL MEDICINE

## 2021-11-19 PROCEDURE — 2709999900 HC NON-CHARGEABLE SUPPLY

## 2021-11-19 PROCEDURE — 74011250637 HC RX REV CODE- 250/637: Performed by: FAMILY MEDICINE

## 2021-11-19 PROCEDURE — 97530 THERAPEUTIC ACTIVITIES: CPT

## 2021-11-19 PROCEDURE — 74011636637 HC RX REV CODE- 636/637: Performed by: INTERNAL MEDICINE

## 2021-11-19 PROCEDURE — 99232 SBSQ HOSP IP/OBS MODERATE 35: CPT | Performed by: HOSPITALIST

## 2021-11-19 PROCEDURE — 74011250637 HC RX REV CODE- 250/637: Performed by: HOSPITALIST

## 2021-11-19 PROCEDURE — 74011250636 HC RX REV CODE- 250/636: Performed by: HOSPITALIST

## 2021-11-19 PROCEDURE — 97535 SELF CARE MNGMENT TRAINING: CPT

## 2021-11-19 PROCEDURE — 74011000250 HC RX REV CODE- 250: Performed by: HOSPITALIST

## 2021-11-19 PROCEDURE — 74011250637 HC RX REV CODE- 250/637: Performed by: PSYCHIATRY & NEUROLOGY

## 2021-11-19 PROCEDURE — 97116 GAIT TRAINING THERAPY: CPT

## 2021-11-19 PROCEDURE — 65270000029 HC RM PRIVATE

## 2021-11-19 RX ORDER — PAROXETINE HYDROCHLORIDE 20 MG/1
10 TABLET, FILM COATED ORAL DAILY
Status: DISCONTINUED | OUTPATIENT
Start: 2021-11-19 | End: 2021-11-24 | Stop reason: HOSPADM

## 2021-11-19 RX ORDER — CITALOPRAM 10 MG/1
10 TABLET ORAL DAILY
Status: DISCONTINUED | OUTPATIENT
Start: 2021-11-19 | End: 2021-11-19

## 2021-11-19 RX ADMIN — MIRTAZAPINE 15 MG: 15 TABLET, ORALLY DISINTEGRATING ORAL at 21:46

## 2021-11-19 RX ADMIN — LACOSAMIDE 100 MG: 10 SOLUTION ORAL at 09:43

## 2021-11-19 RX ADMIN — Medication 10 ML: at 05:14

## 2021-11-19 RX ADMIN — MECLIZINE 12.5 MG: 12.5 TABLET ORAL at 21:46

## 2021-11-19 RX ADMIN — Medication 10 ML: at 21:47

## 2021-11-19 RX ADMIN — THERA TABS 1 TABLET: TAB at 09:42

## 2021-11-19 RX ADMIN — PANTOPRAZOLE SODIUM 40 MG: 40 TABLET, DELAYED RELEASE ORAL at 16:16

## 2021-11-19 RX ADMIN — MECLIZINE 12.5 MG: 12.5 TABLET ORAL at 09:42

## 2021-11-19 RX ADMIN — PANTOPRAZOLE SODIUM 40 MG: 40 TABLET, DELAYED RELEASE ORAL at 06:10

## 2021-11-19 RX ADMIN — LACOSAMIDE 100 MG: 10 SOLUTION ORAL at 21:46

## 2021-11-19 RX ADMIN — MECLIZINE 12.5 MG: 12.5 TABLET ORAL at 16:16

## 2021-11-19 RX ADMIN — PHENAZOPYRIDINE HYDROCHLORIDE 100 MG: 100 TABLET ORAL at 09:43

## 2021-11-19 RX ADMIN — WATER 1 G: 1 INJECTION INTRAMUSCULAR; INTRAVENOUS; SUBCUTANEOUS at 16:15

## 2021-11-19 RX ADMIN — Medication 10 ML: at 16:15

## 2021-11-19 RX ADMIN — PHENAZOPYRIDINE HYDROCHLORIDE 100 MG: 100 TABLET ORAL at 13:11

## 2021-11-19 RX ADMIN — Medication 250 MG: at 09:42

## 2021-11-19 RX ADMIN — Medication 5 MG: at 21:46

## 2021-11-19 RX ADMIN — PHENAZOPYRIDINE HYDROCHLORIDE 100 MG: 100 TABLET ORAL at 18:23

## 2021-11-19 RX ADMIN — ENOXAPARIN SODIUM 40 MG: 100 INJECTION SUBCUTANEOUS at 09:43

## 2021-11-19 RX ADMIN — PAROXETINE HYDROCHLORIDE 20 MG: 20 TABLET, FILM COATED ORAL at 13:12

## 2021-11-19 RX ADMIN — PREDNISONE 40 MG: 20 TABLET ORAL at 09:43

## 2021-11-19 NOTE — PROGRESS NOTES
Problem: Pressure Injury - Risk of  Goal: *Prevention of pressure injury  Description: Document Christofer Scale and appropriate interventions in the flowsheet. Outcome: Progressing Towards Goal  Note: Pressure Injury Interventions:  Sensory Interventions: Assess changes in LOC, Avoid rigorous massage over bony prominences, Check visual cues for pain, Discuss PT/OT consult with provider, Keep linens dry and wrinkle-free    Moisture Interventions: Assess need for specialty bed, Check for incontinence Q2 hours and as needed, Internal/External urinary devices    Activity Interventions: Assess need for specialty bed, Increase time out of bed, Pressure redistribution bed/mattress(bed type), PT/OT evaluation    Mobility Interventions: Assess need for specialty bed, HOB 30 degrees or less, Pressure redistribution bed/mattress (bed type)    Nutrition Interventions: Document food/fluid/supplement intake, Offer support with meals,snacks and hydration    Friction and Shear Interventions: Apply protective barrier, creams and emollients, Foam dressings/transparent film/skin sealants, HOB 30 degrees or less, Lift team/patient mobility team                Problem: Patient Education: Go to Patient Education Activity  Goal: Patient/Family Education  Outcome: Progressing Towards Goal     Problem: Patient Education: Go to Patient Education Activity  Goal: Patient/Family Education  Outcome: Progressing Towards Goal     Problem: Falls - Risk of  Goal: *Absence of Falls  Description: Document Theresa Fall Risk and appropriate interventions in the flowsheet.   Outcome: Progressing Towards Goal  Note: Fall Risk Interventions:  Mobility Interventions: Assess mobility with egress test, Communicate number of staff needed for ambulation/transfer, Patient to call before getting OOB, PT Consult for mobility concerns    Mentation Interventions: Adequate sleep, hydration, pain control, Door open when patient unattended, Evaluate medications/consider consulting pharmacy    Medication Interventions: Assess postural VS orthostatic hypotension, Evaluate medications/consider consulting pharmacy, Patient to call before getting OOB, Teach patient to arise slowly    Elimination Interventions: Call light in reach, Elevated toilet seat, Patient to call for help with toileting needs, Stay With Me (per policy), Toilet paper/wipes in reach, Toileting schedule/hourly rounds    History of Falls Interventions: Consult care management for discharge planning, Door open when patient unattended, Room close to nurse's station         Problem: Patient Education: Go to Patient Education Activity  Goal: Patient/Family Education  Outcome: Progressing Towards Goal     Problem: Nutrition Deficit  Goal: *Optimize nutritional status  Outcome: Progressing Towards Goal     Problem: Patient Education: Go to Patient Education Activity  Goal: Patient/Family Education  Outcome: Progressing Towards Goal     Problem: Patient Education: Go to Patient Education Activity  Goal: Patient/Family Education  Outcome: Progressing Towards Goal     Problem: Patient Education: Go to Patient Education Activity  Goal: Patient/Family Education  Outcome: Progressing Towards Goal     Problem: Patient Education: Go to Patient Education Activity  Goal: Patient/Family Education  Outcome: Progressing Towards Goal     Problem: Nausea/Vomiting (Adult)  Goal: *Absence of nausea/vomiting  Outcome: Progressing Towards Goal  Goal: *Palliation of nausea/vomiting (Palliative Care)  Outcome: Progressing Towards Goal     Problem: Patient Education: Go to Patient Education Activity  Goal: Patient/Family Education  Outcome: Progressing Towards Goal

## 2021-11-19 NOTE — ROUTINE PROCESS
Bedside shift change report given to Denise Rosario (oncoming nurse) by Berny Mora (offgoing nurse). Report included the following information SBAR and Kardex.

## 2021-11-19 NOTE — PROGRESS NOTES
Problem: Self Care Deficits Care Plan (Adult)  Goal: *Acute Goals and Plan of Care (Insert Text)  Description: Occupational Therapy Goals  Re-evaluated 11/15/2021 and continue with all goals for consistency. 1.  Patient will perform bed mobility in preparation for selfcare with modified independence. 2.  Patient will perform grooming task/functional activity standing for 4-7 minutes with supervision/set-up, F+ balance. 3.  Patient will perform lower body dressing with supervision/set-up seated and standing using AE prn (Pt has reacher at home). 4.  Patient will perform toilet transfers with supervision/set-up. 5.  Patient will perform all aspects of toileting with supervision/set-up. 6.  Patient will participate in upper extremity therapeutic exercise/activities with modified independence for 8 minutes. 7.  Patient will utilize energy conservation techniques during functional activities with verbal cues. Prior Level of Function: Patient was independent with self-care and functional mobility PTA. Patient's spouse reports she would walk unsteady at home d/t vertigo and for the stairs she would bump up/down on her bottom  Outcome: Progressing Towards Goal   OCCUPATIONAL THERAPY TREATMENT    Patient: Cresencio Griffith (51 y.o. female)  Date: 11/19/2021  Diagnosis: Vomiting [R11.10]   <principal problem not specified>  Procedure(s) (LRB):  PERCUTANEOUS ENDOSCOPIC GASTROSTOMY TUBE INSERTION (N/A) 13 Days Post-Op  Precautions: Fall, Skin  PLOF: Patient was independent with self-care and functional mobility PTA. Patient's spouse reports she would walk unsteady at home d/t vertigo and for the stairs she would bump up/down on her bottom    Chart, occupational therapy assessment, plan of care, and goals were reviewed. ASSESSMENT:  PT co tx to maximize pt safety and participation. Pt presented supine in bed upon entry and agreeable for participation.  Pt continues to have c/o bouts of dizziness throughout tx session. Pt came to EOB from supine w/ SBA in prep functional tasks. Once sitting EOB she donned her socks with Supervision utilizing leg cross technique. STS transfer SBA with RW and performed functional room and hallway mobility ~ 300 ft SBA with RW to improve overall functional activity tolerance for ADL carryover. Pt required min cueing for safety in hallways 2/2 dizziness. Pt returned back to supine once in bed. She was left with HOB elevated, bed alarm active, and all needs left within reach. RN made aware of pt's participation and position. Progression toward goals:  []          Improving appropriately and progressing toward goals  [x]          Improving slowly and progressing toward goals  []          Not making progress toward goals and plan of care will be adjusted     PLAN:  Patient continues to benefit from skilled intervention to address the above impairments. Continue treatment per established plan of care. Discharge Recommendations:  with 24/7 assist vs Rehab   Further Equipment Recommendations for Discharge:  RW     SUBJECTIVE:   Patient stated  My memory is terrible. \"     OBJECTIVE DATA SUMMARY:   Cognitive/Behavioral Status:  Neurologic State: Alert  Orientation Level: Oriented to person, Oriented to place, Oriented to situation  Cognition: Follows commands, Memory loss  Safety/Judgement: Fall prevention    Functional Mobility and Transfers for ADLs:   Bed Mobility:     Supine to Sit: Stand-by assistance  Sit to Supine: Stand-by assistance      Transfers:  Sit to Stand: Supervision  Stand to Sit: Supervision         Balance:  Sitting: Intact  Standing: Impaired;  With support  Standing - Static: Good  Standing - Dynamic : Fair    ADL Intervention:     Lower Body Dressing Assistance  Socks: Supervision  Leg Crossed Method Used: Yes  Position Performed: Seated edge of bed         Cognitive Retraining  Safety/Judgement: Fall prevention        Pain:  Pain level pre-treatment: 0/10   Pain level post-treatment: 0/10    Activity Tolerance:    Fair due to dizziness  Please refer to the flowsheet for vital signs taken during this treatment. After treatment:   []  Patient left in no apparent distress sitting up in chair  [x]  Patient left in no apparent distress in bed  [x]  Call bell left within reach  [x]  Nursing notified  []  Caregiver present  [x]  Bed alarm activated    COMMUNICATION/EDUCATION:   [x] Role of Occupational Therapy in the acute care setting  [] Home safety education was provided and the patient/caregiver indicated understanding. [x] Patient/family have participated as able in working towards goals and plan of care. [x] Patient/family agree to work toward stated goals and plan of care. [] Patient understands intent and goals of therapy, but is neutral about his/her participation. [] Patient is unable to participate in goal setting and plan of care.       Thank you for this referral.  JUNIOR Hennessy  Time Calculation: 23 mins

## 2021-11-19 NOTE — PROGRESS NOTES
Problem: Mobility Impaired (Adult and Pediatric)  Goal: *Acute Goals and Plan of Care (Insert Text)  Description: Physical Therapy Goals  Re-evaluated 11/11/2021 and updated below  Initiated 10/21/2021, re-evaluated 11/4/21 and goals adjusted as needed and to be accomplished within 7 day(s)  1. Patient will move from supine to sit and sit to supine, scoot up and down, and roll side to side in bed with Sweetie. 2.  Patient will transfer from bed to chair and chair to bed with Sweetie using the least restrictive device. 3.  Patient will perform sit to stand with Sweetie. 4.  Patient will ambulate with Sweetie for 250 feet with the least restrictive device. 5.  Patient will negotiate 5 steps with unilateral HR and Sweetie. PLOF: Pt reports she lives in a [address] [place] [address] with her , states she was ambulatory with no AD however per chart review, pt has been using a wc recently as she has increased weakness and difficulty walking, pt only oriented to self and time this date. Outcome: Progressing Towards Goal     PHYSICAL THERAPY TREATMENT    Patient: Rowan Sandoval (30 y.o. female)  Date: 11/19/2021  Diagnosis: Vomiting [R11.10]   <principal problem not specified>  Procedure(s) (LRB):  PERCUTANEOUS ENDOSCOPIC GASTROSTOMY TUBE INSERTION (N/A) 13 Days Post-Op  Precautions: Fall, Skin    ASSESSMENT:  Pt seen with both PT and OT to maximize patients safety, mobility, and participation. Pt found semi-reclined in bed, in NAD, willing to work with PT. She reports feeling dizzy which is baseline, but agreeable to amb. Pt stood with SBA and RW and amb 300 ft in hallways. No major LOB, pt had slight path deviations from her constant oscillopsia during amb. She describes objects as bouncing while walking. Once returned to room, pt returned semi-reclined with call bell and all needs met. Edu on asking for assist prior to getting up.      Progression toward goals:   []      Improving appropriately and progressing toward goals  [x] Improving slowly and progressing toward goals  []      Not making progress toward goals and plan of care will be adjusted     PLAN:  Patient continues to benefit from skilled intervention to address the above impairments. Continue treatment per established plan of care. Discharge Recommendations:  Rehab vs Home Health with 24/7 assist  Further Equipment Recommendations for Discharge:  rolling walker     SUBJECTIVE:   Patient stated I cant look out that window cus everything is moving.     OBJECTIVE DATA SUMMARY:   Critical Behavior:  Neurologic State: Alert  Orientation Level: Oriented to person, Oriented to place, Oriented to situation  Cognition: Follows commands, Memory loss  Safety/Judgement: Fall prevention  Functional Mobility Training:  Bed Mobility:     Supine to Sit: Stand-by assistance  Sit to Supine: Stand-by assistance            Transfers:  Sit to Stand: Supervision  Stand to Sit: Supervision       Balance:  Sitting: Intact  Standing: Impaired; With support  Standing - Static: Good  Standing - Dynamic : Fair   Ambulation/Gait Training:  Distance (ft): 300 Feet (ft)  Assistive Device: Walker, rolling  Ambulation - Level of Assistance: Stand-by assistance        Gait Abnormalities: Decreased step clearance; Path deviations        Base of Support: Center of gravity altered     Speed/Helen: Slow  Step Length: Right shortened; Left shortened  Pain:  Pain level pre-treatment: 0/10  Pain level post-treatment: 0/10   Pain Intervention(s): Medication (see MAR); Rest, Ice, Repositioning   Response to intervention: Nurse notified, See doc flow    Activity Tolerance:   Pt tolerated mobility well, bouts of dizziness/oscillopsia during amb  Please refer to the flowsheet for vital signs taken during this treatment.   After treatment:   [] Patient left in no apparent distress sitting up in chair  [x] Patient left in no apparent distress in bed  [x] Call bell left within reach  [x] Nursing notified  [] Caregiver present  [] Bed alarm activated  [] SCDs applied      COMMUNICATION/EDUCATION:   [x]         Role of Physical Therapy in the acute care setting. [x]         Fall prevention education was provided and the patient/caregiver indicated understanding. [x]         Patient/family have participated as able in working toward goals and plan of care. []         Patient/family agree to work toward stated goals and plan of care. []         Patient understands intent and goals of therapy, but is neutral about his/her participation.   []         Patient is unable to participate in stated goals/plan of care: ongoing with therapy staff.  []         Other:        Porsha Clark   Time Calculation: 23 mins

## 2021-11-19 NOTE — PROGRESS NOTES
Spoke with Zuleyak at [facility]. She states a peer to peer was done earlier with Dr Aimee Bautista and the stay from 11/10 through today is denied but that patient was approved for 17 days for [address] [place] [address] (all the SNF days she has left). She states she faxed/emailed, etc all the info to Glynn Leal at [facility]. Called and spoke with Bruna Trevizo at [facility]. She states there is no Carren Holiday at the facility. She will contact  [facility] and get the info they need about the auth. Once they have the needed info, she will call back and give an admission time.  will need to transport patient.   Demi Blanchard RN - Outcomes Manager  [phone]

## 2021-11-19 NOTE — PROGRESS NOTES
Hospitalist Progress Note    Subjective:   Daily Progress Note: 11/19/2021    Urine cx pending. Patient seen and examined at bedside, she reports dysuria has improved since yesterday. Dizziness persists however, meclizine little help. She agrees to low-dose Paxil as recommended per neurology. Continues to have good appetite.  at bedside. Assessment/Plan:       1. 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 Continue supplements, meclizine prn. Trial low-dose Paxil, increase gradually for chronic dizziness. She would benefit from rehab including vestibular rehab.  2.  Multiple electrolyte disturbances, resolved currently  3. Intermittent vomiting with nausea could be due to central vertigo, resolved  4. Recent history of herpes encephalitis s/p treatment  5. 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 6.  Chronic mild hyponatremia due to nausea, resolved  7. Poor appetite and severe protein calorie malnutrition, slowly improving. Nutritionist follows. Multivit. 8.  GERD  9. Dyslipidemia  10. History of seizure disorder  11. Intermittent encephalopathy with sundowning due to #5, and #4 and Wernicke's encephalopathy, better currently  12. 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 Continue prednisone and Vimpat as recommended by neurologist.  Taper prednisone by 10 mg weekly, next dose reduction 11/27/2021. 13.  Urinary tract infection, symptoms improving after started on antibiotic. Follow culture. 14.  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 Leukocytosis, source most likely urine. 16.  Full code. Patient would benefit from outpatient follow-up with neurology, and ophthalmology,  is aware. Anticipate SNF transfer once urine culture resulted.  updated at bedside, all questions answered to the best my ability.       Patient is otherwise medically stable to be discharged to skilled nursing facility. Anticipatory discharge: SNF placement when bed is found. Discussed with case management    Total time to take care of this patient was 30 minutes and more than 50% of time was spent counseling and coordinating care. Disclaimer: Sections of this note are dictated using utilizing voice recognition software, which may have resulted in some phonetic based errors in grammar and contents. Even though attempts were made to correct all the mistakes, some may have been missed, and remained in the body of the document. If questions arise, please contact our department.       Current Facility-Administered Medications   Medication Dose Route Frequency    cefTRIAXone (ROCEPHIN) 1 g in sterile water (preservative free) 10 mL IV syringe  1 g IntraVENous Q24H    phenazopyridine (PYRIDIUM) tablet 100 mg  100 mg Oral TIDPC    predniSONE (DELTASONE) tablet 40 mg  40 mg Oral DAILY WITH BREAKFAST    meclizine (ANTIVERT) tablet 12.5 mg  12.5 mg Oral TID    midodrine (PROAMATINE) tablet 2.5 mg  2.5 mg Oral Q12H PRN    albuterol-ipratropium (DUO-NEB) 2.5 MG-0.5 MG/3 ML  3 mL Nebulization Q6H PRN    thiamine (B-1) tablet 250 mg  250 mg Oral DAILY    lacosamide (VIMPAT) oral solution 100 mg  100 mg Oral Q12H    melatonin tablet 5 mg  5 mg Oral QHS    meclizine (ANTIVERT) tablet 12.5 mg  12.5 mg Oral Q6H PRN    pantoprazole (PROTONIX) tablet 40 mg  40 mg Oral ACB&D    mirtazapine (REMERON SOL-TAB) disintegrating tablet 15 mg  15 mg Oral QHS    phenol throat spray (CHLORASEPTIC) 1 Spray  1 Spray Oral PRN    therapeutic multivitamin (THERAGRAN) tablet 1 Tablet  1 Tablet Oral DAILY    sodium chloride (NS) flush 5-40 mL  5-40 mL IntraVENous Q8H    sodium chloride (NS) flush 5-40 mL  5-40 mL IntraVENous PRN    acetaminophen (TYLENOL) tablet 650 mg  650 mg Oral Q6H PRN    Or    acetaminophen (TYLENOL) suppository 650 mg  650 mg Rectal Q6H PRN    polyethylene glycol (MIRALAX) packet 17 g  17 g Oral DAILY PRN    ondansetron (ZOFRAN ODT) tablet 4 mg  4 mg Oral Q8H PRN    Or    ondansetron (ZOFRAN) injection 8 mg  8 mg IntraVENous Q6H PRN    enoxaparin (LOVENOX) injection 40 mg  40 mg SubCUTAneous DAILY        Review of Systems  Pertinent items are noted in HPI. Objective:     Visit Vitals  /73   Pulse 73   Temp 97.5 °F (36.4 °C)   Resp 20   Ht 5' (1.524 m)   Wt 66.2 kg (146 lb)   LMP 2019 Comment: unknown   SpO2 100%   BMI 28.51 kg/m²      O2 Device: None (Room air)    Temp (24hrs), Av.8 °F (36.6 °C), Min:96.6 °F (35.9 °C), Max:98.6 °F (37 °C)      No intake/output data recorded.  1901 -  0700  In: 2410 [P.O.:2400; I.V.:10]  Out: 850 [Urine:850]    General appearance -awake, follows commands appropriately, not in acute distress. Chest -CTA bilaterally, no wheezes currently. Heart - S1 and S2 normal  Abdomen - soft, nontender, nondistended, Bowel sounds present  Neurological -awake, follows commands appropriately, moves all 4 extremities well. No tremors.     Extremities - no pedal edema noted    Data Review    Recent Results (from the past 24 hour(s))   URINALYSIS W/MICROSCOPIC    Collection Time: 21 10:00 AM   Result Value Ref Range    Color YELLOW      Appearance CLOUDY      Specific gravity 1.013 1.005 - 1.030      pH (UA) 7.0 5.0 - 8.0      Protein Negative NEG mg/dL    Glucose Negative NEG mg/dL    Ketone Negative NEG mg/dL    Bilirubin Negative NEG      Blood Negative NEG      Urobilinogen 1.0 0.2 - 1.0 EU/dL    Nitrites Positive (A) NEG      Leukocyte Esterase LARGE (A) NEG      WBC 11 to 20 0 - 4 /hpf    RBC Negative 0 - 5 /hpf    Epithelial cells FEW 0 - 5 /lpf    Bacteria 4+ (A) NEG /hpf    Amorphous Crystals 2+ (A) NEG   CBC WITH AUTOMATED DIFF    Collection Time: 21 12:40 PM   Result Value Ref Range    WBC 17.6 (H) 4.6 - 13.2 K/uL    RBC 3.72 (L) 4.20 - 5.30 M/uL    HGB 12.2 12.0 - 16.0 g/dL    HCT 38.9 35.0 - 45.0 %    .6 (H) 78.0 - 100.0 FL    MCH 32.8 24.0 - 34.0 PG    MCHC 31.4 31.0 - 37.0 g/dL    RDW 16.6 (H) 11.6 - 14.5 %    PLATELET 020 (H) 371 - 420 K/uL    MPV 9.1 (L) 9.2 - 11.8 FL    NRBC 0.0 0  WBC    ABSOLUTE NRBC 0.00 0.00 - 0.01 K/uL    NEUTROPHILS 80 (H) 40 - 73 %    LYMPHOCYTES 13 (L) 21 - 52 %    MONOCYTES 6 3 - 10 %    EOSINOPHILS 0 0 - 5 %    BASOPHILS 0 0 - 2 %    IMMATURE GRANULOCYTES 2 (H) 0.0 - 0.5 %    ABS. NEUTROPHILS 14.1 (H) 1.8 - 8.0 K/UL    ABS. LYMPHOCYTES 2.2 0.9 - 3.6 K/UL    ABS. MONOCYTES 1.0 0.05 - 1.2 K/UL    ABS. EOSINOPHILS 0.0 0.0 - 0.4 K/UL    ABS. BASOPHILS 0.0 0.0 - 0.1 K/UL    ABS. IMM.  GRANS. 0.3 (H) 0.00 - 0.04 K/UL    DF AUTOMATED     METABOLIC PANEL, BASIC    Collection Time: 11/18/21 12:40 PM   Result Value Ref Range    Sodium 133 (L) 136 - 145 mmol/L    Potassium 3.9 3.5 - 5.5 mmol/L    Chloride 99 (L) 100 - 111 mmol/L    CO2 28 21 - 32 mmol/L    Anion gap 6 3.0 - 18 mmol/L    Glucose 116 (H) 74 - 99 mg/dL    BUN 16 7.0 - 18 MG/DL    Creatinine 0.57 (L) 0.6 - 1.3 MG/DL    BUN/Creatinine ratio 28 (H) 12 - 20      GFR est AA >60 >60 ml/min/1.73m2    GFR est non-AA >60 >60 ml/min/1.73m2    Calcium 9.1 8.5 - 10.1 MG/DL

## 2021-11-20 LAB
ANION GAP SERPL CALC-SCNC: 5 MMOL/L (ref 3–18)
BASOPHILS # BLD: 0 K/UL (ref 0–0.1)
BASOPHILS NFR BLD: 0 % (ref 0–2)
BUN SERPL-MCNC: 13 MG/DL (ref 7–18)
BUN/CREAT SERPL: 25 (ref 12–20)
CALCIUM SERPL-MCNC: 9.4 MG/DL (ref 8.5–10.1)
CHLORIDE SERPL-SCNC: 101 MMOL/L (ref 100–111)
CO2 SERPL-SCNC: 32 MMOL/L (ref 21–32)
CREAT SERPL-MCNC: 0.51 MG/DL (ref 0.6–1.3)
DIFFERENTIAL METHOD BLD: ABNORMAL
EOSINOPHIL # BLD: 0 K/UL (ref 0–0.4)
EOSINOPHIL NFR BLD: 0 % (ref 0–5)
ERYTHROCYTE [DISTWIDTH] IN BLOOD BY AUTOMATED COUNT: 15.8 % (ref 11.6–14.5)
GLUCOSE SERPL-MCNC: 81 MG/DL (ref 74–99)
HCT VFR BLD AUTO: 33.6 % (ref 35–45)
HGB BLD-MCNC: 10.7 G/DL (ref 12–16)
IMM GRANULOCYTES # BLD AUTO: 0.3 K/UL (ref 0–0.04)
IMM GRANULOCYTES NFR BLD AUTO: 2 % (ref 0–0.5)
LYMPHOCYTES # BLD: 3.3 K/UL (ref 0.9–3.6)
LYMPHOCYTES NFR BLD: 23 % (ref 21–52)
MAGNESIUM SERPL-MCNC: 2.5 MG/DL (ref 1.6–2.6)
MCH RBC QN AUTO: 32.9 PG (ref 24–34)
MCHC RBC AUTO-ENTMCNC: 31.8 G/DL (ref 31–37)
MCV RBC AUTO: 103.4 FL (ref 78–100)
MONOCYTES # BLD: 1.1 K/UL (ref 0.05–1.2)
MONOCYTES NFR BLD: 8 % (ref 3–10)
NEUTS SEG # BLD: 9.6 K/UL (ref 1.8–8)
NEUTS SEG NFR BLD: 67 % (ref 40–73)
NRBC # BLD: 0 K/UL (ref 0–0.01)
NRBC BLD-RTO: 0 PER 100 WBC
PHOSPHATE SERPL-MCNC: 4.3 MG/DL (ref 2.5–4.9)
PLATELET # BLD AUTO: 415 K/UL (ref 135–420)
PMV BLD AUTO: 9.3 FL (ref 9.2–11.8)
POTASSIUM SERPL-SCNC: 4.2 MMOL/L (ref 3.5–5.5)
RBC # BLD AUTO: 3.25 M/UL (ref 4.2–5.3)
SODIUM SERPL-SCNC: 138 MMOL/L (ref 136–145)
WBC # BLD AUTO: 14.4 K/UL (ref 4.6–13.2)

## 2021-11-20 PROCEDURE — 74011250636 HC RX REV CODE- 250/636: Performed by: HOSPITALIST

## 2021-11-20 PROCEDURE — 85025 COMPLETE CBC W/AUTO DIFF WBC: CPT

## 2021-11-20 PROCEDURE — 80048 BASIC METABOLIC PNL TOTAL CA: CPT

## 2021-11-20 PROCEDURE — 74011250636 HC RX REV CODE- 250/636: Performed by: INTERNAL MEDICINE

## 2021-11-20 PROCEDURE — 74011250637 HC RX REV CODE- 250/637: Performed by: PSYCHIATRY & NEUROLOGY

## 2021-11-20 PROCEDURE — 99233 SBSQ HOSP IP/OBS HIGH 50: CPT | Performed by: PSYCHIATRY & NEUROLOGY

## 2021-11-20 PROCEDURE — 36592 COLLECT BLOOD FROM PICC: CPT

## 2021-11-20 PROCEDURE — 83735 ASSAY OF MAGNESIUM: CPT

## 2021-11-20 PROCEDURE — 84100 ASSAY OF PHOSPHORUS: CPT

## 2021-11-20 PROCEDURE — 97530 THERAPEUTIC ACTIVITIES: CPT

## 2021-11-20 PROCEDURE — 99232 SBSQ HOSP IP/OBS MODERATE 35: CPT | Performed by: HOSPITALIST

## 2021-11-20 PROCEDURE — 74011250637 HC RX REV CODE- 250/637: Performed by: HOSPITALIST

## 2021-11-20 PROCEDURE — 2709999900 HC NON-CHARGEABLE SUPPLY

## 2021-11-20 PROCEDURE — 74011250637 HC RX REV CODE- 250/637: Performed by: FAMILY MEDICINE

## 2021-11-20 PROCEDURE — 74011250637 HC RX REV CODE- 250/637: Performed by: INTERNAL MEDICINE

## 2021-11-20 PROCEDURE — 65270000029 HC RM PRIVATE

## 2021-11-20 PROCEDURE — 97535 SELF CARE MNGMENT TRAINING: CPT

## 2021-11-20 PROCEDURE — 74011636637 HC RX REV CODE- 636/637: Performed by: INTERNAL MEDICINE

## 2021-11-20 PROCEDURE — 74011000250 HC RX REV CODE- 250: Performed by: HOSPITALIST

## 2021-11-20 RX ADMIN — ENOXAPARIN SODIUM 40 MG: 100 INJECTION SUBCUTANEOUS at 09:56

## 2021-11-20 RX ADMIN — PAROXETINE HYDROCHLORIDE 10 MG: 20 TABLET, FILM COATED ORAL at 09:55

## 2021-11-20 RX ADMIN — Medication 10 ML: at 21:28

## 2021-11-20 RX ADMIN — LACOSAMIDE 100 MG: 10 SOLUTION ORAL at 09:56

## 2021-11-20 RX ADMIN — MECLIZINE 12.5 MG: 12.5 TABLET ORAL at 09:00

## 2021-11-20 RX ADMIN — PREDNISONE 40 MG: 20 TABLET ORAL at 09:55

## 2021-11-20 RX ADMIN — Medication 10 ML: at 05:12

## 2021-11-20 RX ADMIN — PANTOPRAZOLE SODIUM 40 MG: 40 TABLET, DELAYED RELEASE ORAL at 17:59

## 2021-11-20 RX ADMIN — MECLIZINE 12.5 MG: 12.5 TABLET ORAL at 21:27

## 2021-11-20 RX ADMIN — PHENAZOPYRIDINE HYDROCHLORIDE 100 MG: 100 TABLET ORAL at 17:59

## 2021-11-20 RX ADMIN — THERA TABS 1 TABLET: TAB at 09:00

## 2021-11-20 RX ADMIN — PHENAZOPYRIDINE HYDROCHLORIDE 100 MG: 100 TABLET ORAL at 13:20

## 2021-11-20 RX ADMIN — PHENAZOPYRIDINE HYDROCHLORIDE 100 MG: 100 TABLET ORAL at 09:00

## 2021-11-20 RX ADMIN — LACOSAMIDE 100 MG: 10 SOLUTION ORAL at 21:28

## 2021-11-20 RX ADMIN — Medication 5 MG: at 21:27

## 2021-11-20 RX ADMIN — PANTOPRAZOLE SODIUM 40 MG: 40 TABLET, DELAYED RELEASE ORAL at 09:55

## 2021-11-20 RX ADMIN — WATER 1 G: 1 INJECTION INTRAMUSCULAR; INTRAVENOUS; SUBCUTANEOUS at 15:10

## 2021-11-20 RX ADMIN — MECLIZINE 12.5 MG: 12.5 TABLET ORAL at 15:40

## 2021-11-20 RX ADMIN — Medication 250 MG: at 09:55

## 2021-11-20 RX ADMIN — Medication 10 ML: at 15:10

## 2021-11-20 RX ADMIN — MIRTAZAPINE 15 MG: 15 TABLET, ORALLY DISINTEGRATING ORAL at 21:27

## 2021-11-20 NOTE — PROGRESS NOTES
Hospitalist Progress Note    Subjective:   Daily Progress Note: 11/20/2021    Urine cx GNR, prelim. WBC improved to 14.4, no bands. Patient seen and examined at bedside, she states dizziness is somewhat improved. Appetite remains good. Denies chest pain, abdominal pain. Dysuria improved since antibiotic started. Denies constipation. Assessment/Plan:       1. 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 Continue supplements, meclizine prn. Trial low-dose Paxil, increase gradually for chronic dizziness. She would benefit from rehab including vestibular rehab.  2.  Multiple electrolyte disturbances, resolved currently  3. Intermittent vomiting with nausea could be due to central vertigo, resolved  4. Recent history of herpes encephalitis s/p treatment  5. 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 6.  Chronic mild hyponatremia due to nausea, resolved  7. Poor appetite and severe protein calorie malnutrition, slowly improving. Nutritionist follows. Multivit. 8.  GERD  9. Dyslipidemia  10. History of seizure disorder  11. Intermittent encephalopathy with sundowning due to #5, and #4 and Wernicke's encephalopathy, better currently  12. 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 Continue prednisone and Vimpat as recommended by neurologist.  Taper prednisone by 10 mg weekly, next dose reduction 11/27/2021. 13.  Urinary tract infection  GNR, symptoms improving after started on antibiotic. Follow culture. 14.  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 Leukocytosis with left shift, source most likely urine, improving. 16.  Full code. Patient would benefit from outpatient follow-up with neurology, and ophthalmology,  is aware. Anticipate SNF transfer once urine culture resulted.  updated at bedside, all questions answered to the best my ability.       Patient is otherwise medically stable to be discharged to skilled nursing facility. Anticipatory discharge: SNF placement when bed is found. Discussed with case management    Total time to take care of this patient was 30 minutes and more than 50% of time was spent counseling and coordinating care. Disclaimer: Sections of this note are dictated using utilizing voice recognition software, which may have resulted in some phonetic based errors in grammar and contents. Even though attempts were made to correct all the mistakes, some may have been missed, and remained in the body of the document. If questions arise, please contact our department.       Current Facility-Administered Medications   Medication Dose Route Frequency    PARoxetine (PAXIL) tablet 10 mg  10 mg Oral DAILY    cefTRIAXone (ROCEPHIN) 1 g in sterile water (preservative free) 10 mL IV syringe  1 g IntraVENous Q24H    phenazopyridine (PYRIDIUM) tablet 100 mg  100 mg Oral TIDPC    predniSONE (DELTASONE) tablet 40 mg  40 mg Oral DAILY WITH BREAKFAST    meclizine (ANTIVERT) tablet 12.5 mg  12.5 mg Oral TID    midodrine (PROAMATINE) tablet 2.5 mg  2.5 mg Oral Q12H PRN    albuterol-ipratropium (DUO-NEB) 2.5 MG-0.5 MG/3 ML  3 mL Nebulization Q6H PRN    thiamine (B-1) tablet 250 mg  250 mg Oral DAILY    lacosamide (VIMPAT) oral solution 100 mg  100 mg Oral Q12H    melatonin tablet 5 mg  5 mg Oral QHS    meclizine (ANTIVERT) tablet 12.5 mg  12.5 mg Oral Q6H PRN    pantoprazole (PROTONIX) tablet 40 mg  40 mg Oral ACB&D    mirtazapine (REMERON SOL-TAB) disintegrating tablet 15 mg  15 mg Oral QHS    phenol throat spray (CHLORASEPTIC) 1 Spray  1 Spray Oral PRN    therapeutic multivitamin (THERAGRAN) tablet 1 Tablet  1 Tablet Oral DAILY    sodium chloride (NS) flush 5-40 mL  5-40 mL IntraVENous Q8H    sodium chloride (NS) flush 5-40 mL  5-40 mL IntraVENous PRN    acetaminophen (TYLENOL) tablet 650 mg  650 mg Oral Q6H PRN    Or    acetaminophen (TYLENOL) suppository 650 mg  650 mg Rectal Q6H PRN    polyethylene glycol (MIRALAX) packet 17 g  17 g Oral DAILY PRN    ondansetron (ZOFRAN ODT) tablet 4 mg  4 mg Oral Q8H PRN    Or    ondansetron (ZOFRAN) injection 8 mg  8 mg IntraVENous Q6H PRN    enoxaparin (LOVENOX) injection 40 mg  40 mg SubCUTAneous DAILY        Review of Systems  Pertinent items are noted in HPI. Objective:     Visit Vitals  /74 (BP 1 Location: Left upper arm, BP Patient Position: Sitting)   Pulse 72   Temp 97.1 °F (36.2 °C)   Resp 16   Ht 5' (1.524 m)   Wt 66.2 kg (146 lb)   LMP 2019 Comment: unknown   SpO2 100%   BMI 28.51 kg/m²      O2 Device: None (Room air)    Temp (24hrs), Av.2 °F (36.8 °C), Min:97.1 °F (36.2 °C), Max:98.8 °F (37.1 °C)      701 -  1900  In: 120 [P.O.:120]  Out: -    190 -  0700  In: 7141 [P.O.:1680; I.V.:10]  Out: 850 [Urine:850]    General appearance -awake, follows commands appropriately, not in acute distress. Chest -CTA bilaterally, no wheezes currently. Heart - S1 and S2 normal  Abdomen - soft, nontender, nondistended, Bowel sounds present  Neurological -awake, follows commands appropriately, moves all 4 extremities well. No tremors. Extremities - no pedal edema noted    Data Review    Recent Results (from the past 24 hour(s))   CBC WITH AUTOMATED DIFF    Collection Time: 21  3:00 AM   Result Value Ref Range    WBC 14.4 (H) 4.6 - 13.2 K/uL    RBC 3.25 (L) 4.20 - 5.30 M/uL    HGB 10.7 (L) 12.0 - 16.0 g/dL    HCT 33.6 (L) 35.0 - 45.0 %    .4 (H) 78.0 - 100.0 FL    MCH 32.9 24.0 - 34.0 PG    MCHC 31.8 31.0 - 37.0 g/dL    RDW 15.8 (H) 11.6 - 14.5 %    PLATELET 589 079 - 194 K/uL    MPV 9.3 9.2 - 11.8 FL    NRBC 0.0 0  WBC    ABSOLUTE NRBC 0.00 0.00 - 0.01 K/uL    NEUTROPHILS 67 40 - 73 %    LYMPHOCYTES 23 21 - 52 %    MONOCYTES 8 3 - 10 %    EOSINOPHILS 0 0 - 5 %    BASOPHILS 0 0 - 2 %    IMMATURE GRANULOCYTES 2 (H) 0.0 - 0.5 %    ABS. NEUTROPHILS 9.6 (H) 1.8 - 8.0 K/UL    ABS. LYMPHOCYTES 3.3 0.9 - 3.6 K/UL    ABS. MONOCYTES 1.1 0.05 - 1.2 K/UL    ABS. EOSINOPHILS 0.0 0.0 - 0.4 K/UL    ABS. BASOPHILS 0.0 0.0 - 0.1 K/UL    ABS. IMM.  GRANS. 0.3 (H) 0.00 - 0.04 K/UL    DF AUTOMATED     MAGNESIUM    Collection Time: 11/20/21  3:00 AM   Result Value Ref Range    Magnesium 2.5 1.6 - 2.6 mg/dL   METABOLIC PANEL, BASIC    Collection Time: 11/20/21  3:00 AM   Result Value Ref Range    Sodium 138 136 - 145 mmol/L    Potassium 4.2 3.5 - 5.5 mmol/L    Chloride 101 100 - 111 mmol/L    CO2 32 21 - 32 mmol/L    Anion gap 5 3.0 - 18 mmol/L    Glucose 81 74 - 99 mg/dL    BUN 13 7.0 - 18 MG/DL    Creatinine 0.51 (L) 0.6 - 1.3 MG/DL    BUN/Creatinine ratio 25 (H) 12 - 20      GFR est AA >60 >60 ml/min/1.73m2    GFR est non-AA >60 >60 ml/min/1.73m2    Calcium 9.4 8.5 - 10.1 MG/DL   PHOSPHORUS    Collection Time: 11/20/21  3:00 AM   Result Value Ref Range    Phosphorus 4.3 2.5 - 4.9 MG/DL

## 2021-11-20 NOTE — PROGRESS NOTES
Problem: Self Care Deficits Care Plan (Adult)  Goal: *Acute Goals and Plan of Care (Insert Text)  Description: Occupational Therapy Goals  Re-evaluated 11/15/2021 and continue with all goals for consistency. 1.  Patient will perform bed mobility in preparation for selfcare with modified independence. 2.  Patient will perform grooming task/functional activity standing for 4-7 minutes with supervision/set-up, F+ balance. 3.  Patient will perform lower body dressing with supervision/set-up seated and standing using AE prn (Pt has reacher at home). 4.  Patient will perform toilet transfers with supervision/set-up. 5.  Patient will perform all aspects of toileting with supervision/set-up. 6.  Patient will participate in upper extremity therapeutic exercise/activities with modified independence for 8 minutes. 7.  Patient will utilize energy conservation techniques during functional activities with verbal cues. Prior Level of Function: Patient was independent with self-care and functional mobility PTA. Patient's spouse reports she would walk unsteady at home d/t vertigo and for the stairs she would bump up/down on her bottom  Outcome: Progressing Towards Goal   OCCUPATIONAL THERAPY TREATMENT    Patient: Rush Blanco (76 y.o. female)  Date: 11/20/2021  Diagnosis: Vomiting [R11.10]   <principal problem not specified>  Procedure(s) (LRB):  PERCUTANEOUS ENDOSCOPIC GASTROSTOMY TUBE INSERTION (N/A) 14 Days Post-Op  Precautions: Fall, Skin  PLOF: Patient was independent with self-care and functional mobility PTA. Patient's spouse reports she would walk unsteady at home d/t vertigo and for the stairs she would bump up/down on her bottom    Chart, occupational therapy assessment, plan of care, and goals were reviewed. ASSESSMENT:  Pt presented sitting EOB upon therapist arrival reporting she needed to go to BR and get cleaned up. Pt seen for am ADL retraining in prep for d/c (see functional levels below). Reviewed energy conservation techniques w/ADLs, and safety w/RW and functional transfers/mobility. Pt requires Supervision with all functional transfer and mobility tasks 2/2 decreased stability from dizziness. At end of session she was left sitting EOB with RN present, bed alarm active, and all needs left within reach. Progression toward goals:  []          Improving appropriately and progressing toward goals  [x]          Improving slowly and progressing toward goals  []          Not making progress toward goals and plan of care will be adjusted     PLAN:  Patient continues to benefit from skilled intervention to address the above impairments. Continue treatment per established plan of care. Discharge Recommendations:  with 24/7 assist vs Rehab   Further Equipment Recommendations for Discharge:  RW     SUBJECTIVE:   Patient stated  I feel more human now. \"     OBJECTIVE DATA SUMMARY:   Cognitive/Behavioral Status:  Neurologic State: Alert, Confused  Orientation Level: Oriented to person  Cognition: Follows commands, Memory loss  Safety/Judgement: Awareness of environment, Fall prevention    Functional Mobility and Transfers for ADLs:   Bed Mobility:        Sit to Supine: Stand-by assistance      Transfers:  Sit to Stand: Supervision  Stand to Sit: Supervision                Toilet Transfer : Supervision           Bathroom Mobility: Supervision/set up (w/ RW)        Balance:  Sitting: Intact  Standing: Impaired;  With support  Standing - Static: Good  Standing - Dynamic : Fair    ADL Intervention:       Grooming  Position Performed: Standing  Washing Face: Supervision  Washing Hands: Supervision  Brushing Teeth: Supervision  Brushing/Combing Hair: Set-up (complex grooming with shampoo cap, sitting EOB)              Upper Body Dressing Assistance  Pullover Shirt: Set-up    Lower Body Dressing Assistance  Protective Undergarmet: Supervision  Pants With Elastic Waist: Supervision  Socks: Supervision  Leg Crossed Method Used: Yes  Position Performed: Seated in chair  Cues: Verbal cues provided  Adaptive Equipment Used: Walker    Toileting  Bladder Hygiene: Supervision (seated)    Cognitive Retraining  Safety/Judgement: Awareness of environment; Fall prevention    Pain:  Pain level pre-treatment: 0/10   Pain level post-treatment: 0/10    Activity Tolerance:    Fair   Please refer to the flowsheet for vital signs taken during this treatment. After treatment:   []  Patient left in no apparent distress sitting up in chair  [x]  Patient left in no apparent distress in bed  [x]  Call bell left within reach  [x]  Nursing notified  []  Caregiver present  [x]  Bed alarm activated    COMMUNICATION/EDUCATION:   [x] Role of Occupational Therapy in the acute care setting  [] Home safety education was provided and the patient/caregiver indicated understanding. [x] Patient/family have participated as able in working towards goals and plan of care. [x] Patient/family agree to work toward stated goals and plan of care. [] Patient understands intent and goals of therapy, but is neutral about his/her participation. [] Patient is unable to participate in goal setting and plan of care.       Thank you for this referral.  JUNIOR Ramirez Ma  Time Calculation: 54 mins

## 2021-11-20 NOTE — ROUTINE PROCESS
End of Shift Note     Bedside and verbal shift change report given to Ceci Neri RN (On coming nurse) by Ian Finch RN (Off going nurse).   Report included the following information:      --Procedure Summary     --MAR,     --Recent Results     --Med Rec Status    SBAR Recommendations: weakness    Issues for Provider to address confusion          Activity This Shift     [] Bed Rest Order   [] Refused   [] Dangled    [] TDWB         Ambulating:     [] Bathroom     [x] BSC     [] Room/Hallway      Up in Chair for meals    []Yes [] No   Voiding       [x] Yes  [] No  Leo          [] Yes  [] No  Incontinent [x] Yes  [] No    DUE TO VOID POUR        [] Yes [] No  Purewick    [x] Yes [] No  New Onset [] Yes [] No Straight Cath   []Yes  [] No  Condom Cath  [] Yes [] No  MD Called      [] Yes  [] No   Blood Sugars Managed []Yes [x] No    Bowels Moved [x] Yes [] No    Incontinent     [x] Yes [] No Passed Gas [x]Yes [] No    New Onset  []Yes [x] No        MD Called []Yes  [] No     CHG Bath Done     Before Surgery     After Surgery      [] Yes  [x] No  [] Yes  [x] No       Drain Removed [] Yes  [] No [x] N/A    Dressing Changed [] Yes   [] No [x] N/A      Nausea/Vomiting [] Yes   [x] No     Ice Packs Changed [] Yes   [] No  [x] N/A    Incentive Spirometer  [] Yes  [x] No      SCD Pumps On     Ankle Pumping  [] Yes   [x] No      [] Yes   [x] No        Telemetry Monitoring [] Yes   [x] No   Rhythm

## 2021-11-20 NOTE — PROGRESS NOTES
Neurology Progress Note    Patient ID:  Karey Mcbride  [id number]  64 y.o.  1965    Subjective:      Patient was seen today as follow-up for dizziness. The patient ophthalmoplegia significantly improved now she has intact extraocular movements with fine square wave jerks. Suspected that she actually had Warnicke encephalopathy given the B1 level on admission and improvement of the eye movement with thiamine replacement. Also at that time the differential diagnosis was also an autoimmune post HSV-1 encephalopathy. She is doing overall well, able to ambulate with a walker. Nausea improved and she tolerates food.     Current Facility-Administered Medications   Medication Dose Route Frequency    PARoxetine (PAXIL) tablet 10 mg  10 mg Oral DAILY    cefTRIAXone (ROCEPHIN) 1 g in sterile water (preservative free) 10 mL IV syringe  1 g IntraVENous Q24H    phenazopyridine (PYRIDIUM) tablet 100 mg  100 mg Oral TIDPC    predniSONE (DELTASONE) tablet 40 mg  40 mg Oral DAILY WITH BREAKFAST    meclizine (ANTIVERT) tablet 12.5 mg  12.5 mg Oral TID    midodrine (PROAMATINE) tablet 2.5 mg  2.5 mg Oral Q12H PRN    albuterol-ipratropium (DUO-NEB) 2.5 MG-0.5 MG/3 ML  3 mL Nebulization Q6H PRN    thiamine (B-1) tablet 250 mg  250 mg Oral DAILY    lacosamide (VIMPAT) oral solution 100 mg  100 mg Oral Q12H    melatonin tablet 5 mg  5 mg Oral QHS    meclizine (ANTIVERT) tablet 12.5 mg  12.5 mg Oral Q6H PRN    pantoprazole (PROTONIX) tablet 40 mg  40 mg Oral ACB&D    mirtazapine (REMERON SOL-TAB) disintegrating tablet 15 mg  15 mg Oral QHS    phenol throat spray (CHLORASEPTIC) 1 Spray  1 Spray Oral PRN    therapeutic multivitamin (THERAGRAN) tablet 1 Tablet  1 Tablet Oral DAILY    sodium chloride (NS) flush 5-40 mL  5-40 mL IntraVENous Q8H    sodium chloride (NS) flush 5-40 mL  5-40 mL IntraVENous PRN    acetaminophen (TYLENOL) tablet 650 mg  650 mg Oral Q6H PRN    Or    acetaminophen (TYLENOL) suppository 650 mg  650 mg Rectal Q6H PRN    polyethylene glycol (MIRALAX) packet 17 g  17 g Oral DAILY PRN    ondansetron (ZOFRAN ODT) tablet 4 mg  4 mg Oral Q8H PRN    Or    ondansetron (ZOFRAN) injection 8 mg  8 mg IntraVENous Q6H PRN    enoxaparin (LOVENOX) injection 40 mg  40 mg SubCUTAneous DAILY          Objective: Active hospital medications were reviewed    Lab results and neuroradiology studies from the last 24 hours were reviewed. Prior to Admission medications    Medication Sig Start Date End Date Taking? Authorizing Provider   pantoprazole (PROTONIX) 40 mg tablet Take 1 Tablet by mouth daily. Take on empty stomah 11/9/21  Yes Bentley Billings MD   albuterol-ipratropium (DUO-NEB) 2.5 mg-0.5 mg/3 ml nebu 3 mL by Nebulization route every six (6) hours as needed for Wheezing. 11/9/21  Yes Bentley Billings MD   lacosamide (VIMPAT) 10 mg/mL soln oral solution Take 10 mL by mouth every twelve (12) hours. Max Daily Amount: 200 mg. 11/9/21  Yes Bentley Billings MD   melatonin 5 mg tablet Take 1 Tablet by mouth nightly. 11/9/21  Yes Bentley Billings MD   mirtazapine (REMERON SOL-TAB) 15 mg disintegrating tablet Take 1 Tablet by mouth nightly. 11/9/21  Yes Bentley Billings MD   predniSONE (DELTASONE) 10 mg tablet 5 tablets p.o. daily for 1 week then 4 tablets p.o. daily for 1 week then 3 tablets p.o. daily for 1 week then 2 tablets p.o. daily for 1 week then 1 tablet daily for 1 week and then stop 11/9/21  Yes Bentley Billings MD   therapeutic multivitamin [facility]) tablet Take 1 Tablet by mouth daily. 11/10/21  Yes Bentley Billings MD   thiamine  mg tablet 250 mg p.o. daily for 1 week then 100 mg p.o. daily 11/9/21  Yes Bentley Billings MD   potassium gluconate 595 mg (99 mg) tablet Take 99 mg by mouth daily. To begin taking when the packets run out   Yes Provider, Historical   potassium chloride (KLOR-CON) 20 mEq pack Take 20 mEq by mouth daily.  Mix with water   Yes Provider, Historical   meclizine (ANTIVERT) 12.5 mg tablet Take 12.5 mg by mouth three (3) times daily as needed for Dizziness. Dizziness   Yes Provider, Historical   ondansetron (ZOFRAN ODT) 8 mg disintegrating tablet Take 8 mg by mouth every eight (8) hours as needed for Nausea or Vomiting. 8/23/21  Yes Provider, Historical   LORazepam (Ativan) 1 mg tablet Take 1 Tablet by mouth every eight (8) hours as needed (Vomiting). Max Daily Amount: 3 mg. Patient not taking: Reported on 10/21/2021 10/15/21   Neha NEAL MD   levETIRAcetam (KEPPRA) 250 mg tablet Take 250 mg by mouth two (2) times a day. Patient not taking: Reported on 10/21/2021 8/26/21 8/26/22  Provider, Historical   amantadine HCL (SYMMETREL) 100 mg capsule Take 200 mg by mouth two (2) times a day. 7/27/21   Provider, Historical     Patient Vitals for the past 8 hrs:   BP Temp Pulse Resp SpO2   11/20/21 0808 122/74 97.1 °F (36.2 °C) 72 16 100 %   [id number] 1901 - 11/20 0700  In: 7146 [P.O.:1680; I.V.:10]  Out: 850 [Urine:850]  11/18 1901 - 11/20 0700  In: 5786 [P.O.:1680; I.V.:10]  Out: 850 [Urine:850]RESULTRCNT(24h)Active Problems:    Vomiting (10/20/2021)      Severe protein-calorie malnutrition ([address] (10/21/2021)      Depression due to physical illness (11/6/2021)        Additional comments:I reviewed the patient's new clinical lab test results. General Exam  No acute distress, mucous membranes normal color and hydration status    Neurologic Exam    Mental status:  Alert, oriented to person, place, time and circumstance  Language: normal fluency and comprehension  No visual spatial neglect or overt apraxia    Cranial nerves: PERRL, Extraocular movements improved, no ophthalmoplegia, fine gaze evoked nystagmus and square with jerks. Intact and full, face symmetric to movement, Tongue midline with normal strength, palat symmetric    Motor: Moves all 4 extremities symmetric. Gait: Ambulate using a walker. Gait improved from admission.     Assessment:     Yoselin Tran Marilyn Madsen is a 64 y.o. 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 Neurological exam significantly improved continue to have mild ophthalmoplegia which explain her dizziness. It is overall very possible that her clinical presentation was actually nutritional in etiology. Unfortunately send patient to B1 deficiency recover but not to 100%. Plan:     -Continue Paxil 10 mg every morning and after 1-2 weeks okay to increase to 20 mg and then again if needed up to 50 mg as tolerated for dizziness  -Continue meclizine 12.5 mg every 6 hours as needed for dizziness  -Continue Vimpat 100 mg every 12 hours  -Okay to continue with B1 100 mg daily  -Agree with discharge to home SNF for now and I suspect she will be able to be discharged home after that  -No other recommendations from neurological point of view.       Signed:  Tiffany Ha MD  Adult Neurologist  11/20/2021  12:02 PM

## 2021-11-20 NOTE — PROGRESS NOTES
conducted a Follow up consultation and Spiritual Assessment for Usman Bahena, who is a 64 y.o.,female. The  provided the following Interventions:  Continued the relationship of care and support. Engaged supportively with her in relation to her health and coping. Chart reviewed. The following outcomes were achieved:  Patient expressed gratitude for 's visit. Assessment:  There are no further spiritual or Mandaen issues which require Spiritual Care Services interventions at this time. Plan:  Chaplains will continue to follow and will provide pastoral care on an as needed/requested basis.  recommends bedside caregivers page  on duty if patient shows signs of acute spiritual or emotional distress.        [address]   [phone]

## 2021-11-21 LAB
BACTERIA SPEC CULT: ABNORMAL
CC UR VC: ABNORMAL
SERVICE CMNT-IMP: ABNORMAL

## 2021-11-21 PROCEDURE — 74011250636 HC RX REV CODE- 250/636: Performed by: INTERNAL MEDICINE

## 2021-11-21 PROCEDURE — 74011636637 HC RX REV CODE- 636/637: Performed by: INTERNAL MEDICINE

## 2021-11-21 PROCEDURE — 99232 SBSQ HOSP IP/OBS MODERATE 35: CPT | Performed by: HOSPITALIST

## 2021-11-21 PROCEDURE — 74011250637 HC RX REV CODE- 250/637: Performed by: INTERNAL MEDICINE

## 2021-11-21 PROCEDURE — 74011250637 HC RX REV CODE- 250/637: Performed by: PSYCHIATRY & NEUROLOGY

## 2021-11-21 PROCEDURE — 2709999900 HC NON-CHARGEABLE SUPPLY

## 2021-11-21 PROCEDURE — 51798 US URINE CAPACITY MEASURE: CPT

## 2021-11-21 PROCEDURE — 65270000029 HC RM PRIVATE

## 2021-11-21 PROCEDURE — 74011250637 HC RX REV CODE- 250/637: Performed by: HOSPITALIST

## 2021-11-21 PROCEDURE — 74011250637 HC RX REV CODE- 250/637: Performed by: FAMILY MEDICINE

## 2021-11-21 PROCEDURE — 97530 THERAPEUTIC ACTIVITIES: CPT

## 2021-11-21 RX ORDER — PAROXETINE 10 MG/1
10 TABLET, FILM COATED ORAL DAILY
Qty: 30 TABLET | Refills: 0 | Status: SHIPPED
Start: 2021-11-22

## 2021-11-21 RX ADMIN — PANTOPRAZOLE SODIUM 40 MG: 40 TABLET, DELAYED RELEASE ORAL at 17:16

## 2021-11-21 RX ADMIN — Medication 250 MG: at 10:15

## 2021-11-21 RX ADMIN — MECLIZINE 12.5 MG: 12.5 TABLET ORAL at 10:15

## 2021-11-21 RX ADMIN — PHENAZOPYRIDINE HYDROCHLORIDE 100 MG: 100 TABLET ORAL at 14:02

## 2021-11-21 RX ADMIN — PAROXETINE HYDROCHLORIDE 10 MG: 20 TABLET, FILM COATED ORAL at 09:00

## 2021-11-21 RX ADMIN — ENOXAPARIN SODIUM 40 MG: 100 INJECTION SUBCUTANEOUS at 10:16

## 2021-11-21 RX ADMIN — MIRTAZAPINE 15 MG: 15 TABLET, ORALLY DISINTEGRATING ORAL at 22:09

## 2021-11-21 RX ADMIN — LACOSAMIDE 100 MG: 10 SOLUTION ORAL at 22:09

## 2021-11-21 RX ADMIN — Medication 5 MG: at 22:09

## 2021-11-21 RX ADMIN — LACOSAMIDE 100 MG: 10 SOLUTION ORAL at 10:17

## 2021-11-21 RX ADMIN — PHENAZOPYRIDINE HYDROCHLORIDE 100 MG: 100 TABLET ORAL at 17:15

## 2021-11-21 RX ADMIN — PREDNISONE 40 MG: 20 TABLET ORAL at 08:00

## 2021-11-21 RX ADMIN — Medication 10 ML: at 22:09

## 2021-11-21 RX ADMIN — PANTOPRAZOLE SODIUM 40 MG: 40 TABLET, DELAYED RELEASE ORAL at 10:15

## 2021-11-21 RX ADMIN — THERA TABS 1 TABLET: TAB at 10:15

## 2021-11-21 RX ADMIN — Medication 10 ML: at 14:03

## 2021-11-21 RX ADMIN — PHENAZOPYRIDINE HYDROCHLORIDE 100 MG: 100 TABLET ORAL at 09:00

## 2021-11-21 RX ADMIN — Medication 10 ML: at 06:32

## 2021-11-21 NOTE — DISCHARGE SUMMARY
Discharge Summary    Patient: Jean Claude Couch MRN: [id number]  CSN: [id number]    YOB: 1965  Age: 64 y.o. 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 In June this year she became lethargic, poorly responsive, and was admitted at [facility] where she was found to have HSV encephalitis. She was intubated. She had a long and difficult recovery, had undergone acute rehab as well as PT and OT at home. Per her  the patient was doing better until about 4 to 6 weeks ago and after that she developed again confusion, difficulty ambulating, nausea and vomiting. She also reported diplopia. the patient reported feeling generally weak. Extremely dizzy and vertiginous, binocular diplopia, nausea and vomiting. Particularly over the past 2 weeks she had an extremely poor intake, so was unable to ambulate independently due to unsteadiness. No hx of seizures, but had abnormal EEG, was on Keppra and was not aware needed to be continued.       Discharge Condition: Good    PHYSICAL EXAM at discharge  Visit Vitals  /76 (BP 1 Location: Right arm, BP Patient Position: Sitting)   Pulse 78   Temp 97.4 °F (36.3 °C)   Resp 18   Ht 5' (1.524 m)   Wt 66.2 kg (146 lb)   SpO2 97%   BMI 28.51 kg/m²     General appearance -awake, follows commands appropriately, not in acute distress. Chest -CTA bilaterally, no wheezes currently. Heart - S1 and S2 normal  Abdomen - soft, nontender, nondistended, Bowel sounds present  Neurological -awake, follows commands appropriately, moves all 4 extremities well. No tremors. intact extraocular movements with fine square wave jerks  Extremities - no pedal edema noted  No rash to visible skin. Hospital Course:   1. 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 Continue supplements, meclizine prn. Continue Paxil 10 mg every morning and after 1-2 weeks okay to increase to 20 mg and then again if needed up to 50 mg as tolerated for dizziness. She would benefit from rehab including vestibular rehab.  2.  Multiple electrolyte disturbances, resolved currently  3. Intermittent vomiting with nausea could be due to central vertigo, resolved. Good po intake. 4.  Recent history of herpes encephalitis s/p treatment  5. 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 6.  Chronic mild hyponatremia due to nausea, resolved  7. Poor appetite and severe protein calorie malnutrition, slowly improving. Nutritionist follows. Multivit. 8.  GERD  9. Dyslipidemia  10. History of seizure disorder. Continue Vimpat. 11.  Intermittent encephalopathy with sundowning due to #5, and #4 and Wernicke's encephalopathy, better currently  12. 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 Continue prednisone and Vimpat as recommended by neurologist.  Taper prednisone by 10 mg weekly, next dose reduction 11/27/2021. 13.  Urinary tract infection, Klebsiella. She received 3 doses ceftriaxone based on sensitivity. Dysuria resolved. 14.  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 Leukocytosis with left shift, source most likely urine, improving. 16. DVT prophylaxis was given in the form of Lovenox subcut daily. 17. Full code.   Patient would benefit from outpatient follow-up with neurology, and ophthalmology,  is aware. Discharge to SNF when bed available. Patient and  aware, extensive discussions held at bedside, all questions answered to the best my ability. Consults: Neurology  Dr. Jose Freeman    Procedures  1. Midline placed 11/5/2021. Horacio Loera PA-C  2. Successful L2-L3 LP. Opening pressures 9. 12 cc clear CSF removed. Toni Kim PA-C . October 22, 2021       2:26 PM    Significant Diagnostic Studies: labs:    Ref. Range 11/18/2021 10:00 11/18/2021 12:40 11/20/2021 03:00   WBC Latest Ref Range: 4.6 - 13.2 K/uL  17.6 (H) 14.4 (H)   NRBC Latest Ref Range: 0  WBC  0.0 0.0   RBC Latest Ref Range: 4.20 - 5.30 M/uL  3.72 (L) 3.25 (L)   HGB Latest Ref Range: 12.0 - 16.0 g/dL  12.2 10.7 (L)   HCT Latest Ref Range: 35.0 - 45.0 %  38.9 33.6 (L)   MCV Latest Ref Range: 78.0 - 100.0 FL  104.6 (H) 103.4 (H)   MCH Latest Ref Range: 24.0 - 34.0 PG  32.8 32.9   MCHC Latest Ref Range: 31.0 - 37.0 g/dL  31.4 31.8   RDW Latest Ref Range: 11.6 - 14.5 %  16.6 (H) 15.8 (H)   PLATELET Latest Ref Range: 135 - 420 K/uL  429 (H) 415   MPV Latest Ref Range: 9.2 - 11.8 FL  9.1 (L) 9.3   NEUTROPHILS Latest Ref Range: 40 - 73 %  80 (H) 67   LYMPHOCYTES Latest Ref Range: 21 - 52 %  13 (L) 23   MONOCYTES Latest Ref Range: 3 - 10 %  6 8   EOSINOPHILS Latest Ref Range: 0 - 5 %  0 0   BASOPHILS Latest Ref Range: 0 - 2 %  0 0   IMMATURE GRANULOCYTES Latest Ref Range: 0.0 - 0.5 %  2 (H) 2 (H)   DF Latest Units:    AUTOMATED AUTOMATED   ABSOLUTE NRBC Latest Ref Range: 0.00 - 0.01 K/uL  0.00 0.00   ABS. NEUTROPHILS Latest Ref Range: 1.8 - 8.0 K/UL  14.1 (H) 9.6 (H)   ABS. IMM. GRANS. Latest Ref Range: 0.00 - 0.04 K/UL  0.3 (H) 0.3 (H)   ABS. LYMPHOCYTES Latest Ref Range: 0.9 - 3.6 K/UL  2.2 3.3   ABS. MONOCYTES Latest Ref Range: 0.05 - 1.2 K/UL  1.0 1.1   ABS. EOSINOPHILS Latest Ref Range: 0.0 - 0.4 K/UL  0.0 0.0   ABS.  BASOPHILS Latest Ref Range: 0.0 - 0.1 K/UL  0.0 0.0   Color Latest Units:   YELLOW     Appearance Latest Units:   CLOUDY     Specific gravity Latest Ref Range: 1.005 - 1.030   1.013     pH (UA) Latest Ref Range: 5.0 - 8.0   7.0     Protein Latest Ref Range: NEG mg/dL Negative     Glucose Latest Ref Range: NEG mg/dL Negative     Ketone Latest Ref Range: NEG mg/dL Negative     Blood Latest Ref Range: NEG   Negative     Bilirubin Latest Ref Range: NEG   Negative     Urobilinogen Latest Ref Range: 0.2 - 1.0 EU/dL 1.0     Nitrites Latest Ref Range: NEG   Positive (A)     Leukocyte Esterase Latest Ref Range: NEG   LARGE (A)     Epithelial cells Latest Ref Range: 0 - 5 /lpf FEW     WBC Latest Ref Range: 0 - 4 /hpf 11 to 20     RBC Latest Ref Range: 0 - 5 /hpf Negative     Bacteria Latest Ref Range: NEG /hpf 4+ (A)     Amorphous Crystals Latest Ref Range: NEG  2+ (A)     Sodium Latest Ref Range: 136 - 145 mmol/L  133 (L) 138   Potassium Latest Ref Range: 3.5 - 5.5 mmol/L  3.9 4.2   Chloride Latest Ref Range: 100 - 111 mmol/L  99 (L) 101   CO2 Latest Ref Range: 21 - 32 mmol/L  28 32   Anion gap Latest Ref Range: 3.0 - 18 mmol/L  6 5   Glucose Latest Ref Range: 74 - 99 mg/dL  116 (H) 81   BUN Latest Ref Range: 7.0 - 18 MG/DL  16 13   Creatinine Latest Ref Range: 0.6 - 1.3 MG/DL  0.57 (L) 0.51 (L)   BUN/Creatinine ratio Latest Ref Range: 12 - 20    28 (H) 25 (H)   Calcium Latest Ref Range: 8.5 - 10.1 MG/DL  9.1 9.4   Phosphorus Latest Ref Range: 2.5 - 4.9 MG/DL   4.3   Magnesium Latest Ref Range: 1.6 - 2.6 mg/dL   2.5   GFR est non-AA Latest Ref Range: >60 ml/min/1.73m2  >60 >60   GFR est AA Latest Ref Range: >60 ml/min/1.73m2  >60 >60       Results     Procedure Component Value Units Date/Time    CULTURE, URINE [[id number]]  (Abnormal)  (Susceptibility) Collected: 11/18/21 1000    Order Status: Completed Specimen: Urine from Clean catch Updated: 11/21/21 0749     Special Requests: NO SPECIAL REQUESTS        [place] Count --        >100,000  COLONIES/mL Culture result: KLEBSIELLA PNEUMONIAE       Susceptibility      Klebsiella pneumoniae     JOSE ALBERTO     Amikacin ($) Susceptible     Ampicillin ($) Resistant     Ampicillin/sulbactam ($) Susceptible     Cefazolin ($) Susceptible     Cefepime ($$) Susceptible     Cefoxitin Susceptible     Ceftazidime ($) Susceptible     Ceftriaxone ($) Susceptible     Ciprofloxacin ($) Susceptible     Gentamicin ($) Susceptible     Levofloxacin ($) Susceptible     Meropenem ($$) Susceptible     Nitrofurantoin Intermediate     Piperacillin/Tazobac ($) Susceptible     Tobramycin ($) Susceptible     Trimeth/Sulfa Susceptible                  Linear View                       IMAGING  XR Results (most recent):  Results from Hospital Encounter encounter on 10/20/21    XR ABD (KUB)    Narrative  EXAM : ABDOMEN PORTABLE  1124 HOURS    CLINICAL HISTORY/INDICATION:  Verification of Nasogastric tube placement  ,  vomiting    COMPARISON: Abdomen 10/29/2021. TECHNIQUE: AP portable abdomen 1 view    FINDINGS:    The tip of the esophagogastric tube ends in the mid stomach. There is gas and  stool within the colon. No bowel dilatation is seen. No organomegaly is noted. Impression  The esophagogastric tube ends at the mid stomach. CT Results (most recent):  Results from Hospital Encounter encounter on 10/20/21    CT CHEST W CONT    Narrative  CT CHEST WITH IV CONTRAST      COMPARISON: Abdominal CT 15 October 2021. INDICATIONS: Intractable nausea and vomiting, failure to thrive. TECHNIQUE: Following intravenous administration of 100 cc of  Isovue-300volumetric data acquisition was performed through the chest with a  multislice scanner. Reconstructions were created in the axial, coronal, and  sagittal planes. FINDINGS:    Thyroid/Base Of Neck:  Unremarkable  . Lungs:  No acute infiltrates are evident. .  No mass lesions are seen. .  A nodule is seen in the right upper lobe on axial image 27.  A 3 mm nodule is  seen in the medial right lower lobe on image 34  The bronchi appear unremarkable. Pleural Spaces: There is no pneumothorax or pleural fluid evident. No pleural plaques are seen    Lymph Nodes:  Axillae: Normal in size and number. Mediastinum / [place]: Normal in size and number. Mediastinum, Great Vessels And Heart: There is no mediastinal mass. The heart and great vessels appear unremarkable. Abdomen Structures Included: The included portions of the liver and spleen are unchanged. .    Osseous Structures: Mild convex right scoliosis. Otherwise negative. Impression  Two  small nodular opacities are evident in the right lung. Fleischner Society recommendation:  In the low risk patient no follow-up is warranted. One-year follow-up is optional in the high risk patient      All CT scans at this facility are performed using dose optimization technique as  appropriate to the performed exam, to include automated exposure control,  adjustment of the mA and/or kV according to patient's size (Including  appropriate matching for site-specific examinations), or use of iterative  reconstruction technique.     EKG Results     Procedure 720 Value Units Date/Time    EKG, 12 LEAD, INITIAL [[id number]] Collected: 10/20/21 1320    Order Status: Completed Updated: 10/21/21 0950     Ventricular Rate 102 BPM      Atrial Rate 102 BPM      P-R Interval 128 ms      QRS Duration 66 ms      Q-T Interval 330 ms      QTC Calculation (Bezet) 430 ms      Calculated P Axis 1 degrees      Calculated R Axis -19 degrees      Calculated T Axis 10 degrees      Diagnosis --     Sinus tachycardia  Cannot rule out Anterior infarct (cited on or before 20-OCT-2021)  Abnormal ECG  When compared with ECG of 15-OCT-2021 08:30,  Questionable change in initial forces of Anterior leads  ST no longer elevated in Inferior leads  Inverted T waves have replaced nonspecific T wave abnormality in Inferior   leads  Confirmed by Kayley Jiménez ([id number]) on 10/21/2021 9:49:57 AM            Discharge Medications:     Current Discharge Medication List      START taking these medications    Details   PARoxetine (PAXIL) 10 mg tablet Take 1 Tablet by mouth daily. Qty: 30 Tablet, Refills: 0      albuterol-ipratropium (DUO-NEB) 2.5 mg-0.5 mg/3 ml nebu 3 mL by Nebulization route every six (6) hours as needed for Wheezing. Qty: 30 Nebule, Refills: 0      lacosamide (VIMPAT) 10 mg/mL soln oral solution Take 10 mL by mouth every twelve (12) hours. Max Daily Amount: 200 mg. Qty: 465 mL, Refills: 0    Associated Diagnoses: Seizure ([address]      melatonin 5 mg tablet Take 1 Tablet by mouth nightly. Qty: 30 Tablet, Refills: 0      mirtazapine (REMERON SOL-TAB) 15 mg disintegrating tablet Take 1 Tablet by mouth nightly. Qty: 30 Tablet, Refills: 0      predniSONE (DELTASONE) 10 mg tablet 5 tablets p.o. daily for 1 week then 4 tablets p.o. daily for 1 week then 3 tablets p.o. daily for 1 week then 2 tablets p.o. daily for 1 week then 1 tablet daily for 1 week and then stop  Qty: 105 Tablet, Refills: 0      therapeutic multivitamin (THERAGRAN) tablet Take 1 Tablet by mouth daily. Qty: 30 Tablet, Refills: 0      thiamine  mg tablet 250 mg p.o. daily for 1 week then 100 mg p.o. daily  Qty: 44 Tablet, Refills: 0         CONTINUE these medications which have CHANGED    Details   pantoprazole (PROTONIX) 40 mg tablet Take 1 Tablet by mouth daily. Take on empty stomah  Qty: 30 Tablet, Refills: 0         CONTINUE these medications which have NOT CHANGED    Details   meclizine (ANTIVERT) 12.5 mg tablet Take 12.5 mg by mouth three (3) times daily as needed for Dizziness. Dizziness      ondansetron (ZOFRAN ODT) 8 mg disintegrating tablet Take 8 mg by mouth every eight (8) hours as needed for Nausea or Vomiting.          STOP taking these medications       potassium gluconate 595 mg (99 mg) tablet Comments:   Reason for Stopping:         potassium chloride (KLOR-CON) 20 mEq pack Comments:   Reason for Stopping:         LORazepam (Ativan) 1 mg tablet Comments:   Reason for Stopping:         levETIRAcetam (KEPPRA) 250 mg tablet Comments:   Reason for Stopping:         amantadine HCL (SYMMETREL) 100 mg capsule Comments:   Reason for Stopping:               Activity: PT/OT Eval and Treat    Diet: Cardiac Diet    Wound Care: None needed    Follow-up:   Follow-up Appointments   Procedures    FOLLOW UP VISIT Appointment in: Other (Specify) Nursing home doctor to follow this patient With patient's private neurologist as scheduled or in 2 to 3 weeks     Nursing home doctor to follow this patient  With patient's private neurologist as scheduled or in 2 to 3 weeks     Standing Status:   Standing     Number of Occurrences:   1     Order Specific Question:   Appointment in     Answer:    Other (Specify)       Minutes spent on discharge: Greater than 30

## 2021-11-21 NOTE — ROUTINE PROCESS
End of Shift Note     Bedside and verbal shift change report given to Sage King RN (On coming nurse) by Dulce Larry RN (Off going nurse).   Report included the following information:      --Procedure Summary     --MAR,     --Recent Results     --Med Rec Status    SBAR Recommendations: weakness    Issues for Provider to address confusion          Activity This Shift     [] Bed Rest Order   [] Refused   [] Dangled    [] TDWB         Ambulating:     [] Bathroom     [x] BSC     [] Room/Hallway      Up in Chair for meals    []Yes [] No   Voiding       [x] Yes  [] No  Leo          [] Yes  [] No  Incontinent [x] Yes  [] No    DUE TO VOID POUR        [] Yes [] No  Purewick    [x] Yes [] No  New Onset [] Yes [] No Straight Cath   []Yes  [] No  Condom Cath  [] Yes [] No  MD Called      [] Yes  [] No   Blood Sugars Managed []Yes [x] No    Bowels Moved [x] Yes [] No    Incontinent     [x] Yes [] No Passed Gas [x]Yes [] No    New Onset  []Yes [x] No        MD Called []Yes  [] No     CHG Bath Done     Before Surgery     After Surgery      [] Yes  [x] No  [] Yes  [x] No       Drain Removed [] Yes  [] No [x] N/A    Dressing Changed [] Yes   [] No [x] N/A      Nausea/Vomiting [] Yes   [x] No     Ice Packs Changed [] Yes   [] No  [x] N/A    Incentive Spirometer  [] Yes  [x] No      SCD Pumps On     Ankle Pumping  [] Yes   [x] No      [] Yes   [x] No        Telemetry Monitoring [] Yes   [x] No   Rhythm

## 2021-11-21 NOTE — PROGRESS NOTES
Problem: Mobility Impaired (Adult and Pediatric)  Goal: *Acute Goals and Plan of Care (Insert Text)  Description: Physical Therapy Goals  Re-evaluated 11/11/2021 and updated below  Initiated 10/21/2021, re-evaluated 11/4/21 and goals adjusted as needed and to be accomplished within 7 day(s)  1. Patient will move from supine to sit and sit to supine, scoot up and down, and roll side to side in bed with Sweetie. 2.  Patient will transfer from bed to chair and chair to bed with [place] using the least restrictive device. 3.  Patient will perform sit to stand with Sweetie. 4.  Patient will ambulate with Sweetie for 250 feet with the least restrictive device. 5.  Patient will negotiate 5 steps with unilateral HR and Sweetie. PLOF: Pt reports she lives in a [address] [place] [address] with her , states she was ambulatory with no AD however per chart review, pt has been using a wc recently as she has increased weakness and difficulty walking, pt only oriented to self and time this date. Outcome: Progressing Towards Goal     PHYSICAL THERAPY TREATMENT    Patient: Ashok Hayward (52 y.o. female)  Date: 11/21/2021  Diagnosis: Vomiting [R11.10]   <principal problem not specified>  Procedure(s) (LRB):  PERCUTANEOUS ENDOSCOPIC GASTROSTOMY TUBE INSERTION (N/A) 15 Days Post-Op  Precautions: Fall, Skin  PLOF: see above    ASSESSMENT:  Pt cleared for PT treatment session per nursing. Pt received in supine with HOB elevated and eventually agreeable to treatment session. Pt c/o of increased sensitivity to light as it increases her visual sensation of words/objects rolling towards her. Initated attempt to sit EOB however, pt c/o increased dizziness with head turn/changing position. Pt repositioned to comfort with SBA. Educated to call for assistance for EOB/OOB due to increased visual symptoms today. Pt left in supine with HOB elevated and all needs met/within reach.     Progression toward goals:   []      Improving appropriately and progressing toward goals  [x]      Improving slowly and progressing toward goals  []      Not making progress toward goals and plan of care will be adjusted     PLAN:  Patient continues to benefit from skilled intervention to address the above impairments. Continue treatment per established plan of care. Discharge Recommendations:  Rehab vs Home Health with 24/7 assistance  Further Equipment Recommendations for Discharge:  TBD at next level of care     SUBJECTIVE:   Patient stated When I move my head over, the dizziness gets worse. I feel like my eyes are just bouncing.     OBJECTIVE DATA SUMMARY:   Critical Behavior:  Neurologic State: Confused  Orientation Level: Oriented to place, Oriented to person, Oriented to time  Cognition: Poor safety awareness, Memory loss, Impulsive  Safety/Judgement: Awareness of environment, Fall prevention  Functional Mobility Training:  Bed Mobility:  Rolling: Stand-by assistance  Supine to Sit: Stand-by assistance (Initiated attempt, unable to complete to due dizziness)  Scooting: Stand-by assistance  Pain:  Pain level pre-treatment: 0/10  Pain level post-treatment: 0/10     Activity Tolerance:   Fair-, limited this session by increased dizziness when changing position, sensitivity to light, and visual sensation of words/objects rolling towards her   Please refer to the flowsheet for vital signs taken during this treatment. After treatment:   [] Patient left in no apparent distress sitting up in chair  [x] Patient left in no apparent distress in bed  [x] Call bell left within reach  [x] Nursing notified  [] Caregiver present  [] Bed alarm activated  [] SCDs applied      COMMUNICATION/EDUCATION:   [x]         Role of Physical Therapy in the acute care setting. [x]         Fall prevention education was provided and the patient/caregiver indicated understanding. [x]         Patient/family have participated as able in working toward goals and plan of care.   []         Patient/family agree to work toward stated goals and plan of care. []         Patient understands intent and goals of therapy, but is neutral about his/her participation.   []         Patient is unable to participate in stated goals/plan of care: ongoing with therapy staff.  []         Other:        Valentin Douglas, PTA   Time Calculation: 8 mins

## 2021-11-22 PROCEDURE — 97535 SELF CARE MNGMENT TRAINING: CPT

## 2021-11-22 PROCEDURE — 74011250636 HC RX REV CODE- 250/636: Performed by: INTERNAL MEDICINE

## 2021-11-22 PROCEDURE — 65270000029 HC RM PRIVATE

## 2021-11-22 PROCEDURE — 74011636637 HC RX REV CODE- 636/637: Performed by: INTERNAL MEDICINE

## 2021-11-22 PROCEDURE — 74011250637 HC RX REV CODE- 250/637: Performed by: INTERNAL MEDICINE

## 2021-11-22 PROCEDURE — 97168 OT RE-EVAL EST PLAN CARE: CPT

## 2021-11-22 PROCEDURE — 99232 SBSQ HOSP IP/OBS MODERATE 35: CPT | Performed by: HOSPITALIST

## 2021-11-22 PROCEDURE — 74011250637 HC RX REV CODE- 250/637: Performed by: HOSPITALIST

## 2021-11-22 PROCEDURE — 2709999900 HC NON-CHARGEABLE SUPPLY

## 2021-11-22 PROCEDURE — 74011250637 HC RX REV CODE- 250/637: Performed by: PSYCHIATRY & NEUROLOGY

## 2021-11-22 PROCEDURE — 74011250637 HC RX REV CODE- 250/637: Performed by: FAMILY MEDICINE

## 2021-11-22 PROCEDURE — 74011250636 HC RX REV CODE- 250/636: Performed by: HOSPITALIST

## 2021-11-22 RX ORDER — MECLIZINE HCL 12.5 MG 12.5 MG/1
12.5 TABLET ORAL
Status: DISCONTINUED | OUTPATIENT
Start: 2021-11-22 | End: 2021-11-24 | Stop reason: HOSPADM

## 2021-11-22 RX ADMIN — PREDNISONE 40 MG: 20 TABLET ORAL at 07:53

## 2021-11-22 RX ADMIN — MECLIZINE 12.5 MG: 12.5 TABLET ORAL at 21:18

## 2021-11-22 RX ADMIN — PHENAZOPYRIDINE HYDROCHLORIDE 100 MG: 100 TABLET ORAL at 08:59

## 2021-11-22 RX ADMIN — ENOXAPARIN SODIUM 40 MG: 100 INJECTION SUBCUTANEOUS at 09:00

## 2021-11-22 RX ADMIN — THERA TABS 1 TABLET: TAB at 08:59

## 2021-11-22 RX ADMIN — LACOSAMIDE 100 MG: 10 SOLUTION ORAL at 21:18

## 2021-11-22 RX ADMIN — PANTOPRAZOLE SODIUM 40 MG: 40 TABLET, DELAYED RELEASE ORAL at 06:20

## 2021-11-22 RX ADMIN — Medication 10 ML: at 05:06

## 2021-11-22 RX ADMIN — Medication 10 ML: at 21:19

## 2021-11-22 RX ADMIN — MIRTAZAPINE 15 MG: 15 TABLET, ORALLY DISINTEGRATING ORAL at 21:18

## 2021-11-22 RX ADMIN — PHENAZOPYRIDINE HYDROCHLORIDE 100 MG: 100 TABLET ORAL at 18:23

## 2021-11-22 RX ADMIN — PANTOPRAZOLE SODIUM 40 MG: 40 TABLET, DELAYED RELEASE ORAL at 17:31

## 2021-11-22 RX ADMIN — LACOSAMIDE 100 MG: 10 SOLUTION ORAL at 09:00

## 2021-11-22 RX ADMIN — Medication 5 MG: at 21:18

## 2021-11-22 RX ADMIN — PHENAZOPYRIDINE HYDROCHLORIDE 100 MG: 100 TABLET ORAL at 12:19

## 2021-11-22 RX ADMIN — MECLIZINE 12.5 MG: 12.5 TABLET ORAL at 12:16

## 2021-11-22 RX ADMIN — Medication 250 MG: at 08:59

## 2021-11-22 RX ADMIN — PAROXETINE HYDROCHLORIDE 10 MG: 20 TABLET, FILM COATED ORAL at 09:00

## 2021-11-22 NOTE — PROGRESS NOTES
Bedside and Verbal shift change report given to [address] LPN (oncoming nurse) by Heron Tavares RN (offgoing nurse). Report included the following information SBAR, Kardex, Intake/Output and MAR.

## 2021-11-22 NOTE — PROGRESS NOTES
Hospitalist Progress Note    Subjective:   Daily Progress Note: 11/22/2021    No acute overnight events. Patient seen and examined at bedside, she reports she did vestibular exercises this am. She is still quite dizzy. Agrees to try meclizine nightly, and prn during the day. Urinary sx resolved. Last BM 2 days ago, she feels she will go today.  at bedside. Assessment/Plan:       1. 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 Continue supplements, meclizine prn. Trial low-dose Paxil, increase gradually for chronic dizziness. She would benefit from rehab including vestibular rehab.  2.  Multiple electrolyte disturbances, resolved currently  3. Intermittent vomiting with nausea could be due to central vertigo, resolved  4. Recent history of herpes encephalitis s/p treatment  5. 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 6.  Chronic mild hyponatremia due to nausea, resolved  7. Poor appetite and severe protein calorie malnutrition, slowly improving. Nutritionist follows. Multivit. 8.  GERD  9. Dyslipidemia  10. History of seizure disorder  11. Intermittent encephalopathy with sundowning due to #5, and #4 and Wernicke's encephalopathy, better currently  12. 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 Continue prednisone and Vimpat as recommended by neurologist.  Taper prednisone by 10 mg weekly, next dose reduction 11/27/2021. 13.  Urinary tract infection  GNR, symptoms improving after started on antibiotic. Follow culture. 14.  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 Leukocytosis with left shift, source most likely urine, improving. 16.  Full code. Patient would benefit from outpatient follow-up with neurology, and ophthalmology,  is aware. SNF when bed available.  updated at bedside, all questions answered to the best my ability.       Patient is otherwise medically stable to be discharged to skilled nursing facility. Anticipatory discharge: SNF placement when bed is found. Discussed with case management    Total time to take care of this patient was 30 minutes and more than 50% of time was spent counseling and coordinating care. Disclaimer: Sections of this note are dictated using utilizing voice recognition software, which may have resulted in some phonetic based errors in grammar and contents. Even though attempts were made to correct all the mistakes, some may have been missed, and remained in the body of the document. If questions arise, please contact our department.       Current Facility-Administered Medications   Medication Dose Route Frequency    meclizine (ANTIVERT) tablet 12.5 mg  12.5 mg Oral QHS    PARoxetine (PAXIL) tablet 10 mg  10 mg Oral DAILY    phenazopyridine (PYRIDIUM) tablet 100 mg  100 mg Oral TIDPC    predniSONE (DELTASONE) tablet 40 mg  40 mg Oral DAILY WITH BREAKFAST    midodrine (PROAMATINE) tablet 2.5 mg  2.5 mg Oral Q12H PRN    albuterol-ipratropium (DUO-NEB) 2.5 MG-0.5 MG/3 ML  3 mL Nebulization Q6H PRN    thiamine (B-1) tablet 250 mg  250 mg Oral DAILY    lacosamide (VIMPAT) oral solution 100 mg  100 mg Oral Q12H    melatonin tablet 5 mg  5 mg Oral QHS    meclizine (ANTIVERT) tablet 12.5 mg  12.5 mg Oral Q6H PRN    pantoprazole (PROTONIX) tablet 40 mg  40 mg Oral ACB&D    mirtazapine (REMERON SOL-TAB) disintegrating tablet 15 mg  15 mg Oral QHS    phenol throat spray (CHLORASEPTIC) 1 Spray  1 Spray Oral PRN    therapeutic multivitamin (THERAGRAN) tablet 1 Tablet  1 Tablet Oral DAILY    sodium chloride (NS) flush 5-40 mL  5-40 mL IntraVENous Q8H    sodium chloride (NS) flush 5-40 mL  5-40 mL IntraVENous PRN    acetaminophen (TYLENOL) tablet 650 mg  650 mg Oral Q6H PRN    Or    acetaminophen (TYLENOL) suppository 650 mg  650 mg Rectal Q6H PRN    polyethylene glycol (MIRALAX) packet 17 g  17 g Oral DAILY PRN    ondansetron (ZOFRAN ODT) tablet 4 mg  4 mg Oral Q8H PRN    Or    ondansetron (ZOFRAN) injection 8 mg  8 mg IntraVENous Q6H PRN    enoxaparin (LOVENOX) injection 40 mg  40 mg SubCUTAneous DAILY        Review of Systems  Pertinent items are noted in HPI. Objective:     Visit Vitals  /63   Pulse 86   Temp 97.9 °F (36.6 °C)   Resp 18   Ht 5' (1.524 m)   Wt 66.2 kg (146 lb)   LMP 2019 Comment: unknown   SpO2 96%   BMI 28.51 kg/m²      O2 Device: None (Room air)    Temp (24hrs), Av.9 °F (36.6 °C), Min:97.2 °F (36.2 °C), Max:98.4 °F (36.9 °C)      No intake/output data recorded.  1901 -  0700  In: 120 [P.O.:120]  Out: 300 [Urine:300]    General appearance -found sitting up in chair, speaking in full sentences on RA. Awake alert and oriented x4. Nad.  at bedside. Chest -CTA bilaterally, no wheezes currently. Heart - S1 and S2 RRR  Abdomen - soft, nontender, nondistended, Bowel sounds present  Neurological -awake, follows commands appropriately, moves all 4 extremities well. No tremors. Extremities - no pedal edema noted  Skin: no rash to visible skin. Data Review    No results found for this or any previous visit (from the past 24 hour(s)).

## 2021-11-22 NOTE — PROGRESS NOTES
Problem: Self Care Deficits Care Plan (Adult)  Goal: *Acute Goals and Plan of Care (Insert Text)  Description: Occupational Therapy Goals  Re-evaluated 11/15/2021 and continue with all goals for consistency. 1.  Patient will perform bed mobility in preparation for selfcare with modified independence. (Met 11/22)  2. Patient will perform grooming task/functional activity standing for 4-7 minutes with supervision/set-up, F+ balance. (Met 11/20)  3. Patient will perform lower body dressing with supervision/set-up seated and standing using AE prn (Pt has reacher at home). (Met 11/20)  4. Patient will perform toilet transfers with supervision/set-up. (Met 11/20)  5. Patient will perform all aspects of toileting with supervision/set-up. (Met 11/20 seated)  6. Patient will participate in upper extremity therapeutic exercise/activities with modified independence for 8 minutes. 7.  Patient will utilize energy conservation techniques during functional activities with verbal cues. Prior Level of Function: Patient was independent with self-care and functional mobility PTA. Patient's spouse reports she would walk unsteady at home d/t vertigo and for the stairs she would bump up/down on her bottom  Outcome: Resolved/Met     OCCUPATIONAL THERAPY RE-EVALUATION/DISCHARGE    Patient: Usman Bahena (62 y.o. female)  Date: 11/22/2021  Primary Diagnosis: Vomiting [R11.10]  Procedure(s) (LRB):  PERCUTANEOUS ENDOSCOPIC GASTROSTOMY TUBE INSERTION (N/A) 16 Days Post-Op   Precautions: Fall      ASSESSMENT AND RECOMMENDATIONS:  Upon entering the room, patient was supine in bed, alert, and agreeable to participate in OT re-evaluation. Patient presents this session modified independent - supervision with basic self-care, modified independent with bed mobility and supervision with functional transfers/mobility using rolling walker.  Patient has improved to meeting skilled OT goals and requiring minimal verbal cues for safety, no tactile cues this session. The patient presents with good static standing and fair dynamic standing balance, however will defer to PT for functional balance and functional mobility tasks. Based on the objective data described below, the patient presents with no deficits that impede pt function with ADLs. OT to d/c from caseload at this time. Skilled occupational therapy is not indicated at this time. Discharge Recommendations: Home Health with 24/7 Supervision d/t intermittent confusion;  If family unable to provide 24/7 Supervision, OT to recommend Rehab  Further Equipment Recommendations for Discharge: shower chair and rolling walker      SUBJECTIVE:   Patient stated i'll get into the chair for breakfast this morning    OBJECTIVE DATA SUMMARY:     Past Medical History:   Diagnosis Date    Arthritis     Diarrhea     no diarrhea since 9/14/2021    Encephalitis 06/14/2021    intubated    GERD (gastroesophageal reflux disease)     H. pylori infection 2019    IBS (irritable bowel syndrome)     Memory difficulty     since encephalitis    Migraine     Ovarian cyst 2019    Seizures ([facility] [address] 06/16/2021    x 1    Vomiting      Past Surgical History:   Procedure Laterality Date    HX BREAST AUGMENTATION  2005    HX CHOLECYSTECTOMY      HX COLONOSCOPY  2016    HX ENDOSCOPY  2019    HX TYMPANOPLASTY      left x 5 times     Barriers to Learning/Limitations: None  Compensate with: visual, verbal, tactile, kinesthetic cues/model    Home Situation:   Home Situation  Home Environment: Private residence  One/Two Story Residence: Two story, live on 1st floor  Living Alone: No  Support Systems: Spouse/Significant Other  Patient Expects to be Discharged to[de-identified] House  Current DME Used/Available at Home: Shower chair  Tub or Shower Type: Tub/Shower combination  [x]     Right hand dominant   []     Left hand dominant    Cognitive/Behavioral Status:  Neurologic State: Alert  Orientation Level: Oriented to person; Oriented to place; Oriented to situation  Cognition: Follows commands  Safety/Judgement: Fall prevention; Insight into deficits    Skin: Intact  Edema: None noted    Vision/Perceptual:    Acuity: Within Defined Limits (pt observed turning head to side for compensation)      Coordination: BUE  Fine Motor Skills-Upper: Left Intact; Right Intact    Gross Motor Skills-Upper: Left Intact; Right Intact    Balance:  Sitting: Intact  Sitting - Static: Good (unsupported)  Sitting - Dynamic: Good (unsupported)  Standing: Impaired; With support  Standing - Static: Good  Standing - Dynamic : Fair    Strength: BUE  Strength: Generally decreased, functional     Tone & Sensation: BUE  Tone: Normal  Sensation: Intact    Range of Motion: BUE  AROM: Within functional limits         Functional Mobility and Transfers for ADLs:  Bed Mobility:  Supine to Sit: Modified independent  Scooting: Modified independent    Transfers:  Sit to Stand: Supervision  Stand to Sit: Supervision   Toilet Transfer : Supervision (simulation with recliner)    ADL Assessment:  Feeding: Modified independent    Oral Facial Hygiene/Grooming: Modified Independent; Supervision (Supervision standing for balance)    Bathing: Modified independent; Supervision (Supervision standing)    Upper Body Dressing: Modified independent    Lower Body Dressing: Modified independent; Supervision (Supervision standing)    Toileting: Modified independent; Supervision (supervision standing)      ADL Intervention:  Feeding  Feeding Assistance: Modified independent  Container Management: Modified independent  Cutting Food: Modified indpendent  Utensil Management: Modified independent  Food to Mouth: Modified independent    Lower Body Dressing Assistance  Dressing Assistance: Modified independent  Socks: Modified independent  Leg Crossed Method Used: Yes  Position Performed: Seated in chair    Cognitive Retraining  Safety/Judgement: Fall prevention;  Insight into deficits    Pain:  Pain level pre-treatment: 0/10   Pain level post-treatment: 0/10   Pain Intervention(s): Medication (see MAR); Response to intervention: Nurse notified, See doc flow    Activity Tolerance:   Good    Please refer to the flowsheet for vital signs taken during this treatment. After treatment:   [x]  Patient left in no apparent distress sitting up in chair  []  Patient left in no apparent distress in bed  [x]  Call bell left within reach  [x]  Nursing notified and present  []  Caregiver present  [x]  alarm activated    COMMUNICATION/EDUCATION:   [x]      Role of Occupational Therapy in the acute care setting  [x]      Home safety education was provided and the patient/caregiver indicated understanding. [x]      Patient/family have participated as able and agree with findings and recommendations. []      Patient is unable to participate in plan of care at this time.     Thank you for this referral.  Rosa Ford OTR/L  Time Calculation: 16 mins

## 2021-11-23 PROCEDURE — 74011250637 HC RX REV CODE- 250/637: Performed by: PSYCHIATRY & NEUROLOGY

## 2021-11-23 PROCEDURE — 74011250636 HC RX REV CODE- 250/636: Performed by: INTERNAL MEDICINE

## 2021-11-23 PROCEDURE — 74011250637 HC RX REV CODE- 250/637: Performed by: FAMILY MEDICINE

## 2021-11-23 PROCEDURE — 74011250636 HC RX REV CODE- 250/636: Performed by: HOSPITALIST

## 2021-11-23 PROCEDURE — 74011250637 HC RX REV CODE- 250/637: Performed by: HOSPITALIST

## 2021-11-23 PROCEDURE — 74011636637 HC RX REV CODE- 636/637: Performed by: INTERNAL MEDICINE

## 2021-11-23 PROCEDURE — 65270000029 HC RM PRIVATE

## 2021-11-23 PROCEDURE — 74011250637 HC RX REV CODE- 250/637: Performed by: INTERNAL MEDICINE

## 2021-11-23 PROCEDURE — 97116 GAIT TRAINING THERAPY: CPT

## 2021-11-23 PROCEDURE — 99232 SBSQ HOSP IP/OBS MODERATE 35: CPT | Performed by: HOSPITALIST

## 2021-11-23 RX ADMIN — PANTOPRAZOLE SODIUM 40 MG: 40 TABLET, DELAYED RELEASE ORAL at 06:40

## 2021-11-23 RX ADMIN — ENOXAPARIN SODIUM 40 MG: 100 INJECTION SUBCUTANEOUS at 09:20

## 2021-11-23 RX ADMIN — PANTOPRAZOLE SODIUM 40 MG: 40 TABLET, DELAYED RELEASE ORAL at 16:39

## 2021-11-23 RX ADMIN — PHENAZOPYRIDINE HYDROCHLORIDE 100 MG: 100 TABLET ORAL at 17:47

## 2021-11-23 RX ADMIN — PREDNISONE 40 MG: 20 TABLET ORAL at 09:19

## 2021-11-23 RX ADMIN — PHENAZOPYRIDINE HYDROCHLORIDE 100 MG: 100 TABLET ORAL at 09:32

## 2021-11-23 RX ADMIN — PHENAZOPYRIDINE HYDROCHLORIDE 100 MG: 100 TABLET ORAL at 12:14

## 2021-11-23 RX ADMIN — LACOSAMIDE 100 MG: 10 SOLUTION ORAL at 20:52

## 2021-11-23 RX ADMIN — Medication 250 MG: at 09:19

## 2021-11-23 RX ADMIN — Medication 10 ML: at 05:50

## 2021-11-23 RX ADMIN — Medication 5 MG: at 21:28

## 2021-11-23 RX ADMIN — Medication 10 ML: at 21:28

## 2021-11-23 RX ADMIN — PAROXETINE HYDROCHLORIDE 10 MG: 20 TABLET, FILM COATED ORAL at 09:19

## 2021-11-23 RX ADMIN — Medication 10 ML: at 13:02

## 2021-11-23 RX ADMIN — THERA TABS 1 TABLET: TAB at 09:19

## 2021-11-23 RX ADMIN — MECLIZINE 12.5 MG: 12.5 TABLET ORAL at 20:52

## 2021-11-23 RX ADMIN — LACOSAMIDE 100 MG: 10 SOLUTION ORAL at 09:19

## 2021-11-23 RX ADMIN — MIRTAZAPINE 15 MG: 15 TABLET, ORALLY DISINTEGRATING ORAL at 21:28

## 2021-11-23 NOTE — PROGRESS NOTES
Problem: Pressure Injury - Risk of  Goal: *Prevention of pressure injury  Description: Document Christofer Scale and appropriate interventions in the flowsheet. Outcome: Progressing Towards Goal  Note: Pressure Injury Interventions:  Sensory Interventions: Assess changes in LOC, Assess need for specialty bed, Check visual cues for pain    Moisture Interventions: Absorbent underpads, Apply protective barrier, creams and emollients, Assess need for specialty bed    Activity Interventions: Assess need for specialty bed, Increase time out of bed, Pressure redistribution bed/mattress(bed type), PT/OT evaluation    Mobility Interventions: Assess need for specialty bed, HOB 30 degrees or less, Pressure redistribution bed/mattress (bed type), PT/OT evaluation    Nutrition Interventions: Document food/fluid/supplement intake, Offer support with meals,snacks and hydration    Friction and Shear Interventions: Apply protective barrier, creams and emollients, Foam dressings/transparent film/skin sealants, HOB 30 degrees or less                Problem: Patient Education: Go to Patient Education Activity  Goal: Patient/Family Education  Outcome: Progressing Towards Goal     Problem: Falls - Risk of  Goal: *Absence of Falls  Description: Document Theresa Fall Risk and appropriate interventions in the flowsheet.   Outcome: Progressing Towards Goal  Note: Fall Risk Interventions:  Mobility Interventions: Assess mobility with egress test, Patient to call before getting OOB, PT Consult for mobility concerns    Mentation Interventions: Adequate sleep, hydration, pain control, Door open when patient unattended, Eyeglasses and hearing aids, Familiar objects from home, Family/sitter at bedside    Medication Interventions: Assess postural VS orthostatic hypotension, Evaluate medications/consider consulting pharmacy, Patient to call before getting OOB, Teach patient to arise slowly    Elimination Interventions: Call light in reach, Elevated toilet seat, Stay With Me (per policy), Toilet paper/wipes in reach, Toileting schedule/hourly rounds    History of Falls Interventions: Consult care management for discharge planning, Evaluate medications/consider consulting pharmacy, Investigate reason for fall, Room close to nurse's station, Assess for delayed presentation/identification of injury for 48 hrs (comment for end date)         Problem: Patient Education: Go to Patient Education Activity  Goal: Patient/Family Education  Outcome: Progressing Towards Goal     Problem: Nutrition Deficit  Goal: *Optimize nutritional status  Outcome: Progressing Towards Goal     Problem: Patient Education: Go to Patient Education Activity  Goal: Patient/Family Education  Outcome: Progressing Towards Goal     Problem: Patient Education: Go to Patient Education Activity  Goal: Patient/Family Education  Outcome: Progressing Towards Goal     Problem: Patient Education: Go to Patient Education Activity  Goal: Patient/Family Education  Outcome: Progressing Towards Goal     Problem: Patient Education: Go to Patient Education Activity  Goal: Patient/Family Education  Outcome: Progressing Towards Goal     Problem: Nausea/Vomiting (Adult)  Goal: *Absence of nausea/vomiting  Outcome: Progressing Towards Goal  Goal: *Palliation of nausea/vomiting (Palliative Care)  Outcome: Progressing Towards Goal     Problem: Patient Education: Go to Patient Education Activity  Goal: Patient/Family Education  Outcome: Progressing Towards Goal

## 2021-11-23 NOTE — PROGRESS NOTES
Hospitalist Progress Note    Subjective:   Daily Progress Note: 11/23/2021    No acute overnight events. Patient seen and examined at bedside, hopes she can go to SNF soon.  at bedside, states he is unable to care for her at home as he is working full time. Assessment/Plan:       1. 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 Continue supplements, meclizine prn. Trial low-dose Paxil, increase gradually for chronic dizziness. She would benefit from rehab including vestibular rehab.  2.  Multiple electrolyte disturbances, resolved currently  3. Intermittent vomiting with nausea could be due to central vertigo, resolved  4. Recent history of herpes encephalitis s/p treatment  5. 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 6.  Chronic mild hyponatremia due to nausea, resolved  7. Poor appetite and severe protein calorie malnutrition, slowly improving. Nutritionist follows. Multivit. 8.  GERD  9. Dyslipidemia  10. History of seizure disorder  11. Intermittent encephalopathy with sundowning due to #5, and #4 and Wernicke's encephalopathy, better currently  12. 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 Continue prednisone and Vimpat as recommended by neurologist.  Taper prednisone by 10 mg weekly, next dose reduction 11/27/2021. 13.  Urinary tract infection  GNR, symptoms improving after started on antibiotic. Follow culture. 14.  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 Leukocytosis with left shift, source most likely urine, improving. 16.  Full code. Patient would benefit from outpatient follow-up with neurology, and ophthalmology,  is aware. SNF when bed available.  updated at bedside, all questions answered to the best my ability. Patient is otherwise medically stable to be discharged to skilled nursing facility. Anticipatory discharge: SNF placement when bed is found.   Discussed with case management    Total time to take care of this patient was 30 minutes and more than 50% of time was spent counseling and coordinating care. Disclaimer: Sections of this note are dictated using utilizing voice recognition software, which may have resulted in some phonetic based errors in grammar and contents. Even though attempts were made to correct all the mistakes, some may have been missed, and remained in the body of the document. If questions arise, please contact our department.       Current Facility-Administered Medications   Medication Dose Route Frequency    meclizine (ANTIVERT) tablet 12.5 mg  12.5 mg Oral QHS    PARoxetine (PAXIL) tablet 10 mg  10 mg Oral DAILY    phenazopyridine (PYRIDIUM) tablet 100 mg  100 mg Oral TIDPC    predniSONE (DELTASONE) tablet 40 mg  40 mg Oral DAILY WITH BREAKFAST    midodrine (PROAMATINE) tablet 2.5 mg  2.5 mg Oral Q12H PRN    albuterol-ipratropium (DUO-NEB) 2.5 MG-0.5 MG/3 ML  3 mL Nebulization Q6H PRN    thiamine (B-1) tablet 250 mg  250 mg Oral DAILY    lacosamide (VIMPAT) oral solution 100 mg  100 mg Oral Q12H    melatonin tablet 5 mg  5 mg Oral QHS    meclizine (ANTIVERT) tablet 12.5 mg  12.5 mg Oral Q6H PRN    pantoprazole (PROTONIX) tablet 40 mg  40 mg Oral ACB&D    mirtazapine (REMERON SOL-TAB) disintegrating tablet 15 mg  15 mg Oral QHS    phenol throat spray (CHLORASEPTIC) 1 Spray  1 Spray Oral PRN    therapeutic multivitamin (THERAGRAN) tablet 1 Tablet  1 Tablet Oral DAILY    sodium chloride (NS) flush 5-40 mL  5-40 mL IntraVENous Q8H    sodium chloride (NS) flush 5-40 mL  5-40 mL IntraVENous PRN    acetaminophen (TYLENOL) tablet 650 mg  650 mg Oral Q6H PRN    Or    acetaminophen (TYLENOL) suppository 650 mg  650 mg Rectal Q6H PRN    polyethylene glycol (MIRALAX) packet 17 g  17 g Oral DAILY PRN    ondansetron (ZOFRAN ODT) tablet 4 mg  4 mg Oral Q8H PRN    Or    ondansetron (ZOFRAN) injection 8 mg  8 mg IntraVENous Q6H PRN    enoxaparin (LOVENOX) injection 40 mg  40 mg SubCUTAneous DAILY        Review of Systems  Pertinent items are noted in HPI. Objective:     Visit Vitals  /66   Pulse 85   Temp 97.7 °F (36.5 °C)   Resp 21   Ht 5' (1.524 m)   Wt 66.2 kg (146 lb)   LMP 2019 Comment: unknown   SpO2 97%   BMI 28.51 kg/m²      O2 Device: None (Room air)    Temp (24hrs), Av.9 °F (36.6 °C), Min:97.1 °F (36.2 °C), Max:98.4 °F (36.9 °C)      No intake/output data recorded.  1901 -  0700  In: 1440 [P.O.:1440]  Out: 1100 [Urine:1100]    General appearance -found sitting up in chair, speaking in full sentences on RA. Awake alert and oriented x4. Nad.  at bedside. Chest -CTA bilaterally, no wheezes currently. Heart - S1 and S2 RRR  Abdomen - soft, nontender, nondistended, Bowel sounds present  Neurological -awake, follows commands appropriately, moves all 4 extremities well. No tremors. Extremities - no pedal edema noted  Skin: no rash to visible skin. Data Review    No results found for this or any previous visit (from the past 24 hour(s)).

## 2021-11-23 NOTE — ROUTINE PROCESS
Bedside shift change report given to Heron Robles (oncoming nurse) by William Zamora (offgoing nurse). Report included the following information SBAR and Kardex.

## 2021-11-23 NOTE — PROGRESS NOTES
Problem: Mobility Impaired (Adult and Pediatric)  Goal: *Acute Goals and Plan of Care (Insert Text)  Description: Physical Therapy Goals  Re-evaluated 11/11/2021 and updated below  Initiated 10/21/2021, re-evaluated 11/4/21 and goals adjusted as needed and to be accomplished within 7 day(s)  1. Patient will move from supine to sit and sit to supine, scoot up and down, and roll side to side in bed with Sweetie. 2.  Patient will transfer from bed to chair and chair to bed with Sweetie using the least restrictive device. 3.  Patient will perform sit to stand with Sweetie. 4.  Patient will ambulate with Sweetie for 250 feet with the least restrictive device. 5.  Patient will negotiate 5 steps with unilateral HR and Sweetie. PLOF: Pt reports she lives in a [address] [place] [address] with her , states she was ambulatory with no AD however per chart review, pt has been using a wc recently as she has increased weakness and difficulty walking, pt only oriented to self and time this date. Outcome: Progressing Towards Goal     PHYSICAL THERAPY TREATMENT    Patient: Sheng Huitron (15 y.o. female)  Date: 11/23/2021  Diagnosis: Vomiting [R11.10]   <principal problem not specified>  Procedure(s) (LRB):  PERCUTANEOUS ENDOSCOPIC GASTROSTOMY TUBE INSERTION (N/A) 17 Days Post-Op  Precautions: Fall  PLOF: see above    ASSESSMENT:  Pt received sitting EOB in NaD, agreeable to PT. Pt endorses dizziness at this time but has already received her meds. Pt amb approx 300 ft with RW and supervision for safety, no LOB. Pt is able to ascend/descend 4 steps with SBA and no LOB with step to step pattern. Pt reports improvement in symptoms with mobility and is able to statically stand with good balance and no AD. Pt finishes session with toileting with mod ind for pericare and hand hygiene. Is positioned back in bed with all needs met and call bell within reach.  Will continue to follow per POC in the acute setting, recommend [place] upon discharge with family support followed by outpatient PT for neuro/vestibular rehab. Progression toward goals:   [x]      Improving appropriately and progressing toward goals  []      Improving slowly and progressing toward goals  []      Not making progress toward goals and plan of care will be adjusted     PLAN:  Patient continues to benefit from skilled intervention to address the above impairments. Continue treatment per established plan of care. Discharge Recommendations:  Home Health with 24/7 assist for safety followed by outpatient PT for neuro/vestibular rehab   Further Equipment Recommendations for Discharge:  rolling walker     SUBJECTIVE:   Patient stated I hope this is not my new normal, I want my brain back.     OBJECTIVE DATA SUMMARY:   Critical Behavior:  Neurologic State: Alert  Orientation Level: Oriented X4  Cognition: Appropriate for age attention/concentration  Safety/Judgement: Fall prevention, Insight into deficits  Functional Mobility Training:  Bed Mobility:     Supine to Sit: Modified independent  Sit to Supine: Modified independent  Scooting: Modified independent         Transfers:  Sit to Stand: Modified independent  Stand to Sit: Modified independent       Balance:  Sitting: Intact  Sitting - Static: Good (unsupported)  Sitting - Dynamic: Good (unsupported)  Standing: Intact; With support  Standing - Static: Good  Standing - Dynamic : Fair    Ambulation/Gait Training:  Distance (ft): 300 Feet (ft)  Assistive Device: Walker, rolling  Ambulation - Level of Assistance: Supervision        Gait Abnormalities: Decreased step clearance              Speed/Helen: Pace decreased (<100 feet/min)  Step Length: Left shortened; Right shortened    Stairs:  Number of Stairs Trained: 4  Stairs - Level of Assistance: Stand-by assistance  Rail Use: Both        Pain:  Pain level pre-treatment: 0/10  Pain level post-treatment: 0/10   Pain Intervention(s): Medication (see MAR);  Rest, Ice, Repositioning   Response to intervention: Nurse notified    Activity Tolerance:   Good    Please refer to the flowsheet for vital signs taken during this treatment. After treatment:   [] Patient left in no apparent distress sitting up in chair  [x] Patient left in no apparent distress in bed  [x] Call bell left within reach  [x] Nursing notified  [x] Caregiver present  [] Bed alarm activated  [] SCDs applied      COMMUNICATION/EDUCATION:   [x]         Role of Physical Therapy in the acute care setting. [x]         Fall prevention education was provided and the patient/caregiver indicated understanding. [x]         Patient/family have participated as able in working toward goals and plan of care. [x]         Patient/family agree to work toward stated goals and plan of care. []         Patient understands intent and goals of therapy, but is neutral about his/her participation.   []         Patient is unable to participate in stated goals/plan of care: ongoing with therapy staff.  []         Other:        Jovana Morataya   Time Calculation: 23 mins

## 2021-11-23 NOTE — PROGRESS NOTES
Nutrition Assessment     Type and Reason for Visit: Reassess, Consult    Nutrition Recommendations/Plan:   - Continue current nutrition interventions. Nutrition Assessment:  Tolerating diet with good appetite, consuming most of meals and supplements. Malnutrition Assessment:  Malnutrition Status: Severe malnutrition     Estimated Daily Nutrient Needs:  Energy (kcal):  7518-4234  Protein (g):         Fluid (ml/day):  7063-4048    Nutrition Related Findings:  BM 11/21      Current Nutrition Therapies:  ADULT DIET Dysphagia - Soft & Bite Sized  ADULT ORAL NUTRITION SUPPLEMENT Breakfast, Lunch, Dinner; Standard High Calorie/High Protein    Anthropometric Measures:  · Height:  5' (152.4 cm)  · Current Body Wt:  67.1 kg (147 lb 14.9 oz)  · BMI: 28.9    Nutrition Diagnosis:   · Severe malnutrition, In context of chronic illness related to inadequate protein-energy intake (decreased appetite) as evidenced by poor intake prior to admission, weight loss greater than or equal to 10% in 6 months      Nutrition Intervention:  Food and/or Nutrient Delivery: Continue current diet, Continue oral nutrition supplement, Vitamin supplement  Nutrition Education and Counseling: No recommendations at this time, Education not indicated  Coordination of Nutrition Care: Continue to monitor while inpatient    Goals:  PO nutrition intake will continue to meet >75% of patient estimated nutritional needs within the next 7 days.        Nutrition Monitoring and Evaluation:   Behavioral-Environmental Outcomes: None identified  Food/Nutrient Intake Outcomes: Food and nutrient intake, Vitamin/mineral intake, Supplement intake  Physical Signs/Symptoms Outcomes: Biochemical data, Chewing or swallowing, GI status, Meal time behavior, Nutrition focused physical findings    Discharge Planning:    Continue current diet, Continue oral nutrition supplement     Electronically signed by David Medina RD, [address]  on 11/23/2021 at 12:10 PM    Contact Number: [phone]

## 2021-11-23 NOTE — PROGRESS NOTES
Spoke with Ismael Cheek at [address] Explained that  wants her to have more time to get stronger before coming home. She will speak to the facility and get back to us. Stressed that we need an definite answer today so patient can be discharged. 1600- received call from [address] [facility]. They got the auth updated to today and can take her today or tomorrow; just needs a PCR covid test done prior to admission. Spoke with Dr Marnie Castillo; she will order test. Spoke with  Siria Monte; let him know they are expecting her around 11am. He is in agreement to transport her.   Celestine Emerson RN - Outcomes Manager  [phone]

## 2021-11-23 NOTE — PROGRESS NOTES
Patient resting in bed. Call bell within reach.  Report given to Linden STATON.-----------  [facility]

## 2021-11-24 VITALS
DIASTOLIC BLOOD PRESSURE: 70 MMHG | HEART RATE: 75 BPM | TEMPERATURE: 99 F | BODY MASS INDEX: 28.66 KG/M2 | RESPIRATION RATE: 18 BRPM | WEIGHT: 146 LBS | SYSTOLIC BLOOD PRESSURE: 124 MMHG | OXYGEN SATURATION: 96 % | HEIGHT: 60 IN

## 2021-11-24 LAB
FLUAV RNA SPEC QL NAA+PROBE: NOT DETECTED
FLUBV RNA SPEC QL NAA+PROBE: NOT DETECTED
SARS-COV-2, COV2: NOT DETECTED

## 2021-11-24 PROCEDURE — 74011250636 HC RX REV CODE- 250/636: Performed by: INTERNAL MEDICINE

## 2021-11-24 PROCEDURE — 87636 SARSCOV2 & INF A&B AMP PRB: CPT

## 2021-11-24 PROCEDURE — 74011250637 HC RX REV CODE- 250/637: Performed by: PSYCHIATRY & NEUROLOGY

## 2021-11-24 PROCEDURE — 74011250637 HC RX REV CODE- 250/637: Performed by: FAMILY MEDICINE

## 2021-11-24 PROCEDURE — 99239 HOSP IP/OBS DSCHRG MGMT >30: CPT | Performed by: HOSPITALIST

## 2021-11-24 PROCEDURE — 74011250637 HC RX REV CODE- 250/637: Performed by: HOSPITALIST

## 2021-11-24 PROCEDURE — 74011636637 HC RX REV CODE- 636/637: Performed by: INTERNAL MEDICINE

## 2021-11-24 PROCEDURE — 74011250637 HC RX REV CODE- 250/637: Performed by: INTERNAL MEDICINE

## 2021-11-24 RX ADMIN — PHENAZOPYRIDINE HYDROCHLORIDE 100 MG: 100 TABLET ORAL at 08:51

## 2021-11-24 RX ADMIN — MECLIZINE 12.5 MG: 12.5 TABLET ORAL at 08:50

## 2021-11-24 RX ADMIN — THERA TABS 1 TABLET: TAB at 08:51

## 2021-11-24 RX ADMIN — Medication 250 MG: at 08:50

## 2021-11-24 RX ADMIN — PANTOPRAZOLE SODIUM 40 MG: 40 TABLET, DELAYED RELEASE ORAL at 06:23

## 2021-11-24 RX ADMIN — LACOSAMIDE 100 MG: 10 SOLUTION ORAL at 08:51

## 2021-11-24 RX ADMIN — ENOXAPARIN SODIUM 40 MG: 100 INJECTION SUBCUTANEOUS at 08:51

## 2021-11-24 RX ADMIN — PREDNISONE 40 MG: 20 TABLET ORAL at 08:51

## 2021-11-24 RX ADMIN — Medication 10 ML: at 05:46

## 2021-11-24 RX ADMIN — PAROXETINE HYDROCHLORIDE 10 MG: 20 TABLET, FILM COATED ORAL at 08:51

## 2021-11-24 NOTE — PROGRESS NOTES
Nutrition Note    Continues to tolerate oral diet with good appetite and adequate intake. Consuming most of meals and supplements. BM 11/21. Awaiting placement. Nutrition Recommendations/Plan:   - Continue current nutrition interventions.       Electronically signed by Prabhakar Jensen RD on 11/24/2021 at 9:03 AM    Contact: [phone]

## 2021-11-24 NOTE — DISCHARGE SUMMARY
Discharge Summary    Patient: Diana Hanks MRN: [id number]  CSN: [id number]    YOB: 1965  Age: 64 y.o. 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 In June this year she became lethargic, poorly responsive, and was admitted at [facility] where she was found to have HSV encephalitis. She was intubated. She had a long and difficult recovery, had undergone acute rehab as well as PT and OT at home. Per her  the patient was doing better until about 4 to 6 weeks ago and after that she developed again confusion, difficulty ambulating, nausea and vomiting. She also reported diplopia. the patient reported feeling generally weak. Extremely dizzy and vertiginous, binocular diplopia, nausea and vomiting. Particularly over the past 2 weeks she had an extremely poor intake, so was unable to ambulate independently due to unsteadiness. No hx of seizures, but had abnormal EEG, was on Keppra and was not aware needed to be continued.       Discharge Condition: Good    PHYSICAL EXAM at discharge  Visit Vitals  BP (P) 124/70 (BP 1 Location: Left arm, BP Patient Position: At rest)   Pulse 75   Temp (P) 99.1 °F (37.3 °C)   Resp (P) 18   Ht 5' (1.524 m)   Wt 66.2 kg (146 lb)   SpO2 (P) 96%   BMI 28.51 kg/m²     General appearance -awake, follows commands appropriately, not in acute distress. Chest -CTA bilaterally, no wheezes currently. Heart - S1 and S2 normal  Abdomen - soft, nontender, nondistended, Bowel sounds present  Neurological -awake, follows commands appropriately, moves all 4 extremities well. No tremors. intact extraocular movements with fine square wave jerks  Extremities - no pedal edema noted  No rash to visible skin. Hospital Course:   1. 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 Continue supplements, meclizine prn. Continue Paxil 10 mg every morning and after 1-2 weeks okay to increase to 20 mg and then again if needed up to 50 mg as tolerated for dizziness. She would benefit from rehab including vestibular rehab.  2.  Multiple electrolyte disturbances, resolved currently  3. Intermittent vomiting with nausea could be due to central vertigo, resolved. Good po intake. 4.  Recent history of herpes encephalitis s/p treatment  5. 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 6.  Chronic mild hyponatremia due to nausea, resolved  7. Poor appetite and severe protein calorie malnutrition, slowly improving. Nutritionist follows. Multivit. 8.  GERD  9. Dyslipidemia  10. History of seizure disorder. Continue Vimpat. 11.  Intermittent encephalopathy with sundowning due to #5, and #4 and Wernicke's encephalopathy, better currently  12. 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 Continue prednisone and Vimpat as recommended by neurologist.  Taper prednisone by 10 mg weekly, next dose reduction 11/27/2021. 13.  Urinary tract infection, Klebsiella. She received 3 doses ceftriaxone based on sensitivity. Dysuria resolved. 14.  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 Leukocytosis with left shift, source most likely urine, improving. 16. DVT prophylaxis was given in the form of Lovenox subcut daily. 17. Full code.   Patient would benefit from outpatient follow-up with neurology, and ophthalmology,  is aware. Discharge to SNF when bed available. Patient and  aware, extensive discussions held at bedside, all questions answered to the best my ability. Consults: Neurology  Dr. Benedict Carter    Procedures  1. Midline placed 11/5/2021. Radha Degroot PA-C  2. Successful L2-L3 LP. Opening pressures 9. 12 cc clear CSF removed. Michell Hidalgo PA-C . October 22, 2021       2:26 PM    Significant Diagnostic Studies: labs:    Ref. Range 11/18/2021 10:00 11/18/2021 12:40 11/20/2021 03:00   WBC Latest Ref Range: 4.6 - 13.2 K/uL  17.6 (H) 14.4 (H)   NRBC Latest Ref Range: 0  WBC  0.0 0.0   RBC Latest Ref Range: 4.20 - 5.30 M/uL  3.72 (L) 3.25 (L)   HGB Latest Ref Range: 12.0 - 16.0 g/dL  12.2 10.7 (L)   HCT Latest Ref Range: 35.0 - 45.0 %  38.9 33.6 (L)   MCV Latest Ref Range: 78.0 - 100.0 FL  104.6 (H) 103.4 (H)   MCH Latest Ref Range: 24.0 - 34.0 PG  32.8 32.9   MCHC Latest Ref Range: 31.0 - 37.0 g/dL  31.4 31.8   RDW Latest Ref Range: 11.6 - 14.5 %  16.6 (H) 15.8 (H)   PLATELET Latest Ref Range: 135 - 420 K/uL  429 (H) 415   MPV Latest Ref Range: 9.2 - 11.8 FL  9.1 (L) 9.3   NEUTROPHILS Latest Ref Range: 40 - 73 %  80 (H) 67   LYMPHOCYTES Latest Ref Range: 21 - 52 %  13 (L) 23   MONOCYTES Latest Ref Range: 3 - 10 %  6 8   EOSINOPHILS Latest Ref Range: 0 - 5 %  0 0   BASOPHILS Latest Ref Range: 0 - 2 %  0 0   IMMATURE GRANULOCYTES Latest Ref Range: 0.0 - 0.5 %  2 (H) 2 (H)   DF Latest Units:    AUTOMATED AUTOMATED   ABSOLUTE NRBC Latest Ref Range: 0.00 - 0.01 K/uL  0.00 0.00   ABS. NEUTROPHILS Latest Ref Range: 1.8 - 8.0 K/UL  14.1 (H) 9.6 (H)   ABS. IMM. GRANS. Latest Ref Range: 0.00 - 0.04 K/UL  0.3 (H) 0.3 (H)   ABS. LYMPHOCYTES Latest Ref Range: 0.9 - 3.6 K/UL  2.2 3.3   ABS. MONOCYTES Latest Ref Range: 0.05 - 1.2 K/UL  1.0 1.1   ABS. EOSINOPHILS Latest Ref Range: 0.0 - 0.4 K/UL  0.0 0.0   ABS.  BASOPHILS Latest Ref Range: 0.0 - 0.1 K/UL  0.0 0.0   Color Latest Units:   YELLOW     Appearance Latest Units:   CLOUDY     Specific gravity Latest Ref Range: 1.005 - 1.030   1.013     pH (UA) Latest Ref Range: 5.0 - 8.0   7.0     Protein Latest Ref Range: NEG mg/dL Negative     Glucose Latest Ref Range: NEG mg/dL Negative     Ketone Latest Ref Range: NEG mg/dL Negative     Blood Latest Ref Range: NEG   Negative     Bilirubin Latest Ref Range: NEG   Negative     Urobilinogen Latest Ref Range: 0.2 - 1.0 EU/dL 1.0     Nitrites Latest Ref Range: NEG   Positive (A)     Leukocyte Esterase Latest Ref Range: NEG   LARGE (A)     Epithelial cells Latest Ref Range: 0 - 5 /lpf FEW     WBC Latest Ref Range: 0 - 4 /hpf 11 to 20     RBC Latest Ref Range: 0 - 5 /hpf Negative     Bacteria Latest Ref Range: NEG /hpf 4+ (A)     Amorphous Crystals Latest Ref Range: NEG  2+ (A)     Sodium Latest Ref Range: 136 - 145 mmol/L  133 (L) 138   Potassium Latest Ref Range: 3.5 - 5.5 mmol/L  3.9 4.2   Chloride Latest Ref Range: 100 - 111 mmol/L  99 (L) 101   CO2 Latest Ref Range: 21 - 32 mmol/L  28 32   Anion gap Latest Ref Range: 3.0 - 18 mmol/L  6 5   Glucose Latest Ref Range: 74 - 99 mg/dL  116 (H) 81   BUN Latest Ref Range: 7.0 - 18 MG/DL  16 13   Creatinine Latest Ref Range: 0.6 - 1.3 MG/DL  0.57 (L) 0.51 (L)   BUN/Creatinine ratio Latest Ref Range: 12 - 20    28 (H) 25 (H)   Calcium Latest Ref Range: 8.5 - 10.1 MG/DL  9.1 9.4   Phosphorus Latest Ref Range: 2.5 - 4.9 MG/DL   4.3   Magnesium Latest Ref Range: 1.6 - 2.6 mg/dL   2.5   GFR est non-AA Latest Ref Range: >60 ml/min/1.73m2  >60 >60   GFR est AA Latest Ref Range: >60 ml/min/1.73m2  >60 >60       Results     Procedure Component Value Units Date/Time    COVID-19 WITH INFLUENZA A/B [[id number]] Collected: 11/24/21 0941    Order Status: Completed Specimen: Nasopharyngeal Updated: 11/24/21 0941    CULTURE, URINE [[id number]]  (Abnormal)  (Susceptibility) Collected: 11/18/21 1000    Order Status: Completed Specimen: Urine from Clean catch Updated: 11/21/21 0749     Special Requests: NO SPECIAL REQUESTS        [place] Count --        >100,000  COLONIES/mL       Culture result: KLEBSIELLA PNEUMONIAE       Susceptibility      Klebsiella pneumoniae     JOSE ALBERTO     Amikacin ($) Susceptible     Ampicillin ($) Resistant     Ampicillin/sulbactam ($) Susceptible     Cefazolin ($) Susceptible     Cefepime ($$) Susceptible     Cefoxitin Susceptible     Ceftazidime ($) Susceptible     Ceftriaxone ($) Susceptible     Ciprofloxacin ($) Susceptible     Gentamicin ($) Susceptible     Levofloxacin ($) Susceptible     Meropenem ($$) Susceptible     Nitrofurantoin Intermediate     Piperacillin/Tazobac ($) Susceptible     Tobramycin ($) Susceptible     Trimeth/Sulfa Susceptible                  Linear View                       IMAGING  XR Results (most recent):  Results from Hospital Encounter encounter on 10/20/21    XR ABD (KUB)    Narrative  EXAM : ABDOMEN PORTABLE  1124 HOURS    CLINICAL HISTORY/INDICATION:  Verification of Nasogastric tube placement  ,  vomiting    COMPARISON: Abdomen 10/29/2021. TECHNIQUE: AP portable abdomen 1 view    FINDINGS:    The tip of the esophagogastric tube ends in the mid stomach. There is gas and  stool within the colon. No bowel dilatation is seen. No organomegaly is noted. Impression  The esophagogastric tube ends at the mid stomach. CT Results (most recent):  Results from Hospital Encounter encounter on 10/20/21    CT CHEST W CONT    Narrative  CT CHEST WITH IV CONTRAST      COMPARISON: Abdominal CT 15 October 2021. INDICATIONS: Intractable nausea and vomiting, failure to thrive. TECHNIQUE: Following intravenous administration of 100 cc of  Isovue-300volumetric data acquisition was performed through the chest with a  multislice scanner. Reconstructions were created in the axial, coronal, and  sagittal planes. FINDINGS:    Thyroid/Base Of Neck:  Unremarkable  .     Lungs:  No acute infiltrates are evident. .  No mass lesions are seen. .  A nodule is seen in the right upper lobe on axial image 27. A 3 mm nodule is  seen in the medial right lower lobe on image 34  The bronchi appear unremarkable. Pleural Spaces: There is no pneumothorax or pleural fluid evident. No pleural plaques are seen    Lymph Nodes:  Axillae: Normal in size and number. Mediastinum / [place]: Normal in size and number. Mediastinum, Great Vessels And Heart: There is no mediastinal mass. The heart and great vessels appear unremarkable. Abdomen Structures Included: The included portions of the liver and spleen are unchanged. .    Osseous Structures: Mild convex right scoliosis. Otherwise negative. Impression  Two  small nodular opacities are evident in the right lung. Fleischner Society recommendation:  In the low risk patient no follow-up is warranted. One-year follow-up is optional in the high risk patient      All CT scans at this facility are performed using dose optimization technique as  appropriate to the performed exam, to include automated exposure control,  adjustment of the mA and/or kV according to patient's size (Including  appropriate matching for site-specific examinations), or use of iterative  reconstruction technique.     EKG Results     Procedure 720 Value Units Date/Time    EKG, 12 LEAD, INITIAL [[id number]] Collected: 10/20/21 1320    Order Status: Completed Updated: 10/21/21 0950     Ventricular Rate 102 BPM      Atrial Rate 102 BPM      P-R Interval 128 ms      QRS Duration 66 ms      Q-T Interval 330 ms      QTC Calculation (Bezet) 430 ms      Calculated P Axis 1 degrees      Calculated R Axis -19 degrees      Calculated T Axis 10 degrees      Diagnosis --     Sinus tachycardia  Cannot rule out Anterior infarct (cited on or before 20-OCT-2021)  Abnormal ECG  When compared with ECG of 15-OCT-2021 08:30,  Questionable change in initial forces of Anterior leads  ST no longer elevated in Inferior leads  Inverted T waves have replaced nonspecific T wave abnormality in Inferior   leads  Confirmed by Jaqui Chow ([id number]) on 10/21/2021 9:49:57 AM            Discharge Medications:     Current Discharge Medication List      START taking these medications    Details   PARoxetine (PAXIL) 10 mg tablet Take 1 Tablet by mouth daily. Qty: 30 Tablet, Refills: 0      albuterol-ipratropium (DUO-NEB) 2.5 mg-0.5 mg/3 ml nebu 3 mL by Nebulization route every six (6) hours as needed for Wheezing. Qty: 30 Nebule, Refills: 0      lacosamide (VIMPAT) 10 mg/mL soln oral solution Take 10 mL by mouth every twelve (12) hours. Max Daily Amount: 200 mg. Qty: 465 mL, Refills: 0    Associated Diagnoses: Seizure ([address]      melatonin 5 mg tablet Take 1 Tablet by mouth nightly. Qty: 30 Tablet, Refills: 0      mirtazapine (REMERON SOL-TAB) 15 mg disintegrating tablet Take 1 Tablet by mouth nightly. Qty: 30 Tablet, Refills: 0      predniSONE (DELTASONE) 10 mg tablet 5 tablets p.o. daily for 1 week then 4 tablets p.o. daily for 1 week then 3 tablets p.o. daily for 1 week then 2 tablets p.o. daily for 1 week then 1 tablet daily for 1 week and then stop  Qty: 105 Tablet, Refills: 0      therapeutic multivitamin (THERAGRAN) tablet Take 1 Tablet by mouth daily. Qty: 30 Tablet, Refills: 0      thiamine  mg tablet 250 mg p.o. daily for 1 week then 100 mg p.o. daily  Qty: 44 Tablet, Refills: 0         CONTINUE these medications which have CHANGED    Details   pantoprazole (PROTONIX) 40 mg tablet Take 1 Tablet by mouth daily. Take on empty stomah  Qty: 30 Tablet, Refills: 0         CONTINUE these medications which have NOT CHANGED    Details   meclizine (ANTIVERT) 12.5 mg tablet Take 12.5 mg by mouth three (3) times daily as needed for Dizziness. Dizziness      ondansetron (ZOFRAN ODT) 8 mg disintegrating tablet Take 8 mg by mouth every eight (8) hours as needed for Nausea or Vomiting.          STOP taking these medications       potassium gluconate 595 mg (99 mg) tablet Comments:   Reason for Stopping:         potassium chloride (KLOR-CON) 20 mEq pack Comments:   Reason for Stopping:         LORazepam (Ativan) 1 mg tablet Comments:   Reason for Stopping:         levETIRAcetam (KEPPRA) 250 mg tablet Comments:   Reason for Stopping:         amantadine HCL (SYMMETREL) 100 mg capsule Comments:   Reason for Stopping:               Activity: PT/OT Eval and Treat    Diet: Cardiac Diet    Wound Care: None needed    Follow-up:   Follow-up Appointments   Procedures    FOLLOW UP VISIT Appointment in: Other (Specify) Nursing home doctor to follow this patient With patient's private neurologist as scheduled or in 2 to 3 weeks     Nursing home doctor to follow this patient  With patient's private neurologist as scheduled or in 2 to 3 weeks     Standing Status:   Standing     Number of Occurrences:   1     Order Specific Question:   Appointment in     Answer:    Other (Specify)       Minutes spent on discharge: Greater than 30

## 2021-11-24 NOTE — PROGRESS NOTES
Problem: Pressure Injury - Risk of  Goal: *Prevention of pressure injury  Description: Document Christofer Scale and appropriate interventions in the flowsheet. Outcome: Progressing Towards Goal  Note: Pressure Injury Interventions:  Sensory Interventions: Assess changes in LOC, Maintain/enhance activity level    Moisture Interventions: Absorbent underpads    Activity Interventions: Increase time out of bed, Pressure redistribution bed/mattress(bed type), PT/OT evaluation    Mobility Interventions: HOB 30 degrees or less, Pressure redistribution bed/mattress (bed type)    Nutrition Interventions: Document food/fluid/supplement intake, Offer support with meals,snacks and hydration    Friction and Shear Interventions: Apply protective barrier, creams and emollients, HOB 30 degrees or less, Lift sheet                Problem: Patient Education: Go to Patient Education Activity  Goal: Patient/Family Education  Outcome: Progressing Towards Goal     Problem: Falls - Risk of  Goal: *Absence of Falls  Description: Document Theresa Fall Risk and appropriate interventions in the flowsheet.   Outcome: Progressing Towards Goal  Note: Fall Risk Interventions:  Mobility Interventions: Assess mobility with egress test, Communicate number of staff needed for ambulation/transfer, OT consult for ADLs, Patient to call before getting OOB, PT Consult for mobility concerns    Mentation Interventions: Adequate sleep, hydration, pain control, Door open when patient unattended, Evaluate medications/consider consulting pharmacy, Eyeglasses and hearing aids    Medication Interventions: Patient to call before getting OOB    Elimination Interventions: Call light in reach, Elevated toilet seat, Stay With Me (per policy), Toilet paper/wipes in reach, Toileting schedule/hourly rounds    History of Falls Interventions: Consult care management for discharge planning, Door open when patient unattended, Evaluate medications/consider consulting pharmacy, Room close to nurse's station         Problem: Patient Education: Go to Patient Education Activity  Goal: Patient/Family Education  Outcome: Progressing Towards Goal     Problem: Nutrition Deficit  Goal: *Optimize nutritional status  Outcome: Progressing Towards Goal     Problem: Patient Education: Go to Patient Education Activity  Goal: Patient/Family Education  Outcome: Progressing Towards Goal     Problem: Patient Education: Go to Patient Education Activity  Goal: Patient/Family Education  Outcome: Progressing Towards Goal     Problem: Patient Education: Go to Patient Education Activity  Goal: Patient/Family Education  Outcome: Progressing Towards Goal     Problem: Patient Education: Go to Patient Education Activity  Goal: Patient/Family Education  Outcome: Progressing Towards Goal     Problem: Nausea/Vomiting (Adult)  Goal: *Absence of nausea/vomiting  Outcome: Progressing Towards Goal  Goal: *Palliation of nausea/vomiting (Palliative Care)  Outcome: Progressing Towards Goal     Problem: Nausea/Vomiting (Adult)  Goal: *Absence of nausea/vomiting  Outcome: Progressing Towards Goal  Goal: *Palliation of nausea/vomiting (Palliative Care)  Outcome: Progressing Towards Goal     Problem: Patient Education: Go to Patient Education Activity  Goal: Patient/Family Education  Outcome: Progressing Towards Goal

## 2021-11-24 NOTE — PROGRESS NOTES
Transition of Care Plan to SNF/Rehab    SNF/Rehab Transition:  Patient has been accepted to [facility] and meets criteria for admission. Patient will  be transported by  and expected to leave at 11:15. Communication to Patient/Family:  Met with patient and , Noy Kim (identified care giver) and they are agreeable to the transition plan. Communication to SNF/Rehab:  Bedside RN, Artemio Andrade, has been notified of the transition plan to the facility and to call report (phone number [phone]). Discharge information has been updated on the AVS. And communicated to facility via [facility][1], or CC link. SNF/Rehab Transition:    PCP/Specialist:     Nursing Please include all hard scripts for controlled substances, med rec and dc summary, and AVS in packet. Reviewed and confirmed with facility representative, Dez Rudd  at [facility] they can manage the patient care needs for the following:     [facility] with (X) only those applicable:    COVID Status  Patient has had the Covid vaccine Yes.   Patient is Covid Negative    Medication:  [x]  Medications will be available at the facility  []  IV Antibiotics   []  Controlled Substance - hard copy to be sent with patient   []  Weekly Labs    Documents:  [] Hard RX  Number sent   [] MAR  [] Kardex  [] AVS  [] Wound care note  [x]Transfer Summary/Discharge Summary    Equipment:  []  CPAP/BiPAP  []  Wound Vacuum  []  Leo or Urinary Device  []  PICC/Central Line  []  Nebulizer  []  Ventilator    Treatment:  []Isolation (for MRSA, VRE, etc.)  []Surgical Drain Management  []Tracheostomy Care  []Dressing Changes  []Dialysis with transportation and chair time  Center Mode of tranpsort   []PEG Care  []Oxygen  []Daily Weights for Heart Failure    Dietary:  []Any diet limitations  []Tube Feedings   []Total Parenteral Management (TPN)    Eligible for Medicaid Long Term Services and Supports  Yes:  [] Eligible for medical assistance or will become eligible within 180 days and LTSS completed. [] Provider/Patient and/or support system has requested screening.  [] LTSS copy provided to patient or responsible party, .  [] LTSS unavailable at discharge will send once processed to SNF provider.  [] LTSS  unavailable at discharged mailed to patient  [] LTSS performed by outside agency  on  with tracking number   No:   [x] Private pay and is not financially eligible for Medicaid within the next 180 days. [] Reside out-of-state.   [] A residents of a state owned/operated facility that is licensed  by [facility] or [facility]  [] Enrollment in [facility] hospice services  []  [facility] [place] [address]  [] Patient /Family declines to have screening completed or provide financial information for screening          Financial Resources:  Medicaid    [] Initiated and application pending   [] Full coverage      Advanced Care Plan:  []Surrogate Decision Maker of Care  []POA  [x]Communicated Code Status- FULL    Other  Ricky Roblero RN - Outcomes Manager  [phone]

## 2021-11-24 NOTE — ROUTINE PROCESS
Bedside shift change report given to Rickey Greer (oncoming nurse) by Bobbi Saha (offgoing nurse). Report included the following information SBAR and Kardex.

## 2021-11-24 NOTE — ROUTINE PROCESS
Mid-Line removed per Dr. Iman Samano orders. Pt discharged with  present. Discharge summary reviewed with Patient and  voicing understanding.

## 2022-01-05 ENCOUNTER — HOME HEALTH ADMISSION (OUTPATIENT)
Dept: HOME HEALTH SERVICES | Facility: HOME HEALTH | Age: 57
End: 2022-01-05
Payer: COMMERCIAL

## 2022-01-10 ENCOUNTER — HOME CARE VISIT (OUTPATIENT)
Dept: SCHEDULING | Facility: HOME HEALTH | Age: 57
End: 2022-01-10
Payer: COMMERCIAL

## 2022-01-10 ENCOUNTER — HOME CARE VISIT (OUTPATIENT)
Dept: HOME HEALTH SERVICES | Facility: HOME HEALTH | Age: 57
End: 2022-01-10
Payer: COMMERCIAL

## 2022-01-10 VITALS
TEMPERATURE: 97.8 F | SYSTOLIC BLOOD PRESSURE: 112 MMHG | HEART RATE: 79 BPM | RESPIRATION RATE: 18 BRPM | WEIGHT: 174 LBS | HEIGHT: 62 IN | DIASTOLIC BLOOD PRESSURE: 78 MMHG | BODY MASS INDEX: 32.02 KG/M2 | OXYGEN SATURATION: 98 %

## 2022-01-10 PROCEDURE — 400013 HH SOC

## 2022-01-10 PROCEDURE — G0299 HHS/HOSPICE OF RN EA 15 MIN: HCPCS

## 2022-01-10 PROCEDURE — G0151 HHCP-SERV OF PT,EA 15 MIN: HCPCS

## 2022-01-10 PROCEDURE — 400018 HH-NO PAY CLAIM PROCEDURE

## 2022-01-11 VITALS
OXYGEN SATURATION: 98 % | DIASTOLIC BLOOD PRESSURE: 78 MMHG | HEART RATE: 79 BPM | RESPIRATION RATE: 18 BRPM | SYSTOLIC BLOOD PRESSURE: 112 MMHG | TEMPERATURE: 97.8 F

## 2022-01-11 NOTE — HOME HEALTH
Skilled services/Home bound verification:     Skilled Reason for admission/summary of clinical condition:  Herpesvirus enchephalitis, epilepsy . This patient is homebound for the following reasons Requires considerable and taxing effort to leave the home , Requires the assistance of 1 or more persons to leave the home  and Only leaves the home for medical reasons or Mu-ism services and are infrequent and of short duration for other reasons . Caregiver: spouse. Caregiver assists with ADL's, transportation, meal prep housekeeping. Medications reconciled and all medications are available in the home this visit. The following education was provided regarding medications, medication interactions, and look alike medications (specify): All medications educated on, reconciled. Medications  are effective at this time. High risk medication teaching regarding anticoagulants, hyperglycemic agents or opiod narcotics performed (specify) None at this time,    No discrepancies/medication interactions. Home health supplies by type and quantity ordered/delivered this visit include: None this visit    Patient education provided this visit to include: [facility] orientation, medications, fall preventions. Patient/caregiver degree of understanding:good    Patient level of understanding of education provided: Good, repeated back      Home exercise program/Homework provided: participating with therapy    Pt/Caregiver instructed on plan of care and are agreeable to plan of care at this time.

## 2022-01-11 NOTE — HOME HEALTH
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 Pt received home care services from our agency from September -October 2021 then she was readmitted to hospital secondary to vomiting. Pt was transferred to in pt rehab in [facility] then transitioned to [facility]. She was discharged home on 12/17/21 and is referred to home care PT due to pt complaints of dizziness and generalized weakness. 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 PLOF/living environment:  prior to July 2021, pt was ind and ambulatory and was working as an .  Lives with  and family in a 2 story home with bedroom on second floor. PREC:  high fall risk;  intermittent dizziness;   S:  Pt. denies falls since coming home from hospital and reported change in meds to nursing. Pt was informed by neurologist that her CT scan still showed \"infection still going on\" in brain.    Her main complaint is dizziness and decreased balance. Pt's goal in PT is  to be ind and safe in the home so she can be left alone when  goes to work. Right now,  teleworks but eventually has towork in person. O:  Pain:  head  =  0-4  /10  Integumentary:  intact. ROM:  all peripheral joints = WNL.  MMT:  BLE = 3+/5;  BUE = 3+/5. Functional strength test:    Bed  mobility:  sit <> supine =  ind  ;  rolling =  ind;  Pt has dizziness during transitional movements and she knows to move slowly and wait for dizziness to dissipate prior to standing and walking. Dspnea, HR of 99 bpm needing seated rest breaks. O2 sats = 99%. Transfers:  sit <> stand from bed, toilet and tub with shower chair =  SBA;   Balance: Tinetti  = 17 / 28, indicating high fall risk;  TUG = 22.1  seconds indicating  high fall risk;  FTSTS:  can only complete 3 reps, indicating  decreased BLE functional strength. Pt complains of dyspnea and has to take seated rest breaks. HR = 99 bpm, needing <5 minutes seated rest breaks to resting HR of 76 bpm.  Gait:  20 ft with SBA using no AD indoors exhibiting ataxic gait pattern and dyspnea. HR = 99 bpm and needs a seated rest break < 5minutes to return to resting HR of 76 bpm.  O2 sats = 99%. Pt instructed to use FWW. She said she uses FWW outdoors. Pt was also instructed to use sneaker type shoes indoor and avoid walking barefoot or with socks to decreased fall risk. Pt was made aware that she is high fall risk. Endurance:     2MWT:  unable, indicating poor walking endurance  Patient education:   Fall risk reduction strategies = use non skid shoes, use AD, ask for supervision. Maintenance of skin integrity:  change body position every 1-2 hours. Physical activity:  walk one lap indoors every hour during daytime. Work on walking endurance once a day to start with 1 minute today and then daily 1 minute increments up to 15 minute walk. Pt's response to education:  Pt understood fall risk reduction strategies discussed.  will remind pt to do her \"homework\". A:  Pt is diagnosed in June 2021 with encephalitis  and presents with intermittent dizziness and physical deconditioning with the following functional impairments:  decreased gait, transfers and balance dysfunction. Dyspnea is observed on any physical exertion with tachycardia relieved by seated rest, but O2 sats maintained at 99%. Pt will benefit from PT to improve  strength , balance, endurance and  establish HEP and teach fall risk reduction strategies to promote safe and ind functional mobility in pt's environment. P:  PT frequency 2w4. F/u visit on 2/23/22  with neurospsychologist;  Dr. Sharon Prince. Ms Leticia Arriola NP was informed of start of care with home care agency for PT and for the following evals to follow:  SN, OT, ST.  Pt and  declined HHA at this time. SOC book:  [facility] selected representative identified as  spouse Patricia Melendrez. Obtained authorization for treatment and viewed insurance cards for accuracy. Patient handbook was provided and reviewed with pt/CG. Patient Minor Walt of rights and advanced directive information reviewed. Instructed on Agency 24 hour on call system and when to call 911 vs MD vs agency for changes in pt condition. Instructed in and promoted pt/CG involvement in plan of care. Emergency preparations done using information in admission booklet. Medication reconciled and updated and no issues noted. Nursing discussed medications with patient and . Informed Rehab clinical manager of PT frequency.

## 2022-01-12 ENCOUNTER — HOME CARE VISIT (OUTPATIENT)
Dept: SCHEDULING | Facility: HOME HEALTH | Age: 57
End: 2022-01-12
Payer: COMMERCIAL

## 2022-01-12 ENCOUNTER — HOME CARE VISIT (OUTPATIENT)
Dept: HOME HEALTH SERVICES | Facility: HOME HEALTH | Age: 57
End: 2022-01-12
Payer: COMMERCIAL

## 2022-01-12 VITALS
SYSTOLIC BLOOD PRESSURE: 118 MMHG | DIASTOLIC BLOOD PRESSURE: 88 MMHG | TEMPERATURE: 98.7 F | OXYGEN SATURATION: 82 % | HEART RATE: 100 BPM

## 2022-01-12 PROCEDURE — G0152 HHCP-SERV OF OT,EA 15 MIN: HCPCS

## 2022-01-12 NOTE — Clinical Note
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 She presents with resting nystagmus that worsens when looking to the left. She would benefit from gaze stabilization exercises. She also presents with increased dizziness with sharp head turns impacting her standing balance and would benefit from education to increase her ability to slowly turn and navigate her environment to reduce fall risks. Her  reports that she presents with increased confusion and decreased safety awareness at night (turning on stove, lighting candles, trying to shower, cutting vegetables). He was educated and would benefit from continued education on safety with house set up. Pt  educated to create visual reminders for safety and to use possible child locks or difficult to reach areas for dangerous house hold items (knives, matches etc.). Pt presents with grossly 4-/5 B UE strength and would benefit from a B UE HEP program to increase her strength. Pt experience increased incontinence and was educated on optional BSC use for urgency and night time to reduce impulsive behaviors and incontinence. Pt provided with bowel program to attempt to use the bathroom when completing hourly functional mobility recommend by PT. She would benefit from increasing her endurance and use of energy conservation techniques to reduce her fatigue during daily tasks. Pt and her  require a $25 dollar therapy co-pay per visit and were educated on this and contacting their insurance for further information.

## 2022-01-12 NOTE — HOME HEALTH
Caregiver involvement: Pt lives with  in a 2 story house but is staying on the first floor. Pt cargiver assists with supervision for ADLs, IADLs and functional mobility with pt decreased saftey awarness. .     Medications reconciled and all medications are available in the home this visit. Pt gabapentin causing facial numbness. Miguel Carrera MD office notified. The following education was provided regarding medications, medication interactions, and look a like medications: Contacted MD about facial numbness from gabapentin. Medication gabapentin is discontinued because of side effects at this time until further direction from MD.      Patient education provided this visit: Pt educated on wearing shoes or nonskid socks when completing functional mobility to reduce their risk for falls. Pt educated on reducing quick head movements due to increased dizziness. Using visual cues for saftey reminders and memory book to increase orientation with increased confusion in the evening. Pt educated on optional use for BSC due to progressive incontinance. Pt  educated on locking up saftey hazard items (knives, matches, etc.) for when pt presents with increased confusion at night. Patient level of understanding of education provided: Pt presenting with memory deficit and is occasionally unclear if she has 1 child or multiple when she only has one son. Pt  present and able to teach back BSC useage and proper footwear. Pt husaband expressing items he may be able to provide visual cues for to increase her saftey throughout the day. Pt  activly moving knives from the kitchen to secure location during session. Patient response to treatment:  Pt responded well to treatments with increased dizziness and need for rest break noted during vision testing. Pt also noted to present with increased dizziness when taking quick and frequent turns during fucntional mobility.     SUBJECTIVE: Pt and  report she has been presenting with decreased saftey awarness during nights and evenings. Pt noted to have taken matches to light candles, attempt to chop vegtables with knives, and attempt to take showers alone during the middle of the night. They report she presents with memory deficits with pt frequently forgetting number of children she has (she only has one son) and where her mother is when her mother lives far away. Pt currently altert and oriented throughout session with ability to make jokes and teach back education however unclear how long the memory of education provided will last.   DME RECOMMENEDED: BSC  OBJECTIVE:  BATHING:Pt supervision for bathing using walk in shower and shower chair options. TOILETING: supervision. Pt and  report she has been experiencing increased episodes of incontinance. Pt and keyannad educated on [facility] option to kepp near pt during urgency. Pt and  educated on bowel program to attempt to toilet hourly when completing daily house hold walks. UB DRESSING: independent  LB DRESSING: supervision. Pt able to joy/doff socks independently when seated EOB with supervision for standing adjustments of proximal clothing  GROOMING: supervision/set up if pt standing at sink due to dizziness  FEEDING: independent  Modified Holden RPE 6/10 after performing ambulation, transfers, and I/ADL assessment   IADL: Pt  assisting with IADLs due to pt decreased saftey awarness. Pt able to assist in cooking, cleaning, and laundry if provided with close supervision due to inconsistant dizziness and fluctuating saftey awarness/memory. AMBULATION: supervision without use of AD  EOB/BED TRANSFER: supervision  COUCH: supervison  TOILET: supervision   SHOWER: SBA with cues for saftey    Skilled Care Provided: Pt completed full occupational therapy assessment to include balance, coordination, strengthening, ADL education/training, functional mobility and home safety.      Progress toward goals: Mrs. Vanda Shook presents with good therapy potential to increase her vision, endurance, balance, saftey awarness and strength to increase pt independence with ADLs and functional mobility. Pt currently requires supervision for most ADLs and SBA for bathing. She is presenting with resting nystagmus that worsens when tracking to the left. She is presenting with decreased ability to perform gaze stabiliation and hsa increased dizziness with saccades. She is presenting with increased inpulsivity and decreased saftey awarness worsneing in the evenings. Pt currently with 4-/5 strength grossly in her BUE. Pt and her husaband agreeable to continued occupational therapy services to increase her functional indepdence and saftey with ADLs/IADLs. Home exercise program: Pt provided with visual stabilization exercises to complete 3x a day (look at stable object and move head left and right while maintaining steady gaze0 and  visual tracking of a ball 1 set of 10 3x a day. Pt provided with bowel program to sit on toilet ever 1-3 hours to reduce episodes of incontinance. Pt  educated on completion of visual reminders to increase pt saftey during episodes of confusion in the home. Pt  to remove dangerous house hold items (knives, matches, etc.) from pt easy access to increase her overall saftey. Continued need for the following skills: PT, OT, SLP, SN  CONTINUED NEED FOR THE FOLLOWING SKILLS: [facility] OT is medically necessary to address barriers of decreased vision, balance, weakness, and decreased saftey awarness. These barriers limit pt's participation in ADLs/IADLs and functional mobility safely and would benefit from continued skilled OT to maximize independence and reduce burden on caregiver. The following discharge planning was discussed with the pt/caregiver: 1w1, 2w4 with focus on B UE strengthening, home saftey/memory, vision exercises, and energy conservation.  Pt to discharge home to family once goals are met or maximum potiental achieved. Condition, which is the primary reason for home health care. PDGM Dx: Epilepsy, unspecified, not intractable, without status epilepticus, Herpesviral encephalitis. 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 Pt received home care services from our agency from September -October 2021 then she was readmitted to hospital secondary to vomiting. Pt was transferred to in pt rehab in [place] then transitioned to [facility]. She was discharged home on 12/17/21 and is referred to home care OT due to pt complaints of dizziness and generalized weakness limiting independence with ADLs and functional mobility. 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

## 2022-01-12 NOTE — Clinical Note
Therapy Functional Score Assessment  Question                                            Score   Grooming                            1       Upper Dressing   1      Lower Dressing   2      Bathing                 2      Toilet Transfer                   1    Transfer                1            Ambulation                         3   Dyspnea                     2       Pain Interfering with activity        2  Est number therapy visits      9

## 2022-01-14 ENCOUNTER — HOME CARE VISIT (OUTPATIENT)
Dept: SCHEDULING | Facility: HOME HEALTH | Age: 57
End: 2022-01-14
Payer: COMMERCIAL

## 2022-01-14 PROCEDURE — G0151 HHCP-SERV OF PT,EA 15 MIN: HCPCS

## 2022-01-14 PROCEDURE — G0153 HHCP-SVS OF S/L PATH,EA 15MN: HCPCS

## 2022-01-15 VITALS
TEMPERATURE: 97.8 F | HEART RATE: 98 BPM | DIASTOLIC BLOOD PRESSURE: 80 MMHG | RESPIRATION RATE: 18 BRPM | OXYGEN SATURATION: 98 % | SYSTOLIC BLOOD PRESSURE: 120 MMHG

## 2022-01-15 VITALS
TEMPERATURE: 98.7 F | RESPIRATION RATE: 17 BRPM | DIASTOLIC BLOOD PRESSURE: 75 MMHG | OXYGEN SATURATION: 97 % | SYSTOLIC BLOOD PRESSURE: 92 MMHG | HEART RATE: 88 BPM

## 2022-01-15 NOTE — HOME HEALTH
[facility]  RECENT HX OF CURRENT ILLNESS/REASON FOR REFERRAL:  65 y/o female referred to [address] for cognitive evaluation following discharge from rehabilitation facility where she received treatment for viral encephalitis. Initial onset of encephalitis was June of 2021. Patient experienced chronic dizziness with nausea/vomiting which resulted in re-hospitalization in October 2021. A PEG tube was considered however, intake improved and patient was discharged home consuming 3 meals a day. Lisa Camacho PLOF/DIET/COMMUNICATION:  memory deficit, poorly tolerated diet due to nausea  . 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 CURRENT RESIDENCE/LIVING SITUATION: Patient lives in 2 story home, bedroom and bathroom on first floor  . EDUCATIONAL LEVEL:      ___POST SECONDARY EDUCATION  . SUBJECTIVE (PT/FAMILY COMMENTS, THERAPIST OBSERVATIONS): Patient is pleasant,  providing history.  Patient does not recall having speech therapy at rehabilitation facility, is unaware of sundowners. Sofia Bloodgood MEDICATIONS REVIEWED:  NO PROBLEMS   . CAREGIVER INVOLVEMENT:  provides assistance with medications, meals and ADLs  . MD NOTIFIED OF POC AND FREQUENCY  . ASSESSMENT OF INTERVENTION / PATIENT PROGRESS / [place] / PLAN:  Patient received cognitive linguistic evaluation utilizing [facility] [address] [facility] which is remarkable for a mild neurocognitive deficit. Specifically, patient demonstrates deficits in generative naming, clock drawing, and mental manipulation. Processing time was decreased throughout evaluation. Executive function skills are also impaired as patient is no longer able to manage her calendar/keeping appointments, bank accounts, emails and apps on her phone making purchases she is unaware of. Patient also experiences night time episodes of confusion where she does not know where she, looks for family member who do not exist or attempts use of unsafe kitchen items. Speech therapy is medically necessary to increase safety in the home and safe completion of ADLs s/p viral encephalitis    PATIENT GOALS:  my memory   . ASSESSMENT/RESPONSE TO TEACHING:  patient is in agreement with POC  . HOME EXERCISE PROGRAM: notebook with biographical information keep handy particularly during periods of increased confusion to increase safety  . EDUCATION PROVIDED:  STPOC, check with MD before starting new supplements which were inquired of today  .   DISCHARGE PLANNING - (ANTICIPATED DC DATE, DC PLAN): 1w1 2w4

## 2022-01-15 NOTE — HOME HEALTH
PT routine:  S:  Pt denies falls and denies change in meds. O:  PREC:  fall risk;  intermittent dizziness and headache. Bed mobility:  sit <> supine = SBA. Emphasized to slow down during transitional movements. Transfers:  sit <> stand from multiple surfaces = SBA for safety. Pt is impulsive and needs cueing to slow down. Balance trng:    Gait:  50 ft, SBA using  no AD exhibiting ataxic gait pattern. Educated on maintaining center of gravity while walking, take longer strides with narrower base of support. Mini BEST test:  total score of = 17/28 (anticipatory = 3/6, reactive postural control = 5/6, sensory orientation = 1/6, dynamic gait = 8/10). Indicates high fall risk. Needs to work on sensory orientation using balance foam.  VILLA balance test = 48/56, indicates fall risk. Endurance trn minutes gait with FWW, as reported by pt. Pt response to balance testing: \"slight\" headache after balance tests. Pt education:  Fall risk reduction :  ask for supervision during transfers and ambulation, use AD, use non-skid shoes. HEP:  work on ambulation endurance by doing 1 minute daily increments. A:  Patient was able to participate well in balance tests but developed \"slight\" headache afterwards indicating decreased tolerance to PT but improved from last session. Pt needs to continue with PT to work on strengthening, balance training, progressive gait training. P:  cont with PT 2x/wk  for safe transfer tech and gait, balance trng using balance foam,  HEP.

## 2022-01-17 ENCOUNTER — HOME CARE VISIT (OUTPATIENT)
Dept: HOME HEALTH SERVICES | Facility: HOME HEALTH | Age: 57
End: 2022-01-17
Payer: COMMERCIAL

## 2022-01-17 ENCOUNTER — HOME CARE VISIT (OUTPATIENT)
Dept: SCHEDULING | Facility: HOME HEALTH | Age: 57
End: 2022-01-17
Payer: COMMERCIAL

## 2022-01-17 PROCEDURE — G0300 HHS/HOSPICE OF LPN EA 15 MIN: HCPCS

## 2022-01-18 ENCOUNTER — HOME CARE VISIT (OUTPATIENT)
Dept: HOME HEALTH SERVICES | Facility: HOME HEALTH | Age: 57
End: 2022-01-18
Payer: COMMERCIAL

## 2022-01-19 ENCOUNTER — HOME CARE VISIT (OUTPATIENT)
Dept: SCHEDULING | Facility: HOME HEALTH | Age: 57
End: 2022-01-19
Payer: COMMERCIAL

## 2022-01-19 VITALS
SYSTOLIC BLOOD PRESSURE: 118 MMHG | RESPIRATION RATE: 16 BRPM | TEMPERATURE: 98 F | HEART RATE: 93 BPM | DIASTOLIC BLOOD PRESSURE: 77 MMHG | OXYGEN SATURATION: 99 %

## 2022-01-19 PROCEDURE — G0157 HHC PT ASSISTANT EA 15: HCPCS

## 2022-01-19 PROCEDURE — G0153 HHCP-SVS OF S/L PATH,EA 15MN: HCPCS

## 2022-01-19 NOTE — HOME HEALTH
SUBJECTIVE: I am super dizzy and nauseated. Sangita Osborne CAREGIVER INVOLVEMENT/ASSISTANCE NEEDED FOR:  Everything due to fatigue, weakness, dizziness, nausea, increased dizziness with walking  . HOME HEALTH SUPPLIES BY TYPE AND QUANTITY ORDERED/DELIVERED THIS VISIT INCLUDE: none  . OBJECTIVE:  See interventions. PATIENT RESPONSE TO TREATMENT:    Pt with Fair tolerance to seated exercises. Once she amb for 2 minutes, she sat on the couch in fetal position and hid her face behind a pillow, as the dizziness was too much. PATIENT LEVEL OF UNDERSTANDING OF EDUCATION PROVIDED:   Patient easily distracted due to memory deficits. She requires keeping on task. ASSESSMENT OF PROGRESS TOWARD GOALS:   The patient was able to tolerate 2 minutes of amb today before she had to sit down due to dizziness and fatigue. She was able to perform a few exercises. CONTINUED NEED FOR THE FOLLOWING SKILLS: Home Health PT is medically necessary to address the following:  decreased ROM, decreased strength, increased edema, impaired bed mobility, decreased Ind with functional transfers, impaired gait, impaired stair negotiation and impaired stability to decrease sx, improve functional Ind, quality of life, reduce the fall risk. PLAN FOR NEXT VISIT:  foam stability exercises and attempt a couple of stairs.      THE FOLLOWING DISCHARGE PLANNING WAS DISCUSSED WITH THE PATIENT/CAREGIVER: Estimated DC 2/3

## 2022-01-20 ENCOUNTER — HOME CARE VISIT (OUTPATIENT)
Dept: SCHEDULING | Facility: HOME HEALTH | Age: 57
End: 2022-01-20
Payer: COMMERCIAL

## 2022-01-20 VITALS
HEART RATE: 91 BPM | DIASTOLIC BLOOD PRESSURE: 80 MMHG | OXYGEN SATURATION: 98 % | SYSTOLIC BLOOD PRESSURE: 120 MMHG | TEMPERATURE: 97.3 F | RESPIRATION RATE: 18 BRPM

## 2022-01-20 PROCEDURE — G0157 HHC PT ASSISTANT EA 15: HCPCS

## 2022-01-21 ENCOUNTER — HOME CARE VISIT (OUTPATIENT)
Dept: SCHEDULING | Facility: HOME HEALTH | Age: 57
End: 2022-01-21
Payer: COMMERCIAL

## 2022-01-21 VITALS
SYSTOLIC BLOOD PRESSURE: 137 MMHG | RESPIRATION RATE: 18 BRPM | TEMPERATURE: 97.7 F | DIASTOLIC BLOOD PRESSURE: 83 MMHG | HEART RATE: 89 BPM | OXYGEN SATURATION: 97 %

## 2022-01-21 PROCEDURE — G0158 HHC OT ASSISTANT EA 15: HCPCS

## 2022-01-21 NOTE — HOME HEALTH
SUBJECTIVE:  The patient reports she is still dizzy and nauseas. I think its my new normals as cruddy as it is. Jimmy Ramirez CAREGIVER INVOLVEMENT/ASSISTANCE NEEDED FOR: The patient's  helps with meals, transportation, chores, meds, he walks with her at night or when she feels \"wobbly\"  . HOME HEALTH SUPPLIES BY TYPE AND QUANTITY ORDERED/DELIVERED THIS VISIT INCLUDE: none  . OBJECTIVE:  See interventions. PATIENT RESPONSE TO TREATMENT:    The patient could tolerate standing at the counter for 5.5 minutes  x 2 of stability activities, before having to sit and take a break. PATIENT LEVEL OF UNDERSTANDING OF EDUCATION PROVIDED:   The patient was able to follow instructions for stability exercises at the counter. ASSESSMENT OF PROGRESS TOWARD GOALS:  Fair stability demonstrated with eyes open/closed on floor and eyes open on foam.    CONTINUED NEED FOR THE FOLLOWING SKILLS:   Home Health PT is medically necessary to address the following:   decreased strength, decreased stability - high fall risk, decreased Ind with functional transfers, impaired gait, impaired stair negotiation and impaired stability to decrease sx, improve functional Ind, quality of life, reduce the fall risk. PLAN FOR NEXT VISIT:  Cont with stability exercises and activities.      THE FOLLOWING DISCHARGE PLANNING WAS DISCUSSED WITH THE PATIENT/CAREGIVER:  Estimated DC 2/3

## 2022-01-22 VITALS
OXYGEN SATURATION: 99 % | RESPIRATION RATE: 18 BRPM | SYSTOLIC BLOOD PRESSURE: 118 MMHG | HEART RATE: 93 BPM | TEMPERATURE: 98 F | DIASTOLIC BLOOD PRESSURE: 77 MMHG

## 2022-01-22 NOTE — HOME HEALTH
SUBJECTIVE: Pt reports she is feeling dizzy but not nauseous today    CLIENT/CGR CONFIRM: No ED visits, No falls, No Medical Insurance changes reported, and No medication changes reported     CG involvement includes: Pt  assists with ADLs/IADLs     ____________________________   OBJECTIVE  see interventions  . Sofia Bloodgood Patient education provided this visit: Pt care giver educated on energy conservation techniques during Activities of Daily Living  -Plan ahead to avoid rushing.  -Sit down to bathe and dry off. Wear a lexie robe instead of drying off.  -Use a shower/bath organizer to decrease leaning and reaching.  -Use extension handles on sponges and brushes.  -Install grab rails in the bathroom or use an elevated toilet seat.  -Lay out clothes and toiletries before dressing.  -Minimize leaning over to put on clothes and shoes. Bring your foot to your knee to apply -socks and shoes. Fasten bra in front then turn to back. -Modify your home to maximize efficient energy use. For example, place chairs strategically to allow for rest stops -- for instance, along a long hallway.  -Wear comfortable shoes and low-heeled, slip on shoes. Wear button front shirts rather than pullovers. Home exercise program: Chest press,  lateral raises ,shoulders press , bicep curls , and triceps extension 10X2   ________________________________  ASSESSMENT / Justification for continued TX or discharge:   Pt demo G ability to follow HEP ind. Pt demo increased strength from 3+/5 strength in BUE needed for completion of ADLs and functional transfers.  Continued OT needed for ADL training, and transfer training  ________________________________      PLAN FOR NEXT VISIT: ADL training  DISCHARGE PLANNING:  The following discharge planning was discussed with the pt/caregiver: Discharge to self and family under MD supervision once all goals have been met or patient has reached maximum potential.  Frequency remaining: ,2X3

## 2022-01-22 NOTE — HOME HEALTH
SUBJECTIVE  Patient not recall meeting SLP on previous visit and previous admission, introducing herself at the door.  states patient has placed several on line purchases via cell phone without awareness. Cell phone purchases tend to occur in the evening after a short time of sleeping. Patient experiencing nystagmus during portion of SLP visit. .  Caregiver involvement:  and son provide assistance with medications, meals and ADLs. .  Medications reconciled and all medications are available in the home this visit. The following education was provided regarding medications, medication interactions, and look a like medications: no new medications, take as scheduled do not start or discontinue any medications over the counter or Rx without MD approval.  Medications  are effective at this time. .  Home health supplies by type and quantity ordered/delivered this visit include: n/a  . Patient education provided this visit:  importance of keeping daily schedule, go to sleep at same time  . OBJECTIVE/RESPONSE TO THERAPY/RESPONSE TO EDUCATION - SEE INTERVENTIONS  . ASSESSMENT  Patient demonstrates steady progress in therapy with unsafe use of cell phone functional reasoning of evening confusion, fall risk for night time showering and ambulation, increased functional processing for completion of external aid/biographical information, and states understanding of  mental manipulation. Patient does not demonstrate recall of night time confusion and relates these events as shared with her by her  and son. Speech therapy is medically necessary to increase safety, working memory and situation awareness for safe completion of ADLs s/p viral encephalitis. Samia Maldonado   PLAN  Home exercise program: discontinue cell phone use at bed time to prevent unintended credit card purchases, rehearse biographical information prior to usual night time confusion and keep it bedside for use during night time waking, keep clock with AM PM indication visible in bedroom to decrease unsafe night time activity such as showering and cooking, consider bed alarm or camera to increase safety. Mental manipulation tasks.   .  Continued need for the following skills: Speech Language Pathology    The following discharge planning was discussed with the pt/caregiver: 7 more visit or when goals met/max benefits achieved

## 2022-01-23 VITALS
SYSTOLIC BLOOD PRESSURE: 116 MMHG | HEART RATE: 80 BPM | TEMPERATURE: 98 F | OXYGEN SATURATION: 98 % | DIASTOLIC BLOOD PRESSURE: 76 MMHG

## 2022-01-24 ENCOUNTER — HOME CARE VISIT (OUTPATIENT)
Dept: SCHEDULING | Facility: HOME HEALTH | Age: 57
End: 2022-01-24
Payer: COMMERCIAL

## 2022-01-24 VITALS
TEMPERATURE: 97.5 F | RESPIRATION RATE: 18 BRPM | OXYGEN SATURATION: 97 % | HEART RATE: 92 BPM | DIASTOLIC BLOOD PRESSURE: 80 MMHG | SYSTOLIC BLOOD PRESSURE: 120 MMHG

## 2022-01-24 VITALS
SYSTOLIC BLOOD PRESSURE: 118 MMHG | RESPIRATION RATE: 24 BRPM | HEART RATE: 91 BPM | TEMPERATURE: 97.6 F | DIASTOLIC BLOOD PRESSURE: 70 MMHG | OXYGEN SATURATION: 97 %

## 2022-01-24 PROCEDURE — G0157 HHC PT ASSISTANT EA 15: HCPCS

## 2022-01-24 PROCEDURE — G0153 HHCP-SVS OF S/L PATH,EA 15MN: HCPCS

## 2022-01-24 NOTE — HOME HEALTH
SN reason for visit: Epilepsy, encephalitis    Caregiver involvement: Patient's cg is . he lives with patient and is available at all times for assistance with iadls, adls, meal prep, medication management, taking to md appointments. Medications reconciled and all medications are available in the home this visit. The following education was provided regarding medications, medication interactions, and look a like medications: educated on the use of Paxil. Medications  are effective at this time. Home health supplies by type and quantity ordered/delivered this visit include: n/a. Patient education/skilled care provided this visit:Seizures are sudden, uncontrolled electrical disturbance in the brain which can cause changes in behavior, movements, feelings, and consciousness. Seizures are not always related to an underlying condition. It may be caused by lack of sleep, alcohol or drug use (amphetamines or cocaine), medications such as certain pain relievers, antidepressants or smoking cessation therapies, that lower the seizure threshold, lack of oxygen during birth, high stress, and rapidly flashing lights. Medications are the first choice of treatment for seizures. Take medications as prescribed and in right doses, never stop taking your medications without consulting the doctor, notify your doctor regarding any side effects from medications, and get adequate rest.  PT EDUCATED ON ACTIVITES TO MANAGE DYSPNEA [PURSED-LIP BREATHING, SLOW CONTROLLED EXPIRATION OF AIR] PROPER POSITIONING [SITTING OR STANDING AND LEANING FORWARD WITH ARMS SUPPORTED], ENERGY-CONSERVATION TECHNIQUES [PACE ACTIVITES, TAKE FREQUENT RESTS, USE ASST DEVICES, BREAK ACTIVITIES INTO SMALLER TASKS]. patient encouraged to monitor for increase in pain and to continue with current pain management and to notify staff/md if pain becomes excrutiating/intolerable.   patient made aware to turn every 2 hours and to keep pressure off of bony prominences, to monitor for any pressure ulcer development/worsening. patient instructed to perform incontinence hep 3-4 x daily to strengthen muscles and to perform timed voiding q 2 hours to prevent incontinence from occurring. patient/cg to continue to take medications as prescribed. patient aware to monitor for effectiveness and to notify staff of any adverse reactions to medications/any changes to medication regimen. reviewed side effects, purposes, dosage, frequencies  INSTRUCTED PATIENT AND CG THAT SHOULD ANY NEEDS OR CONCERNS ARISE TO FIRST CALL OUR OFFICE, OR THE DR'S OFFICE  OR GO TO AN URGENT CARE CENTER AND NOT TO THE ED FOR NON-LIFE THREATENING EVENTS. IF IT IS LIFE THREATENING THEN CALL 911 OR GO TO THE CLOSEST ER. Patient level of understanding of education provided: Patient verbalized understanding of education provided at visit today and did partial teach back. Will need reinforcement at next visit. Patient response to procedure performed:  n/a    Progress toward goals: Patient to remain compliant with medications and free from seizures. Patient doing well at this time. Home exercise program: activity as tolerated, trying to get physical activity 4-5 x weekly, 30 minutes daily. stopping activity if causing shortness of breath or chest pain, dizziness or weakness. Continued need for the following skills: Nursing, Physical Therapy, Occupational Therapy and Speech Language Pathology    The following discharge planning was discussed with the pt/caregiver: Patient will be discharged once education has completed, patient is medically stable and pt/cg are able to independently manage medications and disease process.

## 2022-01-25 ENCOUNTER — HOME CARE VISIT (OUTPATIENT)
Dept: SCHEDULING | Facility: HOME HEALTH | Age: 57
End: 2022-01-25
Payer: COMMERCIAL

## 2022-01-25 VITALS
OXYGEN SATURATION: 99 % | HEART RATE: 83 BPM | RESPIRATION RATE: 18 BRPM | DIASTOLIC BLOOD PRESSURE: 78 MMHG | TEMPERATURE: 98.1 F | SYSTOLIC BLOOD PRESSURE: 109 MMHG

## 2022-01-25 PROCEDURE — G0158 HHC OT ASSISTANT EA 15: HCPCS

## 2022-01-25 NOTE — HOME HEALTH
SUBJECTIVE: The patient's  reported that he was able to amb up/dn the steps with her 1/2 flight and the whole flight the second time. Vance Thurston CAREGIVER INVOLVEMENT/ASSISTANCE NEEDED FOR: Meals, meds, transportation, chores, meds  . HOME HEALTH SUPPLIES BY TYPE AND QUANTITY ORDERED/DELIVERED THIS VISIT INCLUDE: none  . OBJECTIVE:  See interventions. PATIENT RESPONSE TO TREATMENT:    Pt is demonstrating improved stability with her stability exercises. She reported fatigue, but was able to perform her exercises as well as negotiate a flight of steps indicating increased activity tolerance since beginning [facility]. PATIENT LEVEL OF UNDERSTANDING OF EDUCATION PROVIDED:   The patient was able to return demonstrate her stability exercises today with 2 seated rest breaks in order to complete them. She demonstrated Poor - Fair stability throughout. ASSESSMENT OF PROGRESS TOWARD GOALS:   Sit/Stand from kitchen chair, bed, couch, toilet with MOD I. Have not assessed car transfer. Progressing towards transfer goal.  Pt was able to negotiate the entire flight of steps today with mild SOB by the tike she completed them. Sitting x 1-2 minutes and she returned to prior level of exertion. CONTINUED NEED FOR THE FOLLOWING SKILLS:  Home Health PT is medically necessary to address the following:  pain, decreased ROM, decreased strength, decreased activity tolerance, impaired gait, impaired stair negotiation and impaired stability to decrease sx, improve functional Ind, quality of life, reduce the fall risk.      PLAN FOR NEXT VISIT:  stability test, cane training    THE FOLLOWING DISCHARGE PLANNING WAS DISCUSSED WITH THE PATIENT/CAREGIVER:  Estimated DC 2/3

## 2022-01-26 ENCOUNTER — HOME CARE VISIT (OUTPATIENT)
Dept: SCHEDULING | Facility: HOME HEALTH | Age: 57
End: 2022-01-26
Payer: COMMERCIAL

## 2022-01-26 VITALS
OXYGEN SATURATION: 99 % | TEMPERATURE: 97.5 F | RESPIRATION RATE: 24 BRPM | HEART RATE: 102 BPM | DIASTOLIC BLOOD PRESSURE: 84 MMHG | SYSTOLIC BLOOD PRESSURE: 114 MMHG

## 2022-01-26 PROCEDURE — G0153 HHCP-SVS OF S/L PATH,EA 15MN: HCPCS

## 2022-01-26 NOTE — HOME HEALTH
SUBJECTIVE: Pt reports swelling in BLE    PRESENTATION: Client Present sitting upright in chair. CLIENT/CGR CONFIRM: No ED visits, No falls, No Medical Insurance changes reported, and No medication changes reported     CG involvement includes: Pt  assists pt with ADLS/IADLs as needed     ____________________________   OBJECTIVE  see interventions  . Patient education provided this visit: Pt educated on benefit of propping feet up throughout the day to limit swelling  Patient level of understanding of education provided: Pt verbalized understanding and reports she has been propping feet up at times but not often. Home exercise program: Chest press, Chest pull, lateral raises, front raises, bicep curls, shoulder press 10reps X2 sets  ________________________________    ASSESSMENT / Justification for continued TX or discharge:     ASSESSMENT AND PROGRESS TOWARD GOALS:  Pt able to follow UE HEP with Min A. Pt required initial verbal cues from [place] to progress to next exercise. Pt reports increased dizziness during transitions and ADLs. Pt demo decreased attention to task requiring VC to stay on task. Patient response to treatment:  Pt presents winded following UE therex.  Pt demo ability to implement pursed lipped breathing with X 2 Verbal cue  ________________________________      PLAN FOR NEXT VISIT: balance and functional transfer training    DISCHARGE PLANNING:  The following discharge planning was discussed with the pt/caregiver: Discharge to self and family under MD supervision once all goals have been met or patient has reached maximum potential.  Frequency remaining: 1X2, 2X2

## 2022-01-27 ENCOUNTER — HOME CARE VISIT (OUTPATIENT)
Dept: SCHEDULING | Facility: HOME HEALTH | Age: 57
End: 2022-01-27
Payer: COMMERCIAL

## 2022-01-27 VITALS
RESPIRATION RATE: 16 BRPM | DIASTOLIC BLOOD PRESSURE: 90 MMHG | SYSTOLIC BLOOD PRESSURE: 135 MMHG | TEMPERATURE: 98.1 F | HEART RATE: 96 BPM | OXYGEN SATURATION: 98 %

## 2022-01-27 PROCEDURE — G0157 HHC PT ASSISTANT EA 15: HCPCS

## 2022-01-27 PROCEDURE — G0300 HHS/HOSPICE OF LPN EA 15 MIN: HCPCS

## 2022-01-27 PROCEDURE — G0152 HHCP-SERV OF OT,EA 15 MIN: HCPCS

## 2022-01-27 PROCEDURE — G0158 HHC OT ASSISTANT EA 15: HCPCS

## 2022-01-27 NOTE — HOME HEALTH
SUBJECTIVE: Pt alert, was home alone when SLP arrived. Spouse had to go into work for few hours in the morning. Pt was found lighting candles on dining room table with lighter. Spouse came home a few minutes after SLP's arrival.  Pt reports getting Booster shot yesterday and states arm is a little sore. Pt reports using posted in signs in room, \"I woke this morning at 5:30 and I can't take a shower till after 6 a. m. \"    CAREGIVER INVOLVEMENT / ASSISTANCE NEEDED FOR: spouse provides A for ADLs, supervision, meds, meals    HOME HEALTH SUPPLIES BY TYPE AND QUANTITY ORDERED/DELIVERED THIS VISIT INCLUDE: n/a    OBJECTIVE / PATIENT RESPONSE TO TREATMENT / PATIENT LEVEL OF UNDERSTANDING OF EDUCATION PROVIDED: Pt demonstrates increasing working memory manipulation and short term memory recall of uncommon associations using strategies this visit. Pt continues with poor safety awareness/judgement to be left home alone as Pt was found today lighting candles. Spouse was made aware the situation and he stated that he will make rearrangements next time for someone to be with Pt when he needs to leave.       ASSESSMENT OF PROGRESS TOWARD GOALS: Pt is making steady progress toward ST goals    CONTINUED NEED FOR THE FOLLOWING SKILLS: ST is needed to improve cognitive and memory function to reduce overall burden of care    PLAN FOR NEXT VISIT: cognitive and memory tasks    THE FOLLOWING DISCHARGE PLANNING WAS DISCUSSED WITH THE PATIENT/CAREGIVER: ST to d/c in 6 more visits/ wks or when goals met/max potential achieved, Pt/caregiver verbalized understanding

## 2022-01-27 NOTE — Clinical Note
Occupational Therapy Discharge - Mrs. Tho Calderon was seen by skilled OT for 4 visits s/p recovery from Epilepsy (diagnosis). Pt has been educated on energy conservation strategies to reduce SOB and level of exertion with ADL/IADL tasks. functional transfers to the shower with seat, toilet, and bed with AD as needed independently. P t able to follow UE HEP ind. Pt and CG able to complete gaze stabilization exercises IND. Pt reports 3/10 exertion level with functional tasks. Pt and caregiver have been provided with energy conservation handout and able to implement techniques during functional tasks. Pt able to navigate home with increased safety using compensatory strategies such as signs posted throughout the home and alarms/timers for reminders. Pt able to verbalize compensatory strategies and implement to limit dizziness during functional tasks such as scanning and spotting during head turns. Pt/CG have been educated on pursed lipped breathing. Pt uses cane for balance during functional mobility. She has reached maximal potential wish skilled OT at this time. Caregiver is able to independently provide care for pt. No further skilled OT is indicated at this time.

## 2022-01-27 NOTE — Clinical Note
----- Message -----  From: ELAINA Benton  Sent: 1/28/2022   8:05 PM EST  To: MARIA DEL ROSARIO Wells      Occupational Therapy Discharge - Mrs. Steven Rosado was seen by skilled OT for 4 visits s/p recovery from Epilepsy (diagnosis). Pt has been educated on energy conservation strategies to reduce SOB and level of exertion with ADL/IADL tasks. functional transfers to the shower with seat, toilet, and bed with AD as needed independently. P t able to follow UE HEP ind. Pt and CG able to complete gaze stabilization exercises IND. Pt reports 3/10 exertion level with functional tasks. Pt and caregiver have been provided with energy conservation handout and able to implement techniques during functional tasks. Pt able to navigate home with increased safety using compensatory strategies such as signs posted throughout the home and alarms/timers for reminders. Pt able to verbalize compensatory strategies and implement to limit dizziness during functional tasks such as scanning and spotting during head turns. Pt/CG have been educated on pursed lipped breathing. Pt uses cane for balance during functional mobility. She has reached maximal potential wish skilled OT at this time. Caregiver is able to independently provide care for pt. No further skilled OT is indicated at this time.

## 2022-01-27 NOTE — HOME HEALTH
SUBJECTIVE: The patient reports continued dizziness, especially when she looks down as she stands. She reports that nothing helps. Her  reported trying her stability exercises with her, but she got dizzy. Pal Oscar CAREGIVER INVOLVEMENT/ASSISTANCE NEEDED FOR: The patient's  and son assist with meals, meds, chores, transportation, HEP  . HOME HEALTH SUPPLIES BY TYPE AND QUANTITY ORDERED/DELIVERED THIS VISIT INCLUDE: none  . OBJECTIVE:  See interventions. PATIENT RESPONSE TO TREATMENT:   Today the patient reports continued dizziness. She was able to tolerate her balance test and gait training only. PATIENT LEVEL OF UNDERSTANDING OF EDUCATION PROVIDED:   Good understanding and return demonstration of gait with her new cane. She was able to amb with Fair + stability. ASSESSMENT OF PROGRESS TOWARD GOALS:   Pt progressed from high to medium fall risk per [facility] Balance Assessment today. She was able to amb with her new cane with good stability and Fair understanding of sequencing. CONTINUED NEED FOR THE FOLLOWING SKILLS: Prepare for DC    PLAN FOR NEXT VISIT: Car transfer, cont with cane training    THE FOLLOWING DISCHARGE PLANNING WAS DISCUSSED WITH THE PATIENT/CAREGIVER:  Estimated DC from PT 2 visits. She will cont with ST. OT informed today that they will be DC early.

## 2022-01-28 VITALS
OXYGEN SATURATION: 98 % | RESPIRATION RATE: 19 BRPM | TEMPERATURE: 97.6 F | HEART RATE: 96 BPM | DIASTOLIC BLOOD PRESSURE: 78 MMHG | SYSTOLIC BLOOD PRESSURE: 101 MMHG

## 2022-01-28 NOTE — HOME HEALTH
Patient/Caregiver Subjective:  OT DC discussed this visit. Pt and CG verbailze pt is able to complete ADLs ind. Pt states her her only problem is dizziness and memory  . Caregiver involvement:  Pt  and son assists with ADL/IADls as needed. .  Medications reconciled and all medications are available in the home this visit. .  Patient education provided this visit:  Pt and caregiver have been provided with energy conservation handout and able to implement techniques during functional tasks. .  Home exercise program: B UE strengthening against gravity for shoulder flexion/extension, overhead press, chest press, ab/adduction, gross grasp, and elbow flexion/extension with use of visual handout/reminders to increase UE strength and coordination  . Progress toward goals: Pt has met OT goals Pt reports 3/10 exertion level with functional tasks. Pt able to navigate home with increased safety using compensatory strategies such as signs posted throughout the home and alarms/timers for reminders. Pt able to verbalize compensatory strategies and implement to limit dizziness during functional tasks such as scanning and spotting during head turns. Pt/CG have been educated on pursed lipped breathing. Pt uses cane for balance during functional mobility. She has reached maximal potential wish skilled OT at this time. Caregiver is able to independently provide care for pt. No further skilled OT is indicated at this time.

## 2022-01-30 ENCOUNTER — HOME CARE VISIT (OUTPATIENT)
Dept: HOME HEALTH SERVICES | Facility: HOME HEALTH | Age: 57
End: 2022-01-30
Payer: COMMERCIAL

## 2022-01-30 VITALS
SYSTOLIC BLOOD PRESSURE: 124 MMHG | TEMPERATURE: 98.3 F | DIASTOLIC BLOOD PRESSURE: 82 MMHG | HEART RATE: 94 BPM | OXYGEN SATURATION: 97 %

## 2022-01-31 NOTE — HOME HEALTH
SN reason for visit: Epilepsy, encephalitis Caregiver involvement: Patient's cg is . he lives with patient and is available at all times for assistance with iadls, adls, meal prep, medication management, taking to md appointments. Medications reconciled and all medications are available in the home this visit. The following education was provided regarding medications, medication interactions, and look a like medications: no medication education needed today. Medications are effective at this time. Home health supplies by type and quantity ordered/delivered this visit include: n/a. Patient education/skilled care provided this visit:Seizures are sudden, uncontrolled electrical disturbance in the brain which can cause changes in behavior, movements, feelings, and consciousness. Seizures are not always related to an underlying condition. It may be caused by lack of sleep, alcohol or drug use (amphetamines or cocaine), medications such as certain pain relievers, antidepressants or smoking cessation therapies, that lower the seizure threshold, lack of oxygen during birth, high stress, and rapidly flashing lights. Medications are the first choice of treatment for seizures. Take medications as prescribed and in right doses, never stop taking your medications without consulting the doctor, notify your doctor regarding any side effects from medications, and get adequate rest. PT EDUCATED ON ACTIVITES TO MANAGE DYSPNEA [PURSED-LIP BREATHING, SLOW CONTROLLED EXPIRATION OF AIR] PROPER POSITIONING [SITTING OR STANDING AND LEANING FORWARD WITH ARMS SUPPORTED], ENERGY-CONSERVATION TECHNIQUES [PACE ACTIVITES, TAKE FREQUENT RESTS, USE ASST DEVICES, BREAK ACTIVITIES INTO SMALLER TASKS]. patient encouraged to monitor for increase in pain and to continue with current pain management and to notify staff/md if pain becomes excrutiating/intolerable.  patient made aware to turn every 2 hours and to keep pressure off of bony prominences, to monitor for any pressure ulcer development/worsening. patient instructed to perform incontinence hep 3-4 x daily to strengthen muscles and to perform timed voiding q 2 hours to prevent incontinence from occurring. patient/cg to continue to take medications as prescribed. patient aware to monitor for effectiveness and to notify staff of any adverse reactions to medications/any changes to medication regimen. reviewed side effects, purposes, dosage, frequencies. Patient states that she has had some dizzy spells since last seeing me, but that is pretty common vertigo issues with her since having the encephalitis. INSTRUCTED PATIENT AND CG THAT SHOULD ANY NEEDS OR CONCERNS ARISE TO FIRST CALL OUR OFFICE, OR THE DR'S OFFICE OR GO TO AN URGENT CARE CENTER AND NOT TO THE ED FOR NON-LIFE THREATENING EVENTS. IF IT IS LIFE THREATENING THEN CALL 911 OR GO TO THE CLOSEST ER. Patient level of understanding of education provided: Patient verbalized understanding of education provided at visit today and did partial teach back. Will need reinforcement at next visit. Patient response to procedure performed: n/a Progress toward goals: Patient to remain compliant with medications and free from seizures. Patient doing well at this time. Home exercise program: activity as tolerated, trying to get physical activity 4-5 x weekly, 30 minutes daily. stopping activity if causing shortness of breath or chest pain, dizziness or weakness. Continued need for the following skills: Nursing, Physical Therapy, and Speech Language Pathology The following discharge planning was discussed with the pt/caregiver: Patient will be discharged once education has completed, patient is medically stable and pt/cg are able to independently manage medications and disease process. Patient aware that my last visit with SN will be next week.

## 2022-02-01 ENCOUNTER — HOME CARE VISIT (OUTPATIENT)
Dept: SCHEDULING | Facility: HOME HEALTH | Age: 57
End: 2022-02-01
Payer: COMMERCIAL

## 2022-02-01 ENCOUNTER — HOME CARE VISIT (OUTPATIENT)
Dept: HOME HEALTH SERVICES | Facility: HOME HEALTH | Age: 57
End: 2022-02-01
Payer: COMMERCIAL

## 2022-02-01 VITALS
OXYGEN SATURATION: 99 % | DIASTOLIC BLOOD PRESSURE: 70 MMHG | HEART RATE: 87 BPM | SYSTOLIC BLOOD PRESSURE: 126 MMHG | TEMPERATURE: 98.3 F

## 2022-02-01 PROCEDURE — G0300 HHS/HOSPICE OF LPN EA 15 MIN: HCPCS

## 2022-02-01 PROCEDURE — G0153 HHCP-SVS OF S/L PATH,EA 15MN: HCPCS

## 2022-02-02 ENCOUNTER — HOME CARE VISIT (OUTPATIENT)
Dept: SCHEDULING | Facility: HOME HEALTH | Age: 57
End: 2022-02-02
Payer: COMMERCIAL

## 2022-02-02 VITALS
TEMPERATURE: 97.9 F | DIASTOLIC BLOOD PRESSURE: 90 MMHG | HEART RATE: 89 BPM | OXYGEN SATURATION: 100 % | RESPIRATION RATE: 16 BRPM | SYSTOLIC BLOOD PRESSURE: 120 MMHG

## 2022-02-02 PROCEDURE — G0157 HHC PT ASSISTANT EA 15: HCPCS

## 2022-02-02 NOTE — HOME HEALTH
SUBJECTIVE: Pt alert. \"I am so sick of this dizziness\" Spouse reports Pts uses visual aids for reference during times of confusion    CAREGIVER INVOLVEMENT / ASSISTANCE NEEDED FOR: spouse provides A for ADLs, supervision, meds, meals       HOME HEALTH SUPPLIES BY TYPE AND QUANTITY ORDERED/DELIVERED THIS VISIT INCLUDE: n/a       OBJECTIVE / PATIENT RESPONSE TO TREATMENT / PATIENT LEVEL OF UNDERSTANDING OF EDUCATION PROVIDED: Pt demonstrates increasing  short term memory recall and complex problem solving. Pt continues with full participation and motivation to improve.         ASSESSMENT OF PROGRESS TOWARD GOALS: Pt is making steady progress toward ST goals         CONTINUED NEED FOR THE FOLLOWING SKILLS: ST is needed to improve cognitive and memory function to reduce overall burden of care         PLAN FOR NEXT VISIT: cognitive and memory tasks       THE FOLLOWING DISCHARGE PLANNING WAS DISCUSSED WITH THE PATIENT/CAREGIVER: ST to d/c in 5 more visits/ wks or when goals met/max potential achieved, Pt/caregiver verbalized understanding

## 2022-02-03 ENCOUNTER — HOME CARE VISIT (OUTPATIENT)
Dept: SCHEDULING | Facility: HOME HEALTH | Age: 57
End: 2022-02-03
Payer: COMMERCIAL

## 2022-02-03 VITALS
SYSTOLIC BLOOD PRESSURE: 124 MMHG | TEMPERATURE: 98.3 F | DIASTOLIC BLOOD PRESSURE: 70 MMHG | HEART RATE: 83 BPM | OXYGEN SATURATION: 99 %

## 2022-02-03 PROCEDURE — G0151 HHCP-SERV OF PT,EA 15 MIN: HCPCS

## 2022-02-03 PROCEDURE — G0153 HHCP-SVS OF S/L PATH,EA 15MN: HCPCS

## 2022-02-03 NOTE — HOME HEALTH
SN reason for visit: Epilepsy, encephalitis Caregiver involvement: Patient's cg is . he lives with patient and is available at all times for assistance with iadls, adls, meal prep, medication management, taking to md appointments. Medications reconciled and all medications are available in the home this visit. The following education was provided regarding medications, medication interactions, and look a like medications: no medication education needed today. Medications are effective at this time. Home health supplies by type and quantity ordered/delivered this visit include: n/a. Patient education/skilled care provided this visit:Seizures are sudden, uncontrolled electrical disturbance in the brain which can cause changes in behavior, movements, feelings, and consciousness. Seizures are not always related to an underlying condition. It may be caused by lack of sleep, alcohol or drug use (amphetamines or cocaine), medications such as certain pain relievers, antidepressants or smoking cessation therapies, that lower the seizure threshold, lack of oxygen during birth, high stress, and rapidly flashing lights. Medications are the first choice of treatment for seizures. Take medications as prescribed and in right doses, never stop taking your medications without consulting the doctor, notify your doctor regarding any side effects from medications, and get adequate rest. PT EDUCATED ON ACTIVITES TO MANAGE DYSPNEA [PURSED-LIP BREATHING, SLOW CONTROLLED EXPIRATION OF AIR] PROPER POSITIONING [SITTING OR STANDING AND LEANING FORWARD WITH ARMS SUPPORTED], ENERGY-CONSERVATION TECHNIQUES [PACE ACTIVITES, TAKE FREQUENT RESTS, USE ASST DEVICES, BREAK ACTIVITIES INTO SMALLER TASKS]. patient encouraged to monitor for increase in pain and to continue with current pain management and to notify staff/md if pain becomes excrutiating/intolerable.  patient made aware to turn every 2 hours and to keep pressure off of bony prominences, to monitor for any pressure ulcer development/worsening. patient instructed to perform incontinence hep 3-4 x daily to strengthen muscles and to perform timed voiding q 2 hours to prevent incontinence from occurring. patient/cg to continue to take medications as prescribed. patient aware to monitor for effectiveness and to notify staff of any adverse reactions to medications/any changes to medication regimen. reviewed side effects, purposes, dosage, frequencies. Patient is starting a new medication on Monday (not received yet). The medication is Klonopin. I added an extra visit to assess once starting the medication. INSTRUCTED PATIENT AND CG THAT SHOULD ANY NEEDS OR CONCERNS ARISE TO FIRST CALL OUR OFFICE, OR THE DR'S OFFICE OR GO TO AN URGENT CARE CENTER AND NOT TO THE ED FOR NON-LIFE THREATENING EVENTS. IF IT IS LIFE THREATENING THEN CALL 911 OR GO TO THE CLOSEST ER. Patient level of understanding of education provided: Patient verbalized understanding of education provided at visit today and did partial teach back. Patient response to procedure performed: n/a Progress toward goals: Patient to remain compliant with medications and free from seizures. Patient doing well at this time. Home exercise program: activity as tolerated, trying to get physical activity 4-5 x weekly, 30 minutes daily. stopping activity if causing shortness of breath or chest pain, dizziness or weakness. Continued need for the following skills: Nursing, Physical Therapy, and Speech Language Pathology The following discharge planning was discussed with the pt/caregiver: Patient will be discharged once education has completed, patient is medically stable and pt/cg are able to independently manage medications and disease process. Patient aware that extra visit added today.

## 2022-02-03 NOTE — Clinical Note
Thanks  ----- Message -----  From: Cornelio Calle, PT  Sent: 2/5/2022   9:24 AM EST  To: Jared Sandifer, SLP      Pt is discharged from home care PT because she has met her goals. Please do final DC.

## 2022-02-03 NOTE — HOME HEALTH
SUBJECTIVE:  I am still dizzy and am vomiting. They want to put me on meds, but the side effect include dizziness and vomiting. The patient's  reports balance exercises every other day. Carla Ward CAREGIVER INVOLVEMENT/ASSISTANCE NEEDED FOR: The patient's  and son assist with meals, meds, chores, transportation, HEP    [address] ORDERED/DELIVERED THIS VISIT INCLUDE: none    . Carla Ward OBJECTIVE:  See interventions. PATIENT RESPONSE TO TREATMENT:  The patient demonstrated the ability to amb to/from her vehicle with Fair stability. PATIENT LEVEL OF UNDERSTANDING OF EDUCATION PROVIDED:   Good return demonstration of car transfer. Good return demonstration of car transfer. ASSESSMENT OF PROGRESS TOWARD GOALS:   No falls to date in spite of continued c/o dizziness. She reports overall decrease in sx, with headaches a couple times a week. Pt reports semi compliance with HEP due to her frequent dizziness and not feeling well. THE FOLLOWING DISCHARGE PLANNING WAS DISCUSSED WITH THE PATIENT/CAREGIVER:  DC next session.

## 2022-02-04 VITALS
HEART RATE: 83 BPM | TEMPERATURE: 98.8 F | SYSTOLIC BLOOD PRESSURE: 120 MMHG | DIASTOLIC BLOOD PRESSURE: 83 MMHG | OXYGEN SATURATION: 98 % | RESPIRATION RATE: 24 BRPM

## 2022-02-04 NOTE — HOME HEALTH
SUBJECTIVE: Pt alert. Pt continues to c/o dizziness. Spouse reports Pts dizziness interferes with Pts ability to do things around the house. [facility]. Pt and spouse are receptive to outpatient    CAREGIVER INVOLVEMENT / ASSISTANCE NEEDED FOR: spouse provides A for ADLs, supervision, meds, meals       HOME HEALTH SUPPLIES BY TYPE AND QUANTITY ORDERED/DELIVERED THIS VISIT INCLUDE: n/a      OBJECTIVE / PATIENT RESPONSE TO TREATMENT / PATIENT LEVEL OF UNDERSTANDING OF EDUCATION PROVIDED: Pt demonstrates increasing  short term memory recall of targeted information given for longer duration and for completion of complex problem solving tasks. Pt continues with full participation and motivation to improve.        ASSESSMENT OF PROGRESS TOWARD GOALS: Pt is making steady progress toward ST goals         CONTINUED NEED FOR THE FOLLOWING SKILLS: ST is needed to improve cognitive and memory function to reduce overall burden of care      PLAN FOR NEXT VISIT: cognitive and memory tasks    THE FOLLOWING DISCHARGE PLANNING WAS DISCUSSED WITH THE PATIENT/CAREGIVER: ST to d/c in 2 more visits, informed Pt and spouse of upcoming d/c, discussed transfer to outpatient, both are receptive but are checking on inusrance coverage

## 2022-02-05 VITALS
OXYGEN SATURATION: 99 % | HEART RATE: 91 BPM | DIASTOLIC BLOOD PRESSURE: 86 MMHG | SYSTOLIC BLOOD PRESSURE: 133 MMHG | TEMPERATURE: 97.7 F

## 2022-02-05 NOTE — HOME HEALTH
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 S:  Pt. still has dizziness that varies in intensity day to day. Neurologist has ordered another MRI of her brain. Pt denies falls nor change in medications. SHe has dizziness today. O: PT home care eval date is 1/10/22. Pt  received 2x/wk PT sessions for strengthening, bed mob and transfer trng, gait trng, HEP, balance trng, fall risk reduction strategies. Pt's current status :  Pain:  headache = 0-6 / 10 Pt is ind in self management of pain. Goal achieved. Bed mobility:  sit <> supine = ind.  Pt knows to move slowly during transitional movements to prevent dizziness. Goal achieved. MMT BLE =  4 to 4+/5. Transfers:  sit <> stand from multiple surfaces = ind with SC. Goal achieved. Gait:  150 ft with ind using SC exhibiting wide base gait pattern. SBA with rollator for ambulation on outdoor terrain. Goal achieved. FTSTS:  44.5 sec   = indicative of good BLE functional strength. Goal achieved. TUG:  15.2 sec  SC = indicative of balance improvement but continues to be a fall risk. Goal achieved. 6 MWT:  150  ft with rollator walker, O2 sats = 99%,  indicative of good walking endurance. Goal achieved. front entry Stairs negotiation for ingress/egress to home:  SBA holding on to railing with B hands = Goal achieved. one flight of indoor steps = SBA holding  on to railing. Fall risk reduction strategies:  always use AD, ask for supervision for stairs negotiation and if walking on outdoor terrain. HEP:  ind using  HEP handout and pt has been doing exercises daily. Goal achieved. Patient DC instructions:  Continue doing HEP and ambulate once every hour during daytime.     Patient level of understanding of education provided: Pt verbalized full understanding of fall risk reduction strategies and importance of maintaining her current status. Patient response to treatment:  Pt was able to perform functional testing even with dizziness and felt better after walking outside. She was instructed to walk outside with supervision, weather permitting. Caregiver involvement/assistance needed: spouse Babar Sandoval  (name of caregiver) assists with ADLs, iADLs.   A:  Patient demonstrated improvement in BLE strength, decreased fall risk and good endurance. Balance testing was deferred due to complaints of dizziness since this morning. Pt and  were informed that pt needs to continue with her ambulation maintenance and HEP to maintain current status. It is recommended that pt ask for OPPT referral from PCP when she is all done with home care and this is for resistance training and balance training. P: DC PT and pt and  agreed. Pt was instructed to continue with HEP and ambulation.   PCP Dr. Kurt Armas was informed of DC from home care PT.

## 2022-02-08 ENCOUNTER — HOME CARE VISIT (OUTPATIENT)
Dept: SCHEDULING | Facility: HOME HEALTH | Age: 57
End: 2022-02-08
Payer: COMMERCIAL

## 2022-02-08 VITALS
DIASTOLIC BLOOD PRESSURE: 85 MMHG | HEART RATE: 89 BPM | TEMPERATURE: 98.9 F | SYSTOLIC BLOOD PRESSURE: 115 MMHG | RESPIRATION RATE: 20 BRPM | OXYGEN SATURATION: 98 %

## 2022-02-08 PROCEDURE — G0153 HHCP-SVS OF S/L PATH,EA 15MN: HCPCS

## 2022-02-08 NOTE — HOME HEALTH
SUBJECTIVE: Pt alert. Pt c/o having tearful episodes. Pt/spouse report Pt began new med yesterday for dizziness. Pt states, \"It's giving me a little bit of relief. \"  Spouse reports intermittent episodes where of Pt not remembering, reports the other day Pt forgot they had been watching [facility] series, forgot characters. Also using words that are not in her earlier vocabulary except as a child in [place] such as \"porridge. \" Pt reports she will have another MRI in March         CAREGIVER INVOLVEMENT / ASSISTANCE NEEDED FOR: spouse provides A for ADLs, supervision, meds, meals      HOME HEALTH SUPPLIES BY TYPE AND QUANTITY ORDERED/DELIVERED THIS VISIT INCLUDE: n/a      OBJECTIVE / PATIENT RESPONSE TO TREATMENT / PATIENT LEVEL OF UNDERSTANDING OF EDUCATION PROVIDED: Standardized assessment using Cognitive Linguistic Quick Test (CLQT) was administered this visit. Results of CLQT indicate Pt demonstrating mild overall cognitive impairment. Pt with mild deficit in attention. Other cognitive domains such as memory, executive functions, language and visuospatial skills appeared WNL. Although CLQT scores indicate memory and executive function appear WNL, Pt is noted by SLP to exhibit transient episodes of reduced short term memory recall, poor executive function and judgement. ASSESSMENT OF PROGRESS TOWARD GOALS: Pt is making steady progress toward ST goals       CONTINUED NEED FOR THE FOLLOWING SKILLS: ST is needed to improve cognitive and memory function to reduce overall burden of care     PLAN FOR NEXT VISIT:  d/c       THE FOLLOWING DISCHARGE PLANNING WAS DISCUSSED WITH THE PATIENT/CAREGIVER: ST to d/c next visit.   Pt and spouse are interested in transfer to outpatient once dizziness is under control

## 2022-02-09 PROCEDURE — 400018 HH-NO PAY CLAIM PROCEDURE

## 2022-02-10 ENCOUNTER — HOME CARE VISIT (OUTPATIENT)
Dept: SCHEDULING | Facility: HOME HEALTH | Age: 57
End: 2022-02-10
Payer: COMMERCIAL

## 2022-02-10 VITALS
OXYGEN SATURATION: 98 % | TEMPERATURE: 97.7 F | HEART RATE: 86 BPM | DIASTOLIC BLOOD PRESSURE: 73 MMHG | SYSTOLIC BLOOD PRESSURE: 113 MMHG | RESPIRATION RATE: 20 BRPM

## 2022-02-10 PROCEDURE — G0300 HHS/HOSPICE OF LPN EA 15 MIN: HCPCS

## 2022-02-10 PROCEDURE — 400013 HH SOC

## 2022-02-10 PROCEDURE — G0153 HHCP-SVS OF S/L PATH,EA 15MN: HCPCS

## 2022-02-11 NOTE — HOME HEALTH
SUBJECTIVE: Pt alert and cooperative. Reports dizziness is continuing. Spouse reports being told by physician that new med may take awhile to work. Spouse also states Pt continues with sundowning and confusion after 8 each night. Will also demonstate episodes of impaired memory throughout the day, such as not rememering what she's doing or asking family, \"Do I sleep down here? \"  Will also demonstrate confabulations or false memory such as incorrectly stating to  that she walked around the block with PT.         CAREGIVER INVOLVEMENT / ASSISTANCE NEEDED FOR: spouse provides A for ADLs, supervision, meds, meals    HOME HEALTH SUPPLIES BY TYPE AND QUANTITY ORDERED/DELIVERED THIS VISIT INCLUDE: n/a    OBJECTIVE / PATIENT RESPONSE TO TREATMENT / PATIENT LEVEL OF UNDERSTANDING OF EDUCATION PROVIDED: Agency d/c completed

## 2022-02-13 VITALS
HEART RATE: 84 BPM | TEMPERATURE: 97.7 F | OXYGEN SATURATION: 97 % | SYSTOLIC BLOOD PRESSURE: 124 MMHG | DIASTOLIC BLOOD PRESSURE: 80 MMHG

## 2022-02-13 NOTE — HOME HEALTH
SN reason for visit: Epilepsy, encephalitis med management and teaching. Caregiver involvement: Patient's cg is . he lives with patient and is available at all times for assistance with iadls, adls, meal prep, medication management, taking to md appointments. Medications reconciled and all medications are available in the home this visit. The following education was provided regarding medications, medication interactions, and look a like medications: Klonopin effects/ side effects discussed today. No problems thus far since starting the medication on Monday. Medications are effective at this time. Home health supplies by type and quantity ordered/delivered this visit include: n/a. Patient education/skilled care provided this visit:Seizures are sudden, uncontrolled electrical disturbance in the brain which can cause changes in behavior, movements, feelings, and consciousness. Seizures are not always related to an underlying condition. It may be caused by lack of sleep, alcohol or drug use (amphetamines or cocaine), medications such as certain pain relievers, antidepressants or smoking cessation therapies, that lower the seizure threshold, lack of oxygen during birth, high stress, and rapidly flashing lights. Medications are the first choice of treatment for seizures. Take medications as prescribed and in right doses, never stop taking your medications without consulting the doctor, notify your doctor regarding any side effects from medications, and get adequate rest. PT EDUCATED ON ACTIVITES TO MANAGE DYSPNEA [PURSED-LIP BREATHING, SLOW CONTROLLED EXPIRATION OF AIR] PROPER POSITIONING [SITTING OR STANDING AND LEANING FORWARD WITH ARMS SUPPORTED], ENERGY-CONSERVATION TECHNIQUES [PACE ACTIVITES, TAKE FREQUENT RESTS, USE ASST DEVICES, BREAK ACTIVITIES INTO SMALLER TASKS].  patient encouraged to monitor for increase in pain and to continue with current pain management and to notify staff/md if pain becomes excrutiating/intolerable. patient made aware to turn every 2 hours and to keep pressure off of bony prominences, to monitor for any pressure ulcer development/worsening. patient instructed to perform incontinence hep 3-4 x daily to strengthen muscles and to perform timed voiding q 2 hours to prevent incontinence from occurring. patient/cg to continue to take medications as prescribed. patient aware to monitor for effectiveness and to notify staff of any adverse reactions to medications/any changes to medication regimen. reviewed side effects, purposes, dosage, frequencies. INSTRUCTED PATIENT AND CG THAT SHOULD ANY NEEDS OR CONCERNS ARISE TO FIRST CALL OUR OFFICE, OR THE DR'S OFFICE OR GO TO AN URGENT CARE CENTER AND NOT TO THE ED FOR NON-LIFE THREATENING EVENTS. IF IT IS LIFE THREATENING THEN CALL 911 OR GO TO THE CLOSEST ER. Patient level of understanding of education provided: Patient verbalized understanding of education provided at visit today and did partial teach back. Patient response to procedure performed: n/a Progress toward goals: Patient to remain compliant with medications and free from seizures. Patient doing really well at this time. All SN goals met at this time. Home exercise program: activity as tolerated, trying to get physical activity 4-5 x weekly, 30 minutes daily. stopping activity if causing shortness of breath or chest pain, dizziness or weakness. Continued need for the following skills: Speech Language Pathology The following discharge planning was discussed with the pt/caregiver: Pt.clinically discharged and documentation finalized for completion of agency discharge.

## 2022-03-18 PROBLEM — E43 SEVERE PROTEIN-CALORIE MALNUTRITION (HCC): Status: ACTIVE | Noted: 2021-10-21

## 2022-03-18 PROBLEM — F06.31 DEPRESSION DUE TO PHYSICAL ILLNESS: Status: ACTIVE | Noted: 2021-11-06

## 2022-03-19 PROBLEM — R11.10 VOMITING: Status: ACTIVE | Noted: 2021-10-20

## 2022-04-15 NOTE — PROGRESS NOTES
Problem: Pressure Injury - Risk of  Goal: *Prevention of pressure injury  Description: Document Christofer Scale and appropriate interventions in the flowsheet. Outcome: Progressing Towards Goal  Note: Pressure Injury Interventions:  Sensory Interventions: Assess changes in LOC    Moisture Interventions: Absorbent underpads    Activity Interventions: Increase time out of bed    Mobility Interventions: Assess need for specialty bed    Nutrition Interventions: Offer support with meals,snacks and hydration, Document food/fluid/supplement intake    Friction and Shear Interventions: HOB 30 degrees or less                Problem: Patient Education: Go to Patient Education Activity  Goal: Patient/Family Education  Outcome: Progressing Towards Goal     Problem: Falls - Risk of  Goal: *Absence of Falls  Description: Document Theresa Fall Risk and appropriate interventions in the flowsheet.   Outcome: Progressing Towards Goal  Note: Fall Risk Interventions:  Mobility Interventions: Patient to call before getting OOB    Mentation Interventions: Increase mobility    Medication Interventions: Patient to call before getting OOB    Elimination Interventions: Call light in reach    History of Falls Interventions: Door open when patient unattended         Problem: Patient Education: Go to Patient Education Activity  Goal: Patient/Family Education  Outcome: Progressing Towards Goal     Problem: Nutrition Deficit  Goal: *Optimize nutritional status  Outcome: Progressing Towards Goal     Problem: Patient Education: Go to Patient Education Activity  Goal: Patient/Family Education  Outcome: Progressing Towards Goal     Problem: Patient Education: Go to Patient Education Activity  Goal: Patient/Family Education  Outcome: Progressing Towards Goal     Problem: Patient Education: Go to Patient Education Activity  Goal: Patient/Family Education  Outcome: Progressing Towards Goal Island Pedicle Flap-Requiring Vessel Identification Text: The defect edges were debeveled with a #15 scalpel blade.  Given the location of the defect, shape of the defect and the proximity to free margins an island pedicle advancement flap was deemed most appropriate.  Using a sterile surgical marker, an appropriate advancement flap was drawn, based on the axial vessel mentioned above, incorporating the defect, outlining the appropriate donor tissue and placing the expected incisions within the relaxed skin tension lines where possible.    The area thus outlined was incised deep to adipose tissue with a #15 scalpel blade.  The skin margins were undermined to an appropriate distance in all directions around the primary defect and laterally outward around the island pedicle utilizing iris scissors.  There was minimal undermining beneath the pedicle flap.

## 2022-12-21 NOTE — HOME HEALTH
SUBJECTIVE  Patient reports she has vomited 7 times this morning. GI follow up is on Monday 10/4/2021  . Caregiver involvement: caregivers provide assistance with medications, meals and ADLs. .  Medications reconciled and all medications are not available in the home this visit. The following education was provided regarding medications, medication interactions, and look a like medications: no new medications. Medications  are effective, not very effective at this time. .  Home health supplies by type and quantity ordered/delivered this visit include: n/a  . Patient education provided this visit:  keep log of PO intake  . OBJECTIVE - see intervention  . ASSESSMENT  Patient demonstrates steady progress in therapy as indicated by completion of all therapy tasks with encouragement. SLP completed swallow evaluation utilizing thin liquids and mechanical soft small bolus of banana which was remarkable for mild gagging upon presentation of bolus which decreased with slow rate of intake and small bites, no signs or symptoms of aspiration. Cognitive linguistic skills progressing completing thought processing tasks with increased time. Speech therapy is medically necessary to increase functional cognitive linguistic skills for safe completion of ADLs. Katalina Hernandez   PLAN    Home exercise program: daily log to increase situational awareness    Continued need for the following skills: Speech Language Pathology    The following discharge planning was discussed with the pt/caregiver: 6 more visits or when goals met max benefits achieved Yes

## 2023-10-16 SDOH — HEALTH STABILITY: PHYSICAL HEALTH: ON AVERAGE, HOW MANY DAYS PER WEEK DO YOU ENGAGE IN MODERATE TO STRENUOUS EXERCISE (LIKE A BRISK WALK)?: 5 DAYS

## 2023-10-16 SDOH — HEALTH STABILITY: PHYSICAL HEALTH: ON AVERAGE, HOW MANY MINUTES DO YOU ENGAGE IN EXERCISE AT THIS LEVEL?: 30 MIN

## 2023-10-19 ENCOUNTER — OFFICE VISIT (OUTPATIENT)
Age: 58
End: 2023-10-19

## 2023-10-19 VITALS — BODY MASS INDEX: 33.86 KG/M2 | TEMPERATURE: 97 F | HEIGHT: 62 IN | WEIGHT: 184 LBS

## 2023-10-19 DIAGNOSIS — M25.561 CHRONIC PAIN OF RIGHT KNEE: ICD-10-CM

## 2023-10-19 DIAGNOSIS — M21.161 ACQUIRED VARUS DEFORMITY KNEE, RIGHT: ICD-10-CM

## 2023-10-19 DIAGNOSIS — G89.29 CHRONIC PAIN OF RIGHT KNEE: ICD-10-CM

## 2023-10-19 DIAGNOSIS — M17.11 PRIMARY OSTEOARTHRITIS OF RIGHT KNEE: Primary | ICD-10-CM

## 2023-10-19 DIAGNOSIS — M25.461 SWELLING OF JOINT OF RIGHT KNEE: ICD-10-CM

## 2023-10-19 RX ORDER — MELOXICAM 15 MG/1
15 TABLET ORAL DAILY PRN
Qty: 30 TABLET | Refills: 3 | Status: SHIPPED | OUTPATIENT
Start: 2023-10-19

## 2023-10-19 RX ORDER — BETAMETHASONE SODIUM PHOSPHATE AND BETAMETHASONE ACETATE 3; 3 MG/ML; MG/ML
6 INJECTION, SUSPENSION INTRA-ARTICULAR; INTRALESIONAL; INTRAMUSCULAR; SOFT TISSUE ONCE
Status: COMPLETED | OUTPATIENT
Start: 2023-10-19 | End: 2023-10-19

## 2023-10-19 RX ADMIN — BETAMETHASONE SODIUM PHOSPHATE AND BETAMETHASONE ACETATE 6 MG: 3; 3 INJECTION, SUSPENSION INTRA-ARTICULAR; INTRALESIONAL; INTRAMUSCULAR; SOFT TISSUE at 15:08

## 2023-10-19 NOTE — PROGRESS NOTES
Adelfo Burgos prior to the start of the procedure:          - Patient was identified by name and date of birth     - Agreement on procedure being performed was verified     - Risks and benefits explained to the patient     - Patient was positioned for comfort     - Consent was signed and verified     - Patient was advised regarding risks of bruising, bleeding, infection and pain          Time: 3:04 PM          Body Part: intra-articular injection of the right knee         Medication and Dose: 1mL Celestone preparation, i.e. 6 mg, and 3 mL 1% lidocaine          Date of Procedure: 10/19/23         PROCEDURE PERFORMED BY : Harrison Kim M.D., [facility])          Provider Assisted by: Saul Hinson         Patient assisted by: self          Patient tolerated procedure well with no complications     Past Medical History:   Diagnosis Date    Arthritis     Diarrhea     no diarrhea since 9/14/2021    Encephalitis 06/14/2021    intubated    GERD (gastroesophageal reflux disease)     H. pylori infection 2019    IBS (irritable bowel syndrome)     Memory difficulty     since encephalitis    Migraine     Ovarian cyst 2019    Seizures ([address]) 06/16/2021    x 1    Vomiting        No family history on file. Current Outpatient Medications   Medication Sig Dispense Refill    vitamin D 25 MCG (1000 UT) CAPS Take 1,000 Units by mouth daily      clonazePAM (KLONOPIN) 0.5 MG tablet Take 0.5 mg by mouth. ipratropium-albuterol (DUONEB) 0.5-2.5 (3) MG/3ML SOLN nebulizer solution Inhale 3 mLs into the lungs every 6 hours as needed      lacosamide (VIMPAT) 10 MG/ML SOLN oral solution Take 100 mg by mouth in the morning and 100 mg in the evening.       meclizine (ANTIVERT) 12.5 MG tablet Take 12.5 mg by mouth 3 times daily as needed      melatonin 5 MG TABS tablet Take 5 mg by mouth      mirtazapine (REMERON SOL-TAB) 15 MG disintegrating tablet Take 15 mg by mouth      ondansetron (ZOFRAN-ODT) 8 MG TBDP disintegrating tablet Take 8 mg by mouth

## 2023-11-29 ENCOUNTER — OFFICE VISIT (OUTPATIENT)
Age: 58
End: 2023-11-29
Payer: OTHER GOVERNMENT

## 2023-11-29 VITALS — BODY MASS INDEX: 34.23 KG/M2 | HEIGHT: 62 IN | RESPIRATION RATE: 14 BRPM | WEIGHT: 186 LBS

## 2023-11-29 DIAGNOSIS — G89.29 CHRONIC PAIN OF RIGHT KNEE: ICD-10-CM

## 2023-11-29 DIAGNOSIS — M17.11 PRIMARY OSTEOARTHRITIS OF RIGHT KNEE: Primary | ICD-10-CM

## 2023-11-29 DIAGNOSIS — M17.11 PATELLOFEMORAL ARTHRITIS OF RIGHT KNEE: ICD-10-CM

## 2023-11-29 DIAGNOSIS — M25.561 CHRONIC PAIN OF RIGHT KNEE: ICD-10-CM

## 2023-11-29 PROCEDURE — 99214 OFFICE O/P EST MOD 30 MIN: CPT | Performed by: ORTHOPAEDIC SURGERY

## 2023-11-29 PROCEDURE — 73564 X-RAY EXAM KNEE 4 OR MORE: CPT | Performed by: ORTHOPAEDIC SURGERY

## 2023-11-29 NOTE — PROGRESS NOTES
Date    Arthritis     Diarrhea     no diarrhea since 9/14/2021    Encephalitis 06/14/2021    intubated    GERD (gastroesophageal reflux disease)     H. pylori infection 2019    IBS (irritable bowel syndrome)     Memory difficulty     since encephalitis    Migraine     Ovarian cyst 2019    Seizures ([address]) 06/16/2021    x 1    Vomiting        History reviewed. No pertinent family history. Current Outpatient Medications   Medication Sig Dispense Refill    meloxicam (MOBIC) 15 MG tablet Take 1 tablet by mouth daily as needed for Pain Take one pill daily, as needed. Take with food. Do not mix NSAIDs. 30 tablet 3    vitamin D 25 MCG (1000 UT) CAPS Take 1 capsule by mouth daily      clonazePAM (KLONOPIN) 0.5 MG tablet Take 1 tablet by mouth. ipratropium-albuterol (DUONEB) 0.5-2.5 (3) MG/3ML SOLN nebulizer solution Inhale 3 mLs into the lungs every 6 hours as needed      lacosamide (VIMPAT) 10 MG/ML SOLN oral solution Take 10 mLs by mouth in the morning and 10 mLs in the evening. ondansetron (ZOFRAN-ODT) 8 MG TBDP disintegrating tablet Take 1 tablet by mouth every 8 hours as needed      thiamine 250 MG tablet [The details of the medication are not available because there are pending changes by a home health clinician.]      meclizine (ANTIVERT) 12.5 MG tablet Take 12.5 mg by mouth 3 times daily as needed      melatonin 5 MG TABS tablet Take 5 mg by mouth      mirtazapine (REMERON SOL-TAB) 15 MG disintegrating tablet Take 15 mg by mouth      pantoprazole (PROTONIX) 40 MG tablet Take 40 mg by mouth daily      PARoxetine (PAXIL) 10 MG tablet Take 10 mg by mouth daily      potassium gluconate 550 mg tablet Take by mouth daily      predniSONE (DELTASONE) 10 MG tablet [The details of the medication are not available because there are pending changes by a home health clinician.]       No current facility-administered medications for this visit.        Allergies   Allergen Reactions    Minocycline Other (See Comments)

## 2024-01-10 ENCOUNTER — OFFICE VISIT (OUTPATIENT)
Age: 59
End: 2024-01-10
Payer: MEDICARE

## 2024-01-10 VITALS — HEIGHT: 62 IN | BODY MASS INDEX: 34.41 KG/M2 | WEIGHT: 187 LBS

## 2024-01-10 DIAGNOSIS — M17.9 OSTEOARTHRITIS OF KNEE, UNSPECIFIED LATERALITY, UNSPECIFIED OSTEOARTHRITIS TYPE: Primary | ICD-10-CM

## 2024-01-10 PROCEDURE — G8427 DOCREV CUR MEDS BY ELIG CLIN: HCPCS | Performed by: ORTHOPAEDIC SURGERY

## 2024-01-10 PROCEDURE — G8417 CALC BMI ABV UP PARAM F/U: HCPCS | Performed by: ORTHOPAEDIC SURGERY

## 2024-01-10 PROCEDURE — 99214 OFFICE O/P EST MOD 30 MIN: CPT | Performed by: ORTHOPAEDIC SURGERY

## 2024-01-10 PROCEDURE — 1036F TOBACCO NON-USER: CPT | Performed by: ORTHOPAEDIC SURGERY

## 2024-01-10 PROCEDURE — G8484 FLU IMMUNIZE NO ADMIN: HCPCS | Performed by: ORTHOPAEDIC SURGERY

## 2024-01-10 PROCEDURE — 3017F COLORECTAL CA SCREEN DOC REV: CPT | Performed by: ORTHOPAEDIC SURGERY

## 2024-01-10 NOTE — PROGRESS NOTES
Patient: Nabila Barrett                MRN: [id number]       SSN:   YOB: 1965        AGE: 58 y.o.        SEX: female  Body mass index is 34.2 kg/m².    PCP: Alea Dias, TIMMY - DMITRI  01/10/24    Nabila is here today for reassessment right-sided knee pain she is very pleased with the injection that we did approximately 8 weeks ago been taking some Mobic she does suffer from balance issues and does tend to use her walker she is apparently going to be getting some driving lessons as well to see if she is a candidate for driving again no groin pain no back is quiescent and she is very thankful for the injection that we gave her the examination today with essentially no effusion the knee comes out quite straight bends well hips are noncontributory rotate nicely both feet warm and well-perfused and she does use her walker just for for for balance issues we did discuss the role of surgery she is doing extremely well with nonoperative measures and we also talked about NSAIDs as well she also apparently has recurrent UTIs in the be seeing a urologist as well in the not-too-distant future    I am delighted with the response she has had with nonoperative measures and our plan for her will be to reassess in about 6 weeks time and and possibly reinject them.  Social determinants of health reviewed she has very good support at home and both her  and son are helping her with her transportation thank you                I have personally reviewed the 4-view x-rays of the right knee, obtained today, 11/29/23. Shows moderately advanced arthritis of the right knee, especially lateral and patellofemoral.     REVIEW OF SYSTEMS:      CON: negative  EYE: negative   ENT: negative  RESP: negative  GI:    negative   :  negative  MSK: Positive  A twelve point review of systems was completed, positives noted and all other systems were reviewed and are negative          Past Medical History:

## 2024-03-17 DIAGNOSIS — M25.561 CHRONIC PAIN OF RIGHT KNEE: ICD-10-CM

## 2024-03-17 DIAGNOSIS — G89.29 CHRONIC PAIN OF RIGHT KNEE: ICD-10-CM

## 2024-03-17 DIAGNOSIS — M17.11 PRIMARY OSTEOARTHRITIS OF RIGHT KNEE: ICD-10-CM

## 2024-03-18 RX ORDER — MELOXICAM 15 MG/1
TABLET ORAL
Qty: 30 TABLET | Refills: 3 | Status: SHIPPED | OUTPATIENT
Start: 2024-03-18

## 2024-03-27 ENCOUNTER — OFFICE VISIT (OUTPATIENT)
Age: 59
End: 2024-03-27
Payer: MEDICARE

## 2024-03-27 VITALS — HEIGHT: 62 IN | BODY MASS INDEX: 33.86 KG/M2 | WEIGHT: 184 LBS

## 2024-03-27 DIAGNOSIS — M21.161 ACQUIRED VARUS DEFORMITY KNEE, RIGHT: ICD-10-CM

## 2024-03-27 DIAGNOSIS — M17.12 PRIMARY OSTEOARTHRITIS OF LEFT KNEE: ICD-10-CM

## 2024-03-27 DIAGNOSIS — M17.11 PRIMARY OSTEOARTHRITIS OF RIGHT KNEE: Primary | ICD-10-CM

## 2024-03-27 DIAGNOSIS — M17.11 PATELLOFEMORAL ARTHRITIS OF RIGHT KNEE: ICD-10-CM

## 2024-03-27 PROCEDURE — G8484 FLU IMMUNIZE NO ADMIN: HCPCS | Performed by: ORTHOPAEDIC SURGERY

## 2024-03-27 PROCEDURE — 99214 OFFICE O/P EST MOD 30 MIN: CPT | Performed by: ORTHOPAEDIC SURGERY

## 2024-03-27 PROCEDURE — G8427 DOCREV CUR MEDS BY ELIG CLIN: HCPCS | Performed by: ORTHOPAEDIC SURGERY

## 2024-03-27 PROCEDURE — 20610 DRAIN/INJ JOINT/BURSA W/O US: CPT | Performed by: ORTHOPAEDIC SURGERY

## 2024-03-27 PROCEDURE — G8417 CALC BMI ABV UP PARAM F/U: HCPCS | Performed by: ORTHOPAEDIC SURGERY

## 2024-03-27 PROCEDURE — 3017F COLORECTAL CA SCREEN DOC REV: CPT | Performed by: ORTHOPAEDIC SURGERY

## 2024-03-27 PROCEDURE — 1036F TOBACCO NON-USER: CPT | Performed by: ORTHOPAEDIC SURGERY

## 2024-03-27 RX ORDER — LIDOCAINE HYDROCHLORIDE 10 MG/ML
3 INJECTION, SOLUTION INFILTRATION; PERINEURAL ONCE
Status: COMPLETED | OUTPATIENT
Start: 2024-03-27 | End: 2024-03-27

## 2024-03-27 RX ORDER — BETAMETHASONE SODIUM PHOSPHATE AND BETAMETHASONE ACETATE 3; 3 MG/ML; MG/ML
6 INJECTION, SUSPENSION INTRA-ARTICULAR; INTRALESIONAL; INTRAMUSCULAR; SOFT TISSUE ONCE
Status: COMPLETED | OUTPATIENT
Start: 2024-03-27 | End: 2024-03-27

## 2024-03-27 RX ADMIN — BETAMETHASONE SODIUM PHOSPHATE AND BETAMETHASONE ACETATE 6 MG: 3; 3 INJECTION, SUSPENSION INTRA-ARTICULAR; INTRALESIONAL; INTRAMUSCULAR; SOFT TISSUE at 11:15

## 2024-03-27 RX ADMIN — BETAMETHASONE SODIUM PHOSPHATE AND BETAMETHASONE ACETATE 6 MG: 3; 3 INJECTION, SUSPENSION INTRA-ARTICULAR; INTRALESIONAL; INTRAMUSCULAR; SOFT TISSUE at 11:16

## 2024-03-27 RX ADMIN — LIDOCAINE HYDROCHLORIDE 3 ML: 10 INJECTION, SOLUTION INFILTRATION; PERINEURAL at 11:17

## 2024-03-27 NOTE — PROGRESS NOTES
Patient: Nabila Barrett                MRN: [id number]       SSN: [id number]  YOB: 1965        AGE: 58 y.o.        SEX: female  Body mass index is 33.65 kg/m².    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 Warnicke's encephalopathy dizziness and memory issues where she cannot remember short-term her long-term memory is improving she uses her walker for balance both knees bother her and she is requesting injections for both knees reminder from her  reminded her to talk about the left knee today I did examine both knees today relatively neutral alignment to slight effusion calf nontender Homans' sign is negative in both feet warm and well-perfused    At this point I recommend nonoperative measures for her which she is very pleased with and I did inject both knees very well-tolerated social determinants of health reviewed she has excellent support at home and no negative factors affecting patient care today she will return to see us in 3 to 4 months time for or sooner if there is any problem thank you            I have personally reviewed the 4-view x-rays of the right knee, obtained today, 11/29/23. Shows moderately advanced arthritis of the right knee, especially lateral and patellofemoral.          REVIEW OF SYSTEMS:      CON: negative  EYE: negative   ENT: negative  RESP: negative  GI:    negative   :  negative  MSK: Positive  A twelve point review of systems was completed, positives noted and all other systems were reviewed and are negative          Past Medical History:   Diagnosis Date    Arthritis     Diarrhea     no diarrhea since 9/14/2021    Encephalitis 06/14/2021    intubated    GERD (gastroesophageal reflux disease)     H. pylori infection 2019    IBS (irritable bowel syndrome)     Memory difficulty     since encephalitis    Migraine

## 2024-08-07 ENCOUNTER — OFFICE VISIT (OUTPATIENT)
Age: 59
End: 2024-08-07

## 2024-08-07 VITALS — WEIGHT: 184 LBS | BODY MASS INDEX: 33.86 KG/M2 | HEIGHT: 62 IN

## 2024-08-07 DIAGNOSIS — M17.11 PRIMARY OSTEOARTHRITIS OF RIGHT KNEE: Primary | ICD-10-CM

## 2024-08-07 DIAGNOSIS — M17.12 PRIMARY OSTEOARTHRITIS OF LEFT KNEE: ICD-10-CM

## 2024-08-07 RX ORDER — BETAMETHASONE SODIUM PHOSPHATE AND BETAMETHASONE ACETATE 3; 3 MG/ML; MG/ML
6 INJECTION, SUSPENSION INTRA-ARTICULAR; INTRALESIONAL; INTRAMUSCULAR; SOFT TISSUE ONCE
Status: COMPLETED | OUTPATIENT
Start: 2024-08-07 | End: 2024-08-07

## 2024-08-07 RX ORDER — LIDOCAINE HYDROCHLORIDE 10 MG/ML
3 INJECTION, SOLUTION INFILTRATION; PERINEURAL ONCE
Status: COMPLETED | OUTPATIENT
Start: 2024-08-07 | End: 2024-08-07

## 2024-08-07 RX ADMIN — LIDOCAINE HYDROCHLORIDE 3 ML: 10 INJECTION, SOLUTION INFILTRATION; PERINEURAL at 15:42

## 2024-08-07 RX ADMIN — BETAMETHASONE SODIUM PHOSPHATE AND BETAMETHASONE ACETATE 6 MG: 3; 3 INJECTION, SUSPENSION INTRA-ARTICULAR; INTRALESIONAL; INTRAMUSCULAR; SOFT TISSUE at 15:41

## 2024-08-07 RX ADMIN — LIDOCAINE HYDROCHLORIDE 3 ML: 10 INJECTION, SOLUTION INFILTRATION; PERINEURAL at 15:41

## 2024-08-07 NOTE — PROGRESS NOTES
Patient: Nabila Barrett                MRN: [id number]       SSN: [id number]  YOB: 1965        AGE: 58 y.o.        SEX: female  There is no height or weight on file to calculate BMI.    PCP: Alea Dias, TIMMY - DMITRI  08/07/24    Nabila is here today for follow-up of bilateral knee pain and she reminds me that she has had a viral encephalopathy in the past and we did injections for her back in March she is would like bilateral knee injections today she is not a big fan of needles by having said that I did examine her she does have her wheeled walker with a seat both hips rotate adequately both knees have good motion only slight effusion calf nontender Homans' sign is negative.    She has mild joint line tenderness in all 3 compartments but especially medially    At this point I would not recommend total knee replacement we can do injections little more often I had suggest every 4 months both knees were injected and reasonably well-tolerated and return to see us in 4 months time for reevaluation social terms of filtered and reviewed no negative factors affecting patient care    REVIEW OF SYSTEMS:      CON: negative  EYE: negative   ENT: negative  RESP: negative  GI:    negative   :  negative  MSK: Positive  A twelve point review of systems was completed, positives noted and all other systems were reviewed and are negative          Past Medical History:   Diagnosis Date    Arthritis     Diarrhea     no diarrhea since 9/14/2021    Encephalitis 06/14/2021    intubated    GERD (gastroesophageal reflux disease)     H. pylori infection 2019    IBS (irritable bowel syndrome)     Memory difficulty     since encephalitis    Migraine     Ovarian cyst 2019    Seizures ([facility]) 06/16/2021    x 1    Vomiting        No family history on file.    Current Outpatient Medications   Medication Sig Dispense Refill    meloxicam (MOBIC) 15 MG tablet take 1 tablet by mouth once daily AS NEEDED FOR PAIN take 
4 = No assist / stand by assistance

## 2024-08-29 DIAGNOSIS — M25.561 CHRONIC PAIN OF RIGHT KNEE: ICD-10-CM

## 2024-08-29 DIAGNOSIS — G89.29 CHRONIC PAIN OF RIGHT KNEE: ICD-10-CM

## 2024-08-29 DIAGNOSIS — M17.11 PRIMARY OSTEOARTHRITIS OF RIGHT KNEE: ICD-10-CM

## 2024-08-30 RX ORDER — MELOXICAM 15 MG/1
15 TABLET ORAL DAILY
Qty: 30 TABLET | Refills: 3 | Status: SHIPPED | OUTPATIENT
Start: 2024-08-30

## 2024-09-24 DIAGNOSIS — M17.11 PRIMARY OSTEOARTHRITIS OF RIGHT KNEE: ICD-10-CM

## 2024-09-24 DIAGNOSIS — G89.29 CHRONIC PAIN OF RIGHT KNEE: ICD-10-CM

## 2024-09-24 DIAGNOSIS — M25.561 CHRONIC PAIN OF RIGHT KNEE: ICD-10-CM

## 2024-09-24 RX ORDER — MELOXICAM 15 MG/1
15 TABLET ORAL DAILY
Qty: 90 TABLET | Refills: 3 | Status: SHIPPED | OUTPATIENT
Start: 2024-09-24

## 2024-12-23 ENCOUNTER — OFFICE VISIT (OUTPATIENT)
Age: 59
End: 2024-12-23
Payer: MEDICARE

## 2024-12-23 VITALS — HEIGHT: 62 IN | BODY MASS INDEX: 33.65 KG/M2

## 2024-12-23 DIAGNOSIS — M17.0 PRIMARY OSTEOARTHRITIS OF BOTH KNEES: Primary | ICD-10-CM

## 2024-12-23 PROCEDURE — G8484 FLU IMMUNIZE NO ADMIN: HCPCS | Performed by: ORTHOPAEDIC SURGERY

## 2024-12-23 PROCEDURE — 1036F TOBACCO NON-USER: CPT | Performed by: ORTHOPAEDIC SURGERY

## 2024-12-23 PROCEDURE — G8417 CALC BMI ABV UP PARAM F/U: HCPCS | Performed by: ORTHOPAEDIC SURGERY

## 2024-12-23 PROCEDURE — 3017F COLORECTAL CA SCREEN DOC REV: CPT | Performed by: ORTHOPAEDIC SURGERY

## 2024-12-23 PROCEDURE — 20610 DRAIN/INJ JOINT/BURSA W/O US: CPT | Performed by: ORTHOPAEDIC SURGERY

## 2024-12-23 PROCEDURE — 73564 X-RAY EXAM KNEE 4 OR MORE: CPT | Performed by: ORTHOPAEDIC SURGERY

## 2024-12-23 PROCEDURE — 99214 OFFICE O/P EST MOD 30 MIN: CPT | Performed by: ORTHOPAEDIC SURGERY

## 2024-12-23 PROCEDURE — G8428 CUR MEDS NOT DOCUMENT: HCPCS | Performed by: ORTHOPAEDIC SURGERY

## 2024-12-23 RX ORDER — BETAMETHASONE SODIUM PHOSPHATE AND BETAMETHASONE ACETATE 3; 3 MG/ML; MG/ML
12 INJECTION, SUSPENSION INTRA-ARTICULAR; INTRALESIONAL; INTRAMUSCULAR; SOFT TISSUE ONCE
Status: COMPLETED | OUTPATIENT
Start: 2024-12-23 | End: 2024-12-23

## 2024-12-23 RX ORDER — LIDOCAINE HYDROCHLORIDE 10 MG/ML
6 INJECTION, SOLUTION INFILTRATION; PERINEURAL ONCE
Status: COMPLETED | OUTPATIENT
Start: 2024-12-23 | End: 2024-12-23

## 2024-12-23 RX ADMIN — LIDOCAINE HYDROCHLORIDE 6 ML: 10 INJECTION, SOLUTION INFILTRATION; PERINEURAL at 14:11

## 2024-12-23 RX ADMIN — BETAMETHASONE SODIUM PHOSPHATE AND BETAMETHASONE ACETATE 12 MG: 3; 3 INJECTION, SUSPENSION INTRA-ARTICULAR; INTRALESIONAL; INTRAMUSCULAR; SOFT TISSUE at 14:10

## 2024-12-23 NOTE — PROGRESS NOTES
(5/21/2024)    Received from [facility] [employer], [facility]    PRAPARE - Transportation     Lack of Transportation (Medical): No     Lack of Transportation (Non-Medical): Not on file   Physical Activity: Sufficiently Active (10/31/2023)    [facility] [employer] [employer] [facility]    Exercise Vital Sign     Days of Exercise per Week: 5 days     Minutes of Exercise per Session: 30 min   Stress: No Stress Concern Present (10/31/2023)    [facility] [employer] [facility]    [place] [employer] - Occupational Stress Questionnaire     Feeling of Stress : Only a little   Social Connections: Socially Isolated (10/31/2023)    [facility] [employer] [facility]    Social Connection and Isolation Panel [NHANES]     Frequency of Communication with Friends and Family: Once a week     Frequency of Social Gatherings with Friends and Family: Once a week     Attends Denominational Services: Never     Active Member of Clubs or Organizations: No     Attends Club or Organization Meetings: Never     Marital Status:    Intimate Partner Violence: Unknown (5/21/2024)    [facility]    Humiliation, Afraid, Rape, and Kick questionnaire     Fear of Current or Ex-Partner: Not on file     Emotionally Abused: Not on file     Physically Abused: No     Sexually Abused: Not on file   Housing Stability: Unknown (5/21/2024)    Received from [facility] [employer] [facility]    Housing Stability Vital Sign     Unable to Pay for Housing in the Last Year: No     Number of Times Moved in the Last Year: Not on file     Homeless in the Last Year: Not on file       Ht 1.575 m (5' 2\")   BMI 33.65 kg/m²       PHYSICAL EXAMINATION:  GENERAL: Alert and oriented x3, in no acute distress, well-developed, well-nourished, afebrile.  HEART: No JVD.  EYES: No scleral icterus   NECK: No significant lymphadenopathy   LUNGS: No respiratory

## 2025-02-21 ENCOUNTER — HOSPITAL ENCOUNTER (OUTPATIENT)
Facility: HOSPITAL | Age: 60
Discharge: HOME | End: 2025-02-24

## 2025-02-21 DIAGNOSIS — Z01.818 PRE-OP TESTING: Primary | ICD-10-CM

## 2025-02-25 ENCOUNTER — ANESTHESIA EVENT (OUTPATIENT)
Facility: HOSPITAL | Age: 60
End: 2025-02-25
Payer: MEDICARE

## 2025-02-25 LAB
EKG ATRIAL RATE: 56 BPM
EKG DIAGNOSIS: NORMAL
EKG P AXIS: 34 DEGREES
EKG P-R INTERVAL: 126 MS
EKG Q-T INTERVAL: 414 MS
EKG QRS DURATION: 86 MS
EKG QTC CALCULATION (BAZETT): 399 MS
EKG R AXIS: 13 DEGREES
EKG T AXIS: 48 DEGREES
EKG VENTRICULAR RATE: 56 BPM

## 2025-02-26 NOTE — H&P
[facility]  [address]  [facility] [address]  Tel:  [phone]  Fax: [phone]    Patient :  Nabila Barrett  YOB: 1965  Birth Sex:  Female  Date:   02/18/2025 11:20 AM   Visit Type:    Office Visit  Assessment/Plan  #  Detail Type  Description   1.  Assessment  OAB (overactive bladder) (N32.81).    Patient Plan  Patient with significant urinary issues despite medications. Pt underwent a PNE she felt significant improvement with frequency and urgency.  She would like to proceed with 1st and 2nd Stage Axonics as planned for 02/27/2025  Postop medications  Cephalexin 500 mg p.o. b.i.d. for 7 days prescribed  Tramadol 50 mg 1 every 6 hours as needed for postop pain #12. Prescribed         2.  Assessment  Urgency of urination (R39.15).         3.  Assessment  Feeling of incomplete bladder emptying (R39.14).         4.  Assessment  Frequent UTI (N39.0).    Patient Plan      Plan Orders  Urine Culture, Routine to be performed. Obtained on 02/18/2025.              Additional Visit Information    This 59 year old patient presents for UTI and Overactive Bladder.    History of Present Illness  1.  UTI   The onset was gradual. The severity of the problem is moderate. The problem is improving. The symptoms are intermittent. Presenting/Initial symptoms include burning, lower back pain, urgency and pressure when voiding. Pertinent history includes being over 50. Pertinent history does not include diabetes or prostatitis.       Comments: Most recent UTI 01/12/2024 60,000 Enterococcus faecalis  02/26/24  YUNG- unremarkable from  standpoint. She has not had a UTI in the interval. Reports she has started UTI preventatives with benefit. Continues to have dysuria.  04/08/2024  She denies UTIs in the interval. She was evaluated by GYN for 4.6cm right adnexa cyst. She reports she will undergo another US in 6 weeks with the GYN  06/17/2024  The patient had to cancel her UDS r/t

## 2025-02-27 ENCOUNTER — APPOINTMENT (OUTPATIENT)
Facility: HOSPITAL | Age: 60
End: 2025-02-27
Attending: UROLOGY
Payer: MEDICARE

## 2025-02-27 ENCOUNTER — HOSPITAL ENCOUNTER (OUTPATIENT)
Facility: HOSPITAL | Age: 60
Setting detail: OUTPATIENT SURGERY
Discharge: HOME | End: 2025-02-27
Attending: UROLOGY | Admitting: UROLOGY
Payer: MEDICARE

## 2025-02-27 ENCOUNTER — ANESTHESIA (OUTPATIENT)
Facility: HOSPITAL | Age: 60
End: 2025-02-27
Payer: MEDICARE

## 2025-02-27 VITALS
DIASTOLIC BLOOD PRESSURE: 62 MMHG | BODY MASS INDEX: 37.86 KG/M2 | HEIGHT: 61 IN | SYSTOLIC BLOOD PRESSURE: 114 MMHG | RESPIRATION RATE: 16 BRPM | WEIGHT: 200.5 LBS | TEMPERATURE: 97.6 F | HEART RATE: 65 BPM | OXYGEN SATURATION: 84 %

## 2025-02-27 PROCEDURE — 77002 NEEDLE LOCALIZATION BY XRAY: CPT

## 2025-02-27 PROCEDURE — 7100000000 HC PACU RECOVERY - FIRST 15 MIN: Performed by: UROLOGY

## 2025-02-27 PROCEDURE — 3600000012 HC SURGERY LEVEL 2 ADDTL 15MIN: Performed by: UROLOGY

## 2025-02-27 PROCEDURE — 7100000010 HC PHASE II RECOVERY - FIRST 15 MIN: Performed by: UROLOGY

## 2025-02-27 PROCEDURE — 3700000000 HC ANESTHESIA ATTENDED CARE: Performed by: UROLOGY

## 2025-02-27 PROCEDURE — 3600000002 HC SURGERY LEVEL 2 BASE: Performed by: UROLOGY

## 2025-02-27 PROCEDURE — C1897 LEAD, NEUROSTIM TEST KIT: HCPCS | Performed by: UROLOGY

## 2025-02-27 PROCEDURE — 2580000003 HC RX 258: Performed by: UROLOGY

## 2025-02-27 PROCEDURE — 7100000001 HC PACU RECOVERY - ADDTL 15 MIN: Performed by: UROLOGY

## 2025-02-27 PROCEDURE — 7100000011 HC PHASE II RECOVERY - ADDTL 15 MIN: Performed by: UROLOGY

## 2025-02-27 PROCEDURE — 6360000002 HC RX W HCPCS: Performed by: UROLOGY

## 2025-02-27 PROCEDURE — 2500000003 HC RX 250 WO HCPCS: Performed by: NURSE ANESTHETIST, CERTIFIED REGISTERED

## 2025-02-27 PROCEDURE — 2709999900 HC NON-CHARGEABLE SUPPLY: Performed by: UROLOGY

## 2025-02-27 PROCEDURE — C1820 GENERATOR NEURO RECHG BAT SY: HCPCS | Performed by: UROLOGY

## 2025-02-27 PROCEDURE — C1778 LEAD, NEUROSTIMULATOR: HCPCS | Performed by: UROLOGY

## 2025-02-27 PROCEDURE — 6360000002 HC RX W HCPCS: Performed by: NURSE ANESTHETIST, CERTIFIED REGISTERED

## 2025-02-27 PROCEDURE — 2500000003 HC RX 250 WO HCPCS: Performed by: UROLOGY

## 2025-02-27 PROCEDURE — 3700000001 HC ADD 15 MINUTES (ANESTHESIA): Performed by: UROLOGY

## 2025-02-27 PROCEDURE — C1787 PATIENT PROGR, NEUROSTIM: HCPCS | Performed by: UROLOGY

## 2025-02-27 DEVICE — TINED LEAD KIT
Type: IMPLANTABLE DEVICE | Site: BACK | Status: FUNCTIONAL
Brand: AXONICS

## 2025-02-27 DEVICE — NEUROSTIMULATOR
Type: IMPLANTABLE DEVICE | Site: BACK | Status: FUNCTIONAL
Brand: AXONICS

## 2025-02-27 RX ORDER — SODIUM CHLORIDE 0.9 % (FLUSH) 0.9 %
5-40 SYRINGE (ML) INJECTION PRN
Status: DISCONTINUED | OUTPATIENT
Start: 2025-02-27 | End: 2025-02-27 | Stop reason: HOSPADM

## 2025-02-27 RX ORDER — OXYCODONE HYDROCHLORIDE 5 MG/1
5 TABLET ORAL
Status: DISCONTINUED | OUTPATIENT
Start: 2025-02-27 | End: 2025-02-27 | Stop reason: HOSPADM

## 2025-02-27 RX ORDER — DROPERIDOL 2.5 MG/ML
INJECTION, SOLUTION INTRAMUSCULAR; INTRAVENOUS
Status: DISCONTINUED | OUTPATIENT
Start: 2025-02-27 | End: 2025-02-27 | Stop reason: SDUPTHER

## 2025-02-27 RX ORDER — SODIUM CHLORIDE 0.9 % (FLUSH) 0.9 %
5-40 SYRINGE (ML) INJECTION EVERY 12 HOURS SCHEDULED
Status: DISCONTINUED | OUTPATIENT
Start: 2025-02-27 | End: 2025-02-27 | Stop reason: HOSPADM

## 2025-02-27 RX ORDER — PROPOFOL 10 MG/ML
INJECTION, EMULSION INTRAVENOUS
Status: DISCONTINUED | OUTPATIENT
Start: 2025-02-27 | End: 2025-02-27 | Stop reason: SDUPTHER

## 2025-02-27 RX ORDER — SODIUM CHLORIDE 9 MG/ML
INJECTION, SOLUTION INTRAVENOUS CONTINUOUS
Status: DISCONTINUED | OUTPATIENT
Start: 2025-02-27 | End: 2025-02-27 | Stop reason: HOSPADM

## 2025-02-27 RX ORDER — HYDROMORPHONE HYDROCHLORIDE 1 MG/ML
0.25 INJECTION, SOLUTION INTRAMUSCULAR; INTRAVENOUS; SUBCUTANEOUS EVERY 5 MIN PRN
Status: DISCONTINUED | OUTPATIENT
Start: 2025-02-27 | End: 2025-02-27 | Stop reason: HOSPADM

## 2025-02-27 RX ORDER — NALOXONE HYDROCHLORIDE 0.4 MG/ML
INJECTION, SOLUTION INTRAMUSCULAR; INTRAVENOUS; SUBCUTANEOUS PRN
Status: DISCONTINUED | OUTPATIENT
Start: 2025-02-27 | End: 2025-02-27 | Stop reason: HOSPADM

## 2025-02-27 RX ORDER — SODIUM CHLORIDE 9 MG/ML
INJECTION, SOLUTION INTRAVENOUS PRN
Status: DISCONTINUED | OUTPATIENT
Start: 2025-02-27 | End: 2025-02-27 | Stop reason: HOSPADM

## 2025-02-27 RX ORDER — LIDOCAINE HYDROCHLORIDE 20 MG/ML
INJECTION, SOLUTION INTRAVENOUS
Status: DISCONTINUED | OUTPATIENT
Start: 2025-02-27 | End: 2025-02-27 | Stop reason: SDUPTHER

## 2025-02-27 RX ORDER — ROCURONIUM BROMIDE 10 MG/ML
INJECTION, SOLUTION INTRAVENOUS
Status: DISCONTINUED | OUTPATIENT
Start: 2025-02-27 | End: 2025-02-27 | Stop reason: SDUPTHER

## 2025-02-27 RX ORDER — FENTANYL CITRATE 50 UG/ML
25 INJECTION, SOLUTION INTRAMUSCULAR; INTRAVENOUS EVERY 5 MIN PRN
Status: DISCONTINUED | OUTPATIENT
Start: 2025-02-27 | End: 2025-02-27 | Stop reason: HOSPADM

## 2025-02-27 RX ORDER — DEXAMETHASONE SODIUM PHOSPHATE 4 MG/ML
INJECTION, SOLUTION INTRA-ARTICULAR; INTRALESIONAL; INTRAMUSCULAR; INTRAVENOUS; SOFT TISSUE
Status: DISCONTINUED | OUTPATIENT
Start: 2025-02-27 | End: 2025-02-27 | Stop reason: SDUPTHER

## 2025-02-27 RX ORDER — GLYCOPYRROLATE 0.2 MG/ML
INJECTION INTRAMUSCULAR; INTRAVENOUS
Status: DISCONTINUED | OUTPATIENT
Start: 2025-02-27 | End: 2025-02-27 | Stop reason: SDUPTHER

## 2025-02-27 RX ORDER — EPHEDRINE SULFATE/0.9% NACL/PF 25 MG/5 ML
SYRINGE (ML) INTRAVENOUS
Status: DISCONTINUED | OUTPATIENT
Start: 2025-02-27 | End: 2025-02-27 | Stop reason: SDUPTHER

## 2025-02-27 RX ORDER — DIPHENHYDRAMINE HYDROCHLORIDE 50 MG/ML
12.5 INJECTION INTRAMUSCULAR; INTRAVENOUS
Status: DISCONTINUED | OUTPATIENT
Start: 2025-02-27 | End: 2025-02-27 | Stop reason: HOSPADM

## 2025-02-27 RX ORDER — DROPERIDOL 2.5 MG/ML
0.62 INJECTION, SOLUTION INTRAMUSCULAR; INTRAVENOUS
Status: DISCONTINUED | OUTPATIENT
Start: 2025-02-27 | End: 2025-02-27 | Stop reason: HOSPADM

## 2025-02-27 RX ORDER — MIDAZOLAM HYDROCHLORIDE 1 MG/ML
INJECTION, SOLUTION INTRAMUSCULAR; INTRAVENOUS
Status: DISCONTINUED | OUTPATIENT
Start: 2025-02-27 | End: 2025-02-27 | Stop reason: SDUPTHER

## 2025-02-27 RX ORDER — SODIUM CHLORIDE, SODIUM LACTATE, POTASSIUM CHLORIDE, CALCIUM CHLORIDE 600; 310; 30; 20 MG/100ML; MG/100ML; MG/100ML; MG/100ML
INJECTION, SOLUTION INTRAVENOUS CONTINUOUS
Status: DISCONTINUED | OUTPATIENT
Start: 2025-02-27 | End: 2025-02-27 | Stop reason: HOSPADM

## 2025-02-27 RX ORDER — LABETALOL HYDROCHLORIDE 5 MG/ML
10 INJECTION, SOLUTION INTRAVENOUS
Status: DISCONTINUED | OUTPATIENT
Start: 2025-02-27 | End: 2025-02-27 | Stop reason: HOSPADM

## 2025-02-27 RX ORDER — FENTANYL CITRATE 50 UG/ML
INJECTION, SOLUTION INTRAMUSCULAR; INTRAVENOUS
Status: DISCONTINUED | OUTPATIENT
Start: 2025-02-27 | End: 2025-02-27 | Stop reason: SDUPTHER

## 2025-02-27 RX ORDER — BUPIVACAINE HYDROCHLORIDE 2.5 MG/ML
INJECTION, SOLUTION EPIDURAL; INFILTRATION; INTRACAUDAL PRN
Status: DISCONTINUED | OUTPATIENT
Start: 2025-02-27 | End: 2025-02-27 | Stop reason: ALTCHOICE

## 2025-02-27 RX ORDER — SUCCINYLCHOLINE/SOD CL,ISO/PF 100 MG/5ML
SYRINGE (ML) INTRAVENOUS
Status: DISCONTINUED | OUTPATIENT
Start: 2025-02-27 | End: 2025-02-27 | Stop reason: SDUPTHER

## 2025-02-27 RX ORDER — ONDANSETRON 2 MG/ML
4 INJECTION INTRAMUSCULAR; INTRAVENOUS
Status: DISCONTINUED | OUTPATIENT
Start: 2025-02-27 | End: 2025-02-27 | Stop reason: HOSPADM

## 2025-02-27 RX ADMIN — Medication 2000 MG: at 10:35

## 2025-02-27 RX ADMIN — DROPERIDOL 0.62 MG: 2.5 INJECTION, SOLUTION INTRAMUSCULAR; INTRAVENOUS at 10:30

## 2025-02-27 RX ADMIN — SODIUM CHLORIDE: 900 INJECTION, SOLUTION INTRAVENOUS at 10:21

## 2025-02-27 RX ADMIN — SODIUM CHLORIDE: 900 INJECTION, SOLUTION INTRAVENOUS at 08:42

## 2025-02-27 RX ADMIN — LIDOCAINE HYDROCHLORIDE 100 MG: 20 INJECTION, SOLUTION INTRAVENOUS at 10:26

## 2025-02-27 RX ADMIN — MIDAZOLAM 2 MG: 1 INJECTION INTRAMUSCULAR; INTRAVENOUS at 10:21

## 2025-02-27 RX ADMIN — GLYCOPYRROLATE 0.2 MG: 0.2 INJECTION INTRAMUSCULAR; INTRAVENOUS at 10:21

## 2025-02-27 RX ADMIN — HYDROMORPHONE HYDROCHLORIDE 0.5 MG: 1 INJECTION, SOLUTION INTRAMUSCULAR; INTRAVENOUS; SUBCUTANEOUS at 10:59

## 2025-02-27 RX ADMIN — FENTANYL CITRATE 50 MCG: 50 INJECTION, SOLUTION INTRAMUSCULAR; INTRAVENOUS at 10:25

## 2025-02-27 RX ADMIN — ROCURONIUM BROMIDE 5 MG: 10 INJECTION, SOLUTION INTRAVENOUS at 10:25

## 2025-02-27 RX ADMIN — PROPOFOL 150 MG: 10 INJECTION, EMULSION INTRAVENOUS at 10:26

## 2025-02-27 RX ADMIN — Medication 10 MG: at 11:03

## 2025-02-27 RX ADMIN — HYDROMORPHONE HYDROCHLORIDE 0.5 MG: 1 INJECTION, SOLUTION INTRAMUSCULAR; INTRAVENOUS; SUBCUTANEOUS at 10:47

## 2025-02-27 RX ADMIN — FENTANYL CITRATE 50 MCG: 50 INJECTION, SOLUTION INTRAMUSCULAR; INTRAVENOUS at 10:21

## 2025-02-27 RX ADMIN — Medication 100 MG: at 10:26

## 2025-02-27 RX ADMIN — DEXAMETHASONE SODIUM PHOSPHATE 4 MG: 4 INJECTION INTRA-ARTICULAR; INTRALESIONAL; INTRAMUSCULAR; INTRAVENOUS; SOFT TISSUE at 10:26

## 2025-02-27 ASSESSMENT — ENCOUNTER SYMPTOMS: SHORTNESS OF BREATH: 0

## 2025-02-27 ASSESSMENT — PAIN SCALES - GENERAL
PAINLEVEL_OUTOF10: 0
PAINLEVEL_OUTOF10: 0

## 2025-02-27 NOTE — PERIOP NOTE
TRANSFER - IN REPORT:    Verbal report received from Arianna MEDEROS on Nabila Barrett  being received from PACU for routine progression of patient care      Report consisted of patient's Situation, Background, Assessment and   Recommendations(SBAR).     Information from the following report(s) Nurse Handoff Report, Surgery Report, Intake/Output, and MAR was reviewed with the receiving nurse.    Opportunity for questions and clarification was provided.      Assessment completed upon patient's arrival to unit and care assumed.

## 2025-02-27 NOTE — DISCHARGE INSTRUCTIONS
dessert, such as Jell-O.  Do not smoke. Smoking and being around smoke can make nausea worse.   When should you call for help?    Call your doctor now or seek immediate medical care if:    You have new or worse nausea or vomiting.     You are too sick to your stomach to drink any fluids.     You cannot keep down fluids.     You have symptoms of dehydration, such as:  Dry eyes and a dry mouth.  Passing only a little urine.  Feeling thirstier than usual.     You are dizzy or lightheaded, or you feel like you may faint.           Infection After Surgery: Care Instructions  Overview  After surgery, an infection is always possible. It doesn't mean that the surgery didn't go well.  How can you care for yourself at home?  Make sure your surgeon knows about the infection, especially if you saw another doctor about your symptoms.  If your doctor prescribed antibiotics, take them as directed. Do not stop taking them just because you feel better. You need to take the full course of antibiotics.  Keep the skin clean and dry.  You may have a bandage over the cut (incision). A bandage helps the incision heal and protects it. Your doctor will tell you how to take care of this. Keep it clean and dry. You may have drainage from the wound.     Call your doctor now or seek immediate medical care if:    You have signs of an infection such as:  Increased pain, swelling, warmth, or redness in the area.  Red streaks leading from the area.  Pus draining from the wound.  A new or higher fever.       Bleeding After Surgery: Care Instructions  Overview  After surgery, it is common to have some minor bruising or bleeding from the cut (incision) made by your doctor. But problems may occur that cause you to bleed too much in the surgery area.  An injury to a blood vessel can cause bleeding after surgery. Other causes include medicines such as aspirin or anticoagulants (blood thinners).  To help stop the bleeding, your doctor may have put

## 2025-02-27 NOTE — ANESTHESIA PRE PROCEDURE
Department of Anesthesiology  Preprocedure Note       Name:  Nabila Barrett   Age:  59 y.o.  :  1965                                          MRN:  [id number]         Date:  2025      Surgeon: Surgeon(s):  Craig Rodgers MD    Procedure: Procedure(s):  AXONICS IMPLANT STAGE I & 2 WITH C-ARM \"SPEC POP\"    Medications prior to admission:   Prior to Admission medications    Medication Sig Start Date End Date Taking? Authorizing Provider   sertraline (ZOLOFT) 25 MG tablet Take 1.5 tablets by mouth nightly Indications: depression   Yes Shanna Hancock MD   baclofen (LIORESAL) 10 MG tablet Take 1 tablet by mouth 3 times daily Indications: dizziness   Yes Shanna Hancock MD   chlorzoxazone (PARAFON FORTE) 500 MG tablet Take 1 tablet by mouth 4 times daily Indications: h/o enchaphalitis   Yes Shanna Hancock MD   loratadine (CLARITIN) 10 MG capsule Take 1 capsule by mouth every morning Indications: Allergies   Yes Shanna Hancock MD   Multiple Vitamin (MULTIVITAMIN) TABS tablet Take 1 tablet by mouth daily   Yes Shanna Hancock MD   atorvastatin (LIPITOR) 20 MG tablet Take 1 tablet by mouth nightly Indications: High Cholestrol   Yes ProviderShanna MD   clonazePAM (KLONOPIN) 0.5 MG tablet Take 1 tablet by mouth nightly. Indications: anxiety and dizziness   Yes Automatic Reconciliation, Ar   ondansetron (ZOFRAN-ODT) 8 MG TBDP disintegrating tablet Take 1 tablet by mouth nightly as needed for Nausea or Vomiting 21  Yes Automatic Reconciliation, Ar   lacosamide (VIMPAT) 50 MG TABS tablet Take 2 tablets by mouth 2 times daily. Indications: Seizure    ProviderShanna MD   Vitamin D3 125 MCG (5000 UT) TABS tablet Take 1 tablet by mouth daily    Shanna Hancock MD   vitamin B-12 (CYANOCOBALAMIN) 1000 MCG tablet Take 1 tablet by mouth daily    Shanna Hancock MD   acetaminophen (TYLENOL) 500 MG tablet Take 1 tablet by mouth every 6 hours as needed for Pain

## 2025-02-27 NOTE — PERIOP NOTE
No intake/output data recorded.  I/O this shift:  In: 2150 [P.O.:200; I.V.:1950]  Out: 100 [Urine:100]

## 2025-02-27 NOTE — ANESTHESIA POSTPROCEDURE EVALUATION
Post-Anesthesia Evaluation and Assessment    Cardiovascular Function/Vital Signs/Pain Score:  Vitals  BP: 102/88  Temp: 97.7 °F (36.5 °C)  Temp Source: Temporal  Pulse: 59  Respirations: 16  SpO2: 98 %  Height: 154.9 cm (5' 1\")  Weight - Scale: 90.9 kg (200 lb 8 oz)  Pain Level: 0    Patient is status post Procedure(s):  AXONICS IMPLANT STAGE I & 2 WITH C-ARM \"SPEC POP\".    Nausea/Vomiting: Controlled.    Postoperative hydration reviewed and adequate.    Pain:  Managed    Mental Status and Level of Consciousness: Baseline and appropriate for discharge.    Adequate oxygenation and airway patent.    Complications related to anesthesia: None    Post-anesthesia assessment completed. No concerns.    Patient has met all discharge requirements.    Signed By: Yan Jones MD    February 27, 2025

## 2025-02-27 NOTE — PERIOP NOTE
TRANSFER - IN REPORT:    Verbal report received from OR RN on Nabila Barrett  being received from OR for routine progression of patient care      Report consisted of patient's Situation, Background, Assessment and   Recommendations(SBAR).     Information from the following report(s) Adult Overview, Surgery Report, Intake/Output, and MAR was reviewed with the receiving nurse.    Opportunity for questions and clarification was provided.      Assessment completed upon patient's arrival to unit and care assumed.

## 2025-02-27 NOTE — OP NOTE
Operative Note      Patient: Nabila Barrett  YOB: 1965  MRN: [id number]    Date of Procedure: 2/27/2025    Pre-Op Diagnosis Codes:      * Urgency of urination [R39.15]    Post-Op Diagnosis: Same       Procedure(s):  AXONICS IMPLANT STAGE I & 2 WITH C-ARM \"SPEC POP\"    Surgeon(s):  Craig Rodgers MD    Assistant:   Surgical Assistant: Hortencia Mehta    Anesthesia: Other    Estimated Blood Loss (mL): Minimal    Complications: None    Specimens:   * No specimens in log *    Implants:  Implant Name Type Inv. Item Serial No.  Lot No. LRB No. Used Action   NEUROSTIMULATOR AXONICS 4101 - [id number]  NEUROSTIMULATOR AXONICS 4101 [id number] [employer]  N/A 1 Implanted   TINED LEAD KIT - [id number]  TINED LEAD KIT [id number] [employer]  N/A 1 Implanted         Drains: * No LDAs found *    Findings:  Infection Present At Time Of Surgery (PATOS) (choose all levels that have infection present):  No infection present    Detailed Description of Procedure:   After informed consent was obtained, the patient was taken to the operating room, and intubated on the OR stretcher.  She was then placed in the prone position on the Isaac table.  All pressure points were supported and the pt was prepped and draped in usual surgical fashion. Tape was placed on each buttock and pulled laterally to help in visualization of the anal sphincter. A  was used to confirm the patient was level. The patient was then prepped and draped in usual surgical fashion. The C-arm was moved into the lateral position to image the area of the sacral promontory to the coccyx. We then obtained an AP view to identify the superior medial aspect of S3. This was then marked on the skin level on the left side. Next, the skin was anesthetized with 0.25% Marcaine The anesthetic was placed at the skin insertion point and at the periosteal level for the lead insertion, as well as

## 2025-02-27 NOTE — INTERVAL H&P NOTE
Update History & Physical    The patient's History and Physical of February 8, 2025 was reviewed with the patient and I examined the patient. There was no change. The surgical site was confirmed by the patient and me.     Plan: The risks, benefits, expected outcome, and alternative to the recommended procedure have been discussed with the patient. Patient understands and wants to proceed with the procedure.     Electronically signed by Craig Rodgers MD on 2/27/2025 at 8:24 AM

## 2025-02-27 NOTE — PERIOP NOTE
TRANSFER - OUT REPORT:    Verbal report given to Candice MEDEROS on Nabila Barrett  being transferred to [facility] for routine progression of patient care       Report consisted of patient's Situation, Background, Assessment and   Recommendations(SBAR).     Information from the following report(s) Adult Overview, Surgery Report, Intake/Output, and MAR was reviewed with the receiving nurse.           Lines:   Peripheral IV 02/27/25 Posterior;Right Hand (Active)   Site Assessment Clean, dry & intact 02/27/25 1224   Line Status Infusing 02/27/25 1224   Phlebitis Assessment No symptoms 02/27/25 1224   Infiltration Assessment 0 02/27/25 1224   Alcohol Cap Used No 02/27/25 0842   Dressing Status Clean, dry & intact 02/27/25 1224   Dressing Type Transparent 02/27/25 1224        Opportunity for questions and clarification was provided.      Patient transported with:  Registered Nurse         CXR no acute abnormality/C spine no acute abnormality/CT head - no acute findings

## 2025-02-27 NOTE — PERIOP NOTE
Reviewed PTA medication list with patient/caregiver and patient/caregiver denies any additional medications.     Patient admits to having a responsible adult care for them at home for at least 24 hours after surgery.    Patient encouraged to use gown warming system and informed that using said warming gown to regulate body temperature prior to a procedure has been shown to help reduce the risks of blood clots and infection.    Patient's pharmacy of choice verified and documented in PTA medication section.    Dual skin assessment & fall risk band verification completed with A Elizabeth MEDEROS.

## 2025-04-03 ENCOUNTER — OFFICE VISIT (OUTPATIENT)
Age: 60
End: 2025-04-03

## 2025-04-03 VITALS — BODY MASS INDEX: 37.76 KG/M2 | HEIGHT: 61 IN | WEIGHT: 200 LBS

## 2025-04-03 DIAGNOSIS — M17.0 PRIMARY OSTEOARTHRITIS OF BOTH KNEES: Primary | ICD-10-CM

## 2025-04-03 RX ORDER — BETAMETHASONE SODIUM PHOSPHATE AND BETAMETHASONE ACETATE 3; 3 MG/ML; MG/ML
12 INJECTION, SUSPENSION INTRA-ARTICULAR; INTRALESIONAL; INTRAMUSCULAR; SOFT TISSUE ONCE
Status: COMPLETED | OUTPATIENT
Start: 2025-04-03 | End: 2025-04-03

## 2025-04-03 RX ADMIN — BETAMETHASONE SODIUM PHOSPHATE AND BETAMETHASONE ACETATE 12 MG: 3; 3 INJECTION, SUSPENSION INTRA-ARTICULAR; INTRALESIONAL; INTRAMUSCULAR; SOFT TISSUE at 15:00

## 2025-04-03 NOTE — PROGRESS NOTES
Patient: Nabila Barrett                MRN: [id number]       SSN: [id number]  YOB: 1965        AGE: 59 y.o.        SEX: female  Body mass index is 37.79 kg/m².    PCP: Bettie Vazquez MD  04/03/25    Nabila is here today for bilateral assessment of bilateral knee pain little worse on the right than on the left she is a very pleasant lady she reminds me she has had a viral encephalopathy and Warnicke's to follow and has balance problems uses her walker for that and she has known severe arthritis right knee advancing left requesting injections for both knees and I think eventually she benefit from a knee replacement she would like    The exam today very pleasant lady and wonder if she has a component of Tourette's as well both hips rotate nicely both knees move well good motion in both knees 120 degrees flexion bilaterally and comes out straight Homans' sign is negative    I did review her x-rays with her severe arthritis right knee advanced left and valgus she like both knees injected both knees were injected and well-tolerated, return to see us in about 6 weeks to  the efficacy of the injections eventual total knee social determinants of health were again reviewed no negative factors affecting patient care thank you    REVIEW OF SYSTEMS:      CON: negative  EYE: negative   ENT: negative  RESP: negative  GI:    negative   :  negative  MSK: Positive  A twelve point review of systems was completed, positives noted and all other systems were reviewed and are negative          Past Medical History:   Diagnosis Date    Abnormal EKG 08/2024    for leaky valve per patient, previously abnormal in 2021- cardio-    Allergies 2022    chronic, treated with meds    Anxiety and depression 2021    treated with med    Deaf, 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

## 2025-05-15 ENCOUNTER — OFFICE VISIT (OUTPATIENT)
Age: 60
End: 2025-05-15
Payer: MEDICARE

## 2025-05-15 DIAGNOSIS — M17.12 PRIMARY OSTEOARTHRITIS OF LEFT KNEE: Primary | ICD-10-CM

## 2025-05-15 PROCEDURE — 3017F COLORECTAL CA SCREEN DOC REV: CPT | Performed by: ORTHOPAEDIC SURGERY

## 2025-05-15 PROCEDURE — G8427 DOCREV CUR MEDS BY ELIG CLIN: HCPCS | Performed by: ORTHOPAEDIC SURGERY

## 2025-05-15 PROCEDURE — G8417 CALC BMI ABV UP PARAM F/U: HCPCS | Performed by: ORTHOPAEDIC SURGERY

## 2025-05-15 PROCEDURE — 99214 OFFICE O/P EST MOD 30 MIN: CPT | Performed by: ORTHOPAEDIC SURGERY

## 2025-05-15 PROCEDURE — 1036F TOBACCO NON-USER: CPT | Performed by: ORTHOPAEDIC SURGERY

## 2025-05-15 RX ORDER — MELOXICAM 15 MG/1
TABLET ORAL
Qty: 30 TABLET | Refills: 1 | Status: SHIPPED | OUTPATIENT
Start: 2025-05-15

## 2025-05-15 NOTE — PROGRESS NOTES
Patient: Nabila Barrett                MRN: [id number]       SSN: [id number]  YOB: 1965        AGE: 59 y.o.        SEX: female  There is no height or weight on file to calculate BMI.    PCP: Bettie Vazquez MD  05/15/25    Had the pleasure of seeing Nabila today in follow-up for her bilateral knees worse on the the right than the left and on the left she is getting some mechanical symptoms catching locking I did examine her today positive Umesh's test on the left knee without click and the right knee has findings consistent with severe arthritis going into valgus no evidence for infection or DVT    At this point I would recommend a knee replacement for her as the next surgery for the right knee that is really up to her as the timing hide we did talk about complications and same-day surgery    Risks and benefits described including but not limited to infection DVT pulmonary embolism anesthetic complications blood loss requiring transfusion chronic pain instability implant longevity arthrofibrosis and also the need for bending and straightening knee on an hourly basis to avoid complications with regards to the left knee she has moderate arthritis and I think that she is also got a meniscal tear I will obtain an MRI for the left knee written a prescription for Mobic 15 mg as needed daily advised her to follow-up with primary care to monitor this and we will see her back and we will see her back in the not-too-distant future for follow-up assessment it has been a pleasure to share in her care and both hips were rotated and are noncontributory she does use her walker for balance    REVIEW OF SYSTEMS:      CON: negative  EYE: negative   ENT: negative  RESP: negative  GI:    negative   :  negative  MSK: Positive  A twelve point review of systems was completed, positives noted and all other systems were reviewed and are negative          Past Medical History:   Diagnosis Date    Abnormal EKG

## 2025-06-02 ENCOUNTER — HOSPITAL ENCOUNTER (OUTPATIENT)
Age: 60
Discharge: HOME | End: 2025-06-05
Attending: ORTHOPAEDIC SURGERY

## 2025-06-02 DIAGNOSIS — M17.12 PRIMARY OSTEOARTHRITIS OF LEFT KNEE: ICD-10-CM

## (undated) DEVICE — GARMENT,MEDLINE,DVT,INT,CALF,MED, GEN2: Brand: MEDLINE

## (undated) DEVICE — C-ARMOR C-ARM EQUIPMENT COVERS CLEAR STERILE UNIVERSAL FIT 12 PER CASE: Brand: C-ARMOR

## (undated) DEVICE — FLEX ADVANTAGE 3000CC: Brand: FLEX ADVANTAGE

## (undated) DEVICE — CANNULA ORIG TL CLR W FOAM CUSHIONS AND 14FT SUPL TB 3 CHN

## (undated) DEVICE — Z DISCONTINUED USE (MFG CAT 7984-37) SOLUTION IV SODIUM CHL 0.9% 100 ML INJ

## (undated) DEVICE — STERILE POLYISOPRENE POWDER-FREE SURGICAL GLOVES: Brand: PROTEXIS

## (undated) DEVICE — MASTISOL ADHESIVE LIQ 2/3ML

## (undated) DEVICE — AIRLIFE™ NASAL OXYGEN CANNULA CURVED, FLARED TIP WITH 14 FOOT (4.3 M) CRUSH-RESISTANT TUBING, OVER-THE-EAR STYLE: Brand: AIRLIFE™

## (undated) DEVICE — MEDI-VAC NON-CONDUCTIVE SUCTION TUBING: Brand: CARDINAL HEALTH

## (undated) DEVICE — MEDI-VAC SUCTION HIGH CAPACITY: Brand: CARDINAL HEALTH

## (undated) DEVICE — SYRINGE MED 25GA 3ML L5/8IN SUBQ PLAS W/ DETACH NDL SFTY

## (undated) DEVICE — 3M™ STERI-DRAPE™ INCISE DRAPE 1050 (60CM X 45CM): Brand: STERI-DRAPE™

## (undated) DEVICE — SOLUTION IRRIG 1000ML H2O STRL BLT

## (undated) DEVICE — SOLUTION IRRIG 500ML 0.9% SOD CHLO USP POUR PLAS BTL

## (undated) DEVICE — ELECTRODE PT RET AD L9FT HI MOIST COND ADH HYDRGEL CORDED

## (undated) DEVICE — TAPE ADH CLTH SILK H2O REPELLENT CURAD

## (undated) DEVICE — LIMB HOLDER, WRIST/ANKLE: Brand: DEROYAL

## (undated) DEVICE — BASIN EMESIS 500CC ROSE 250/CS 60/PLT: Brand: MEDEGEN MEDICAL PRODUCTS, LLC

## (undated) DEVICE — REMOTE CONTROL: Brand: AXONICS

## (undated) DEVICE — SYR 10ML LUER LOK 1/5ML GRAD --

## (undated) DEVICE — SUTURE VICRYL + SZ 4-0 L27IN ABSRB UD PS-2 3/8 CIR REV CUT VCP426H

## (undated) DEVICE — FLUFF AND POLYMER UNDERPAD,EXTRA HEAVY: Brand: WINGS

## (undated) DEVICE — LEAD IMPLANT KIT: Brand: AXONICS

## (undated) DEVICE — SYR 50ML LR LCK 1ML GRAD NSAF --

## (undated) DEVICE — SUTURE VICRYL + SZ 3-0 L27IN ABSRB UD L26MM SH 1/2 CIR VCP416H

## (undated) DEVICE — ENDOSCOPY PUMP TUBING/ CAP SET: Brand: ERBE

## (undated) DEVICE — SPONGE GZ W4XL4IN COT 12 PLY TYP VII WVN C FLD DSGN STERILE

## (undated) DEVICE — GLOVE ORANGE PI 7 1/2   MSG9075

## (undated) DEVICE — INTENDED FOR TISSUE SEPARATION, AND OTHER PROCEDURES THAT REQUIRE A SHARP SURGICAL BLADE TO PUNCTURE OR CUT.: Brand: BARD-PARKER ® CARBON RIB-BACK BLADES

## (undated) DEVICE — 3M™ TEGADERM™ TRANSPARENT FILM DRESSING FRAME STYLE, 1626W, 4 IN X 4-3/4 IN (10 CM X 12 CM), 50/CT 4CT/CASE: Brand: 3M™ TEGADERM™

## (undated) DEVICE — BITE BLOCK ENDOSCP UNIV AD 6 TO 9.4 MM

## (undated) DEVICE — MINOR: Brand: MEDLINE INDUSTRIES, INC.

## (undated) DEVICE — GAUZE,SPONGE,4"X4",16PLY,STRL,LF,10/TRAY: Brand: MEDLINE

## (undated) DEVICE — CATHETER SUCT TR FL TIP 14FR W/ O CTRL

## (undated) DEVICE — DRAPE TWL SURG 16X26IN BLU ORB04] ALLCARE INC]

## (undated) DEVICE — SYRINGE MED 10ML TRNSLUC BRL PLUNG BLK MRK POLYPR CTRL

## (undated) DEVICE — SYR 50ML SLIP TIP NSAF LF STRL --

## (undated) DEVICE — FCPS RAD JAW 4LC 240CM W/NDL -- BX/20 RADIAL JAW 4

## (undated) DEVICE — AEGIS 1" DISK 4MM HOLE, PEEL OPEN: Brand: MEDLINE

## (undated) DEVICE — PENCIL ES L3M ROCK SWCH S STL HEX LOK BLDE ELECTRD HOLSTER

## (undated) DEVICE — 1010 S-DRAPE TOWEL DRAPE 10/BX: Brand: STERI-DRAPE™

## (undated) DEVICE — KIT GASTMY PERC PEG PULL 20FR -- ENDOVIVE BX/2

## (undated) DEVICE — DRAPE C ARM UNIV W41XL74IN CLR PLAS XR VELC CLSR POLY STRP

## (undated) DEVICE — STRIP,CLOSURE,WOUND,MEDI-STRIP,1/2X4: Brand: MEDLINE